# Patient Record
Sex: FEMALE | Race: WHITE | Employment: OTHER | URBAN - METROPOLITAN AREA
[De-identification: names, ages, dates, MRNs, and addresses within clinical notes are randomized per-mention and may not be internally consistent; named-entity substitution may affect disease eponyms.]

---

## 2017-01-21 ENCOUNTER — APPOINTMENT (OUTPATIENT)
Dept: GENERAL RADIOLOGY | Facility: HOSPITAL | Age: 72
End: 2017-01-21
Attending: HOSPITALIST
Payer: MEDICARE

## 2017-01-21 ENCOUNTER — APPOINTMENT (OUTPATIENT)
Dept: CT IMAGING | Facility: HOSPITAL | Age: 72
End: 2017-01-21
Attending: HOSPITALIST
Payer: MEDICARE

## 2017-01-21 ENCOUNTER — HOSPITAL ENCOUNTER (OUTPATIENT)
Facility: HOSPITAL | Age: 72
Setting detail: OBSERVATION
Discharge: HOME | End: 2017-01-22
Attending: EMERGENCY MEDICINE | Admitting: HOSPITALIST
Payer: MEDICARE

## 2017-01-21 DIAGNOSIS — I48.92 ATRIAL FLUTTER, PAROXYSMAL (HCC): ICD-10-CM

## 2017-01-21 DIAGNOSIS — R07.9 CHEST PAIN RADIATING TO JAW: Primary | ICD-10-CM

## 2017-01-21 PROBLEM — R73.9 HYPERGLYCEMIA: Status: ACTIVE | Noted: 2017-01-21

## 2017-01-21 PROBLEM — R79.89 AZOTEMIA: Status: ACTIVE | Noted: 2017-01-21

## 2017-01-21 LAB
ANION GAP SERPL CALC-SCNC: 8 MMOL/L (ref 0–18)
BASOPHILS # BLD: 0 K/UL (ref 0–0.2)
BASOPHILS NFR BLD: 1 %
BUN SERPL-MCNC: 17 MG/DL (ref 8–20)
BUN/CREAT SERPL: 22.1 (ref 10–20)
CALCIUM SERPL-MCNC: 9.5 MG/DL (ref 8.5–10.5)
CHLORIDE SERPL-SCNC: 104 MMOL/L (ref 95–110)
CO2 SERPL-SCNC: 28 MMOL/L (ref 22–32)
CREAT SERPL-MCNC: 0.77 MG/DL (ref 0.5–1.5)
D DIMER PPP FEU-MCNC: 2.53 MCG/ML
EOSINOPHIL # BLD: 0.1 K/UL (ref 0–0.7)
EOSINOPHIL NFR BLD: 2 %
ERYTHROCYTE [DISTWIDTH] IN BLOOD BY AUTOMATED COUNT: 12.5 % (ref 11–15)
GLUCOSE SERPL-MCNC: 100 MG/DL (ref 70–99)
HCT VFR BLD AUTO: 41.8 % (ref 35–48)
HGB BLD-MCNC: 14 G/DL (ref 12–16)
LYMPHOCYTES # BLD: 1.7 K/UL (ref 1–4)
LYMPHOCYTES NFR BLD: 29 %
MCH RBC QN AUTO: 31.5 PG (ref 27–32)
MCHC RBC AUTO-ENTMCNC: 33.5 G/DL (ref 32–37)
MCV RBC AUTO: 94 FL (ref 80–100)
MONOCYTES # BLD: 0.5 K/UL (ref 0–1)
MONOCYTES NFR BLD: 8 %
NEUTROPHILS # BLD AUTO: 3.6 K/UL (ref 1.8–7.7)
NEUTROPHILS NFR BLD: 61 %
OSMOLALITY UR CALC.SUM OF ELEC: 292 MOSM/KG (ref 275–295)
PLATELET # BLD AUTO: 281 K/UL (ref 140–400)
PMV BLD AUTO: 8 FL (ref 7.4–10.3)
POTASSIUM SERPL-SCNC: 3.9 MMOL/L (ref 3.3–5.1)
RBC # BLD AUTO: 4.44 M/UL (ref 3.7–5.4)
SODIUM SERPL-SCNC: 140 MMOL/L (ref 136–144)
TROPONIN I SERPL-MCNC: 0 NG/ML (ref ?–0.03)
TSH SERPL-ACNC: 1.48 UIU/ML (ref 0.34–5.6)
WBC # BLD AUTO: 6 K/UL (ref 4–11)

## 2017-01-21 PROCEDURE — 71010 XR CHEST AP PORTABLE  (CPT=71010): CPT

## 2017-01-21 PROCEDURE — 99220 INITIAL OBSERVATION CARE,LEVL III: CPT | Performed by: HOSPITALIST

## 2017-01-21 PROCEDURE — 71260 CT THORAX DX C+: CPT

## 2017-01-21 RX ORDER — ONDANSETRON 4 MG/1
4 TABLET, FILM COATED ORAL EVERY 8 HOURS PRN
Status: DISCONTINUED | OUTPATIENT
Start: 2017-01-21 | End: 2017-01-22

## 2017-01-21 RX ORDER — OMEPRAZOLE 20 MG/1
20 CAPSULE, DELAYED RELEASE ORAL
COMMUNITY
End: 2017-01-31 | Stop reason: DRUGHIGH

## 2017-01-21 RX ORDER — PANTOPRAZOLE SODIUM 20 MG/1
20 TABLET, DELAYED RELEASE ORAL NIGHTLY
Status: DISCONTINUED | OUTPATIENT
Start: 2017-01-21 | End: 2017-01-22

## 2017-01-21 RX ORDER — ACETAMINOPHEN 325 MG/1
650 TABLET ORAL EVERY 6 HOURS PRN
Status: DISCONTINUED | OUTPATIENT
Start: 2017-01-21 | End: 2017-01-22

## 2017-01-21 RX ORDER — CETIRIZINE HYDROCHLORIDE 10 MG/1
10 TABLET ORAL DAILY
Status: DISCONTINUED | OUTPATIENT
Start: 2017-01-21 | End: 2017-01-22

## 2017-01-21 RX ORDER — PRAVASTATIN SODIUM 10 MG
10 TABLET ORAL NIGHTLY
Status: DISCONTINUED | OUTPATIENT
Start: 2017-01-21 | End: 2017-01-22

## 2017-01-21 RX ORDER — PANTOPRAZOLE SODIUM 20 MG/1
20 TABLET, DELAYED RELEASE ORAL
Status: DISCONTINUED | OUTPATIENT
Start: 2017-01-22 | End: 2017-01-21

## 2017-01-21 RX ORDER — ATENOLOL 25 MG/1
25 TABLET ORAL
Status: DISCONTINUED | OUTPATIENT
Start: 2017-01-22 | End: 2017-01-22

## 2017-01-21 RX ORDER — ASPIRIN 81 MG/1
81 TABLET ORAL DAILY
Status: DISCONTINUED | OUTPATIENT
Start: 2017-01-22 | End: 2017-01-22

## 2017-01-21 NOTE — CONSULTS
[facility] AND [facility]  Cardiology Consultation    Sera Mccabe Patient Status:  Observation    1945 MRN [id number]   Location [address] [facility] Attending Lina Meraz, [address] Day # 0 PCP Balaji Guerrero MD     Reason for [facility] • Breast Cancer Mother 61     approx   • Other[other] [OTHER] Mother    • Other[other] [OTHER] Other      per ng lupus erythematosus   • Heart Disease Other      per ng CAD, vein, artery, liver disease, arthritis   • MS[other] Marquez Santamaria Sister       report Without clubbing, cyanosis or edema. Peripheral pulses are 2+. Neurologic: Alert and oriented, normal affect. Skin: Warm and dry.      Laboratory Data:    Lab Results  Component Value Date   WBC 6.0 01/21/2017   HGB 14.0 01/21/2017   HCT 41.8 01/21/2017

## 2017-01-21 NOTE — ED PROVIDER NOTES
Patient Seen in: [facility] AND CLINICS 1sw    History   Patient presents with:  Chest Pain Angina (cardiovascular)    Stated Complaint: chest pain    HPI    The patient is a 26-year-old female who woke up this morning with sharp left-sided face pain.   She to HCl (ANTIVERT) 12.5 MG Oral Tab,     Ondansetron HCl (ZOFRAN) 4 mg tablet,     Probiotic Product (PROBIOTIC DAILY OR),  Take by mouth daily. Calcium Carbonate-Vitamin D (CALCIUM + D OR),  Take by mouth daily.  Take 500mg & 1000 IU Vit D daily   GLUCOSAMIN 96%        Physical Exam   Constitutional: She is oriented to person, place, and time. She appears well-developed and well-nourished. HENT:   Head: Normocephalic and atraumatic.    Mouth/Throat: Oropharynx is clear and moist.   Eyes: Conjunctivae and EOM DRAW LAVENDER   RAINBOW DRAW LIGHT GREEN   RAINBOW DRAW LAVENDER TALL (BNP)   RAINBOW DRAW DARK GREEN   CBC W/ DIFFERENTIAL      EKG    Rate, intervals and axes as noted on EKG Report.   Rate: 109  Rhythm: Atrial Fibrillation with rapid ventricular rate  Re

## 2017-01-21 NOTE — H&P
[facility]    History & Physical    Nataliafarhana Bermudez Patient Status:  Observation    1945 MRN [id number]   Location [facility] Attending Gaviota Ravi, [address] [facility] Day # 0 PCP Chiqui Guerra MD     Date:  2017 LEO  2006    Comment per jessenia  uvjamal removed      Family History   Problem Relation Age of Onset   • Cancer Father      per jessenia lung   • Breast Cancer Mother 61     approx   • Other[other] [OTHER] Mother    • Other[other] [OTHER] Other      per  lupus vicky vision  Ears, nose, mouth, throat, and face: negative  Respiratory: negative for cough and dyspnea on exertion  Cardiovascular: pos for cp and facial pain   Gastrointestinal: negative for constipation, diarrhea, melena, nausea and vomiting  Genitourinary:n 01/21/2017   * 01/21/2017   CA 9.5 01/21/2017   ALB 3.6 10/25/2016   ALKPHO 87 10/25/2016   BILT 0.8 10/25/2016   TP 7.6 10/25/2016   AST 21 10/25/2016   ALT 20 10/25/2016   TSH 1.48 01/21/2017   CRP <0.5 12/12/2015   MG 2.1 12/12/2015   TROP 0.00 0

## 2017-01-22 ENCOUNTER — APPOINTMENT (OUTPATIENT)
Dept: NUCLEAR MEDICINE | Facility: HOSPITAL | Age: 72
End: 2017-01-22
Attending: HOSPITALIST
Payer: MEDICARE

## 2017-01-22 ENCOUNTER — APPOINTMENT (OUTPATIENT)
Dept: CV DIAGNOSTICS | Facility: HOSPITAL | Age: 72
End: 2017-01-22
Attending: HOSPITALIST
Payer: MEDICARE

## 2017-01-22 VITALS
TEMPERATURE: 98 F | SYSTOLIC BLOOD PRESSURE: 141 MMHG | WEIGHT: 177.38 LBS | HEART RATE: 70 BPM | RESPIRATION RATE: 18 BRPM | OXYGEN SATURATION: 97 % | HEIGHT: 62 IN | BODY MASS INDEX: 32.64 KG/M2 | DIASTOLIC BLOOD PRESSURE: 65 MMHG

## 2017-01-22 LAB
ANION GAP SERPL CALC-SCNC: 6 MMOL/L (ref 0–18)
BASOPHILS # BLD: 0 K/UL (ref 0–0.2)
BASOPHILS NFR BLD: 1 %
BUN SERPL-MCNC: 17 MG/DL (ref 8–20)
BUN/CREAT SERPL: 23.6 (ref 10–20)
CALCIUM SERPL-MCNC: 9.1 MG/DL (ref 8.5–10.5)
CHLORIDE SERPL-SCNC: 106 MMOL/L (ref 95–110)
CO2 SERPL-SCNC: 28 MMOL/L (ref 22–32)
CREAT SERPL-MCNC: 0.72 MG/DL (ref 0.5–1.5)
EOSINOPHIL # BLD: 0.1 K/UL (ref 0–0.7)
EOSINOPHIL NFR BLD: 2 %
ERYTHROCYTE [DISTWIDTH] IN BLOOD BY AUTOMATED COUNT: 12.5 % (ref 11–15)
GLUCOSE SERPL-MCNC: 89 MG/DL (ref 70–99)
HCT VFR BLD AUTO: 36.2 % (ref 35–48)
HGB BLD-MCNC: 12.4 G/DL (ref 12–16)
LYMPHOCYTES # BLD: 2.1 K/UL (ref 1–4)
LYMPHOCYTES NFR BLD: 36 %
MCH RBC QN AUTO: 32.2 PG (ref 27–32)
MCHC RBC AUTO-ENTMCNC: 34.4 G/DL (ref 32–37)
MCV RBC AUTO: 93.6 FL (ref 80–100)
MONOCYTES # BLD: 0.5 K/UL (ref 0–1)
MONOCYTES NFR BLD: 9 %
NEUTROPHILS # BLD AUTO: 3 K/UL (ref 1.8–7.7)
NEUTROPHILS NFR BLD: 52 %
OSMOLALITY UR CALC.SUM OF ELEC: 291 MOSM/KG (ref 275–295)
PLATELET # BLD AUTO: 242 K/UL (ref 140–400)
PMV BLD AUTO: 7.7 FL (ref 7.4–10.3)
POTASSIUM SERPL-SCNC: 3.9 MMOL/L (ref 3.3–5.1)
RBC # BLD AUTO: 3.87 M/UL (ref 3.7–5.4)
SODIUM SERPL-SCNC: 140 MMOL/L (ref 136–144)
WBC # BLD AUTO: 5.7 K/UL (ref 4–11)

## 2017-01-22 PROCEDURE — 93016 CV STRESS TEST SUPVJ ONLY: CPT | Performed by: INTERNAL MEDICINE

## 2017-01-22 PROCEDURE — 93306 TTE W/DOPPLER COMPLETE: CPT | Performed by: INTERNAL MEDICINE

## 2017-01-22 PROCEDURE — 99217 OBSERVATION CARE DISCHARGE: CPT | Performed by: HOSPITALIST

## 2017-01-22 PROCEDURE — 78452 HT MUSCLE IMAGE SPECT MULT: CPT

## 2017-01-22 PROCEDURE — 93018 CV STRESS TEST I&R ONLY: CPT | Performed by: INTERNAL MEDICINE

## 2017-01-22 PROCEDURE — 93306 TTE W/DOPPLER COMPLETE: CPT

## 2017-01-22 PROCEDURE — 93017 CV STRESS TEST TRACING ONLY: CPT

## 2017-01-22 RX ORDER — SODIUM CHLORIDE 0.9 % (FLUSH) 0.9 %
SYRINGE (ML) INJECTION
Status: COMPLETED
Start: 2017-01-22 | End: 2017-01-22

## 2017-01-22 RX ORDER — SODIUM CHLORIDE 9 MG/ML
INJECTION, SOLUTION INTRAVENOUS
Status: COMPLETED
Start: 2017-01-22 | End: 2017-01-22

## 2017-01-22 RX ORDER — ASPIRIN 325 MG
325 TABLET ORAL DAILY
Qty: 30 TABLET | Refills: 0 | Status: SHIPPED | OUTPATIENT
Start: 2017-01-22 | End: 2017-01-30 | Stop reason: ALTCHOICE

## 2017-01-22 NOTE — PLAN OF CARE
CARDIOVASCULAR - ADULT    • Maintains optimal cardiac output and hemodynamic stability Adequate for Discharge    • Absence of cardiac arrhythmias or at baseline Adequate for Discharge        Patient/Family Goals    • Patient/Family Long Term Goal: Remain o

## 2017-01-22 NOTE — DISCHARGE SUMMARY
[place] HOSPITALIST  DISCHARGE SUMMARY     Inessameryl Taveras Pascual Patient Status:  Observation    1945 MRN [id number]   Location [facility] Attending Kelsey Herrera MD   [address] Day # 1 PCP Chiqui Guerra MD     Date of Admission:  Prescription details    aspirin 325 MG Tabs        Take 1 tablet (325 mg total) by mouth daily.     Stop taking on:  2/21/2017   Quantity:  30 tablet   Refills:  0         CONTINUE taking these medications       Instructions Prescription details    atenolol [address] [id number]     Phone:  [phone]    - aspirin 325 MG Tabs          Discharge Condition: Medically Stable for discharge. Discharge Diet: .  AHA    Discharge Activity: As tolerated     Follow-up appointment:   Isai Bull

## 2017-01-22 NOTE — PROGRESS NOTES
[facility] AND [facility]  Cardiology Progress Note    Adrianna Silvana Patient Status:  Observation    1945 MRN [id number]   Location [facility] Attending Herlinda Singleton MD   Hosp Day # 1 PCP Lee Rutherford MD       Subjective: Feeling ALPHOS, TBIL, DBIL, TPROT    Recent Labs   Lab  01/21/17   0840  01/21/17   1048  01/21/17   1447   TROP  0.00  0.00  0.00       Allergies:     Pollen                  Other (See Comments)    Medications:    Current Facility-Administered Medications:  aten

## 2017-01-22 NOTE — IMAGING NOTE
Pt here for a regadenoson stress test. Baseline EKG SB. While walking during test, pt had an intermittent RBBB. Tolerated test without incident until in recovery.  At 40 seconds into recovery pt became unresponsive and threw her head back while sitting in t

## 2017-01-22 NOTE — PLAN OF CARE
CARDIOVASCULAR - ADULT    • Maintains optimal cardiac output and hemodynamic stability Progressing    • Absence of cardiac arrhythmias or at baseline Progressing        Patient/Family Goals    • Patient/Family Long Term Goal: Remain out of hospital [place]

## 2017-01-30 ENCOUNTER — OFFICE VISIT (OUTPATIENT)
Dept: CARDIOLOGY CLINIC | Facility: HOSPITAL | Age: 72
End: 2017-01-30
Attending: INTERNAL MEDICINE
Payer: MEDICARE

## 2017-01-30 VITALS
HEART RATE: 51 BPM | DIASTOLIC BLOOD PRESSURE: 67 MMHG | SYSTOLIC BLOOD PRESSURE: 125 MMHG | WEIGHT: 178 LBS | OXYGEN SATURATION: 99 % | BODY MASS INDEX: 33 KG/M2 | RESPIRATION RATE: 17 BRPM

## 2017-01-30 DIAGNOSIS — K22.719 BARRETT'S ESOPHAGUS WITH DYSPLASIA: ICD-10-CM

## 2017-01-30 DIAGNOSIS — I48.0 PAROXYSMAL ATRIAL FIBRILLATION (HCC): ICD-10-CM

## 2017-01-30 DIAGNOSIS — R12 HEART BURN: ICD-10-CM

## 2017-01-30 DIAGNOSIS — I10 ESSENTIAL HYPERTENSION: ICD-10-CM

## 2017-01-30 DIAGNOSIS — I48.91 ATRIAL FIBRILLATION (HCC): Primary | ICD-10-CM

## 2017-01-30 DIAGNOSIS — I48.92 ATRIAL FLUTTER, PAROXYSMAL (HCC): ICD-10-CM

## 2017-01-30 PROCEDURE — 99214 OFFICE O/P EST MOD 30 MIN: CPT | Performed by: NURSE PRACTITIONER

## 2017-01-30 PROCEDURE — 93005 ELECTROCARDIOGRAM TRACING: CPT

## 2017-01-30 PROCEDURE — 93010 ELECTROCARDIOGRAM REPORT: CPT | Performed by: NURSE PRACTITIONER

## 2017-01-30 PROCEDURE — 99211 OFF/OP EST MAY X REQ PHY/QHP: CPT | Performed by: NURSE PRACTITIONER

## 2017-01-30 RX ORDER — RANITIDINE 150 MG/1
150 TABLET ORAL
COMMUNITY
End: 2019-08-21 | Stop reason: ALTCHOICE

## 2017-01-30 NOTE — PROGRESS NOTES
[address]  Patient Status:  Outpatient    1945 MRN [id number]   Location MD Riley Carvajal MD Kris Curlin is a 70year old female who p AM   BUN 17 01/22/2017 05:37 AM    01/22/2017 05:37 AM   K 3.9 01/22/2017 05:37 AM    01/22/2017 05:37 AM   CO2 28 01/22/2017 05:37 AM   GLU 89 01/22/2017 05:37 AM   CA 9.1 01/22/2017 05:37 AM   ALB 3.6 10/25/2016 08:20 AM   ALKPHO 87 10/25/201 esophagitis following with Dr. Janie Aldana  -recurrent mild heart burn after dinner with increased asa 325 mg  - no sign of bleeding, cbc nml  - on omeprazole 20 mg daily , taking with dinner  - prn zantac        Decision Making:   Here for follow up for hos walking at a slow to moderate pace for 5-10 minutes 2-3 times a day. Pace your activity to prevent shortness of breath or fatigue.  Stop exercise if you develop chest pain, lightheadedness, or significant shortness of breath          I spent greater than 40

## 2017-01-30 NOTE — PATIENT INSTRUCTIONS
Continue all your same medications and may take a coated aspirin 325 mg daily    Begin taking your omeprazole 20 mg tablet daily, take about 30 minutes before breakfast on an empty stomach    Call if having any dizziness, lightheadedness, heart racing, pal

## 2017-01-31 ENCOUNTER — OFFICE VISIT (OUTPATIENT)
Dept: INTERNAL MEDICINE CLINIC | Facility: CLINIC | Age: 72
End: 2017-01-31

## 2017-01-31 VITALS
HEIGHT: 62 IN | BODY MASS INDEX: 32.76 KG/M2 | TEMPERATURE: 98 F | RESPIRATION RATE: 18 BRPM | SYSTOLIC BLOOD PRESSURE: 115 MMHG | HEART RATE: 47 BPM | WEIGHT: 178 LBS | DIASTOLIC BLOOD PRESSURE: 70 MMHG

## 2017-01-31 DIAGNOSIS — K21.00 REFLUX ESOPHAGITIS: ICD-10-CM

## 2017-01-31 DIAGNOSIS — I10 ESSENTIAL HYPERTENSION: Primary | ICD-10-CM

## 2017-01-31 DIAGNOSIS — E78.2 HYPERLIPIDEMIA, MIXED: ICD-10-CM

## 2017-01-31 DIAGNOSIS — I48.0 PAROXYSMAL ATRIAL FIBRILLATION (HCC): ICD-10-CM

## 2017-01-31 PROCEDURE — 99212 OFFICE O/P EST SF 10 MIN: CPT | Performed by: INTERNAL MEDICINE

## 2017-01-31 PROCEDURE — 99214 OFFICE O/P EST MOD 30 MIN: CPT | Performed by: INTERNAL MEDICINE

## 2017-01-31 RX ORDER — OMEPRAZOLE 40 MG/1
40 CAPSULE, DELAYED RELEASE ORAL DAILY
Qty: 90 CAPSULE | Refills: 3 | Status: SHIPPED
Start: 2017-01-31 | End: 2017-09-07

## 2017-02-15 ENCOUNTER — OFFICE VISIT (OUTPATIENT)
Dept: CARDIOLOGY CLINIC | Facility: CLINIC | Age: 72
End: 2017-02-15

## 2017-02-15 VITALS
HEART RATE: 60 BPM | HEIGHT: 62 IN | BODY MASS INDEX: 33.31 KG/M2 | RESPIRATION RATE: 18 BRPM | DIASTOLIC BLOOD PRESSURE: 58 MMHG | SYSTOLIC BLOOD PRESSURE: 118 MMHG | WEIGHT: 181 LBS

## 2017-02-15 DIAGNOSIS — I48.0 PAROXYSMAL ATRIAL FIBRILLATION (HCC): Primary | ICD-10-CM

## 2017-02-15 PROCEDURE — 99214 OFFICE O/P EST MOD 30 MIN: CPT | Performed by: INTERNAL MEDICINE

## 2017-02-15 PROCEDURE — 99212 OFFICE O/P EST SF 10 MIN: CPT | Performed by: INTERNAL MEDICINE

## 2017-02-15 NOTE — PROGRESS NOTES
Rosie Stratton is a 70year old female. Patient presents with:  Hospital F/U    HPI:   Patient is here for follow-up after hospitalization.   She has history of hypertension and acid reflux, Ramírez's esophagitis who presents to the hospital with com Rfl:    omega-3 fatty acids (FISH OIL) 1000 MG Oral Cap Take 500 mg by mouth 2 (two) times daily. Disp:  Rfl:    Multiple Vitamins-Minerals (MULTI FOR HER OR) Take 1 tablet by mouth daily.    Disp:  Rfl:         Past Medical History   Diagnosis Date   • D anticoagulation with Coumadin, Javier and aspirin discussed with the patient. Risks including but not limited to bleeding complications and risk of strokes were discussed. Patient would like to think about it and let us know.   She has an appointment with

## 2017-02-15 NOTE — PATIENT INSTRUCTIONS
Continue current medications. Consider switching from aspirin to Pradaxa. Follow up with APN in 4 weeks after appointment with gastroenterology.

## 2017-02-20 ENCOUNTER — PATIENT MESSAGE (OUTPATIENT)
Dept: INTERNAL MEDICINE CLINIC | Facility: CLINIC | Age: 72
End: 2017-02-20

## 2017-02-22 NOTE — TELEPHONE ENCOUNTER
From: Minna Pastor  To: Lorne Almonte MD  Sent: 2/20/2017 5:22 PM CST  Subject: Visit Follow-up Question    Dr. Ziyad Montero,  Following my visit with you, I also had a consultation with Dr. Chelly Hammonds regarding my A-Fib incident.  He feels that I shoul

## 2017-03-07 ENCOUNTER — OFFICE VISIT (OUTPATIENT)
Dept: GASTROENTEROLOGY | Facility: CLINIC | Age: 72
End: 2017-03-07

## 2017-03-07 VITALS
HEART RATE: 50 BPM | BODY MASS INDEX: 33.44 KG/M2 | SYSTOLIC BLOOD PRESSURE: 131 MMHG | HEIGHT: 61.3 IN | DIASTOLIC BLOOD PRESSURE: 68 MMHG | WEIGHT: 179.38 LBS

## 2017-03-07 DIAGNOSIS — K21.00 REFLUX ESOPHAGITIS: ICD-10-CM

## 2017-03-07 DIAGNOSIS — I48.0 PAROXYSMAL ATRIAL FIBRILLATION (HCC): ICD-10-CM

## 2017-03-07 DIAGNOSIS — Z87.19 HISTORY OF BARRETT'S ESOPHAGUS: Primary | ICD-10-CM

## 2017-03-07 PROCEDURE — 99212 OFFICE O/P EST SF 10 MIN: CPT | Performed by: INTERNAL MEDICINE

## 2017-03-07 PROCEDURE — 99214 OFFICE O/P EST MOD 30 MIN: CPT | Performed by: INTERNAL MEDICINE

## 2017-03-07 NOTE — H&P
History of present illness: This is a 77-year-old female patient of Dr. Chey Plasencia here in routine follow-up for history of Ramírez's. The patient was diagnosed with Ramírez's esophagus and her next surveillance endoscopy is not due until January 2018.   R

## 2017-03-07 NOTE — PROGRESS NOTES
HPI:    Patient ID: Sera Mccabe is a 70year old female. HPI    Review of Systems   Constitutional: Negative for fever, chills, diaphoresis, activity change, appetite change, fatigue and unexpected weight change.    HENT: Negative for sore thr Carbonate-Vitamin D (CALCIUM + D OR) Take by mouth daily. Take 500mg & 1000 IU Vit D daily Disp:  Rfl:    GLUCOSAMINE-CHONDROITIN OR Take 1 tablet by mouth 2 (two) times daily. Take Glucosamine 1500mg and Chondroitin 1100mg two times a day.   Disp:  Rfl: tenderness. There is no CVA tenderness. No hernia. Abdomen soft, nondistended, nontender. No scars, masses or hepatosplenomegaly. Musculoskeletal: Normal range of motion. She exhibits no edema or tenderness.    Lymphadenopathy:     She has no cervical

## 2017-03-07 NOTE — PATIENT INSTRUCTIONS
1. Take omeprazole daily with ranitidine for breakthrough symptoms as already prescribed. 2.  Next EGD in January 2018 as per recall    3. Start warfarin as per your cardiologist instructions with INR checked as needed.

## 2017-03-15 ENCOUNTER — OFFICE VISIT (OUTPATIENT)
Dept: CARDIOLOGY CLINIC | Facility: CLINIC | Age: 72
End: 2017-03-15

## 2017-03-15 VITALS
HEART RATE: 60 BPM | SYSTOLIC BLOOD PRESSURE: 90 MMHG | BODY MASS INDEX: 32.94 KG/M2 | HEIGHT: 62 IN | DIASTOLIC BLOOD PRESSURE: 60 MMHG | RESPIRATION RATE: 18 BRPM | WEIGHT: 179 LBS

## 2017-03-15 DIAGNOSIS — I48.0 PAROXYSMAL ATRIAL FIBRILLATION (HCC): ICD-10-CM

## 2017-03-15 DIAGNOSIS — I48.91 ATRIAL FIBRILLATION, UNSPECIFIED TYPE (HCC): Primary | ICD-10-CM

## 2017-03-15 PROCEDURE — 99212 OFFICE O/P EST SF 10 MIN: CPT | Performed by: NURSE PRACTITIONER

## 2017-03-15 PROCEDURE — 99213 OFFICE O/P EST LOW 20 MIN: CPT | Performed by: NURSE PRACTITIONER

## 2017-03-15 RX ORDER — WARFARIN SODIUM 5 MG/1
5 TABLET ORAL NIGHTLY
Qty: 30 TABLET | Refills: 0 | Status: SHIPPED | OUTPATIENT
Start: 2017-03-15 | End: 2017-04-14

## 2017-03-15 NOTE — PROGRESS NOTES
Deloris Grey is a 70year old female. Patient presents with:  Atrial Fibrillation    HPI:   Patient comes in today for a checkup to discuss anticoagulation.   She has a history of hypertension and acid reflux Ramírez's esophagus who is having some Disp:  Rfl:    DiphenhydrAMINE HCl (BENADRYL ALLERGY) 25 MG Oral Cap Take 25 mg by mouth as needed. Disp:  Rfl:    loratadine (CLARITIN) 10 MG Oral Tab Take  by mouth.  Disp:  Rfl:    omega-3 fatty acids (FISH OIL) 1000 MG Oral Cap Take 500 mg by mouth 2 type [facility])    -  Primary           Patient is doing well from a cardiac standpoint she is maintaining sinus rhythm however she does have risk factors that increase likelihood of stroke so we will start her on Coumadin 5 mg she will have an INR check on Mon

## 2017-03-20 ENCOUNTER — ANTI-COAG VISIT (OUTPATIENT)
Dept: INTERNAL MEDICINE CLINIC | Facility: CLINIC | Age: 72
End: 2017-03-20

## 2017-03-20 DIAGNOSIS — I48.91 ATRIAL FIBRILLATION, UNSPECIFIED TYPE (HCC): Primary | ICD-10-CM

## 2017-03-20 LAB — INR: 1.5 (ref 2–3)

## 2017-03-20 PROCEDURE — 85610 PROTHROMBIN TIME: CPT

## 2017-03-20 PROCEDURE — 99211 OFF/OP EST MAY X REQ PHY/QHP: CPT

## 2017-03-20 PROCEDURE — 36416 COLLJ CAPILLARY BLOOD SPEC: CPT

## 2017-03-24 ENCOUNTER — ANTI-COAG VISIT (OUTPATIENT)
Dept: INTERNAL MEDICINE CLINIC | Facility: CLINIC | Age: 72
End: 2017-03-24

## 2017-03-24 DIAGNOSIS — I48.91 ATRIAL FIBRILLATION, UNSPECIFIED TYPE (HCC): Primary | ICD-10-CM

## 2017-03-24 LAB — INR: 3 (ref 2–3)

## 2017-03-24 PROCEDURE — 99211 OFF/OP EST MAY X REQ PHY/QHP: CPT

## 2017-03-24 PROCEDURE — 85610 PROTHROMBIN TIME: CPT

## 2017-03-24 PROCEDURE — 36416 COLLJ CAPILLARY BLOOD SPEC: CPT

## 2017-03-31 ENCOUNTER — ANTI-COAG VISIT (OUTPATIENT)
Dept: INTERNAL MEDICINE CLINIC | Facility: CLINIC | Age: 72
End: 2017-03-31

## 2017-03-31 DIAGNOSIS — I48.91 ATRIAL FIBRILLATION, UNSPECIFIED TYPE (HCC): Primary | ICD-10-CM

## 2017-03-31 LAB — INR: 3.9 (ref 2–3)

## 2017-03-31 PROCEDURE — 99211 OFF/OP EST MAY X REQ PHY/QHP: CPT

## 2017-03-31 PROCEDURE — 85610 PROTHROMBIN TIME: CPT

## 2017-03-31 PROCEDURE — 36416 COLLJ CAPILLARY BLOOD SPEC: CPT

## 2017-04-14 ENCOUNTER — ANTI-COAG VISIT (OUTPATIENT)
Dept: INTERNAL MEDICINE CLINIC | Facility: CLINIC | Age: 72
End: 2017-04-14

## 2017-04-14 ENCOUNTER — OFFICE VISIT (OUTPATIENT)
Dept: INTERNAL MEDICINE CLINIC | Facility: CLINIC | Age: 72
End: 2017-04-14

## 2017-04-14 VITALS
SYSTOLIC BLOOD PRESSURE: 136 MMHG | BODY MASS INDEX: 33.13 KG/M2 | HEIGHT: 62 IN | DIASTOLIC BLOOD PRESSURE: 69 MMHG | TEMPERATURE: 98 F | OXYGEN SATURATION: 97 % | HEART RATE: 47 BPM | WEIGHT: 180 LBS

## 2017-04-14 DIAGNOSIS — I10 ESSENTIAL HYPERTENSION WITH GOAL BLOOD PRESSURE LESS THAN 140/90: Primary | ICD-10-CM

## 2017-04-14 DIAGNOSIS — R06.83 SNORING: ICD-10-CM

## 2017-04-14 DIAGNOSIS — Z78.0 POSTMENOPAUSAL: ICD-10-CM

## 2017-04-14 DIAGNOSIS — I48.0 PAROXYSMAL ATRIAL FIBRILLATION (HCC): ICD-10-CM

## 2017-04-14 DIAGNOSIS — R29.818 SUSPECTED SLEEP APNEA: ICD-10-CM

## 2017-04-14 DIAGNOSIS — I48.91 ATRIAL FIBRILLATION, UNSPECIFIED TYPE (HCC): Primary | ICD-10-CM

## 2017-04-14 DIAGNOSIS — E78.5 HYPERLIPIDEMIA, UNSPECIFIED HYPERLIPIDEMIA TYPE: ICD-10-CM

## 2017-04-14 PROCEDURE — G0463 HOSPITAL OUTPT CLINIC VISIT: HCPCS | Performed by: INTERNAL MEDICINE

## 2017-04-14 PROCEDURE — 85610 PROTHROMBIN TIME: CPT

## 2017-04-14 PROCEDURE — 90670 PCV13 VACCINE IM: CPT | Performed by: INTERNAL MEDICINE

## 2017-04-14 PROCEDURE — 99214 OFFICE O/P EST MOD 30 MIN: CPT | Performed by: INTERNAL MEDICINE

## 2017-04-14 PROCEDURE — G0009 ADMIN PNEUMOCOCCAL VACCINE: HCPCS | Performed by: INTERNAL MEDICINE

## 2017-04-14 PROCEDURE — 36416 COLLJ CAPILLARY BLOOD SPEC: CPT

## 2017-04-14 RX ORDER — WARFARIN SODIUM 5 MG/1
5 TABLET ORAL NIGHTLY
Qty: 90 TABLET | Refills: 1 | Status: SHIPPED | OUTPATIENT
Start: 2017-04-14 | End: 2017-07-07 | Stop reason: SDUPTHER

## 2017-04-14 NOTE — PROGRESS NOTES
HPI:    Patient ID: Adiel Moe is a 70year old female.     HPI  Establish care   Pt of Dr Graciela Boeck    HTN  Long standing history of hypertension  More than a year haylee  Status::::: controlled:::::::::::::::::::::::::::::y   sympotms  :        Head length about diet and monitoring she understands what this will entail.  We will have her come back and see Dr. Austen Parrish in 6 months sooner if any questions or problems  The patient indicates understanding of these issues and agrees to the plan.   The patient surgery    INJECTION; TENDON ORIGIN/INSERTION      Comment per ng injection tendon sheath, ligament    CORTISONE INJECTION Right 2010    Comment per ng post op right forefoot    EXCISE HAND/FOOT NEUROMA Right 2007    Comment per ng neuroma 2 , grupo Probiotic Product (PROBIOTIC DAILY OR) Take by mouth daily. Disp:  Rfl:    Calcium Carbonate-Vitamin D (CALCIUM + D OR) Take by mouth daily.  Take 500mg & 1000 IU Vit D daily Disp:  Rfl:    GLUCOSAMINE-CHONDROITIN OR Take 1 tablet by mouth 2 (two) times d PERFORMED  2015      Family History   Problem Relation Age of Onset   • Cancer Father      per ng lung   • Breast Cancer Mother 61     approx   • Other[other] [OTHER] Mother    • Other[other] [OTHER] Other      per ng lupus erythematosus   • Heart Disease  --------------------------  Marked sinus Bradycardia  LAD  BORDERLINE RHYTHM  When compared with ECG of 01/21/2017 11:11:50  No significant changes  Electronically signed on 01/30/2017 at 15:09 by Thieron Weston MD                ASSESSMENT/PLAN:   (I1

## 2017-04-19 ENCOUNTER — HOSPITAL ENCOUNTER (OUTPATIENT)
Dept: BONE DENSITY | Facility: HOSPITAL | Age: 72
Discharge: HOME | End: 2017-04-19
Attending: INTERNAL MEDICINE
Payer: MEDICARE

## 2017-04-19 DIAGNOSIS — Z78.0 POSTMENOPAUSAL: ICD-10-CM

## 2017-04-19 PROCEDURE — 77080 DXA BONE DENSITY AXIAL: CPT

## 2017-04-20 PROBLEM — I48.91 ATRIAL FIBRILLATION, UNSPECIFIED TYPE (HCC): Status: RESOLVED | Noted: 2017-03-15 | Resolved: 2017-04-20

## 2017-04-20 PROBLEM — R12 HEART BURN: Status: RESOLVED | Noted: 2017-01-30 | Resolved: 2017-04-20

## 2017-04-20 NOTE — PROGRESS NOTES
HPI:    Patient ID: Carmen Naik is a 70year old female. HPI patient was hospitalized with left-sided chest pain. Cleared quickly and she had a negative stress echo.   He had a short burst of atrial fibrillation was seen by cardiology and was 25 MG Oral Cap Take 25 mg by mouth as needed. Disp:  Rfl:    loratadine (CLARITIN) 10 MG Oral Tab Take  by mouth. Disp:  Rfl:    omega-3 fatty acids (FISH OIL) 1000 MG Oral Cap Take 500 mg by mouth 2 (two) times daily.    Disp:  Rfl:    Multiple Vitamins- exercise at least 3 times weekly to build strength, burn calories and help to achieve ideal body weight. 3. Paroxysmal atrial fibrillation (HCC)  Continue aspirin 325 mg daily and see Dr. Keith Rodriguez on 2-15    4.  Reflux esophagitis  Omeprazole 40 mg d

## 2017-04-22 ENCOUNTER — APPOINTMENT (OUTPATIENT)
Dept: LAB | Facility: HOSPITAL | Age: 72
End: 2017-04-22
Attending: INTERNAL MEDICINE
Payer: MEDICARE

## 2017-04-22 DIAGNOSIS — E78.5 HYPERLIPIDEMIA, UNSPECIFIED HYPERLIPIDEMIA TYPE: ICD-10-CM

## 2017-04-22 PROCEDURE — 36415 COLL VENOUS BLD VENIPUNCTURE: CPT

## 2017-04-22 PROCEDURE — 80061 LIPID PANEL: CPT

## 2017-04-28 ENCOUNTER — ANTI-COAG VISIT (OUTPATIENT)
Dept: INTERNAL MEDICINE CLINIC | Facility: CLINIC | Age: 72
End: 2017-04-28

## 2017-04-28 DIAGNOSIS — I48.0 PAROXYSMAL ATRIAL FIBRILLATION (HCC): Primary | ICD-10-CM

## 2017-04-28 PROCEDURE — 85610 PROTHROMBIN TIME: CPT

## 2017-04-28 PROCEDURE — 36416 COLLJ CAPILLARY BLOOD SPEC: CPT

## 2017-05-04 ENCOUNTER — OFFICE VISIT (OUTPATIENT)
Dept: RHEUMATOLOGY | Facility: CLINIC | Age: 72
End: 2017-05-04

## 2017-05-04 VITALS
WEIGHT: 181 LBS | TEMPERATURE: 98 F | BODY MASS INDEX: 33.31 KG/M2 | DIASTOLIC BLOOD PRESSURE: 80 MMHG | HEIGHT: 62 IN | SYSTOLIC BLOOD PRESSURE: 129 MMHG | HEART RATE: 52 BPM

## 2017-05-04 DIAGNOSIS — M25.531 RIGHT WRIST PAIN: ICD-10-CM

## 2017-05-04 DIAGNOSIS — M11.20 PSEUDOGOUT: ICD-10-CM

## 2017-05-04 DIAGNOSIS — G56.01 CARPAL TUNNEL SYNDROME OF RIGHT WRIST: Primary | ICD-10-CM

## 2017-05-04 PROCEDURE — 99214 OFFICE O/P EST MOD 30 MIN: CPT | Performed by: INTERNAL MEDICINE

## 2017-05-04 PROCEDURE — G0463 HOSPITAL OUTPT CLINIC VISIT: HCPCS | Performed by: INTERNAL MEDICINE

## 2017-05-04 NOTE — PATIENT INSTRUCTIONS
1. Try carpal tunnel brace - quinn jaun lopez   2. Information on carpal tunnel brace   3. Check right hand xray   4. Return to clinic in 2-3 months. Carpal Tunnel Syndrome    Carpal tunnel syndrome is a painful condition of the wrist and arm.  It is caused acetaminophen, which treats pain, but not inflammation. If you have chronic liver or kidney disease or ever had a stomach ulcer or GI bleeding, talk with your doctor before using these medicines.   · Opioid pain medicine will only give temporary relief and your wrist. When you can, use your whole hand and all its fingers to grasp an object. Minimize repetition  Don’t move your arms or hands or hold an object in the same way for long periods of time. Even simple, light tasks can cause injury this way.  Gabriel Hall

## 2017-05-04 NOTE — PROGRESS NOTES
Sera Mccabe is a 70year old female who presents for Patient presents with:  Joint Pain  . HPI:     He is a 79year old with hx of pseudogout and is here for evaluation on 12/11/2015. She has a hx of pseugo gout.  SHe had it in her knee sev kg)    Body mass index is 33.1 kg/(m^2). Current Outpatient Prescriptions:  Warfarin Sodium 5 MG Oral Tab Take 1 tablet (5 mg total) by mouth nightly. Or as directed by anticoagulation clinic.  Disp: 90 tablet Rfl: 1   Omeprazole 40 MG Oral Capsule D egd w/ biopsy   • Ramírez's esophagus    • Other and unspecified hyperlipidemia    • High cholesterol    • Paroxysmal atrial fibrillation ([facility] 2/15/2017   • Atrial fibrillation [facility]) 1/ 2017          Past Surgical History    COLONOSCOPY  8/26/11    OTHER nasal ulcers, no parotid swelling, no neck pain, no jaw pain, rare temple pain  Eyes: No visual changes,   Hx of iritis once a few years ago- before 1996.    CVS: No chest pain, no heart disease  RS: No SOB, no Cough, No Pleurtic pain,   GI: No nausea, no v 20.0 17.9 23.6 (H)   ANION GAP      0 - 18 8 7   CALCIUM      8.5 - 10.5 mg/dL 9.3 9.6   CALCULATED OSMOLALITY      275 - 295 mOsm/kg 291 295   AST      15 - 41 U/L 18 20   ALT (SGPT)      14 - 54 U/L 14 20   ALK PHOSPHATASE (P)      32 - 100 U/L 85 79   T 20    URINE SQUAMOUS EPITHELIAL CELL       Few    URINE WBC      0 - 5 /HPF 3    URINE RBC      0 - 3 /HPF 1    URINE BACTERIA      None seen Negative    MICROSCOPIC URINALYSIS COMMENT       Completed    HDL Cholesterol       36 41   CHOLESTEROL (P)      1 %  61    Lymphocytes %      25 - 46 %  23 (L)    Monocytes %      3 - 16 %  12    Eosinophils %      0 - 7 %  4    Basophils %      0 - 2 %  1    NEUTROPHILS #      1.8 - 7.7 K/UL  3.3    LYMPHOCYTES #      1.0 - 4.0 K/UL  1.3    MONOCYTES #      0.0 - 1.0 Component      Latest Ref Rng 1/22/2017 1/21/2017   WBC      4.0-11.0 K/UL 5.7    RBC      3.70-5.40 M/UL 3.87    Hemoglobin      12.0-16.0 g/dL 12.4    Hematocrit      35.0-48.0 % 36.2    MCV      80.0-100.0 fL 93.6    MCH      27.0-32.0 pg 32.2 (H) 18 wc, 68218 rbc, positive cppd   ASSESSMENT AND PLAN:   Jeremias Hauser is a 70year old female who presents for Patient presents with:  Joint Pain      1.  Intermittent pseudogout - d/w her that work up in past show no RA - had chornic synoviti

## 2017-05-11 ENCOUNTER — HOSPITAL ENCOUNTER (OUTPATIENT)
Dept: GENERAL RADIOLOGY | Facility: HOSPITAL | Age: 72
Discharge: HOME | End: 2017-05-11
Attending: INTERNAL MEDICINE
Payer: MEDICARE

## 2017-05-11 DIAGNOSIS — M11.20 PSEUDOGOUT: ICD-10-CM

## 2017-05-11 DIAGNOSIS — M25.531 RIGHT WRIST PAIN: ICD-10-CM

## 2017-05-11 PROCEDURE — 73130 X-RAY EXAM OF HAND: CPT | Performed by: INTERNAL MEDICINE

## 2017-05-26 ENCOUNTER — ANTI-COAG VISIT (OUTPATIENT)
Dept: INTERNAL MEDICINE CLINIC | Facility: CLINIC | Age: 72
End: 2017-05-26

## 2017-05-26 DIAGNOSIS — I48.20 CHRONIC ATRIAL FIBRILLATION (HCC): Primary | ICD-10-CM

## 2017-05-26 PROBLEM — I48.91 ATRIAL FIBRILLATION (HCC): Status: ACTIVE | Noted: 2017-05-26

## 2017-05-26 PROCEDURE — G0463 HOSPITAL OUTPT CLINIC VISIT: HCPCS

## 2017-05-26 PROCEDURE — 85610 PROTHROMBIN TIME: CPT

## 2017-05-26 PROCEDURE — 36416 COLLJ CAPILLARY BLOOD SPEC: CPT

## 2017-06-06 ENCOUNTER — OFFICE VISIT (OUTPATIENT)
Dept: SLEEP CENTER | Age: 72
End: 2017-06-06
Attending: INTERNAL MEDICINE
Payer: MEDICARE

## 2017-06-06 DIAGNOSIS — Z76.89 SLEEP CONCERN: Primary | ICD-10-CM

## 2017-06-06 PROCEDURE — 95810 POLYSOM 6/> YRS 4/> PARAM: CPT

## 2017-06-08 NOTE — PROCEDURES
[address] [facility]  Accredited by the [facility] Sleep Medicine ([facility])    PATIENT'S NAME: [facility]   ATTENDING PHYSICIAN: [address] Harjit Rodríguez MD   REFERRING PHYSICIAN: Balbina Travis.  Ambreen Rodríguez MD   PATIENT ACCOUNT #: [id number] LOC periodic limb movements for a periodic limb movement index of 175 events per hour of which 11.3 per hour were associated with arousal.  Lowest desaturation was to 90% and the average heart rate was 50 beats per minute with no significant bradycardia, asyst

## 2017-06-09 ENCOUNTER — ANTI-COAG VISIT (OUTPATIENT)
Dept: INTERNAL MEDICINE CLINIC | Facility: CLINIC | Age: 72
End: 2017-06-09

## 2017-06-09 DIAGNOSIS — I48.20 CHRONIC ATRIAL FIBRILLATION (HCC): Primary | ICD-10-CM

## 2017-06-09 PROCEDURE — 85610 PROTHROMBIN TIME: CPT

## 2017-06-09 PROCEDURE — 36416 COLLJ CAPILLARY BLOOD SPEC: CPT

## 2017-06-22 DIAGNOSIS — G47.33 OBSTRUCTIVE SLEEP APNEA SYNDROME: Primary | ICD-10-CM

## 2017-07-07 ENCOUNTER — ANTI-COAG VISIT (OUTPATIENT)
Dept: INTERNAL MEDICINE CLINIC | Facility: CLINIC | Age: 72
End: 2017-07-07

## 2017-07-07 DIAGNOSIS — I48.20 CHRONIC ATRIAL FIBRILLATION (HCC): ICD-10-CM

## 2017-07-07 LAB — INR: 2.1 (ref 2–3)

## 2017-07-07 PROCEDURE — 85610 PROTHROMBIN TIME: CPT

## 2017-07-07 PROCEDURE — 36416 COLLJ CAPILLARY BLOOD SPEC: CPT

## 2017-07-07 RX ORDER — WARFARIN SODIUM 5 MG/1
TABLET ORAL
Qty: 90 TABLET | Refills: 1 | Status: SHIPPED | OUTPATIENT
Start: 2017-07-07 | End: 2018-01-04 | Stop reason: SDUPTHER

## 2017-08-09 ENCOUNTER — OFFICE VISIT (OUTPATIENT)
Dept: CARDIOLOGY CLINIC | Facility: CLINIC | Age: 72
End: 2017-08-09

## 2017-08-09 ENCOUNTER — ANTI-COAG VISIT (OUTPATIENT)
Dept: INTERNAL MEDICINE CLINIC | Facility: CLINIC | Age: 72
End: 2017-08-09

## 2017-08-09 VITALS
HEART RATE: 58 BPM | RESPIRATION RATE: 16 BRPM | WEIGHT: 182 LBS | SYSTOLIC BLOOD PRESSURE: 118 MMHG | DIASTOLIC BLOOD PRESSURE: 64 MMHG | BODY MASS INDEX: 33 KG/M2

## 2017-08-09 DIAGNOSIS — I48.20 CHRONIC ATRIAL FIBRILLATION (HCC): ICD-10-CM

## 2017-08-09 DIAGNOSIS — I10 ESSENTIAL HYPERTENSION: ICD-10-CM

## 2017-08-09 DIAGNOSIS — I48.0 PAROXYSMAL ATRIAL FIBRILLATION (HCC): Primary | ICD-10-CM

## 2017-08-09 LAB — INR: 2.6 (ref 2–3)

## 2017-08-09 PROCEDURE — G0463 HOSPITAL OUTPT CLINIC VISIT: HCPCS | Performed by: INTERNAL MEDICINE

## 2017-08-09 PROCEDURE — 85610 PROTHROMBIN TIME: CPT

## 2017-08-09 PROCEDURE — 99214 OFFICE O/P EST MOD 30 MIN: CPT | Performed by: INTERNAL MEDICINE

## 2017-08-09 PROCEDURE — 36416 COLLJ CAPILLARY BLOOD SPEC: CPT

## 2017-08-09 NOTE — PROGRESS NOTES
Jeremias Hauser is a 70year old female. Patient presents with: Follow - Up: A-Fib incident in June    HPI:   Patient is here for follow-up appointment. She has history of paroxysmal atrial fibrillation and she is on anticoagulation for that.   She for Heartburn. Disp:  Rfl:    LORazepam (ATIVAN) 0.5 MG Oral Tab Take 0.5 mg by mouth as needed. Disp:  Rfl: 1   Meclizine HCl (ANTIVERT) 12.5 MG Oral Tab Take 12.5 mg by mouth as needed.    Disp:  Rfl: 0   Ondansetron HCl (ZOFRAN) 4 mg tablet Take 4 mg b edema    Assessment   ASSESSMENT AND PLAN:     Problem List Items Addressed This Visit     Essential hypertension    Paroxysmal atrial fibrillation ([facility] - Primary      Other Visit Diagnoses    None.          Paroxysmal atrial fibrillation with history of C

## 2017-08-09 NOTE — PATIENT INSTRUCTIONS
Continue current medications. Follow-up with Dr. Annita Mullen for sleep apnea. Follow-up with me in 6 months or sooner if needed.

## 2017-08-22 ENCOUNTER — OFFICE VISIT (OUTPATIENT)
Dept: PULMONOLOGY | Facility: CLINIC | Age: 72
End: 2017-08-22

## 2017-08-22 VITALS
HEIGHT: 62 IN | HEART RATE: 54 BPM | DIASTOLIC BLOOD PRESSURE: 69 MMHG | RESPIRATION RATE: 18 BRPM | BODY MASS INDEX: 33.93 KG/M2 | SYSTOLIC BLOOD PRESSURE: 108 MMHG | WEIGHT: 184.38 LBS | OXYGEN SATURATION: 95 %

## 2017-08-22 DIAGNOSIS — G47.33 OSA (OBSTRUCTIVE SLEEP APNEA): Primary | ICD-10-CM

## 2017-08-22 PROCEDURE — G0463 HOSPITAL OUTPT CLINIC VISIT: HCPCS | Performed by: INTERNAL MEDICINE

## 2017-08-22 PROCEDURE — 99204 OFFICE O/P NEW MOD 45 MIN: CPT | Performed by: INTERNAL MEDICINE

## 2017-08-22 NOTE — PROGRESS NOTES
Dear  Olga Lidia Londono  :           As you know, Gómez Vazquez is a 25-year-old female who I am now evaluating for sleep disordered breathing.        HISTORY OF PRESENT ILLNESS: I first met the patient about 17 years ago at which time she had polysomnography which did not de examination is unremarkable with pupils equal round and reactive to light and accommodation. Neck without adenopathy, thyromegaly, JVD nor bruit. Lungs clear to auscultation and percussion. Cardiac regular rate and rhythm no murmur.  Abdomen nontender, with

## 2017-09-07 ENCOUNTER — OFFICE VISIT (OUTPATIENT)
Dept: INTERNAL MEDICINE CLINIC | Facility: CLINIC | Age: 72
End: 2017-09-07

## 2017-09-07 ENCOUNTER — TELEPHONE (OUTPATIENT)
Dept: INTERNAL MEDICINE CLINIC | Facility: CLINIC | Age: 72
End: 2017-09-07

## 2017-09-07 ENCOUNTER — ANTI-COAG VISIT (OUTPATIENT)
Dept: INTERNAL MEDICINE CLINIC | Facility: CLINIC | Age: 72
End: 2017-09-07

## 2017-09-07 VITALS
HEART RATE: 48 BPM | DIASTOLIC BLOOD PRESSURE: 67 MMHG | TEMPERATURE: 98 F | BODY MASS INDEX: 32.94 KG/M2 | SYSTOLIC BLOOD PRESSURE: 103 MMHG | HEIGHT: 62 IN | WEIGHT: 179 LBS

## 2017-09-07 DIAGNOSIS — Z12.31 VISIT FOR SCREENING MAMMOGRAM: ICD-10-CM

## 2017-09-07 DIAGNOSIS — I48.0 PAROXYSMAL ATRIAL FIBRILLATION (HCC): ICD-10-CM

## 2017-09-07 DIAGNOSIS — M85.80 OSTEOPENIA WITH HIGH RISK OF FRACTURE: ICD-10-CM

## 2017-09-07 DIAGNOSIS — Z87.19 HISTORY OF BARRETT'S ESOPHAGUS: ICD-10-CM

## 2017-09-07 DIAGNOSIS — G47.33 OSA (OBSTRUCTIVE SLEEP APNEA): ICD-10-CM

## 2017-09-07 DIAGNOSIS — Z11.59 SCREENING FOR VIRAL DISEASE: ICD-10-CM

## 2017-09-07 DIAGNOSIS — I10 ESSENTIAL HYPERTENSION: Primary | ICD-10-CM

## 2017-09-07 DIAGNOSIS — E78.5 HYPERLIPIDEMIA, UNSPECIFIED HYPERLIPIDEMIA TYPE: ICD-10-CM

## 2017-09-07 LAB — INR: 1.8 (ref 2–3)

## 2017-09-07 PROCEDURE — 99214 OFFICE O/P EST MOD 30 MIN: CPT | Performed by: INTERNAL MEDICINE

## 2017-09-07 PROCEDURE — 36416 COLLJ CAPILLARY BLOOD SPEC: CPT

## 2017-09-07 PROCEDURE — G0463 HOSPITAL OUTPT CLINIC VISIT: HCPCS | Performed by: INTERNAL MEDICINE

## 2017-09-07 PROCEDURE — 85610 PROTHROMBIN TIME: CPT

## 2017-09-07 RX ORDER — OMEPRAZOLE 20 MG/1
20 CAPSULE, DELAYED RELEASE ORAL
Qty: 90 CAPSULE | Refills: 3 | Status: SHIPPED | OUTPATIENT
Start: 2017-09-07 | End: 2018-08-21

## 2017-09-07 RX ORDER — NICOTINE POLACRILEX 4 MG/1
20 GUM, CHEWING ORAL DAILY
Qty: 90 TABLET | Refills: 3 | Status: SHIPPED | OUTPATIENT
Start: 2017-09-07 | End: 2017-09-07

## 2017-09-07 RX ORDER — NICOTINE POLACRILEX 4 MG/1
GUM, CHEWING ORAL
COMMUNITY
End: 2017-09-07

## 2017-09-07 RX ORDER — ATENOLOL 25 MG/1
25 TABLET ORAL
Qty: 90 TABLET | Refills: 3 | Status: SHIPPED | OUTPATIENT
Start: 2017-09-07 | End: 2018-09-17

## 2017-09-07 RX ORDER — LOVASTATIN 10 MG/1
10 TABLET ORAL DAILY
Qty: 90 TABLET | Refills: 3 | Status: SHIPPED | OUTPATIENT
Start: 2017-09-07 | End: 2018-09-17

## 2017-09-07 NOTE — PROGRESS NOTES
HPI:    Patient ID: Rosie Stratton is a 70year old female.     HPI    Follow up    HTN  Long standing history of hypertension  More than a year haylee  Status::::: controlled:::::::::::::::::::::::::::::y   sympotms  :        Headache no  dizziness for agitation and behavioral problems. Current Outpatient Prescriptions:  Omeprazole 20 MG Oral Tab EC Take by mouth. Disp:  Rfl:    Warfarin Sodium 5 MG Oral Tab TAKE AS DIRECTED BY ANTICOAGULATION CLINIC: 1 tab daily, PO in Evening.  Disp: 90 ta 2012    pr ng egd w/ biopsy   • Gastritis    • Heartburn 2011    per ng reflux, dysphagia   • High cholesterol    • Other and unspecified hyperlipidemia    • [facility] 2/15/2017   • Snoring 2006    per ng uvulectomy      Past Lesia equal, round, and reactive to light. Right eye exhibits no discharge. Left eye exhibits no discharge. No scleral icterus. Neck: Neck supple. No thyromegaly present.    Cardiovascular: Normal rate, regular rhythm, normal heart sounds and intact distal puls CALCIUM      8.5 - 10.5 mg/dL 9.1      BUN/CREA RATIO      10.0 - 20.0 23.6 (H)      ANION GAP      0 - 18 6      CALCULATED OSMOLALITY      275 - 295 mOsm/kg 291      GFR, Non-      >=60 >60      GFR, -American      >=60 >60 sleep apnea (ICD-10 code G47.33). Severe periodic limb movements were identified of which 11 per hour were associated with arousal.     RECOMMENDATIONS:    1. CPAP titration. 2.                 Weight loss.   3.                 Avoid alcoh 32.0 pg 32.9 (H)   MCHC      32.0 - 37.0 g/dl 34.7   RDW      11.0 - 15.0 % 12.6   Platelet Count      347 - 400 K/   MEAN PLATELET VOLUME      7.4 - 10.3 fL 7.8   Neutrophils %      % 61   Lymphocytes %      % 28   Monocytes %      % 8   Eosinophils patients with mean peak bone mass and are given in standard deviation (s.d.). Each 1 s.d. corresponds to approximately 10% below peak normal bone density.        [employer]IZATION CRITERIA  NORMAL T SCORE:                Above -1 s.d.    OSTEOPENIA pt with chronic  somis badycardia  asymptmatic  CPM    (E78.5) Hyperlipidemia, unspecified hyperlipidemia type  Plan: ASSAY, THYROID STIM HORMONE, LIPID PANEL        Low chol diet  Monitor    (G47.33) YASMIN (obstructive sleep apnea)  Plan: ongoing evaluation

## 2017-09-07 NOTE — TELEPHONE ENCOUNTER
Pharmacy calling, omeprazole tabs not covered by insurance, request change to capsule, see pending rx

## 2017-09-16 ENCOUNTER — APPOINTMENT (OUTPATIENT)
Dept: LAB | Facility: HOSPITAL | Age: 72
End: 2017-09-16
Attending: INTERNAL MEDICINE
Payer: MEDICARE

## 2017-09-16 DIAGNOSIS — I10 ESSENTIAL HYPERTENSION: ICD-10-CM

## 2017-09-16 DIAGNOSIS — Z11.59 SCREENING FOR VIRAL DISEASE: ICD-10-CM

## 2017-09-16 DIAGNOSIS — E78.5 HYPERLIPIDEMIA, UNSPECIFIED HYPERLIPIDEMIA TYPE: ICD-10-CM

## 2017-09-16 LAB
ALBUMIN SERPL BCP-MCNC: 3.8 G/DL (ref 3.5–4.8)
ALBUMIN/GLOB SERPL: 1 {RATIO} (ref 1–2)
ALP SERPL-CCNC: 69 U/L (ref 32–100)
ALT SERPL-CCNC: 17 U/L (ref 14–54)
ANION GAP SERPL CALC-SCNC: 6 MMOL/L (ref 0–18)
AST SERPL-CCNC: 21 U/L (ref 15–41)
BILIRUB SERPL-MCNC: 0.6 MG/DL (ref 0.3–1.2)
BUN SERPL-MCNC: 17 MG/DL (ref 8–20)
BUN/CREAT SERPL: 19.8 (ref 10–20)
CALCIUM SERPL-MCNC: 9.6 MG/DL (ref 8.5–10.5)
CHLORIDE SERPL-SCNC: 105 MMOL/L (ref 95–110)
CHOLEST SERPL-MCNC: 159 MG/DL (ref 110–200)
CO2 SERPL-SCNC: 30 MMOL/L (ref 22–32)
CREAT SERPL-MCNC: 0.86 MG/DL (ref 0.5–1.5)
ERYTHROCYTE [DISTWIDTH] IN BLOOD BY AUTOMATED COUNT: 12.7 % (ref 11–15)
GLOBULIN PLAS-MCNC: 3.8 G/DL (ref 2.5–3.7)
GLUCOSE SERPL-MCNC: 92 MG/DL (ref 70–99)
HCT VFR BLD AUTO: 39.5 % (ref 35–48)
HDLC SERPL-MCNC: 44 MG/DL
HGB BLD-MCNC: 13.6 G/DL (ref 12–16)
LDLC SERPL CALC-MCNC: 100 MG/DL (ref 0–99)
MCH RBC QN AUTO: 32.7 PG (ref 27–32)
MCHC RBC AUTO-ENTMCNC: 34.3 G/DL (ref 32–37)
MCV RBC AUTO: 95.1 FL (ref 80–100)
NONHDLC SERPL-MCNC: 115 MG/DL
OSMOLALITY UR CALC.SUM OF ELEC: 293 MOSM/KG (ref 275–295)
PLATELET # BLD AUTO: 258 K/UL (ref 140–400)
PMV BLD AUTO: 8.3 FL (ref 7.4–10.3)
POTASSIUM SERPL-SCNC: 5.1 MMOL/L (ref 3.3–5.1)
PROT SERPL-MCNC: 7.6 G/DL (ref 5.9–8.4)
RBC # BLD AUTO: 4.15 M/UL (ref 3.7–5.4)
SODIUM SERPL-SCNC: 141 MMOL/L (ref 136–144)
TRIGL SERPL-MCNC: 75 MG/DL (ref 1–149)
TSH SERPL-ACNC: 1.21 UIU/ML (ref 0.45–5.33)
WBC # BLD AUTO: 4.8 K/UL (ref 4–11)

## 2017-09-16 PROCEDURE — 36415 COLL VENOUS BLD VENIPUNCTURE: CPT

## 2017-09-16 PROCEDURE — 84443 ASSAY THYROID STIM HORMONE: CPT

## 2017-09-16 PROCEDURE — 80053 COMPREHEN METABOLIC PANEL: CPT

## 2017-09-16 PROCEDURE — 86803 HEPATITIS C AB TEST: CPT

## 2017-09-16 PROCEDURE — 85027 COMPLETE CBC AUTOMATED: CPT

## 2017-09-16 PROCEDURE — 80061 LIPID PANEL: CPT

## 2017-09-18 LAB — HCV AB SERPL QL IA: NONREACTIVE

## 2017-09-22 ENCOUNTER — ANTI-COAG VISIT (OUTPATIENT)
Dept: INTERNAL MEDICINE CLINIC | Facility: CLINIC | Age: 72
End: 2017-09-22

## 2017-09-22 ENCOUNTER — TELEPHONE (OUTPATIENT)
Dept: INTERNAL MEDICINE CLINIC | Facility: CLINIC | Age: 72
End: 2017-09-22

## 2017-09-22 DIAGNOSIS — I48.20 CHRONIC ATRIAL FIBRILLATION (HCC): ICD-10-CM

## 2017-09-22 LAB — INR: 1.7 (ref 2–3)

## 2017-09-22 PROCEDURE — G0463 HOSPITAL OUTPT CLINIC VISIT: HCPCS

## 2017-09-22 PROCEDURE — 36416 COLLJ CAPILLARY BLOOD SPEC: CPT

## 2017-09-22 PROCEDURE — 85610 PROTHROMBIN TIME: CPT

## 2017-09-26 ENCOUNTER — OFFICE VISIT (OUTPATIENT)
Dept: SLEEP CENTER | Age: 72
End: 2017-09-26
Attending: INTERNAL MEDICINE
Payer: MEDICARE

## 2017-09-26 DIAGNOSIS — Z76.89 SLEEP CONCERN: Primary | ICD-10-CM

## 2017-09-26 PROCEDURE — 95810 POLYSOM 6/> YRS 4/> PARAM: CPT

## 2017-09-29 NOTE — PROCEDURES
[address] [facility]  Accredited by the [facility] ([facility])    PATIENT'S NAME: Severiano Cassius   ATTENDING PHYSICIAN: Shannon Wyatt MD   REFERRING PHYSICIAN: Shannon Wyatt MD   PATIENT ACCOUNT #: [id number] [facility] events per hour. The patient spent 319 minutes in the supine position. The respiratory event related arousal index was 2.8 events per hour and the spontaneous arousal index was 9.8 events per hour for a combined arousal index of 23 events per hour.   Monica Rea

## 2017-10-09 ENCOUNTER — PATIENT MESSAGE (OUTPATIENT)
Dept: INTERNAL MEDICINE CLINIC | Facility: CLINIC | Age: 72
End: 2017-10-09

## 2017-10-09 ENCOUNTER — ANTI-COAG VISIT (OUTPATIENT)
Dept: INTERNAL MEDICINE CLINIC | Facility: CLINIC | Age: 72
End: 2017-10-09

## 2017-10-09 DIAGNOSIS — I48.20 CHRONIC ATRIAL FIBRILLATION (HCC): ICD-10-CM

## 2017-10-09 PROCEDURE — 36416 COLLJ CAPILLARY BLOOD SPEC: CPT

## 2017-10-09 PROCEDURE — 85610 PROTHROMBIN TIME: CPT

## 2017-11-06 ENCOUNTER — ANTI-COAG VISIT (OUTPATIENT)
Dept: INTERNAL MEDICINE CLINIC | Facility: CLINIC | Age: 72
End: 2017-11-06

## 2017-11-06 DIAGNOSIS — I48.20 CHRONIC ATRIAL FIBRILLATION (HCC): ICD-10-CM

## 2017-11-06 PROCEDURE — 36416 COLLJ CAPILLARY BLOOD SPEC: CPT

## 2017-11-06 PROCEDURE — 85610 PROTHROMBIN TIME: CPT

## 2017-11-14 ENCOUNTER — HOSPITAL ENCOUNTER (OUTPATIENT)
Dept: MAMMOGRAPHY | Age: 72
Discharge: HOME | End: 2017-11-14
Attending: INTERNAL MEDICINE
Payer: MEDICARE

## 2017-11-14 DIAGNOSIS — Z12.31 VISIT FOR SCREENING MAMMOGRAM: ICD-10-CM

## 2017-11-14 PROCEDURE — 77067 SCR MAMMO BI INCL CAD: CPT | Performed by: INTERNAL MEDICINE

## 2017-12-01 ENCOUNTER — TELEPHONE (OUTPATIENT)
Dept: GASTROENTEROLOGY | Facility: CLINIC | Age: 72
End: 2017-12-01

## 2017-12-01 NOTE — TELEPHONE ENCOUNTER
----- Message from Santi Vega RN sent at 7/2/2015  4:28 PM CDT -----  Regarding: Recall EGD  Recall EGD in 3 years per ES. EGD done 1/2015.

## 2017-12-04 ENCOUNTER — ANTI-COAG VISIT (OUTPATIENT)
Dept: INTERNAL MEDICINE CLINIC | Facility: CLINIC | Age: 72
End: 2017-12-04

## 2017-12-04 DIAGNOSIS — I48.20 CHRONIC ATRIAL FIBRILLATION (HCC): ICD-10-CM

## 2017-12-04 PROCEDURE — G0463 HOSPITAL OUTPT CLINIC VISIT: HCPCS

## 2017-12-04 PROCEDURE — 36416 COLLJ CAPILLARY BLOOD SPEC: CPT

## 2017-12-04 PROCEDURE — 85610 PROTHROMBIN TIME: CPT

## 2017-12-11 ENCOUNTER — TELEPHONE (OUTPATIENT)
Dept: GASTROENTEROLOGY | Facility: CLINIC | Age: 72
End: 2017-12-11

## 2017-12-11 DIAGNOSIS — K22.70 BARRETT'S ESOPHAGUS WITHOUT DYSPLASIA: Primary | ICD-10-CM

## 2017-12-11 NOTE — TELEPHONE ENCOUNTER
Last Procedure:  1/15/2015  Last Diagnosis: History of Ramírez's esophagus    Recalled for (years): 3 years  Sedation used previously:  IV sedation  Last Prep Used (if known):  NPO after midnight  Anticoagulants/Diabetic Meds: Warfarin  Height & Weight/BMI

## 2017-12-11 NOTE — TELEPHONE ENCOUNTER
Pt called to schedule repeat EGD per letter received in mail. Pt would like to know if she needs to be seen before scheduling procedure and she also has concerns because she is on warfarin now. Please call.

## 2017-12-12 NOTE — TELEPHONE ENCOUNTER
Scheduled for:  EGD - [address]130  Provider Name:  Dr. Judi Byers  Date:  1/18/18  Location:  [facility]  Sedation:  IV  Time:  9:30 am (pt is aware to arrive at 8:30 am)  Prep:  NPO after midnight, Prep instructions were given to pt over the phone, pt verbalized understandi

## 2017-12-12 NOTE — TELEPHONE ENCOUNTER
Okay to schedule EGD, diagnosis Ramírez's esophagus, IV or MAC sedation, hold warfarin for 5 days prior to the procedure if okay with prescribing MD

## 2017-12-12 NOTE — TELEPHONE ENCOUNTER
Surgery Schedulers,    Cee Schmid MD is pt's cardiologist with [facility] [phone].  Please send this back to me after the pt is scheduled so I can get Coumadin orders    Thanks

## 2017-12-18 ENCOUNTER — ANTI-COAG VISIT (OUTPATIENT)
Dept: INTERNAL MEDICINE CLINIC | Facility: CLINIC | Age: 72
End: 2017-12-18

## 2017-12-18 DIAGNOSIS — I48.0 PAROXYSMAL ATRIAL FIBRILLATION (HCC): ICD-10-CM

## 2017-12-18 PROCEDURE — 85610 PROTHROMBIN TIME: CPT

## 2017-12-18 PROCEDURE — 36416 COLLJ CAPILLARY BLOOD SPEC: CPT

## 2017-12-22 NOTE — TELEPHONE ENCOUNTER
Ángel Casillas. Could you please call Chiqui Caballero in [place]. All they want to know is if pt can hold warfarin X 5 days before her procedure? So far they have gotten the run around between offices. Ex is [id number].

## 2017-12-22 NOTE — TELEPHONE ENCOUNTER
I spoke to the pt over the phone. She will call Luke SALEH at DR Mahmood's office to get the Lovenex orders    Please let us know after you speak to this pt regarding her Lovenox.      [facility], [facility]

## 2017-12-22 NOTE — TELEPHONE ENCOUNTER
Mi called back and reviewed EGD is scheduled for 1/18. She states pt will need to bridge with lovenox. Pt should call their office in records to this.

## 2017-12-22 NOTE — TELEPHONE ENCOUNTER
Future Appointments  Date Time Provider Vincent Shelley   1/15/2018 9:45 AM [facility]   1/18/2018 9:30 AM ARJUN, PROCEDURE [id number] None   2/28/2018 10:00 AM Sol Rendon MD [facility]     Is it ok for pt to h

## 2017-12-22 NOTE — TELEPHONE ENCOUNTER
Dr. Andreina Aleman in [address] had pt bridge with lovenox for procedure so will also need to bridge her for EGD.  This was relayed to GI

## 2018-01-08 ENCOUNTER — OFFICE VISIT (OUTPATIENT)
Dept: CARDIOLOGY CLINIC | Facility: CLINIC | Age: 73
End: 2018-01-08

## 2018-01-08 VITALS
HEIGHT: 62 IN | RESPIRATION RATE: 18 BRPM | HEART RATE: 56 BPM | WEIGHT: 163 LBS | DIASTOLIC BLOOD PRESSURE: 60 MMHG | SYSTOLIC BLOOD PRESSURE: 100 MMHG | BODY MASS INDEX: 30 KG/M2

## 2018-01-08 DIAGNOSIS — I48.0 PAROXYSMAL ATRIAL FIBRILLATION (HCC): Primary | ICD-10-CM

## 2018-01-08 PROCEDURE — 99214 OFFICE O/P EST MOD 30 MIN: CPT | Performed by: NURSE PRACTITIONER

## 2018-01-08 PROCEDURE — G0463 HOSPITAL OUTPT CLINIC VISIT: HCPCS | Performed by: NURSE PRACTITIONER

## 2018-01-08 NOTE — PROGRESS NOTES
Brian Leroy is a 67year old female. Patient presents with:  Atrial Fibrillation: Lovenox teaching for endoscopy w/ Dr Kevin Mckeon 1/18/18    HPI:   Patient comes in today for a checkup for [facility] teaching.   She has a history of hypertension Ramírez's daily Disp:  Rfl:    GLUCOSAMINE-CHONDROITIN OR Take 1 tablet by mouth 2 (two) times daily. Take Glucosamine 1500mg and Chondroitin 1100mg two times a day. Disp:  Rfl:    loratadine (CLARITIN) 10 MG Oral Tab Take  by mouth.  Disp:  Rfl:    omega-3 fatty ac suspicious lesions  HEENT: atraumatic, normocephalic,ears and throat are clear  NECK: supple,no adenopathy,no bruits  LUNGS: clear to auscultation  CARDIO: Regular rate and rhythm  GI: good BS's,no masses, HSM or tenderness  EXTREMITIES: no cyanosis, clubb

## 2018-01-08 NOTE — PATIENT INSTRUCTIONS
1. Last dose of coumadin on 1/13/18  2. Check INR 1/15/18 if less than 2 start lovenox 80mg SC twice a day last dose 1/17/18 evening  3. Restart coumadin evening of procedure  4. Ask Dr. Marquis Moy when ok to resume lovenox  5.  Recheck INR 1/22/18

## 2018-01-15 ENCOUNTER — ANTI-COAG VISIT (OUTPATIENT)
Dept: INTERNAL MEDICINE CLINIC | Facility: CLINIC | Age: 73
End: 2018-01-15

## 2018-01-15 DIAGNOSIS — I48.20 CHRONIC ATRIAL FIBRILLATION (HCC): ICD-10-CM

## 2018-01-15 LAB — INR: 1.5 (ref 2–3)

## 2018-01-15 PROCEDURE — 85610 PROTHROMBIN TIME: CPT

## 2018-01-15 PROCEDURE — 36416 COLLJ CAPILLARY BLOOD SPEC: CPT

## 2018-01-15 NOTE — TELEPHONE ENCOUNTER
I spoke to the pt over the phone    She states she started the Lovenox and will be taking it until her procedure is over. She just started it this morning.     Future Appointments  Date Time Provider Vincent Rosa   1/18/2018 9:30 AM ARJUN, PROCEDURE ECW

## 2018-01-18 ENCOUNTER — SURGERY (OUTPATIENT)
Age: 73
End: 2018-01-18

## 2018-01-18 ENCOUNTER — HOSPITAL ENCOUNTER (OUTPATIENT)
Facility: HOSPITAL | Age: 73
Setting detail: HOSPITAL OUTPATIENT SURGERY
Discharge: HOME | End: 2018-01-18
Attending: INTERNAL MEDICINE | Admitting: INTERNAL MEDICINE
Payer: MEDICARE

## 2018-01-18 ENCOUNTER — TELEPHONE (OUTPATIENT)
Dept: GASTROENTEROLOGY | Facility: CLINIC | Age: 73
End: 2018-01-18

## 2018-01-18 DIAGNOSIS — K22.70 BARRETT'S ESOPHAGUS WITHOUT DYSPLASIA: ICD-10-CM

## 2018-01-18 PROCEDURE — G0500 MOD SEDAT ENDO SERVICE >5YRS: HCPCS | Performed by: INTERNAL MEDICINE

## 2018-01-18 PROCEDURE — 43239 EGD BIOPSY SINGLE/MULTIPLE: CPT | Performed by: INTERNAL MEDICINE

## 2018-01-18 PROCEDURE — 0DB48ZX EXCISION OF ESOPHAGOGASTRIC JUNCTION, VIA NATURAL OR ARTIFICIAL OPENING ENDOSCOPIC, DIAGNOSTIC: ICD-10-PCS | Performed by: INTERNAL MEDICINE

## 2018-01-18 RX ORDER — MIDAZOLAM HYDROCHLORIDE 1 MG/ML
INJECTION INTRAMUSCULAR; INTRAVENOUS
Status: DISCONTINUED | OUTPATIENT
Start: 2018-01-18 | End: 2018-01-18

## 2018-01-18 RX ORDER — SODIUM CHLORIDE, SODIUM LACTATE, POTASSIUM CHLORIDE, CALCIUM CHLORIDE 600; 310; 30; 20 MG/100ML; MG/100ML; MG/100ML; MG/100ML
INJECTION, SOLUTION INTRAVENOUS CONTINUOUS
Status: DISCONTINUED | OUTPATIENT
Start: 2018-01-18 | End: 2018-01-18

## 2018-01-18 RX ORDER — SODIUM CHLORIDE 0.9 % (FLUSH) 0.9 %
10 SYRINGE (ML) INJECTION AS NEEDED
Status: DISCONTINUED | OUTPATIENT
Start: 2018-01-18 | End: 2018-01-18

## 2018-01-18 RX ORDER — MIDAZOLAM HYDROCHLORIDE 1 MG/ML
1 INJECTION INTRAMUSCULAR; INTRAVENOUS EVERY 5 MIN PRN
Status: DISCONTINUED | OUTPATIENT
Start: 2018-01-18 | End: 2018-01-18

## 2018-01-18 NOTE — OPERATIVE REPORT
[facility]    PATIENT'S NAME: Kendlal Race A   ATTENDING PHYSICIAN: Zaire Garcia MD   OPERATING PHYSICIAN: Zaire Garcia MD   PATIENT ACCOUNT#:   [id number]    LOCATION:  [facility] ENDO POOL ROOM [address] [place] [address] less than 5 mm, short-segment Ramírez esophagus possible. Then, biopsies x6 were taken of the distal esophagus at that location. The diaphragmatic impression was also at 36 cm. There were no strictures, ulcerations, or mass lesions.   3.   The stomach wa

## 2018-01-18 NOTE — BRIEF OP NOTE
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

## 2018-01-18 NOTE — H&P
History & Physical Examination    Patient Name: Fredy Sampson  MRN: [id number]  [facility]: [id number]  YOB: 1945    Diagnosis: History of Ramírez's esophagus      Prescriptions Prior to Admission:  Enoxaparin Sodium (LOVENOX) 80 MG/0.8ML S 1 1/12/2018   Misc. Devices (WRIST BRACE) Does not apply Misc 1 Application by Does not apply route nightly.  Disp: 1 each Rfl: 1 Taking       Current Facility-Administered Medications:  Normal Saline Flush 0.9 % injection 10 mL 10 mL Intravenous PRN   lact Onset   • Cancer Father      per ng lung   • Breast Cancer Mother 61     approx   • Other [OTHER] Mother    • MS [OTHER] Sister    • Heart Disease Other      per ng CAD, vein, artery, liver disease, arthritis   • Other [OTHER] Other      per ng lupus eryth

## 2018-01-19 VITALS
HEART RATE: 52 BPM | RESPIRATION RATE: 17 BRPM | BODY MASS INDEX: 29.63 KG/M2 | OXYGEN SATURATION: 94 % | HEIGHT: 62 IN | SYSTOLIC BLOOD PRESSURE: 111 MMHG | DIASTOLIC BLOOD PRESSURE: 67 MMHG | WEIGHT: 161 LBS

## 2018-01-22 ENCOUNTER — TELEPHONE (OUTPATIENT)
Dept: CARDIOLOGY CLINIC | Facility: CLINIC | Age: 73
End: 2018-01-22

## 2018-01-22 ENCOUNTER — ANTI-COAG VISIT (OUTPATIENT)
Dept: INTERNAL MEDICINE CLINIC | Facility: CLINIC | Age: 73
End: 2018-01-22

## 2018-01-22 DIAGNOSIS — I48.20 CHRONIC ATRIAL FIBRILLATION (HCC): ICD-10-CM

## 2018-01-22 LAB — INR: 1.5 (ref 2–3)

## 2018-01-22 PROCEDURE — 85610 PROTHROMBIN TIME: CPT

## 2018-01-22 PROCEDURE — 36416 COLLJ CAPILLARY BLOOD SPEC: CPT

## 2018-01-22 NOTE — TELEPHONE ENCOUNTER
Hi Dr. Anny Santiago,    Pt had INR 1.5 today, was holding x 3 days for EGD and resume warfarin 7.5 mg x4 days, She is on lovenox, I have her take 12.5 mg tonight and 10 mg tmr night, Recheck in 2 days.

## 2018-01-24 ENCOUNTER — ANTI-COAG VISIT (OUTPATIENT)
Dept: INTERNAL MEDICINE CLINIC | Facility: CLINIC | Age: 73
End: 2018-01-24

## 2018-01-24 DIAGNOSIS — I48.20 CHRONIC ATRIAL FIBRILLATION (HCC): ICD-10-CM

## 2018-01-24 LAB — INR: 2.2 (ref 2–3)

## 2018-01-24 PROCEDURE — 85610 PROTHROMBIN TIME: CPT

## 2018-01-24 PROCEDURE — 36416 COLLJ CAPILLARY BLOOD SPEC: CPT

## 2018-01-24 PROCEDURE — G0463 HOSPITAL OUTPT CLINIC VISIT: HCPCS

## 2018-02-05 ENCOUNTER — TELEPHONE (OUTPATIENT)
Dept: INTERNAL MEDICINE CLINIC | Facility: CLINIC | Age: 73
End: 2018-02-05

## 2018-02-05 ENCOUNTER — ANTI-COAG VISIT (OUTPATIENT)
Dept: INTERNAL MEDICINE CLINIC | Facility: CLINIC | Age: 73
End: 2018-02-05

## 2018-02-05 DIAGNOSIS — I48.91 ATRIAL FIBRILLATION, UNSPECIFIED TYPE (HCC): ICD-10-CM

## 2018-02-05 LAB — INR: 1.8 (ref 2–3)

## 2018-02-05 PROCEDURE — 85610 PROTHROMBIN TIME: CPT

## 2018-02-05 PROCEDURE — 36416 COLLJ CAPILLARY BLOOD SPEC: CPT

## 2018-02-05 PROCEDURE — G0463 HOSPITAL OUTPT CLINIC VISIT: HCPCS

## 2018-02-28 ENCOUNTER — OFFICE VISIT (OUTPATIENT)
Dept: CARDIOLOGY CLINIC | Facility: CLINIC | Age: 73
End: 2018-02-28

## 2018-02-28 ENCOUNTER — ANTI-COAG VISIT (OUTPATIENT)
Dept: INTERNAL MEDICINE CLINIC | Facility: CLINIC | Age: 73
End: 2018-02-28

## 2018-02-28 VITALS
SYSTOLIC BLOOD PRESSURE: 100 MMHG | BODY MASS INDEX: 29 KG/M2 | WEIGHT: 157 LBS | HEART RATE: 56 BPM | RESPIRATION RATE: 12 BRPM | DIASTOLIC BLOOD PRESSURE: 60 MMHG

## 2018-02-28 DIAGNOSIS — R07.9 CHEST PAIN RADIATING TO JAW: Primary | ICD-10-CM

## 2018-02-28 DIAGNOSIS — E78.2 HYPERLIPIDEMIA, MIXED: ICD-10-CM

## 2018-02-28 DIAGNOSIS — I48.91 ATRIAL FIBRILLATION, UNSPECIFIED TYPE (HCC): Primary | ICD-10-CM

## 2018-02-28 DIAGNOSIS — I48.0 PAROXYSMAL ATRIAL FIBRILLATION (HCC): ICD-10-CM

## 2018-02-28 DIAGNOSIS — I10 ESSENTIAL HYPERTENSION: ICD-10-CM

## 2018-02-28 LAB — INR: 2 (ref 2–3)

## 2018-02-28 PROCEDURE — 85610 PROTHROMBIN TIME: CPT

## 2018-02-28 PROCEDURE — 99214 OFFICE O/P EST MOD 30 MIN: CPT | Performed by: INTERNAL MEDICINE

## 2018-02-28 PROCEDURE — G0463 HOSPITAL OUTPT CLINIC VISIT: HCPCS | Performed by: INTERNAL MEDICINE

## 2018-02-28 PROCEDURE — 36416 COLLJ CAPILLARY BLOOD SPEC: CPT

## 2018-02-28 NOTE — PATIENT INSTRUCTIONS
Continue current medications. Schedule a CT coronary angiogram within the next few days. Follow with me in 6 months or sooner depending on the results.

## 2018-02-28 NOTE — PROGRESS NOTES
Jackie Crook is a 67year old female. Patient presents with: Follow - Up    HPI:   Patient is here for a follow-up appointment. She has history of paroxysmal atrial fibrillation.   She has had 2 episodes since last 6 months with atrial fibrillati Misc. Devices (WRIST BRACE) Does not apply Misc 1 Application by Does not apply route nightly. Disp: 1 each Rfl: 1   LORazepam (ATIVAN) 0.5 MG Oral Tab Take 0.5 mg by mouth as needed.    Disp:  Rfl: 1      Past Medical History:   Diagnosis Date   • Arrhyt ARTERIES W CALCIUM SCORING (CPT=75574)    Hyperlipidemia, mixed    Relevant Orders    CTA GATED CORONARY ARTERIES W CALCIUM SCORING (CPT=75574)    Chest pain radiating to jaw - Primary    Relevant Orders    CTA GATED CORONARY ARTERIES W CALCIUM SCORING (CP

## 2018-03-01 ENCOUNTER — TELEPHONE (OUTPATIENT)
Dept: CARDIOLOGY CLINIC | Facility: CLINIC | Age: 73
End: 2018-03-01

## 2018-03-01 NOTE — TELEPHONE ENCOUNTER
No PA required for supplemental policy to Medicare. Verified with pt Medicare is active as primary insurance. She will keep appt as scheduled.

## 2018-03-07 ENCOUNTER — HOSPITAL ENCOUNTER (OUTPATIENT)
Dept: CT IMAGING | Facility: HOSPITAL | Age: 73
Discharge: HOME | End: 2018-03-07
Attending: INTERNAL MEDICINE
Payer: MEDICARE

## 2018-03-07 VITALS — WEIGHT: 157 LBS | HEIGHT: 61 IN | BODY MASS INDEX: 29.64 KG/M2

## 2018-03-07 DIAGNOSIS — I48.0 PAROXYSMAL ATRIAL FIBRILLATION (HCC): ICD-10-CM

## 2018-03-07 DIAGNOSIS — E78.2 HYPERLIPIDEMIA, MIXED: ICD-10-CM

## 2018-03-07 DIAGNOSIS — I10 ESSENTIAL HYPERTENSION: ICD-10-CM

## 2018-03-07 DIAGNOSIS — R07.9 CHEST PAIN RADIATING TO JAW: ICD-10-CM

## 2018-03-07 LAB — CREAT BLD-MCNC: 0.8 MG/DL (ref 0.5–1.5)

## 2018-03-07 PROCEDURE — 75574 CT ANGIO HRT W/3D IMAGE: CPT | Performed by: INTERNAL MEDICINE

## 2018-03-07 PROCEDURE — 82565 ASSAY OF CREATININE: CPT

## 2018-03-07 RX ORDER — NITROGLYCERIN 0.4 MG/1
0.4 TABLET SUBLINGUAL ONCE
Status: COMPLETED | OUTPATIENT
Start: 2018-03-07 | End: 2018-03-07

## 2018-03-07 RX ORDER — DILTIAZEM HYDROCHLORIDE 5 MG/ML
10 INJECTION INTRAVENOUS SEE ADMIN INSTRUCTIONS
Status: DISCONTINUED | OUTPATIENT
Start: 2018-03-07 | End: 2018-03-08

## 2018-03-07 RX ORDER — METOPROLOL TARTRATE 5 MG/5ML
5 INJECTION INTRAVENOUS SEE ADMIN INSTRUCTIONS
Status: DISCONTINUED | OUTPATIENT
Start: 2018-03-07 | End: 2018-03-08

## 2018-03-07 RX ADMIN — NITROGLYCERIN 0.4 MG: 0.4 TABLET SUBLINGUAL at 08:45:00

## 2018-03-07 NOTE — IMAGING NOTE
TO RAD HOLDING AT 0810  HX TAKEN PT CONSENTED AT 0820 VS HR 42 /52    18 GAUGE IV STARTED AT 0835 POC TESTING COMPLETED    TO CT TABLE @ 5113G1 PLACED AT 2 L NASAL CANNULA HOOKED TO MONITOR  VS HR 40 /60      NITROGLYCERIN 0.4 MILLIGRAMS SUBLIN

## 2018-03-14 ENCOUNTER — OFFICE VISIT (OUTPATIENT)
Dept: INTERNAL MEDICINE CLINIC | Facility: CLINIC | Age: 73
End: 2018-03-14

## 2018-03-14 VITALS
HEART RATE: 58 BPM | DIASTOLIC BLOOD PRESSURE: 67 MMHG | BODY MASS INDEX: 28.89 KG/M2 | TEMPERATURE: 98 F | HEIGHT: 62 IN | WEIGHT: 157 LBS | SYSTOLIC BLOOD PRESSURE: 108 MMHG

## 2018-03-14 DIAGNOSIS — H81.02 MENIERE DISEASE, LEFT: Primary | ICD-10-CM

## 2018-03-14 DIAGNOSIS — H81.12 BENIGN PAROXYSMAL POSITIONAL VERTIGO OF LEFT EAR: ICD-10-CM

## 2018-03-14 PROCEDURE — G0463 HOSPITAL OUTPT CLINIC VISIT: HCPCS | Performed by: INTERNAL MEDICINE

## 2018-03-14 PROCEDURE — 99214 OFFICE O/P EST MOD 30 MIN: CPT | Performed by: INTERNAL MEDICINE

## 2018-03-14 RX ORDER — WARFARIN SODIUM 5 MG/1
TABLET ORAL
Qty: 150 TABLET | Refills: 1 | Status: SHIPPED | OUTPATIENT
Start: 2018-03-14 | End: 2019-03-03

## 2018-03-14 NOTE — PROGRESS NOTES
HPI:    Patient ID: Juan Antonio Walsh is a 67year old female.     HPI    menier's left  3 episodes last week  The first 2 days   Took meclizine and Zofran  Sat did not atke anythign    menier's attack  nausae and have to have a BM    Feeling  Imbalanc 150 mg by mouth daily as needed for Heartburn. Disp:  Rfl:    LORazepam (ATIVAN) 0.5 MG Oral Tab Take 0.5 mg by mouth as needed. Disp:  Rfl: 1   Meclizine HCl (ANTIVERT) 12.5 MG Oral Tab Take 12.5 mg by mouth as needed.    Disp:  Rfl: 0   Ondansetron HCl HAND/FINGER SURGERY UNLISTED Right      Comment: per ng hand synovitis surgery  No date: HYSTERECTOMY  No date: INJECTION; TENDON ORIGIN/INSERTION      Comment: per ng injection tendon sheath, ligament  1981: OTHER SURGICAL HISTORY Left      Comment: per n She exhibits no edema or tenderness. Lymphadenopathy:     She has no cervical adenopathy. Neurological: She is alert. Skin: No rash noted. She is not diaphoretic. No erythema. Nursing note and vitals reviewed.            ASSESSMENT/PLAN:   (H81.02)

## 2018-03-22 ENCOUNTER — TELEPHONE (OUTPATIENT)
Dept: CARDIOLOGY CLINIC | Facility: CLINIC | Age: 73
End: 2018-03-22

## 2018-03-22 DIAGNOSIS — I48.91 ATRIAL FIBRILLATION, UNSPECIFIED TYPE (HCC): Primary | ICD-10-CM

## 2018-03-22 NOTE — TELEPHONE ENCOUNTER
Patient's current anticoagulation monitoring order is  3/15/18. Patient has an upcoming appointment with [facility] on 3/28/18         Please sign pending order for renewal.     Thank you.

## 2018-03-23 ENCOUNTER — ANTI-COAG VISIT (OUTPATIENT)
Dept: INTERNAL MEDICINE CLINIC | Facility: CLINIC | Age: 73
End: 2018-03-23

## 2018-03-23 DIAGNOSIS — I48.20 CHRONIC ATRIAL FIBRILLATION (HCC): ICD-10-CM

## 2018-03-23 DIAGNOSIS — I48.91 ATRIAL FIBRILLATION, UNSPECIFIED TYPE (HCC): ICD-10-CM

## 2018-03-28 ENCOUNTER — ANTI-COAG VISIT (OUTPATIENT)
Dept: INTERNAL MEDICINE CLINIC | Facility: CLINIC | Age: 73
End: 2018-03-28

## 2018-03-28 DIAGNOSIS — I48.91 ATRIAL FIBRILLATION, UNSPECIFIED TYPE (HCC): ICD-10-CM

## 2018-03-28 LAB — INR: 2.1 (ref 2–3)

## 2018-03-28 PROCEDURE — 85610 PROTHROMBIN TIME: CPT

## 2018-03-28 PROCEDURE — 36416 COLLJ CAPILLARY BLOOD SPEC: CPT

## 2018-04-25 ENCOUNTER — ANTI-COAG VISIT (OUTPATIENT)
Dept: INTERNAL MEDICINE CLINIC | Facility: CLINIC | Age: 73
End: 2018-04-25

## 2018-04-25 DIAGNOSIS — I48.91 ATRIAL FIBRILLATION, UNSPECIFIED TYPE (HCC): ICD-10-CM

## 2018-04-25 DIAGNOSIS — I48.20 CHRONIC ATRIAL FIBRILLATION (HCC): ICD-10-CM

## 2018-04-25 PROCEDURE — 85610 PROTHROMBIN TIME: CPT

## 2018-04-25 PROCEDURE — 36416 COLLJ CAPILLARY BLOOD SPEC: CPT

## 2018-05-19 ENCOUNTER — MED REC SCAN ONLY (OUTPATIENT)
Dept: INTERNAL MEDICINE CLINIC | Facility: CLINIC | Age: 73
End: 2018-05-19

## 2018-05-24 ENCOUNTER — ANTI-COAG VISIT (OUTPATIENT)
Dept: INTERNAL MEDICINE CLINIC | Facility: CLINIC | Age: 73
End: 2018-05-24

## 2018-05-24 DIAGNOSIS — I48.20 CHRONIC ATRIAL FIBRILLATION (HCC): ICD-10-CM

## 2018-05-24 DIAGNOSIS — I48.91 ATRIAL FIBRILLATION, UNSPECIFIED TYPE (HCC): ICD-10-CM

## 2018-05-24 PROCEDURE — 36416 COLLJ CAPILLARY BLOOD SPEC: CPT

## 2018-05-24 PROCEDURE — 85610 PROTHROMBIN TIME: CPT

## 2018-06-20 ENCOUNTER — ANTI-COAG VISIT (OUTPATIENT)
Dept: INTERNAL MEDICINE CLINIC | Facility: CLINIC | Age: 73
End: 2018-06-20

## 2018-06-20 DIAGNOSIS — I48.20 CHRONIC ATRIAL FIBRILLATION (HCC): ICD-10-CM

## 2018-06-20 DIAGNOSIS — I48.91 ATRIAL FIBRILLATION, UNSPECIFIED TYPE (HCC): ICD-10-CM

## 2018-06-20 PROCEDURE — 36416 COLLJ CAPILLARY BLOOD SPEC: CPT

## 2018-06-20 PROCEDURE — 85610 PROTHROMBIN TIME: CPT

## 2018-07-17 ENCOUNTER — ANTI-COAG VISIT (OUTPATIENT)
Dept: INTERNAL MEDICINE CLINIC | Facility: CLINIC | Age: 73
End: 2018-07-17

## 2018-07-17 DIAGNOSIS — I48.91 ATRIAL FIBRILLATION, UNSPECIFIED TYPE (HCC): ICD-10-CM

## 2018-07-17 LAB — INR: 2.2 (ref 2–3)

## 2018-07-17 PROCEDURE — 93793 ANTICOAG MGMT PT WARFARIN: CPT | Performed by: INTERNAL MEDICINE

## 2018-08-15 ENCOUNTER — ANTI-COAG VISIT (OUTPATIENT)
Dept: INTERNAL MEDICINE CLINIC | Facility: CLINIC | Age: 73
End: 2018-08-15
Payer: MEDICARE

## 2018-08-15 DIAGNOSIS — I48.91 ATRIAL FIBRILLATION, UNSPECIFIED TYPE (HCC): ICD-10-CM

## 2018-08-15 DIAGNOSIS — I48.20 CHRONIC ATRIAL FIBRILLATION (HCC): ICD-10-CM

## 2018-08-15 LAB — INR: 2.4 (ref 2–3)

## 2018-08-15 PROCEDURE — 93793 ANTICOAG MGMT PT WARFARIN: CPT | Performed by: INTERNAL MEDICINE

## 2018-08-21 RX ORDER — OMEPRAZOLE 20 MG/1
20 CAPSULE, DELAYED RELEASE ORAL
Qty: 90 CAPSULE | Refills: 0 | Status: SHIPPED | OUTPATIENT
Start: 2018-08-21 | End: 2018-09-17

## 2018-08-21 NOTE — TELEPHONE ENCOUNTER
Refill Protocol Appointment Criteria  · Appointment scheduled in the past 12 months or in the next 3 months  Recent Outpatient Visits            5 months ago Meniere disease, left    Peterson Andrade MD    Office Visit

## 2018-08-22 ENCOUNTER — OFFICE VISIT (OUTPATIENT)
Dept: CARDIOLOGY CLINIC | Facility: CLINIC | Age: 73
End: 2018-08-22
Payer: MEDICARE

## 2018-08-22 VITALS
SYSTOLIC BLOOD PRESSURE: 118 MMHG | HEIGHT: 62 IN | RESPIRATION RATE: 12 BRPM | WEIGHT: 150 LBS | DIASTOLIC BLOOD PRESSURE: 60 MMHG | HEART RATE: 64 BPM | BODY MASS INDEX: 27.6 KG/M2

## 2018-08-22 DIAGNOSIS — I48.0 PAROXYSMAL ATRIAL FIBRILLATION (HCC): Primary | ICD-10-CM

## 2018-08-22 PROCEDURE — G0463 HOSPITAL OUTPT CLINIC VISIT: HCPCS | Performed by: INTERNAL MEDICINE

## 2018-08-22 PROCEDURE — 99213 OFFICE O/P EST LOW 20 MIN: CPT | Performed by: INTERNAL MEDICINE

## 2018-08-22 RX ORDER — HYDROCHLOROTHIAZIDE 12.5 MG/1
12.5 CAPSULE, GELATIN COATED ORAL EVERY MORNING
COMMUNITY
End: 2019-08-21 | Stop reason: ALTCHOICE

## 2018-08-22 NOTE — PROGRESS NOTES
Anisa Skinner is a 67year old female. Patient presents with: Follow - Up: Palpatations - only 1 incident from last visit    HPI:   Patient is here for follow-up appointment.   She had one episode of paroxysmal A. fib since last 6 months which last FOR HER OR) Take 1 tablet by mouth daily.    Disp:  Rfl:       Past Medical History:   Diagnosis Date   • Arrhythmia     A-fib   • Atrial fibrillation [facility]) 1/ 2017   • Ramírez's esophagus    • DUB (dysfunctional uterine bleeding) 1980    per jessenia MORRISH   • Eso Continue warfarin. The patient indicates understanding of these issues and agrees to the plan. The patient is asked to return in 12 months or sooner if needed.              Joesph Arreola MD  8/22/2018  9:50 AM

## 2018-09-12 ENCOUNTER — ANTI-COAG VISIT (OUTPATIENT)
Dept: INTERNAL MEDICINE CLINIC | Facility: CLINIC | Age: 73
End: 2018-09-12
Payer: MEDICARE

## 2018-09-12 DIAGNOSIS — I48.91 ATRIAL FIBRILLATION, UNSPECIFIED TYPE (HCC): ICD-10-CM

## 2018-09-12 DIAGNOSIS — I48.20 CHRONIC ATRIAL FIBRILLATION (HCC): ICD-10-CM

## 2018-09-12 LAB — INR: 2.1 (ref 2–3)

## 2018-09-12 PROCEDURE — G0463 HOSPITAL OUTPT CLINIC VISIT: HCPCS

## 2018-09-12 PROCEDURE — 36416 COLLJ CAPILLARY BLOOD SPEC: CPT

## 2018-09-12 PROCEDURE — 85610 PROTHROMBIN TIME: CPT

## 2018-09-17 ENCOUNTER — OFFICE VISIT (OUTPATIENT)
Dept: INTERNAL MEDICINE CLINIC | Facility: CLINIC | Age: 73
End: 2018-09-17
Payer: MEDICARE

## 2018-09-17 VITALS
HEIGHT: 62 IN | DIASTOLIC BLOOD PRESSURE: 56 MMHG | WEIGHT: 150 LBS | SYSTOLIC BLOOD PRESSURE: 90 MMHG | BODY MASS INDEX: 27.6 KG/M2 | HEART RATE: 52 BPM

## 2018-09-17 DIAGNOSIS — E78.5 HYPERLIPIDEMIA, UNSPECIFIED HYPERLIPIDEMIA TYPE: ICD-10-CM

## 2018-09-17 DIAGNOSIS — I48.0 PAROXYSMAL ATRIAL FIBRILLATION (HCC): ICD-10-CM

## 2018-09-17 DIAGNOSIS — Z12.31 VISIT FOR SCREENING MAMMOGRAM: ICD-10-CM

## 2018-09-17 DIAGNOSIS — I10 ESSENTIAL HYPERTENSION: Primary | ICD-10-CM

## 2018-09-17 DIAGNOSIS — H81.02 MENIERE DISEASE, LEFT: ICD-10-CM

## 2018-09-17 PROCEDURE — G0463 HOSPITAL OUTPT CLINIC VISIT: HCPCS | Performed by: INTERNAL MEDICINE

## 2018-09-17 PROCEDURE — 99214 OFFICE O/P EST MOD 30 MIN: CPT | Performed by: INTERNAL MEDICINE

## 2018-09-17 RX ORDER — ATENOLOL 25 MG/1
25 TABLET ORAL
Qty: 90 TABLET | Refills: 3 | Status: SHIPPED | OUTPATIENT
Start: 2018-09-17 | End: 2018-11-26 | Stop reason: ALTCHOICE

## 2018-09-17 RX ORDER — LOVASTATIN 10 MG/1
10 TABLET ORAL DAILY
Qty: 90 TABLET | Refills: 3 | Status: SHIPPED | OUTPATIENT
Start: 2018-09-17 | End: 2018-09-24

## 2018-09-17 RX ORDER — FEXOFENADINE HCL 180 MG/1
180 TABLET ORAL DAILY
COMMUNITY
End: 2018-11-26

## 2018-09-17 RX ORDER — OMEPRAZOLE 20 MG/1
20 CAPSULE, DELAYED RELEASE ORAL
Qty: 90 CAPSULE | Refills: 1 | Status: SHIPPED | OUTPATIENT
Start: 2018-09-17 | End: 2019-05-14

## 2018-09-22 ENCOUNTER — APPOINTMENT (OUTPATIENT)
Dept: LAB | Facility: HOSPITAL | Age: 73
End: 2018-09-22
Attending: INTERNAL MEDICINE
Payer: MEDICARE

## 2018-09-22 DIAGNOSIS — E78.5 HYPERLIPIDEMIA, UNSPECIFIED HYPERLIPIDEMIA TYPE: ICD-10-CM

## 2018-09-22 DIAGNOSIS — I10 ESSENTIAL HYPERTENSION: ICD-10-CM

## 2018-09-22 LAB
ALBUMIN SERPL BCP-MCNC: 3.7 G/DL (ref 3.5–4.8)
ALBUMIN/GLOB SERPL: 1.1 {RATIO} (ref 1–2)
ALP SERPL-CCNC: 68 U/L (ref 32–100)
ALT SERPL-CCNC: 15 U/L (ref 14–54)
ANION GAP SERPL CALC-SCNC: 6 MMOL/L (ref 0–18)
AST SERPL-CCNC: 19 U/L (ref 15–41)
BACTERIA UR QL AUTO: NEGATIVE /HPF
BILIRUB SERPL-MCNC: 0.4 MG/DL (ref 0.3–1.2)
BUN SERPL-MCNC: 12 MG/DL (ref 8–20)
BUN/CREAT SERPL: 14.6 (ref 10–20)
CALCIUM SERPL-MCNC: 9.3 MG/DL (ref 8.5–10.5)
CHLORIDE SERPL-SCNC: 101 MMOL/L (ref 95–110)
CHOLEST SERPL-MCNC: 168 MG/DL (ref 110–200)
CO2 SERPL-SCNC: 30 MMOL/L (ref 22–32)
CREAT SERPL-MCNC: 0.82 MG/DL (ref 0.5–1.5)
ERYTHROCYTE [DISTWIDTH] IN BLOOD BY AUTOMATED COUNT: 12.8 % (ref 11–15)
GLOBULIN PLAS-MCNC: 3.5 G/DL (ref 2.5–3.7)
GLUCOSE SERPL-MCNC: 85 MG/DL (ref 70–99)
HCT VFR BLD AUTO: 38.9 % (ref 35–48)
HDLC SERPL-MCNC: 50 MG/DL
HGB BLD-MCNC: 13.3 G/DL (ref 12–16)
LDLC SERPL CALC-MCNC: 105 MG/DL (ref 0–99)
MCH RBC QN AUTO: 32.5 PG (ref 27–32)
MCHC RBC AUTO-ENTMCNC: 34.1 G/DL (ref 32–37)
MCV RBC AUTO: 95.4 FL (ref 80–100)
NONHDLC SERPL-MCNC: 118 MG/DL
OSMOLALITY UR CALC.SUM OF ELEC: 283 MOSM/KG (ref 275–295)
PATIENT FASTING: YES
PLATELET # BLD AUTO: 279 K/UL (ref 140–400)
PMV BLD AUTO: 7.7 FL (ref 7.4–10.3)
POTASSIUM SERPL-SCNC: 4.2 MMOL/L (ref 3.3–5.1)
PROT SERPL-MCNC: 7.2 G/DL (ref 5.9–8.4)
RBC # BLD AUTO: 4.07 M/UL (ref 3.7–5.4)
RBC #/AREA URNS AUTO: <1 /HPF
SODIUM SERPL-SCNC: 137 MMOL/L (ref 136–144)
T4 FREE SERPL-MCNC: 0.83 NG/DL (ref 0.58–1.64)
TRIGL SERPL-MCNC: 65 MG/DL (ref 1–149)
TSH SERPL-ACNC: 1.08 UIU/ML (ref 0.45–5.33)
WBC # BLD AUTO: 4.5 K/UL (ref 4–11)
WBC #/AREA URNS AUTO: 1 /HPF

## 2018-09-22 PROCEDURE — 85027 COMPLETE CBC AUTOMATED: CPT

## 2018-09-22 PROCEDURE — 84439 ASSAY OF FREE THYROXINE: CPT

## 2018-09-22 PROCEDURE — 80053 COMPREHEN METABOLIC PANEL: CPT

## 2018-09-22 PROCEDURE — 81015 MICROSCOPIC EXAM OF URINE: CPT

## 2018-09-22 PROCEDURE — 84443 ASSAY THYROID STIM HORMONE: CPT

## 2018-09-22 PROCEDURE — 36415 COLL VENOUS BLD VENIPUNCTURE: CPT

## 2018-09-22 PROCEDURE — 80061 LIPID PANEL: CPT

## 2018-09-24 RX ORDER — LOVASTATIN 10 MG/1
TABLET ORAL
Qty: 90 TABLET | Refills: 3 | Status: SHIPPED | OUTPATIENT
Start: 2018-09-24 | End: 2019-10-21

## 2018-09-24 RX ORDER — ATENOLOL 25 MG/1
TABLET ORAL
Qty: 90 TABLET | Refills: 3 | Status: SHIPPED | OUTPATIENT
Start: 2018-09-24 | End: 2019-08-21

## 2018-09-28 NOTE — PROGRESS NOTES
HPI:    Patient ID: Renata Hammonds is a 67year old female.     HPI  HTN  Long standing history of hypertension     sympotms  :        Headache no  dizziness        no                             Blurred vision no  palpitaionsSyncope no  Chest pain Medications:  Fexofenadine HCl 180 MG Oral Tab Take 180 mg by mouth daily. Disp:  Rfl:    atenolol 25 MG Oral Tab Take 1 tablet (25 mg total) by mouth once daily.  Disp: 90 tablet Rfl: 3   omeprazole 20 MG Oral Capsule Delayed Release Take 1 capsule (20 mg 2012    pr ng egd w/ biopsy   • Gastritis    • Heartburn 2011    per ng reflux, dysphagia   • High blood pressure    • High cholesterol    • Meniere disease    • Other and unspecified hyperlipidemia    • Paroxysmal atrial fibrillation ([facility] 2/15/2017   • S distress. HENT:   Head: Normocephalic and atraumatic. Right Ear: External ear normal.   Left Ear: External ear normal.   Nose: Nose normal.   Mouth/Throat: Oropharynx is clear and moist. No oropharyngeal exudate.    Eyes: Conjunctivae and EOM are normal advsed    (Z12.31) Visit for screening mammogram  Plan: [facility] ALICE 2D+3D SCREENING BILAT         (CPT=77067/14187), CANCELED: [facility] SCREENING         BILAT (HWW=33861)        Self breast exam advsed    Medication reviewed. Refill will be given as needed .   Med

## 2018-10-10 ENCOUNTER — ANTI-COAG VISIT (OUTPATIENT)
Dept: INTERNAL MEDICINE CLINIC | Facility: CLINIC | Age: 73
End: 2018-10-10
Payer: MEDICARE

## 2018-10-10 DIAGNOSIS — I48.91 ATRIAL FIBRILLATION, UNSPECIFIED TYPE (HCC): ICD-10-CM

## 2018-10-10 DIAGNOSIS — I48.20 CHRONIC ATRIAL FIBRILLATION (HCC): ICD-10-CM

## 2018-10-10 PROCEDURE — 36416 COLLJ CAPILLARY BLOOD SPEC: CPT

## 2018-10-10 PROCEDURE — 85610 PROTHROMBIN TIME: CPT

## 2018-11-06 ENCOUNTER — LAB ENCOUNTER (OUTPATIENT)
Dept: LAB | Facility: HOSPITAL | Age: 73
End: 2018-11-06
Attending: INTERNAL MEDICINE
Payer: MEDICARE

## 2018-11-06 DIAGNOSIS — I48.20 CHRONIC ATRIAL FIBRILLATION (HCC): ICD-10-CM

## 2018-11-06 PROCEDURE — 36415 COLL VENOUS BLD VENIPUNCTURE: CPT

## 2018-11-06 PROCEDURE — 85610 PROTHROMBIN TIME: CPT

## 2018-11-07 ENCOUNTER — TELEPHONE (OUTPATIENT)
Dept: OTHER | Age: 73
End: 2018-11-07

## 2018-11-07 ENCOUNTER — ANTI-COAG VISIT (OUTPATIENT)
Dept: INTERNAL MEDICINE CLINIC | Facility: CLINIC | Age: 73
End: 2018-11-07

## 2018-11-07 ENCOUNTER — PATIENT MESSAGE (OUTPATIENT)
Dept: INTERNAL MEDICINE CLINIC | Facility: CLINIC | Age: 73
End: 2018-11-07

## 2018-11-07 DIAGNOSIS — I48.20 CHRONIC ATRIAL FIBRILLATION (HCC): ICD-10-CM

## 2018-11-07 DIAGNOSIS — I48.91 ATRIAL FIBRILLATION, UNSPECIFIED TYPE (HCC): ICD-10-CM

## 2018-11-08 NOTE — TELEPHONE ENCOUNTER
From: Minna Pastor  To: Kasey Watson MD  Sent: 11/7/2018 5:10 PM CST  Subject: Other    I am having many vertigo attacks the last 10 days. I have made an appt. with you on Monday afternoon.  I am wonder if there could be an issue with medicati

## 2018-11-08 NOTE — TELEPHONE ENCOUNTER
From: Julia Martines  To: Joni Travis MD  Sent: 11/7/2018 4:49 PM CST  Subject: Other    Dr. Holden Pineda,  I am having many vertigo attacks the last 10 days. I have made an appt. with you on Monday afternoon.  I am wondering if there could be an is

## 2018-11-08 NOTE — TELEPHONE ENCOUNTER
Patient informed with understanding. She has an appointment to see Dr. Christian Sy on Monday 11/19/18.

## 2018-11-08 NOTE — TELEPHONE ENCOUNTER
From  Maeve Garner To  Em Rn Triage Sent  11/7/2018  4:49 PM   ----- Message from Generic, Vivienhart sent at 11/7/2018  4:49 PM CST -----     Dr. Ruben Drake,   I am having many vertigo attacks the last 10 days.  I have made an appt.  with you on Monday

## 2018-11-08 NOTE — TELEPHONE ENCOUNTER
See Pt's mychart message, decline to see other Dr -stated this was chronic and rather see you/discuss her medications

## 2018-11-12 ENCOUNTER — OFFICE VISIT (OUTPATIENT)
Dept: INTERNAL MEDICINE CLINIC | Facility: CLINIC | Age: 73
End: 2018-11-12
Payer: MEDICARE

## 2018-11-12 VITALS
DIASTOLIC BLOOD PRESSURE: 66 MMHG | SYSTOLIC BLOOD PRESSURE: 118 MMHG | HEART RATE: 47 BPM | HEIGHT: 62 IN | BODY MASS INDEX: 27.79 KG/M2 | WEIGHT: 151 LBS

## 2018-11-12 DIAGNOSIS — H81.02 MENIERE DISEASE, LEFT: ICD-10-CM

## 2018-11-12 DIAGNOSIS — I10 ESSENTIAL HYPERTENSION: ICD-10-CM

## 2018-11-12 DIAGNOSIS — I48.91 ATRIAL FIBRILLATION, UNSPECIFIED TYPE (HCC): Primary | ICD-10-CM

## 2018-11-12 PROCEDURE — G0463 HOSPITAL OUTPT CLINIC VISIT: HCPCS | Performed by: INTERNAL MEDICINE

## 2018-11-12 PROCEDURE — 99214 OFFICE O/P EST MOD 30 MIN: CPT | Performed by: INTERNAL MEDICINE

## 2018-11-13 NOTE — PROGRESS NOTES
HPI:    Patient ID: Moses Almendarez is a 68year old female.     HPI    History of Ménière's disease was seen by her ear nose and throat doctor  Nothing was working so she was started on half dose of hydrochlorothiazide 12.5 mg and it was increased t and joint swelling. Skin: Negative for rash. Neurological: Positive for dizziness. Negative for syncope, weakness, light-headedness and headaches. Hematological: Negative for adenopathy. Does not bruise/bleed easily.    Psychiatric/Behavioral: Fatoumata Fine Allergies:No Known Allergies    HISTORY:  Past Medical History:   Diagnosis Date   • Arrhythmia     A-fib   • Atrial fibrillation [facility]) 1/ 2017   • Ramírez's esophagus    • DUB (dysfunctional uterine bleeding) 1980    per [facility]   • Esophageal reflux quittin.1      Smokeless tobacco: Former User    Alcohol use: No      Alcohol/week: 0.0 oz      Comment: formerly since 2016    Drug use: No       PHYSICAL EXAM:    Physical Exam   Constitutional: She appears well-nourished. No distress.    HENT:   Umu [place] 3. 7   GLOBULIN, TOTAL      2.5 - 3.7 g/dL 3.5   A/G RATIO      1.0 - 2.0 1.1   ANION GAP      0 - 18 mmol/L 6   BUN/CREA RATIO      10.0 - 20.0 14.6   CALCULATED OSMOLALITY      275 - 295 mOsm/kg 283   GFR, Non-      >=60 >60   GFR,

## 2018-11-26 ENCOUNTER — NURSE ONLY (OUTPATIENT)
Dept: ALLERGY | Facility: CLINIC | Age: 73
End: 2018-11-26
Payer: MEDICARE

## 2018-11-26 ENCOUNTER — OFFICE VISIT (OUTPATIENT)
Dept: ALLERGY | Facility: CLINIC | Age: 73
End: 2018-11-26
Payer: MEDICARE

## 2018-11-26 VITALS
HEIGHT: 62 IN | TEMPERATURE: 97 F | WEIGHT: 149 LBS | DIASTOLIC BLOOD PRESSURE: 68 MMHG | BODY MASS INDEX: 27.42 KG/M2 | SYSTOLIC BLOOD PRESSURE: 107 MMHG | HEART RATE: 51 BPM

## 2018-11-26 DIAGNOSIS — J30.1 SEASONAL ALLERGIC RHINITIS DUE TO POLLEN: Primary | ICD-10-CM

## 2018-11-26 DIAGNOSIS — H81.09 MENIERE'S DISEASE, UNSPECIFIED LATERALITY: ICD-10-CM

## 2018-11-26 DIAGNOSIS — J30.9 ALLERGIC RHINITIS, UNSPECIFIED SEASONALITY, UNSPECIFIED TRIGGER: ICD-10-CM

## 2018-11-26 PROBLEM — J30.2 SEASONAL ALLERGIC RHINITIS: Status: ACTIVE | Noted: 2018-11-26

## 2018-11-26 PROCEDURE — G0463 HOSPITAL OUTPT CLINIC VISIT: HCPCS | Performed by: ALLERGY & IMMUNOLOGY

## 2018-11-26 PROCEDURE — 99204 OFFICE O/P NEW MOD 45 MIN: CPT | Performed by: ALLERGY & IMMUNOLOGY

## 2018-11-26 PROCEDURE — 95004 PERQ TESTS W/ALRGNC XTRCS: CPT | Performed by: ALLERGY & IMMUNOLOGY

## 2018-11-26 RX ORDER — LEVOCETIRIZINE DIHYDROCHLORIDE 5 MG/1
5 TABLET, FILM COATED ORAL NIGHTLY
Qty: 30 TABLET | Refills: 0 | Status: SHIPPED | OUTPATIENT
Start: 2018-11-26 | End: 2018-12-24

## 2018-11-26 NOTE — PROGRESS NOTES
Kamillaabdelrahman Ear is a 68year old female. HPI:   Patient presents with: Allergies    Patient is a 77-year-old female who presents for allergy evaluation upon referral of her PCP, Dr. Lj Robles with a chief complaint of allergies.     Prior note from naprosyn 500   • EXCISE HAND/FOOT NEUROMA Left 2009    per ng 2 left hammer toe 2 rt, hallux valqus naprosyn 500   • EXCISION OF UVULA  2006    per ng  uvula removed    • HAND/FINGER SURGERY UNLISTED Right 2011    per ng hand synovitis surgery   • HYSTEREC Rfl:    Meclizine HCl (ANTIVERT) 12.5 MG Oral Tab Take 12.5 mg by mouth as needed. Disp:  Rfl: 0   Ondansetron HCl (ZOFRAN) 4 mg tablet Take 4 mg by mouth as needed. Disp:  Rfl: 1   Calcium Carbonate-Vitamin D (CALCIUM + D OR) Take by mouth daily.  Take noted  Head/Face: NC/Atraumatic  Eyes/Vision: conjunctiva and lids are normal extraocular motion is intact   Ears/Audiometry: tympanic membranes are normal bilaterally hearing is grossly intact  Nose/Mouth/Throat: nose and throat are clear mucous membranes training related to self administration of these medications. Teaching, instruction and sample was provided to the patient by myself. Teaching included  a review of potential adverse side effects as well as potential efficacy.   Patient's questions were a

## 2018-11-26 NOTE — PATIENT INSTRUCTIONS
Recs: Handouts on allergies and avoidance measures provided and reviewed including the potential treatment option of immunotherapy for underlying environmental allergens  Trial of Xyzal, levo cetirizine 5 mg once a night at bedtime  Consider adding Flonase

## 2018-11-29 ENCOUNTER — HOSPITAL ENCOUNTER (OUTPATIENT)
Dept: MAMMOGRAPHY | Age: 73
Discharge: HOME | End: 2018-11-29
Attending: INTERNAL MEDICINE
Payer: MEDICARE

## 2018-11-29 DIAGNOSIS — Z12.31 VISIT FOR SCREENING MAMMOGRAM: ICD-10-CM

## 2018-11-29 PROCEDURE — 77063 BREAST TOMOSYNTHESIS BI: CPT | Performed by: INTERNAL MEDICINE

## 2018-11-29 PROCEDURE — 77067 SCR MAMMO BI INCL CAD: CPT | Performed by: INTERNAL MEDICINE

## 2018-12-05 ENCOUNTER — ANTI-COAG VISIT (OUTPATIENT)
Dept: INTERNAL MEDICINE CLINIC | Facility: CLINIC | Age: 73
End: 2018-12-05
Payer: MEDICARE

## 2018-12-05 DIAGNOSIS — Z79.01 LONG TERM (CURRENT) USE OF ANTICOAGULANTS: Primary | ICD-10-CM

## 2018-12-05 DIAGNOSIS — Z51.81 ENCOUNTER FOR THERAPEUTIC DRUG MONITORING: ICD-10-CM

## 2018-12-05 DIAGNOSIS — I48.20 CHRONIC ATRIAL FIBRILLATION (HCC): ICD-10-CM

## 2018-12-05 PROCEDURE — 85610 PROTHROMBIN TIME: CPT

## 2018-12-05 PROCEDURE — 36416 COLLJ CAPILLARY BLOOD SPEC: CPT

## 2018-12-07 ENCOUNTER — OFFICE VISIT (OUTPATIENT)
Dept: FAMILY MEDICINE CLINIC | Facility: CLINIC | Age: 73
End: 2018-12-07
Payer: MEDICARE

## 2018-12-07 VITALS
TEMPERATURE: 98 F | SYSTOLIC BLOOD PRESSURE: 100 MMHG | BODY MASS INDEX: 28.13 KG/M2 | WEIGHT: 149 LBS | DIASTOLIC BLOOD PRESSURE: 60 MMHG | HEART RATE: 57 BPM | RESPIRATION RATE: 14 BRPM | HEIGHT: 61 IN

## 2018-12-07 DIAGNOSIS — R30.0 DYSURIA: Primary | ICD-10-CM

## 2018-12-07 PROCEDURE — 87086 URINE CULTURE/COLONY COUNT: CPT | Performed by: NURSE PRACTITIONER

## 2018-12-07 PROCEDURE — 99213 OFFICE O/P EST LOW 20 MIN: CPT | Performed by: NURSE PRACTITIONER

## 2018-12-07 RX ORDER — CEPHALEXIN 500 MG/1
500 TABLET ORAL 2 TIMES DAILY
Qty: 14 TABLET | Refills: 0 | Status: SHIPPED | OUTPATIENT
Start: 2018-12-07 | End: 2018-12-14

## 2018-12-07 NOTE — PROGRESS NOTES
CHIEF COMPLAINT:   Patient presents with:  UTI      HPI:   James Oh is a 68year old female who presents with symptoms of UTI. Complaining of urinary frequency, urgency, dysuria for last 7 days worse in last 24 hours.  Symptoms have been pro Ondansetron HCl (ZOFRAN) 4 mg tablet Take 4 mg by mouth as needed. Disp:  Rfl: 1   Calcium Carbonate-Vitamin D (CALCIUM + D OR) Take by mouth daily.  Take 500mg & 1000 IU Vit D daily Disp:  Rfl:    GLUCOSAMINE-CHONDROITIN OR Take 1 tablet by mouth 2 (two) CARDIO: RRR, no murmurs  LUNGS: clear to ausculation bilaterally, no wheezing or rhonchi  GI: BS present x 4. No hepatosplenomegaly. : no suprapubic tenderness.  No bladder distention, or CVAT     No results found for this or any previous visit (from th · Radiation therapy to the pelvic area  How is dysuria diagnosed? Your healthcare provider will examine you. He or she will ask about your symptoms and health.  After talking with you and doing a physical exam, your healthcare provider may know what is cau

## 2018-12-07 NOTE — PATIENT INSTRUCTIONS
Dysuria     Painful urination (dysuria) is often caused by a problem in the urinary tract. Dysuria is pain felt during urination. It is often described as a burning. Learn more about this problem and how it can be treated. What causes dysuria?   Possib · Rash or joint pain  · Increased back or abdominal pain  · Enlarged painful lymph nodes (lumps) in the groin   Date Last Reviewed: 1/1/2017  © 2703-4112 [employer]. [address] [facility], [address] [place] [address]. All rights reserved.  This inform

## 2018-12-18 ENCOUNTER — OFFICE VISIT (OUTPATIENT)
Dept: INTERNAL MEDICINE CLINIC | Facility: CLINIC | Age: 73
End: 2018-12-18
Payer: MEDICARE

## 2018-12-18 VITALS
HEIGHT: 61 IN | SYSTOLIC BLOOD PRESSURE: 102 MMHG | BODY MASS INDEX: 28.13 KG/M2 | WEIGHT: 149 LBS | DIASTOLIC BLOOD PRESSURE: 53 MMHG | HEART RATE: 53 BPM

## 2018-12-18 DIAGNOSIS — R73.9 HYPERGLYCEMIA: ICD-10-CM

## 2018-12-18 DIAGNOSIS — E78.5 HYPERLIPIDEMIA, UNSPECIFIED HYPERLIPIDEMIA TYPE: ICD-10-CM

## 2018-12-18 DIAGNOSIS — I10 ESSENTIAL HYPERTENSION: Primary | ICD-10-CM

## 2018-12-18 DIAGNOSIS — M85.80 OSTEOPENIA WITH HIGH RISK OF FRACTURE: ICD-10-CM

## 2018-12-18 DIAGNOSIS — G47.33 OSA (OBSTRUCTIVE SLEEP APNEA): ICD-10-CM

## 2018-12-18 DIAGNOSIS — I48.0 PAROXYSMAL ATRIAL FIBRILLATION (HCC): ICD-10-CM

## 2018-12-18 PROCEDURE — G0463 HOSPITAL OUTPT CLINIC VISIT: HCPCS | Performed by: INTERNAL MEDICINE

## 2018-12-18 PROCEDURE — 99214 OFFICE O/P EST MOD 30 MIN: CPT | Performed by: INTERNAL MEDICINE

## 2018-12-18 NOTE — PROGRESS NOTES
HPI:    Patient ID: Priti Pinto is a 68year old female.     HPI     HTN  HTN  Long standing history of hypertension     sympotms  :        Headache no  dizziness        no                             Blurred vision no  palpitaionsSyncope no  Smiley 1,000 mg by mouth.  Disp:  Rfl:    ATENOLOL 25 MG Oral Tab TAKE 1 TABLET BY MOUTH EVERY DAY Disp: 90 tablet Rfl: 3   LOVASTATIN 10 MG Oral Tab TAKE 1 TABLET BY MOUTH EVERY DAY Disp: 90 tablet Rfl: 3   omeprazole 20 MG Oral Capsule Delayed Release Take 1 cap Comment:Headache, watery eyes    HISTORY:  Past Medical History:   Diagnosis Date   • Arrhythmia     A-fib   • Atrial fibrillation [facility]) 1/ 2017   • Ramírez's esophagus    • DUB (dysfunctional uterine bleeding) 1980    per jessenia SOUZA   • Esophageal reflux    • 50.2      Smokeless tobacco: Former User    Alcohol use: No      Alcohol/week: 0.0 oz      Comment: formerly since 2016    Drug use: No       PHYSICAL EXAM:    Physical Exam   Constitutional: She appears well-nourished. No distress.    HENT:   Head: Batool 15 - 41 U/L    19   ALKALINE PHOSPHATASE      32 - 100 U/L    68   Total Bilirubin      0.3 - 1.2 mg/dL    0.4   TOTAL PROTEIN      5.9 - 8.4 g/dL    7.2   Albumin      3.5 - 4.8 g/dL    3.7   GLOBULIN, TOTAL      2.5 - 3.7 g/dL    3.5   A/G RATIO      1.0 5.9 - 8.4 g/dL    Albumin      3.5 - 4.8 g/dL    GLOBULIN, TOTAL      2.5 - 3.7 g/dL    A/G RATIO      1.0 - 2.0    ANION GAP      0 - 18 mmol/L    BUN/CREA RATIO      10.0 - 20.0    CALCULATED OSMOLALITY      275 - 295 mOsm/kg    GFR, Non-African American with CPaP    (I48.0) Paroxysmal atrial fibrillation (HCC)  Plan: controolled    (R73.9) Hyperglycemia  Plan: limit carb intake  Body mass index is 28.15 kg/m².   WT loss adivsed    Medications and most recent test results reviewed  Refill medicaitons  as ne

## 2018-12-24 RX ORDER — LEVOCETIRIZINE DIHYDROCHLORIDE 5 MG/1
TABLET, FILM COATED ORAL
Qty: 30 TABLET | Refills: 0 | Status: SHIPPED | OUTPATIENT
Start: 2018-12-24 | End: 2019-08-21 | Stop reason: ALTCHOICE

## 2018-12-24 NOTE — TELEPHONE ENCOUNTER
Refill requested for   Name from pharmacy: LEVOCETIRIZINE 5 MG TABLET          Will file in chart as: LEVOCETIRIZINE DIHYDROCHLORIDE 5 MG Oral Tab    Sig: TAKE 1 TABLET BY MOUTH EVERY DAY AT NIGHT    Disp:  30 tablet    Refills:  0    Start: 12/24/2018

## 2019-01-02 ENCOUNTER — ANTI-COAG VISIT (OUTPATIENT)
Dept: INTERNAL MEDICINE CLINIC | Facility: CLINIC | Age: 74
End: 2019-01-02
Payer: MEDICARE

## 2019-01-02 DIAGNOSIS — I48.20 CHRONIC ATRIAL FIBRILLATION (HCC): ICD-10-CM

## 2019-01-02 DIAGNOSIS — I48.91 ATRIAL FIBRILLATION, UNSPECIFIED TYPE (HCC): ICD-10-CM

## 2019-01-02 DIAGNOSIS — Z51.81 ENCOUNTER FOR THERAPEUTIC DRUG MONITORING: ICD-10-CM

## 2019-01-02 DIAGNOSIS — Z79.01 LONG TERM (CURRENT) USE OF ANTICOAGULANTS: ICD-10-CM

## 2019-01-02 LAB — INR: 1.7 (ref 2–3)

## 2019-01-02 PROCEDURE — 85610 PROTHROMBIN TIME: CPT

## 2019-01-02 PROCEDURE — 36416 COLLJ CAPILLARY BLOOD SPEC: CPT

## 2019-01-31 ENCOUNTER — APPOINTMENT (OUTPATIENT)
Dept: LAB | Age: 74
End: 2019-01-31
Attending: INTERNAL MEDICINE
Payer: MEDICARE

## 2019-01-31 ENCOUNTER — OFFICE VISIT (OUTPATIENT)
Dept: RHEUMATOLOGY | Facility: CLINIC | Age: 74
End: 2019-01-31
Payer: MEDICARE

## 2019-01-31 VITALS
HEART RATE: 52 BPM | DIASTOLIC BLOOD PRESSURE: 77 MMHG | BODY MASS INDEX: 28.13 KG/M2 | HEIGHT: 61 IN | WEIGHT: 149 LBS | SYSTOLIC BLOOD PRESSURE: 156 MMHG

## 2019-01-31 DIAGNOSIS — M11.20 PSEUDOGOUT: Primary | ICD-10-CM

## 2019-01-31 DIAGNOSIS — M11.20 PSEUDOGOUT: ICD-10-CM

## 2019-01-31 DIAGNOSIS — M54.2 NECK PAIN: ICD-10-CM

## 2019-01-31 LAB
CRP SERPL-MCNC: <0.5 MG/DL (ref 0–0.9)
ERYTHROCYTE [SEDIMENTATION RATE] IN BLOOD: 29 MM/HR (ref 0–30)

## 2019-01-31 PROCEDURE — 85652 RBC SED RATE AUTOMATED: CPT

## 2019-01-31 PROCEDURE — 99213 OFFICE O/P EST LOW 20 MIN: CPT | Performed by: INTERNAL MEDICINE

## 2019-01-31 PROCEDURE — G0463 HOSPITAL OUTPT CLINIC VISIT: HCPCS | Performed by: INTERNAL MEDICINE

## 2019-01-31 PROCEDURE — 86140 C-REACTIVE PROTEIN: CPT

## 2019-01-31 PROCEDURE — 36415 COLL VENOUS BLD VENIPUNCTURE: CPT

## 2019-01-31 RX ORDER — PREDNISONE 10 MG/1
TABLET ORAL
Qty: 21 TABLET | Refills: 0 | Status: SHIPPED | OUTPATIENT
Start: 2019-01-31 | End: 2019-08-21 | Stop reason: ALTCHOICE

## 2019-01-31 RX ORDER — TIZANIDINE 2 MG/1
TABLET ORAL
Qty: 60 TABLET | Refills: 1 | Status: SHIPPED | OUTPATIENT
Start: 2019-01-31 | End: 2019-08-21 | Stop reason: ALTCHOICE

## 2019-01-31 NOTE — PATIENT INSTRUCTIONS
1. Prednisone 10mg - Take 6 tabsx 1 day, take 5 tabsx 1 day, take 4 tabsx 1 day,take 3 tabsx 1 day, take 2 tabsx 1 day, take 1 tabsx 1 day, then off  Call again if it doesn't work -   2. tizandine 2-4mg at night   3. Check labs  4.  Return to clinic as need

## 2019-01-31 NOTE — PROGRESS NOTES
Lali Howe is a 68year old female who presents for No chief complaint on file. Jose Villarreal HPI:     He is a 79year old with hx of pseudogout and is here for evaluation on 12/11/2015. She has a hx of pseugo gout.  SHe had it in her knee several ye but now it's getting worse. She didn't feel she pulled a muscle. This feels like her pseudogout in her left shoulder. No stress and no fevers. She can't move her neck much. She had 2 menierr's attack e5 days ago - and that is stress ful for her.  She (FISH OIL) 1000 MG Oral Cap Take 500 mg by mouth 2 (two) times daily. Disp:  Rfl:    Multiple Vitamins-Minerals (MULTI FOR HER OR) Take 1 tablet by mouth daily.    Disp:  Rfl:       Past Medical History:   Diagnosis Date   • Arrhythmia     A-fib   • Atria no hx of kidney stones, no psorais in family, no hx of sle,   No hx of magnesium, no hx of back pain, no hx of calicum in the family.     Social History:  Social History    Tobacco Use      Smoking status: Former Smoker        Quit date: 9/29/1968 RRR, no murmurs  RS: CTAB, no crackles, no rhonchi  ABD: Soft Non tender, no HSM felt, BS positive  Joint exam:   Left shoulder not tender now.,  good range of motion   Neck - tender in paracervical area - toward left side -   Tender with lateral rotaiton 2    Basophils %       1    NEUTROPHILS #      1.8 - 7.7 K/UL 3.5    LYMPHOCYTES #      1.0 - 4.0 K/UL 1.7    MONOCYTES #      0.0 - 1.0 K/UL 0.4    EOSINOPHILS #      0.0 - 0.7 K/UL 0.1    BASOPHILS #      0.0 - 0.2 K/UL 0.0    URINE-COLOR      Yellow Yel - 1.2 mg/dL  0.3    TOTAL PROTEIN      6.5 - 9.1 g/dL 7.3 7.2    Albumin      3.8 - 5.8 g/dL 4.52 3.6    GLOBULIN, TOTAL      2.5 - 3.7 g/dL  3.6    A/G RATIO      1.0 - 2.0  1.0    GFR/NON-      >60  > 60    GFR/      >60 mg/dL  116 (H)    c-ANCA      Negative Negative     p-ANCA      Negative Negative     URIC ACID      2.1 - 7.4 mg/dL 5.1     SED RATE (ESR) (L)      0 - 30 MM/HR 19  19   C-CITRULLINATED PEPTIDE IGG AB      0.0 - 6.9 U/ML 4.2     HEPATITIS C VIRUS ANTIBODY 291    GFR, Non-      >=60 >60    GFR, -American      >=60 >60    D-DIMER        2.53     Reviewed notes from 6/2012 - when she came to see me    11/12/2014 - dexa - right fem neck - t score is -1.4, right total hip is -0.9, lumbar s months. Le Reyna MD  1/31/2019   11:05 AM  - [place]ed [place]  through Ar Sam is a 68year old female who presents for Patient presents with:  Neck Pain: left side  Shoulder Pain  .    HPI:     He is a 79 doesn't seen any swelling. Wt Readings from Last 2 Encounters:  01/31/19 : 149 lb (67.6 kg)  12/18/18 : 149 lb (67.6 kg)    Body mass index is 28.15 kg/m².         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   EXTREMITIES: no cyanosis, clubbing or edema  NEURO: intact touch, 5/5 ue and le strength    Component      Latest Ref Rng 9/18/2015 3/10/2015   Glucose      65 - 99 mg/dL 98 92   Sodium      136 - 144 mmol/L 140 141   Potassium      3.3 - 5 GRAVITY, URINE      1.002 - 1.035 1.024    URINE PH      5.0 - 8.0 6.0    PROTEIN, URINE, QUAL. Negative mg/dL Negative    GLUCOSE, URINE, QUAL. Negative mg/dL Negative    KETONES, URINE      Negative mg/dL Negative    BILIRUBIN, URINE, QUAL. 5. 5    RED BLOOD CELL COUNT      3.70 - 5.40 M/UL  3.93    Hemoglobin      12.0 - 16.0 G/DL  12.6    Hematocrit      35.0 - 48.0 %  37.8    MEAN CORPUSCULAR VOLUME      80.0 - 100.0 FL  96.0    MEAN CORPUSCULAR HEMOGLOBIN      27.0 - 32.0 PG  32.0    MEAN CONVERTING ENZ(S)      8 - 53 U/L <5 (L)     C-REACTIVE PROTEIN (CRP)(S)      0.0 - 0.9 mg/dL < 0.5     TSH      0.34 - 5.60 uIU/mL  1.39    RHEUMATOID FACTOR      <14 IU/mL   11     Component      Latest Ref Rng 12/12/2015 6/8/2012 10/26/2011   SED RATE 0 - 5 /HPF 1   RBC Urine      0 - 3 /HPF <1   BACTERIA      None Seen /HPF Negative   TSH      0.45 - 5.33 uIU/mL 1.08   T4,Free (Direct)      0.58 - 1.64 ng/dL 0.83       Reviewed notes from 6/2012 - when she came to see me    11/12/2014 - dexa - right fe treatment like colchicine needed at this time. 2. afbi 1/2107 - on coumadin  3. abdiel's esophagiits -   4.  carpal tunnel symtpoms  Better with braces   5. menierre's since 2012 -     Summary:  1.  Prednisone 10mg - Take 6 tabsx 1 day, take 5 tabsx 1 d

## 2019-02-05 ENCOUNTER — MED REC SCAN ONLY (OUTPATIENT)
Dept: INTERNAL MEDICINE CLINIC | Facility: CLINIC | Age: 74
End: 2019-02-05

## 2019-02-05 ENCOUNTER — ANTI-COAG VISIT (OUTPATIENT)
Dept: INTERNAL MEDICINE CLINIC | Facility: CLINIC | Age: 74
End: 2019-02-05
Payer: MEDICARE

## 2019-02-05 DIAGNOSIS — Z51.81 ENCOUNTER FOR THERAPEUTIC DRUG MONITORING: ICD-10-CM

## 2019-02-05 DIAGNOSIS — Z79.01 LONG TERM (CURRENT) USE OF ANTICOAGULANTS: ICD-10-CM

## 2019-02-05 DIAGNOSIS — I48.91 ATRIAL FIBRILLATION, UNSPECIFIED TYPE (HCC): ICD-10-CM

## 2019-02-05 DIAGNOSIS — I48.20 CHRONIC ATRIAL FIBRILLATION (HCC): ICD-10-CM

## 2019-02-05 LAB — INR: 2.7 (ref 2–3)

## 2019-02-05 PROCEDURE — 85610 PROTHROMBIN TIME: CPT

## 2019-02-05 PROCEDURE — 36416 COLLJ CAPILLARY BLOOD SPEC: CPT

## 2019-03-04 RX ORDER — WARFARIN SODIUM 5 MG/1
TABLET ORAL
Qty: 150 TABLET | Refills: 1 | Status: SHIPPED | OUTPATIENT
Start: 2019-03-04 | End: 2019-11-02

## 2019-03-05 ENCOUNTER — TELEPHONE (OUTPATIENT)
Dept: INTERNAL MEDICINE CLINIC | Facility: CLINIC | Age: 74
End: 2019-03-05

## 2019-03-05 DIAGNOSIS — Z79.01 LONG TERM (CURRENT) USE OF ANTICOAGULANTS: ICD-10-CM

## 2019-03-05 DIAGNOSIS — Z51.81 ENCOUNTER FOR THERAPEUTIC DRUG MONITORING: ICD-10-CM

## 2019-03-05 DIAGNOSIS — I48.91 ATRIAL FIBRILLATION, UNSPECIFIED TYPE (HCC): Primary | ICD-10-CM

## 2019-03-05 NOTE — TELEPHONE ENCOUNTER
Anticoag referral expires soon. Order pended. Please sign, thank you.     Dx: Atrial fibrillation, unspecified type ([facility] (I48.91)

## 2019-03-06 ENCOUNTER — ANTI-COAG VISIT (OUTPATIENT)
Dept: INTERNAL MEDICINE CLINIC | Facility: CLINIC | Age: 74
End: 2019-03-06
Payer: MEDICARE

## 2019-03-06 DIAGNOSIS — Z79.01 LONG TERM (CURRENT) USE OF ANTICOAGULANTS: ICD-10-CM

## 2019-03-06 DIAGNOSIS — Z51.81 ENCOUNTER FOR THERAPEUTIC DRUG MONITORING: ICD-10-CM

## 2019-03-06 DIAGNOSIS — I48.91 ATRIAL FIBRILLATION, UNSPECIFIED TYPE (HCC): ICD-10-CM

## 2019-03-06 DIAGNOSIS — I48.20 CHRONIC ATRIAL FIBRILLATION (HCC): ICD-10-CM

## 2019-03-06 LAB — INR: 2.2 (ref 2–3)

## 2019-03-06 PROCEDURE — 85610 PROTHROMBIN TIME: CPT

## 2019-03-06 PROCEDURE — 36416 COLLJ CAPILLARY BLOOD SPEC: CPT

## 2019-04-03 ENCOUNTER — ANTI-COAG VISIT (OUTPATIENT)
Dept: INTERNAL MEDICINE CLINIC | Facility: CLINIC | Age: 74
End: 2019-04-03
Payer: MEDICARE

## 2019-04-03 DIAGNOSIS — I48.91 ATRIAL FIBRILLATION, UNSPECIFIED TYPE (HCC): ICD-10-CM

## 2019-04-03 DIAGNOSIS — I48.20 CHRONIC ATRIAL FIBRILLATION (HCC): ICD-10-CM

## 2019-04-03 DIAGNOSIS — Z51.81 ENCOUNTER FOR THERAPEUTIC DRUG MONITORING: ICD-10-CM

## 2019-04-03 DIAGNOSIS — Z79.01 LONG TERM (CURRENT) USE OF ANTICOAGULANTS: ICD-10-CM

## 2019-04-03 PROCEDURE — 36416 COLLJ CAPILLARY BLOOD SPEC: CPT

## 2019-04-03 PROCEDURE — 85610 PROTHROMBIN TIME: CPT

## 2019-05-01 ENCOUNTER — ANTI-COAG VISIT (OUTPATIENT)
Dept: INTERNAL MEDICINE CLINIC | Facility: CLINIC | Age: 74
End: 2019-05-01
Payer: MEDICARE

## 2019-05-01 DIAGNOSIS — I48.91 ATRIAL FIBRILLATION, UNSPECIFIED TYPE (HCC): ICD-10-CM

## 2019-05-01 DIAGNOSIS — Z79.01 LONG TERM (CURRENT) USE OF ANTICOAGULANTS: ICD-10-CM

## 2019-05-01 DIAGNOSIS — I48.20 CHRONIC ATRIAL FIBRILLATION (HCC): ICD-10-CM

## 2019-05-01 DIAGNOSIS — Z51.81 ENCOUNTER FOR THERAPEUTIC DRUG MONITORING: ICD-10-CM

## 2019-05-01 PROCEDURE — 36416 COLLJ CAPILLARY BLOOD SPEC: CPT

## 2019-05-01 PROCEDURE — 85610 PROTHROMBIN TIME: CPT

## 2019-05-14 RX ORDER — OMEPRAZOLE 20 MG/1
CAPSULE, DELAYED RELEASE ORAL
Qty: 90 CAPSULE | Refills: 1 | Status: SHIPPED | OUTPATIENT
Start: 2019-05-14 | End: 2019-10-21

## 2019-05-22 RX ORDER — WARFARIN SODIUM 5 MG/1
TABLET ORAL
Qty: 90 TABLET | Refills: 1 | OUTPATIENT
Start: 2019-05-22

## 2019-05-29 ENCOUNTER — ANTI-COAG VISIT (OUTPATIENT)
Dept: INTERNAL MEDICINE CLINIC | Facility: CLINIC | Age: 74
End: 2019-05-29
Payer: MEDICARE

## 2019-05-29 DIAGNOSIS — Z79.01 LONG TERM (CURRENT) USE OF ANTICOAGULANTS: ICD-10-CM

## 2019-05-29 DIAGNOSIS — Z51.81 ENCOUNTER FOR THERAPEUTIC DRUG MONITORING: ICD-10-CM

## 2019-05-29 DIAGNOSIS — I48.20 CHRONIC ATRIAL FIBRILLATION (HCC): ICD-10-CM

## 2019-05-29 DIAGNOSIS — I48.91 ATRIAL FIBRILLATION, UNSPECIFIED TYPE (HCC): ICD-10-CM

## 2019-05-29 PROCEDURE — 36416 COLLJ CAPILLARY BLOOD SPEC: CPT

## 2019-05-29 PROCEDURE — 85610 PROTHROMBIN TIME: CPT

## 2019-05-29 RX ORDER — OMEPRAZOLE 20 MG/1
CAPSULE, DELAYED RELEASE ORAL
Qty: 90 CAPSULE | Refills: 1 | OUTPATIENT
Start: 2019-05-29

## 2019-06-26 ENCOUNTER — TELEPHONE (OUTPATIENT)
Dept: INTERNAL MEDICINE CLINIC | Facility: CLINIC | Age: 74
End: 2019-06-26

## 2019-06-26 ENCOUNTER — ANTI-COAG VISIT (OUTPATIENT)
Dept: INTERNAL MEDICINE CLINIC | Facility: CLINIC | Age: 74
End: 2019-06-26
Payer: MEDICARE

## 2019-06-26 DIAGNOSIS — Z79.01 LONG TERM (CURRENT) USE OF ANTICOAGULANTS: ICD-10-CM

## 2019-06-26 DIAGNOSIS — I48.91 ATRIAL FIBRILLATION, UNSPECIFIED TYPE (HCC): ICD-10-CM

## 2019-06-26 DIAGNOSIS — Z51.81 ENCOUNTER FOR THERAPEUTIC DRUG MONITORING: ICD-10-CM

## 2019-06-26 DIAGNOSIS — I48.20 CHRONIC ATRIAL FIBRILLATION (HCC): ICD-10-CM

## 2019-06-26 PROCEDURE — 36416 COLLJ CAPILLARY BLOOD SPEC: CPT

## 2019-06-26 PROCEDURE — 85610 PROTHROMBIN TIME: CPT

## 2019-07-10 ENCOUNTER — ANTI-COAG VISIT (OUTPATIENT)
Dept: INTERNAL MEDICINE CLINIC | Facility: CLINIC | Age: 74
End: 2019-07-10
Payer: MEDICARE

## 2019-07-10 DIAGNOSIS — Z51.81 ENCOUNTER FOR THERAPEUTIC DRUG MONITORING: ICD-10-CM

## 2019-07-10 DIAGNOSIS — Z79.01 LONG TERM (CURRENT) USE OF ANTICOAGULANTS: ICD-10-CM

## 2019-07-10 DIAGNOSIS — I48.20 CHRONIC ATRIAL FIBRILLATION (HCC): ICD-10-CM

## 2019-07-10 DIAGNOSIS — I48.91 ATRIAL FIBRILLATION, UNSPECIFIED TYPE (HCC): ICD-10-CM

## 2019-07-10 LAB — INR: 2.7 (ref 2–3)

## 2019-07-10 PROCEDURE — 85610 PROTHROMBIN TIME: CPT

## 2019-07-10 PROCEDURE — 36416 COLLJ CAPILLARY BLOOD SPEC: CPT

## 2019-08-04 ENCOUNTER — HOSPITAL ENCOUNTER (OUTPATIENT)
Age: 74
Discharge: HOME | End: 2019-08-04
Attending: EMERGENCY MEDICINE
Payer: MEDICARE

## 2019-08-04 VITALS
WEIGHT: 149 LBS | SYSTOLIC BLOOD PRESSURE: 103 MMHG | BODY MASS INDEX: 27.42 KG/M2 | OXYGEN SATURATION: 95 % | HEART RATE: 58 BPM | HEIGHT: 62 IN | DIASTOLIC BLOOD PRESSURE: 63 MMHG | RESPIRATION RATE: 18 BRPM | TEMPERATURE: 98 F

## 2019-08-04 DIAGNOSIS — R30.0 DYSURIA: Primary | ICD-10-CM

## 2019-08-04 LAB
BILIRUB UR QL STRIP: NEGATIVE
COLOR UR: YELLOW
GLUCOSE UR STRIP-MCNC: NEGATIVE MG/DL
KETONES UR STRIP-MCNC: NEGATIVE MG/DL
NITRITE UR QL STRIP: NEGATIVE
PH UR STRIP: 8.5 [PH]
PROT UR STRIP-MCNC: >=300 MG/DL
SP GR UR STRIP: 1.02
UROBILINOGEN UR STRIP-ACNC: <2 MG/DL

## 2019-08-04 PROCEDURE — 99214 OFFICE O/P EST MOD 30 MIN: CPT

## 2019-08-04 PROCEDURE — 87086 URINE CULTURE/COLONY COUNT: CPT | Performed by: EMERGENCY MEDICINE

## 2019-08-04 PROCEDURE — 81002 URINALYSIS NONAUTO W/O SCOPE: CPT

## 2019-08-04 RX ORDER — CEPHALEXIN 500 MG/1
500 CAPSULE ORAL 2 TIMES DAILY
Qty: 14 CAPSULE | Refills: 0 | Status: SHIPPED | OUTPATIENT
Start: 2019-08-04 | End: 2019-08-11

## 2019-08-04 NOTE — ED INITIAL ASSESSMENT (HPI)
patient reports urinary urgency accompanied by dysuria starting yesterday evening. Denies fevers. Denies vomiting. Denies back pain.

## 2019-08-04 NOTE — ED PROVIDER NOTES
[facility]    History   Patient presents with:  Urinary Symptoms (urologic)    Stated Complaint: uti     HPI    Patient complains of burning and urgency with urination for the past day.   Patient denies fe history reviewed and is not pertinent to presenting problem.     Social History    Tobacco Use      Smoking status: Former Smoker        Quit date: 1968        Years since quittin.8      Smokeless tobacco: Former User    Alcohol use: No      Alcoh was needed.   Patient stated that she has taken cephalosporin antibiotic in the past without adverse reaction              Disposition and Plan     Clinical Impression:  Dysuria  (primary encounter diagnosis)    Disposition:  Discharge  8/4/2019  9:11 am

## 2019-08-08 ENCOUNTER — ANTI-COAG VISIT (OUTPATIENT)
Dept: INTERNAL MEDICINE CLINIC | Facility: CLINIC | Age: 74
End: 2019-08-08
Payer: MEDICARE

## 2019-08-08 DIAGNOSIS — Z79.01 LONG TERM (CURRENT) USE OF ANTICOAGULANTS: ICD-10-CM

## 2019-08-08 DIAGNOSIS — Z51.81 ENCOUNTER FOR THERAPEUTIC DRUG MONITORING: ICD-10-CM

## 2019-08-08 DIAGNOSIS — I48.91 ATRIAL FIBRILLATION, UNSPECIFIED TYPE (HCC): ICD-10-CM

## 2019-08-08 DIAGNOSIS — I48.20 CHRONIC ATRIAL FIBRILLATION (HCC): ICD-10-CM

## 2019-08-08 LAB — INR: 2.5 (ref 2–3)

## 2019-08-08 PROCEDURE — 85610 PROTHROMBIN TIME: CPT

## 2019-08-08 PROCEDURE — 36416 COLLJ CAPILLARY BLOOD SPEC: CPT

## 2019-08-21 ENCOUNTER — OFFICE VISIT (OUTPATIENT)
Dept: CARDIOLOGY CLINIC | Facility: CLINIC | Age: 74
End: 2019-08-21
Payer: MEDICARE

## 2019-08-21 VITALS
HEART RATE: 59 BPM | DIASTOLIC BLOOD PRESSURE: 67 MMHG | RESPIRATION RATE: 20 BRPM | WEIGHT: 150 LBS | OXYGEN SATURATION: 97 % | SYSTOLIC BLOOD PRESSURE: 106 MMHG | BODY MASS INDEX: 27 KG/M2

## 2019-08-21 DIAGNOSIS — I48.0 PAROXYSMAL ATRIAL FIBRILLATION (HCC): Primary | ICD-10-CM

## 2019-08-21 PROCEDURE — G0463 HOSPITAL OUTPT CLINIC VISIT: HCPCS | Performed by: INTERNAL MEDICINE

## 2019-08-21 PROCEDURE — 99214 OFFICE O/P EST MOD 30 MIN: CPT | Performed by: INTERNAL MEDICINE

## 2019-08-21 NOTE — PROGRESS NOTES
Tamara Bernal is a 68year old female. Patient presents with: Follow - Up: Annual   Atrial Fibrillation    HPI:   Patient is here for follow-up appointment. She did not have any more episodes of atrial fibrillation over last 12 months.   He denies biopsy   • Esophagitis 2012    pr ng egd w/ biopsy   • Gastritis    • Heartburn 2011    per ng reflux, dysphagia   • High blood pressure    • High cholesterol    • Meniere disease    • Other and unspecified hyperlipidemia    • Paroxysmal atrial fibrillatio

## 2019-08-21 NOTE — PATIENT INSTRUCTIONS
Discontinue atenolol. Continue rest of the medications. Follow-up with me in 12 months or sooner if needed.

## 2019-09-04 ENCOUNTER — ANTI-COAG VISIT (OUTPATIENT)
Dept: INTERNAL MEDICINE CLINIC | Facility: CLINIC | Age: 74
End: 2019-09-04
Payer: MEDICARE

## 2019-09-04 DIAGNOSIS — Z79.01 LONG TERM (CURRENT) USE OF ANTICOAGULANTS: ICD-10-CM

## 2019-09-04 DIAGNOSIS — I48.20 CHRONIC ATRIAL FIBRILLATION (HCC): ICD-10-CM

## 2019-09-04 DIAGNOSIS — Z51.81 ENCOUNTER FOR THERAPEUTIC DRUG MONITORING: ICD-10-CM

## 2019-09-04 DIAGNOSIS — I48.91 ATRIAL FIBRILLATION, UNSPECIFIED TYPE (HCC): ICD-10-CM

## 2019-09-04 LAB — INR: 2.4 (ref 0.8–1.2)

## 2019-09-04 PROCEDURE — 36416 COLLJ CAPILLARY BLOOD SPEC: CPT

## 2019-09-04 PROCEDURE — 85610 PROTHROMBIN TIME: CPT

## 2019-09-13 ENCOUNTER — HOSPITAL ENCOUNTER (OUTPATIENT)
Dept: GENERAL RADIOLOGY | Facility: HOSPITAL | Age: 74
Discharge: HOME | End: 2019-09-13
Attending: PODIATRIST
Payer: MEDICARE

## 2019-09-13 ENCOUNTER — OFFICE VISIT (OUTPATIENT)
Dept: PODIATRY CLINIC | Facility: CLINIC | Age: 74
End: 2019-09-13
Payer: MEDICARE

## 2019-09-13 ENCOUNTER — HOSPITAL ENCOUNTER (OUTPATIENT)
Dept: GENERAL RADIOLOGY | Facility: HOSPITAL | Age: 74
Discharge: HOME | End: 2019-09-13
Attending: PODIATRIST | Admitting: PODIATRIST
Payer: MEDICARE

## 2019-09-13 DIAGNOSIS — M79.671 RIGHT FOOT PAIN: ICD-10-CM

## 2019-09-13 DIAGNOSIS — M25.571 RIGHT ANKLE PAIN, UNSPECIFIED CHRONICITY: ICD-10-CM

## 2019-09-13 DIAGNOSIS — M79.671 RIGHT FOOT PAIN: Primary | ICD-10-CM

## 2019-09-13 DIAGNOSIS — M19.90 ARTHRITIS: ICD-10-CM

## 2019-09-13 PROCEDURE — 73620 X-RAY EXAM OF FOOT: CPT | Performed by: PODIATRIST

## 2019-09-13 PROCEDURE — 99203 OFFICE O/P NEW LOW 30 MIN: CPT | Performed by: PODIATRIST

## 2019-09-13 PROCEDURE — 73610 X-RAY EXAM OF ANKLE: CPT | Performed by: PODIATRIST

## 2019-09-13 PROCEDURE — G0463 HOSPITAL OUTPT CLINIC VISIT: HCPCS | Performed by: PODIATRIST

## 2019-09-13 NOTE — PROGRESS NOTES
HPI:    Patient ID: Jessica Tierney is a 68year old female. This pleasant 70-year-old female presents to the office today having not been seen in about 4 years. Patient's chief complaint is pain associated with the right foot and ankle.   She give Comment:Headache, watery eyes  Ragweed                 OTHER (SEE COMMENTS)    Comment:Watery eyes , Sneezing, HA's  Trees, [place]        Runny nose    Comment:Headache, watery eyes   PHYSICAL EXAM:     On physical exam pedal pulses are noted.   There is requested or ordered in this encounter       Imaging & Referrals:  None       [id number]

## 2019-10-02 ENCOUNTER — ANTI-COAG VISIT (OUTPATIENT)
Dept: INTERNAL MEDICINE CLINIC | Facility: CLINIC | Age: 74
End: 2019-10-02
Payer: MEDICARE

## 2019-10-02 DIAGNOSIS — I48.20 CHRONIC ATRIAL FIBRILLATION (HCC): ICD-10-CM

## 2019-10-02 DIAGNOSIS — Z79.01 LONG TERM (CURRENT) USE OF ANTICOAGULANTS: ICD-10-CM

## 2019-10-02 DIAGNOSIS — I48.91 ATRIAL FIBRILLATION, UNSPECIFIED TYPE (HCC): ICD-10-CM

## 2019-10-02 DIAGNOSIS — Z51.81 ENCOUNTER FOR THERAPEUTIC DRUG MONITORING: ICD-10-CM

## 2019-10-02 PROCEDURE — 85610 PROTHROMBIN TIME: CPT

## 2019-10-02 PROCEDURE — 36416 COLLJ CAPILLARY BLOOD SPEC: CPT

## 2019-10-21 ENCOUNTER — OFFICE VISIT (OUTPATIENT)
Dept: INTERNAL MEDICINE CLINIC | Facility: CLINIC | Age: 74
End: 2019-10-21
Payer: MEDICARE

## 2019-10-21 VITALS
HEIGHT: 62 IN | DIASTOLIC BLOOD PRESSURE: 72 MMHG | TEMPERATURE: 98 F | BODY MASS INDEX: 27.79 KG/M2 | HEART RATE: 62 BPM | WEIGHT: 151 LBS | SYSTOLIC BLOOD PRESSURE: 116 MMHG

## 2019-10-21 DIAGNOSIS — H81.02 MENIERE DISEASE, LEFT: ICD-10-CM

## 2019-10-21 DIAGNOSIS — Z00.00 ENCOUNTER FOR MEDICARE ANNUAL WELLNESS EXAM: Primary | ICD-10-CM

## 2019-10-21 DIAGNOSIS — M85.80 OSTEOPENIA WITH HIGH RISK OF FRACTURE: ICD-10-CM

## 2019-10-21 DIAGNOSIS — I10 ESSENTIAL HYPERTENSION: ICD-10-CM

## 2019-10-21 DIAGNOSIS — Z00.00 ENCOUNTER FOR ANNUAL HEALTH EXAMINATION: ICD-10-CM

## 2019-10-21 DIAGNOSIS — I48.91 ATRIAL FIBRILLATION, UNSPECIFIED TYPE (HCC): ICD-10-CM

## 2019-10-21 DIAGNOSIS — Z12.31 VISIT FOR SCREENING MAMMOGRAM: ICD-10-CM

## 2019-10-21 DIAGNOSIS — G47.33 OSA (OBSTRUCTIVE SLEEP APNEA): ICD-10-CM

## 2019-10-21 DIAGNOSIS — E78.5 HYPERLIPIDEMIA, UNSPECIFIED HYPERLIPIDEMIA TYPE: ICD-10-CM

## 2019-10-21 DIAGNOSIS — Z78.0 POSTMENOPAUSAL: ICD-10-CM

## 2019-10-21 PROCEDURE — G0439 PPPS, SUBSEQ VISIT: HCPCS | Performed by: INTERNAL MEDICINE

## 2019-10-21 RX ORDER — LOVASTATIN 10 MG/1
10 TABLET ORAL
Qty: 90 TABLET | Refills: 3 | Status: SHIPPED | OUTPATIENT
Start: 2019-10-21 | End: 2020-11-17

## 2019-10-21 RX ORDER — OMEPRAZOLE 20 MG/1
CAPSULE, DELAYED RELEASE ORAL
Qty: 90 CAPSULE | Refills: 1 | Status: SHIPPED | OUTPATIENT
Start: 2019-10-21 | End: 2020-05-19

## 2019-10-21 NOTE — PATIENT INSTRUCTIONS
Maeve Garner's SCREENING SCHEDULE   Tests on this list are recommended by your physician but may not be covered, or covered at this frequency, by your insurer. Please check with your insurance carrier before scheduling to verify coverage.    PRE lifetime   • Anyone with a family history    Colorectal Cancer Screening  Covered up to Age 76     Colonoscopy Screen   Covered every 10 years- more often if abnormal Colonoscopy due on 08/26/2021 Update Health Maintenance if applicable    Flex Sigmoidosco Once after 65 Orders placed or performed in visit on 04/14/17   • PNEUMOCOCCAL VACC, 13 KISHOR IM    Please get once after your 65th birthday    Pneumococcal 23 (Pneumovax)  Covered Once after 65 No orders found for this or any previous visit.  Please get once

## 2019-10-21 NOTE — PROGRESS NOTES
HPI:   Tamara Bernal is a 76year old female who presents for a Medicare Subsequent Annual Wellness visit (Pt already had Initial Annual Wellness).     Colon due Addi DR Maeve Zuluaga  Hearing aid bird [employer]   Thinking about surery  For n ow  Mamm Psychiatric/Behavioral: The patient is not nervous/anxious. Lovastatin 10 MG Oral Tab, Take 1 tablet (10 mg total) by mouth once daily. , Disp: 90 tablet, Rfl: 3  omeprazole 20 MG Oral Capsule Delayed Release, TAKE 1 CAPSULE BY MOUTH EVERY DAY IN (dysfunctional uterine bleeding) 1980    per ng LIBBY   • Esophageal reflux    • Esophagitis 2011    per ng eosinophilic egd w/ biopsy   • Esophagitis 2012    pr ng egd w/ biopsy   • Gastritis    • Heartburn 2011    per ng reflux, dysphagia   • High blood pr Physical Exam   Constitutional: She appears well-developed and well-nourished. No distress. HENT:   Head: Normocephalic and atraumatic.    Right Ear: External ear and ear canal normal.   Left Ear: External ear and ear canal normal.   Nose: Nose normal. over 1 year: Annual Physical due on 1948         Fall/Risk Assessment   She has been screened for Falls and is low risk: Fall/Risk Scorin    Cognitive Assessment             Functional Ability/Status   Maeve Negrete has some abnormal fun pain radiating to jaw     Hyperglycemia     Azotemia     Atrial flutter, paroxysmal (HCC)     Paroxysmal atrial fibrillation ([facility]     Atrial fibrillation (HCC) [I48.91]     YASMIN (obstructive sleep apnea)     Atrial fibrillation, unspecified type ([facility] daily  GLUCOSAMINE-CHONDROITIN OR, Take 1 tablet by mouth 2 (two) times daily. Take Glucosamine 1500mg and Chondroitin 1100mg two times a day. omega-3 fatty acids (FISH OIL) 1000 MG Oral Cap, Take 500 mg by mouth 2 (two) times daily.     Multiple Vitamins Negative for chest pain, palpitations and leg swelling. Gastrointestinal: Negative for nausea, vomiting, abdominal pain, constipation and abdominal distention. Genitourinary: Negative for dysuria, frequency, hematuria and difficulty urinating.    Muscul I misunderstand what they say:  No   I misunderstand what others are saying and make inappropriate responses:  No I avoid social activities because I cannot hear well and fear I will reply improperly:  No   Family members and friends have told me they thin Vaccine Medicare () 10/10/2017   • Influenza 10/24/2007, 09/19/2009, 09/24/2011, 09/30/2013   • Pneumococcal (Prevnar 13) 04/14/2017   • Pneumococcal (Prevnar 7) 11/05/2011   • Pneumovax 23 11/05/2011   • TDAP 06/04/2007        ASSESSMENT AND OTHER RE ask questions  After Visit Summary handout    Discussed  And given to patient. Diet assessment: good     PLAN:  The patient indicates understanding of these issues and agrees to the plan. Reinforced healthy diet, lifestyle, and exercise.     No reminders to display for this patient. Update Health Maintenance if applicable    Chlamydia  Annually if high risk No results found for: CHLAMYDIA No flowsheet data found.     Screening Mammogram      Mammogram  Annually to 76, then as discussed Mammogram d

## 2019-10-24 ENCOUNTER — APPOINTMENT (OUTPATIENT)
Dept: LAB | Facility: HOSPITAL | Age: 74
End: 2019-10-24
Attending: INTERNAL MEDICINE
Payer: MEDICARE

## 2019-10-24 DIAGNOSIS — E78.5 HYPERLIPIDEMIA, UNSPECIFIED HYPERLIPIDEMIA TYPE: ICD-10-CM

## 2019-10-24 DIAGNOSIS — I10 ESSENTIAL HYPERTENSION: ICD-10-CM

## 2019-10-24 PROCEDURE — 81015 MICROSCOPIC EXAM OF URINE: CPT

## 2019-10-24 PROCEDURE — 80061 LIPID PANEL: CPT

## 2019-10-24 PROCEDURE — 36415 COLL VENOUS BLD VENIPUNCTURE: CPT

## 2019-10-24 PROCEDURE — 84443 ASSAY THYROID STIM HORMONE: CPT

## 2019-10-24 PROCEDURE — 85027 COMPLETE CBC AUTOMATED: CPT

## 2019-10-24 PROCEDURE — 80053 COMPREHEN METABOLIC PANEL: CPT

## 2019-10-24 PROCEDURE — 84439 ASSAY OF FREE THYROXINE: CPT

## 2019-10-30 ENCOUNTER — ANTI-COAG VISIT (OUTPATIENT)
Dept: INTERNAL MEDICINE CLINIC | Facility: CLINIC | Age: 74
End: 2019-10-30
Payer: MEDICARE

## 2019-10-30 DIAGNOSIS — Z51.81 ENCOUNTER FOR THERAPEUTIC DRUG MONITORING: ICD-10-CM

## 2019-10-30 DIAGNOSIS — I48.91 ATRIAL FIBRILLATION, UNSPECIFIED TYPE (HCC): ICD-10-CM

## 2019-10-30 DIAGNOSIS — Z79.01 LONG TERM (CURRENT) USE OF ANTICOAGULANTS: ICD-10-CM

## 2019-10-30 DIAGNOSIS — I48.20 CHRONIC ATRIAL FIBRILLATION (HCC): ICD-10-CM

## 2019-10-30 PROCEDURE — 85610 PROTHROMBIN TIME: CPT

## 2019-10-30 PROCEDURE — 36416 COLLJ CAPILLARY BLOOD SPEC: CPT

## 2019-11-03 NOTE — TELEPHONE ENCOUNTER
Review pended refill request as it does not fall under a protocol.   Requested Prescriptions     Pending Prescriptions Disp Refills   • WARFARIN SODIUM 5 MG Oral Tab [Pharmacy Med Name: WARFARIN SODIUM 5 MG TABLET] 150 tablet 1     Sig: TAKE 1 TAB DAILY IN

## 2019-11-04 RX ORDER — WARFARIN SODIUM 5 MG/1
TABLET ORAL
Qty: 150 TABLET | Refills: 1 | Status: SHIPPED | OUTPATIENT
Start: 2019-11-04 | End: 2020-07-23

## 2019-11-06 ENCOUNTER — TELEPHONE (OUTPATIENT)
Dept: GASTROENTEROLOGY | Facility: CLINIC | Age: 74
End: 2019-11-06

## 2019-11-06 ENCOUNTER — OFFICE VISIT (OUTPATIENT)
Dept: GASTROENTEROLOGY | Facility: CLINIC | Age: 74
End: 2019-11-06
Payer: MEDICARE

## 2019-11-06 VITALS
BODY MASS INDEX: 27.23 KG/M2 | DIASTOLIC BLOOD PRESSURE: 71 MMHG | WEIGHT: 148 LBS | SYSTOLIC BLOOD PRESSURE: 116 MMHG | HEART RATE: 64 BPM | HEIGHT: 62 IN

## 2019-11-06 DIAGNOSIS — Z12.11 COLON CANCER SCREENING: Primary | ICD-10-CM

## 2019-11-06 DIAGNOSIS — Z87.19 HISTORY OF BARRETT'S ESOPHAGUS: ICD-10-CM

## 2019-11-06 DIAGNOSIS — K22.70 BARRETT'S ESOPHAGUS WITHOUT DYSPLASIA: ICD-10-CM

## 2019-11-06 PROCEDURE — G0463 HOSPITAL OUTPT CLINIC VISIT: HCPCS | Performed by: INTERNAL MEDICINE

## 2019-11-06 PROCEDURE — 99214 OFFICE O/P EST MOD 30 MIN: CPT | Performed by: INTERNAL MEDICINE

## 2019-11-06 NOTE — TELEPHONE ENCOUNTER
Scheduled for:  Colonoscopy - 58719 & EGD - 16518  Provider Name:  Dr. Luis Borja  Date:  2/3/20  Location:  [facility]  Sedation:  MAC  Time:  7:30 am (pt is aware to arrive at 6:30 am)  Prep:  Colyte, Prep instructions were given to pt in the office, pt verbalized u

## 2019-11-06 NOTE — PROGRESS NOTES
Melita Moy is a 76year old female. HPI:   Patient presents with:  Colonoscopy Screening    The patient is a 51-year-old female who has a history of atrial fibrillation, Ramírez's esophagus, who presents for colon cancer screening.   She mazin TENDON ORIGIN/INSERTION      per ng injection tendon sheath, ligament   • OTHER SURGICAL HISTORY Left 1981    per ng torn ligament left ankle   • UPPER GI ENDOSCOPY PERFORMED  2015   • UPPER GI ENDOSCOPY,EXAM        Family History   Problem Relation Age of (ATIVAN) 0.5 MG Oral Tab Take 0.5 mg by mouth as needed. 1   • Meclizine HCl (ANTIVERT) 12.5 MG Oral Tab Take 12.5 mg by mouth as needed. 0   • Ondansetron HCl (ZOFRAN) 4 mg tablet Take 4 mg by mouth as needed.     1   • Calcium Carbonate-Vitamin D (C years out from her last colonoscopy and this was negative. She was told to return 10 years time. She has no rectal bleeding or change in bowel habits but she does have increased gas. We discussed dietary modification and a trial of probiotic.   Abdon

## 2019-11-06 NOTE — TELEPHONE ENCOUNTER
Dr. Andrea Yang,     It's right below that EGD op note dated 09/26/16 also w/ procedure date 08/26/11.

## 2019-11-06 NOTE — TELEPHONE ENCOUNTER
Dr. Fidelina Cruz an operative report under the Notes tab dated 09/29/2016 with an encounter date of 08/26/2011 done by Dr. Ghazala Talley.

## 2019-11-06 NOTE — PATIENT INSTRUCTIONS
Ramírez's esophagus  - continue on omeprazole 20mg daily  - EGD for sampling Ramírez's segment    Colon cancer screening  - colonoscopy + MAC and colyte prep  - hold coumadin 2 days before and check PT/INR the AM of procedure     Gas  - avoid dairy   - susana

## 2019-11-12 ENCOUNTER — PATIENT MESSAGE (OUTPATIENT)
Dept: INTERNAL MEDICINE CLINIC | Facility: CLINIC | Age: 74
End: 2019-11-12

## 2019-11-13 ENCOUNTER — TELEPHONE (OUTPATIENT)
Dept: OTHER | Age: 74
End: 2019-11-13

## 2019-11-13 NOTE — TELEPHONE ENCOUNTER
Patient returned call. She states that she is having intermittent moderate pain in her right metacarpal region. She has a history of 3 bouts of synovitis. She states the pain feels different than normal RA pain.  Is still able to function normally with hand

## 2019-11-13 NOTE — TELEPHONE ENCOUNTER
From: Moses Almendarez  To: Olegario Edgar MD  Sent: 11/12/2019 5:30 PM CST  Subject: Other    Dr. Amrita Medina,  In 2011 I had synovitis in my right hand which destroyed 2 tendons.  Dr. Aletha Garcia sent me to Dr. Mackenzie Barragan at that time and she operated to

## 2019-11-13 NOTE — TELEPHONE ENCOUNTER
----- [facility] Alcantara sent at 11/12/2019  5:30 PM CST -----  Regarding: Other  Contact: [phone]  Dr. Mildred Whitaker,  In 2011 I had synovitis in my right hand which destroyed 2 tendons.    Dr. Martha Alejandro sent me to Dr. Chasity Kruger at that t

## 2019-11-14 NOTE — TELEPHONE ENCOUNTER
Spoke with patient ( verified) and relayed Dr. Carol Avelar message below--patient verbalizes understanding and agreement. Call transferred to Rheum per patient's request. No further questions/concerns at this time.

## 2019-11-27 ENCOUNTER — ANTI-COAG VISIT (OUTPATIENT)
Dept: INTERNAL MEDICINE CLINIC | Facility: CLINIC | Age: 74
End: 2019-11-27
Payer: MEDICARE

## 2019-11-27 DIAGNOSIS — Z79.01 LONG TERM (CURRENT) USE OF ANTICOAGULANTS: ICD-10-CM

## 2019-11-27 DIAGNOSIS — Z51.81 ENCOUNTER FOR THERAPEUTIC DRUG MONITORING: ICD-10-CM

## 2019-11-27 DIAGNOSIS — I48.20 CHRONIC ATRIAL FIBRILLATION (HCC): ICD-10-CM

## 2019-11-27 DIAGNOSIS — I48.91 ATRIAL FIBRILLATION, UNSPECIFIED TYPE (HCC): ICD-10-CM

## 2019-11-27 PROCEDURE — 85610 PROTHROMBIN TIME: CPT

## 2019-11-27 PROCEDURE — 36416 COLLJ CAPILLARY BLOOD SPEC: CPT

## 2019-12-09 ENCOUNTER — HOSPITAL ENCOUNTER (OUTPATIENT)
Dept: BONE DENSITY | Facility: HOSPITAL | Age: 74
Discharge: HOME | End: 2019-12-09
Attending: INTERNAL MEDICINE
Payer: MEDICARE

## 2019-12-09 DIAGNOSIS — Z78.0 POSTMENOPAUSAL: ICD-10-CM

## 2019-12-09 PROCEDURE — 77080 DXA BONE DENSITY AXIAL: CPT | Performed by: INTERNAL MEDICINE

## 2019-12-14 ENCOUNTER — HOSPITAL ENCOUNTER (OUTPATIENT)
Dept: MAMMOGRAPHY | Facility: HOSPITAL | Age: 74
Discharge: HOME | End: 2019-12-14
Attending: INTERNAL MEDICINE
Payer: MEDICARE

## 2019-12-14 DIAGNOSIS — Z12.31 VISIT FOR SCREENING MAMMOGRAM: ICD-10-CM

## 2019-12-14 PROCEDURE — 77067 SCR MAMMO BI INCL CAD: CPT | Performed by: INTERNAL MEDICINE

## 2019-12-14 PROCEDURE — 77063 BREAST TOMOSYNTHESIS BI: CPT | Performed by: INTERNAL MEDICINE

## 2019-12-30 ENCOUNTER — ANTI-COAG VISIT (OUTPATIENT)
Dept: INTERNAL MEDICINE CLINIC | Facility: CLINIC | Age: 74
End: 2019-12-30
Payer: MEDICARE

## 2019-12-30 DIAGNOSIS — Z51.81 ENCOUNTER FOR THERAPEUTIC DRUG MONITORING: ICD-10-CM

## 2019-12-30 DIAGNOSIS — I48.21 PERMANENT ATRIAL FIBRILLATION (HCC): ICD-10-CM

## 2019-12-30 DIAGNOSIS — I48.20 CHRONIC ATRIAL FIBRILLATION (HCC): ICD-10-CM

## 2019-12-30 DIAGNOSIS — Z79.01 LONG TERM (CURRENT) USE OF ANTICOAGULANTS: ICD-10-CM

## 2019-12-30 DIAGNOSIS — I48.91 ATRIAL FIBRILLATION, UNSPECIFIED TYPE (HCC): ICD-10-CM

## 2019-12-30 PROCEDURE — 85610 PROTHROMBIN TIME: CPT

## 2019-12-30 PROCEDURE — 36416 COLLJ CAPILLARY BLOOD SPEC: CPT

## 2020-01-06 ENCOUNTER — OFFICE VISIT (OUTPATIENT)
Dept: INTERNAL MEDICINE CLINIC | Facility: CLINIC | Age: 75
End: 2020-01-06
Payer: MEDICARE

## 2020-01-06 ENCOUNTER — NURSE TRIAGE (OUTPATIENT)
Dept: INTERNAL MEDICINE CLINIC | Facility: CLINIC | Age: 75
End: 2020-01-06

## 2020-01-06 VITALS
HEART RATE: 64 BPM | SYSTOLIC BLOOD PRESSURE: 152 MMHG | OXYGEN SATURATION: 97 % | RESPIRATION RATE: 18 BRPM | HEIGHT: 62 IN | DIASTOLIC BLOOD PRESSURE: 81 MMHG | BODY MASS INDEX: 28.19 KG/M2 | TEMPERATURE: 98 F | WEIGHT: 153.19 LBS

## 2020-01-06 DIAGNOSIS — R03.0 ELEVATED BP WITHOUT DIAGNOSIS OF HYPERTENSION: ICD-10-CM

## 2020-01-06 DIAGNOSIS — M54.2 NECK PAIN ON LEFT SIDE: Primary | ICD-10-CM

## 2020-01-06 PROCEDURE — G0463 HOSPITAL OUTPT CLINIC VISIT: HCPCS | Performed by: INTERNAL MEDICINE

## 2020-01-06 PROCEDURE — 99214 OFFICE O/P EST MOD 30 MIN: CPT | Performed by: INTERNAL MEDICINE

## 2020-01-06 RX ORDER — CYCLOBENZAPRINE HCL 10 MG
5 TABLET ORAL 2 TIMES DAILY PRN
Qty: 20 TABLET | Refills: 0 | Status: SHIPPED | OUTPATIENT
Start: 2020-01-06 | End: 2020-01-26

## 2020-01-06 RX ORDER — POLYETHYLENE GLYCOL-3350 AND ELECTROLYTES WITH FLAVOR PACK 240; 5.84; 2.98; 6.72; 22.72 G/278.26G; G/278.26G; G/278.26G; G/278.26G; G/278.26G
POWDER, FOR SOLUTION ORAL
Refills: 0 | COMMUNITY
Start: 2019-11-06 | End: 2020-03-06 | Stop reason: ALTCHOICE

## 2020-01-06 NOTE — TELEPHONE ENCOUNTER
Action Requested: Summary for Provider     []  Critical Lab, Recommendations Needed  [] Need Additional Advice  []   FYI    []   Need Orders  [] Need Medications Sent to Pharmacy  []  Other     SUMMARY:appt made, neck pain from lifting weights, icing/heat,

## 2020-01-06 NOTE — PROGRESS NOTES
HPI:    Patient ID: Elisha Weathers is a 76year old female.   Patient presents with:  Neck Pain: Pt state that she has left side neck pain that started on Friday that she think started on Thursday with exercising    Patient states she developed some Medication Sig Dispense Refill   • cyclobenzaprine 10 MG Oral Tab Take 0.5 tablets (5 mg total) by mouth 2 (two) times daily as needed for Muscle spasms. 20 tablet 0   • WARFARIN SODIUM 5 MG Oral Tab TAKE 1 TAB DAILY IN THE EVENING.  IN ADDITION PT TAKE 7.5 Constitutional: She is oriented to person, place, and time. She appears well-developed and well-nourished. No distress. HENT:   Head: Normocephalic and atraumatic.    Right Ear: Tympanic membrane, external ear and ear canal normal.   Left Ear: Tympanic me Range of motion neck exercises-slowly patient education  Flexeril muscle relaxant 5 mg twice daily as needed for muscle spasm  Recommend patient if pain is worsening might need Medrol Dosepak    Advised patient to avoid Tylenol especially ibuprofen and NSA

## 2020-01-06 NOTE — TELEPHONE ENCOUNTER
Patient called in stating that she is experiencing neck pain on the left side that goes up into her head and down her shoulder that started Friday. Requesting to see Dr. Rambo Reed today (first available 1/7) or advisement. CSS transferred to triage.

## 2020-01-29 ENCOUNTER — ANTI-COAG VISIT (OUTPATIENT)
Dept: INTERNAL MEDICINE CLINIC | Facility: CLINIC | Age: 75
End: 2020-01-29
Payer: MEDICARE

## 2020-01-29 DIAGNOSIS — I48.91 ATRIAL FIBRILLATION, UNSPECIFIED TYPE (HCC): ICD-10-CM

## 2020-01-29 DIAGNOSIS — Z79.01 LONG TERM (CURRENT) USE OF ANTICOAGULANTS: ICD-10-CM

## 2020-01-29 DIAGNOSIS — Z51.81 ENCOUNTER FOR THERAPEUTIC DRUG MONITORING: ICD-10-CM

## 2020-01-29 DIAGNOSIS — I48.20 CHRONIC ATRIAL FIBRILLATION (HCC): ICD-10-CM

## 2020-01-29 LAB
INR: 3.4 (ref 0.8–1.2)
TEST STRIP EXPIRATION DATE: ABNORMAL DATE

## 2020-01-29 PROCEDURE — 85610 PROTHROMBIN TIME: CPT

## 2020-01-29 PROCEDURE — 36416 COLLJ CAPILLARY BLOOD SPEC: CPT

## 2020-01-30 ENCOUNTER — TELEPHONE (OUTPATIENT)
Dept: INTERNAL MEDICINE CLINIC | Facility: CLINIC | Age: 75
End: 2020-01-30

## 2020-02-03 ENCOUNTER — ANESTHESIA (OUTPATIENT)
Dept: ENDOSCOPY | Facility: HOSPITAL | Age: 75
End: 2020-02-03
Payer: MEDICARE

## 2020-02-03 ENCOUNTER — ANESTHESIA EVENT (OUTPATIENT)
Dept: ENDOSCOPY | Facility: HOSPITAL | Age: 75
End: 2020-02-03
Payer: MEDICARE

## 2020-02-03 ENCOUNTER — HOSPITAL ENCOUNTER (OUTPATIENT)
Facility: HOSPITAL | Age: 75
Setting detail: HOSPITAL OUTPATIENT SURGERY
Discharge: HOME | End: 2020-02-03
Attending: INTERNAL MEDICINE | Admitting: INTERNAL MEDICINE
Payer: MEDICARE

## 2020-02-03 ENCOUNTER — ANTI-COAG VISIT (OUTPATIENT)
Dept: INTERNAL MEDICINE CLINIC | Facility: CLINIC | Age: 75
End: 2020-02-03

## 2020-02-03 VITALS
OXYGEN SATURATION: 100 % | HEIGHT: 62 IN | RESPIRATION RATE: 17 BRPM | BODY MASS INDEX: 27.6 KG/M2 | HEART RATE: 61 BPM | DIASTOLIC BLOOD PRESSURE: 59 MMHG | WEIGHT: 150 LBS | SYSTOLIC BLOOD PRESSURE: 113 MMHG

## 2020-02-03 DIAGNOSIS — K63.5 POLYP OF ASCENDING COLON: ICD-10-CM

## 2020-02-03 DIAGNOSIS — I48.91 ATRIAL FIBRILLATION, UNSPECIFIED TYPE (HCC): ICD-10-CM

## 2020-02-03 DIAGNOSIS — Z12.11 COLON CANCER SCREENING: ICD-10-CM

## 2020-02-03 DIAGNOSIS — Z79.01 LONG TERM (CURRENT) USE OF ANTICOAGULANTS: ICD-10-CM

## 2020-02-03 DIAGNOSIS — K57.30 DIVERTICULOSIS LARGE INTESTINE W/O PERFORATION OR ABSCESS W/O BLEEDING: ICD-10-CM

## 2020-02-03 DIAGNOSIS — Z51.81 ENCOUNTER FOR THERAPEUTIC DRUG MONITORING: ICD-10-CM

## 2020-02-03 DIAGNOSIS — K44.9 HIATAL HERNIA: Primary | ICD-10-CM

## 2020-02-03 DIAGNOSIS — K63.5 POLYP OF TRANSVERSE COLON: ICD-10-CM

## 2020-02-03 DIAGNOSIS — K31.7 GASTRIC POLYP: ICD-10-CM

## 2020-02-03 DIAGNOSIS — K22.70 BARRETT'S ESOPHAGUS WITHOUT DYSPLASIA: ICD-10-CM

## 2020-02-03 LAB
INR BLD: 1.8 (ref 0.9–1.2)
PROTHROMBIN TIME: 21 SECONDS (ref 11.8–14.5)

## 2020-02-03 PROCEDURE — 0DB68ZX EXCISION OF STOMACH, VIA NATURAL OR ARTIFICIAL OPENING ENDOSCOPIC, DIAGNOSTIC: ICD-10-PCS | Performed by: INTERNAL MEDICINE

## 2020-02-03 PROCEDURE — 0DBL8ZX EXCISION OF TRANSVERSE COLON, VIA NATURAL OR ARTIFICIAL OPENING ENDOSCOPIC, DIAGNOSTIC: ICD-10-PCS | Performed by: INTERNAL MEDICINE

## 2020-02-03 PROCEDURE — 45385 COLONOSCOPY W/LESION REMOVAL: CPT | Performed by: INTERNAL MEDICINE

## 2020-02-03 PROCEDURE — 43239 EGD BIOPSY SINGLE/MULTIPLE: CPT | Performed by: INTERNAL MEDICINE

## 2020-02-03 PROCEDURE — 0DBK8ZX EXCISION OF ASCENDING COLON, VIA NATURAL OR ARTIFICIAL OPENING ENDOSCOPIC, DIAGNOSTIC: ICD-10-PCS | Performed by: INTERNAL MEDICINE

## 2020-02-03 PROCEDURE — 0DB48ZX EXCISION OF ESOPHAGOGASTRIC JUNCTION, VIA NATURAL OR ARTIFICIAL OPENING ENDOSCOPIC, DIAGNOSTIC: ICD-10-PCS | Performed by: INTERNAL MEDICINE

## 2020-02-03 RX ORDER — SODIUM CHLORIDE, SODIUM LACTATE, POTASSIUM CHLORIDE, CALCIUM CHLORIDE 600; 310; 30; 20 MG/100ML; MG/100ML; MG/100ML; MG/100ML
INJECTION, SOLUTION INTRAVENOUS CONTINUOUS
Status: DISCONTINUED | OUTPATIENT
Start: 2020-02-03 | End: 2020-02-03

## 2020-02-03 RX ORDER — LIDOCAINE HYDROCHLORIDE 10 MG/ML
INJECTION, SOLUTION EPIDURAL; INFILTRATION; INTRACAUDAL; PERINEURAL AS NEEDED
Status: DISCONTINUED | OUTPATIENT
Start: 2020-02-03 | End: 2020-02-03 | Stop reason: SURG

## 2020-02-03 RX ORDER — NALOXONE HYDROCHLORIDE 0.4 MG/ML
80 INJECTION, SOLUTION INTRAMUSCULAR; INTRAVENOUS; SUBCUTANEOUS AS NEEDED
Status: DISCONTINUED | OUTPATIENT
Start: 2020-02-03 | End: 2020-02-03

## 2020-02-03 RX ADMIN — SODIUM CHLORIDE, SODIUM LACTATE, POTASSIUM CHLORIDE, CALCIUM CHLORIDE: 600; 310; 30; 20 INJECTION, SOLUTION INTRAVENOUS at 07:46:00

## 2020-02-03 RX ADMIN — SODIUM CHLORIDE, SODIUM LACTATE, POTASSIUM CHLORIDE, CALCIUM CHLORIDE: 600; 310; 30; 20 INJECTION, SOLUTION INTRAVENOUS at 08:16:00

## 2020-02-03 RX ADMIN — LIDOCAINE HYDROCHLORIDE 25 MG: 10 INJECTION, SOLUTION EPIDURAL; INFILTRATION; INTRACAUDAL; PERINEURAL at 07:45:00

## 2020-02-03 NOTE — H&P
History & Physical Examination    Patient Name: Liberty Alfaro  MRN: [id number]  [facility]: [id number]  YOB: 1945    Diagnosis: colon cancer screening  Ramírez's esophagus      WARFARIN SODIUM 5 MG Oral Tab, TAKE 1 TAB DAILY IN THE EVENING. eyes  Mold                    Runny nose    Comment:Headache, watery eyes  Pollen                  Runny nose    Comment:Headache, watery eyes  Ragweed                 OTHER (SEE COMMENTS)    Comment:Watery eyes , Sneezing, HA's  Trees, [place]        Ru breast cancer   • Other (Other) Mother    • Other (MS) Sister    • Heart Disease Other         per ng CAD, vein, artery, liver disease, arthritis   • Other (Other) Other         per ng lupus erythematosus   • Cancer Paternal Uncle         liver cancer   •

## 2020-02-03 NOTE — ANESTHESIA POSTPROCEDURE EVALUATION
Patient: Adrianna Pina    Procedure Summary     Date:  02/03/20 Room / Location:  [facility] ENDOSCOPY 05 / [facility] ENDOSCOPY    Anesthesia Start:  0745 Anesthesia Stop:      Procedures:       COLONOSCOPY (N/A )      ESOPHAGOGASTRODUODENOSCOPY (EGD) (N/A ) D

## 2020-02-03 NOTE — ANESTHESIA PREPROCEDURE EVALUATION
Anesthesia PreOp Note    HPI:     Kameron Hernandez is a 76year old female who presents for preoperative consultation requested by: Phani Moeller MD    Date of Surgery: 2/3/2020    Procedure(s):  COLONOSCOPY  ESOPHAGOGASTRODUODENOSCOPY (EGD)  Ind • Atrial fibrillation [facility]) 1/ 2017   • Ramírez's esophagus    • DUB (dysfunctional uterine bleeding) 1980    per ng LIBBY   • Esophageal reflux    • Esophagitis 2011    per ng eosinophilic egd w/ biopsy   • Esophagitis 2012    pr ng egd w/ biopsy   • Leonidas 1/30/2020  LORazepam (ATIVAN) 0.5 MG Oral Tab, Take 0.5 mg by mouth as needed. , Disp: , Rfl: 1, Taking  Meclizine HCl (ANTIVERT) 12.5 MG Oral Tab, Take 12.5 mg by mouth as needed.   , Disp: , Rfl: 0, Taking  Ondansetron HCl (ZOFRAN) 4 mg tablet, Take 4 mg lupus erythematosus   • Cancer Paternal Uncle         liver cancer   • Cancer Paternal Uncle         stomach cancer   • Cancer Maternal Uncle         stomach cancer   • Cancer Maternal Uncle    • Colon Polyps Maternal Uncle         bone cancer     Social H Hazards: Not Asked        Sleep Concern: Not Asked        Stress Concern: Not Asked        Weight Concern: Not Asked        Special Diet: Not Asked        Back Care: Not Asked        Exercise: Not Asked        Bike Helmet: Not Asked        Seat Belt: Not A 2  Plan:   MAC  Informed Consent Plan and Risks Discussed With:  Patient and spouse  Consent Comment: INR 3.4 a few days ago, recent lab pending this am upon pt arrival. Dr. Eyal López aware.   Discussed plan with:  Surgeon      I have informed Markel Ordaz

## 2020-02-03 NOTE — OPERATIVE REPORT
[facility] HOSP - [facility] Endoscopy Report      Preoperative Diagnosis:  - colon cancer screening  - history of Ramírez's esophagus      Postoperative Diagnosis:  - colon polyps x 2  - diverticulosis  - internal hemorrhoids  - small hiatal hernia  - gastr diverticulitis    Small internal hemorrhoids noted on retroflexed view. The esophagus was normal.  No Ramírez's changes noted, biopsies taken from GE junction given history.    The GE junction and diaphragmatic impression were at 36 cm and 38 cm for a 2

## 2020-02-03 NOTE — TELEPHONE ENCOUNTER
Patient states she got INR at hospital today. States it was 1.8. Requesting a call back if she should resume regular medications tonight. Please advise.

## 2020-02-12 ENCOUNTER — ANTI-COAG VISIT (OUTPATIENT)
Dept: INTERNAL MEDICINE CLINIC | Facility: CLINIC | Age: 75
End: 2020-02-12
Payer: MEDICARE

## 2020-02-12 DIAGNOSIS — I48.91 ATRIAL FIBRILLATION, UNSPECIFIED TYPE (HCC): ICD-10-CM

## 2020-02-12 DIAGNOSIS — Z51.81 ENCOUNTER FOR THERAPEUTIC DRUG MONITORING: ICD-10-CM

## 2020-02-12 DIAGNOSIS — Z79.01 LONG TERM (CURRENT) USE OF ANTICOAGULANTS: ICD-10-CM

## 2020-02-12 DIAGNOSIS — I48.20 CHRONIC ATRIAL FIBRILLATION (HCC): ICD-10-CM

## 2020-02-12 LAB
INR: 2.2 (ref 0.8–1.2)
TEST STRIP EXPIRATION DATE: ABNORMAL DATE

## 2020-02-12 PROCEDURE — 85610 PROTHROMBIN TIME: CPT

## 2020-02-12 PROCEDURE — 36416 COLLJ CAPILLARY BLOOD SPEC: CPT

## 2020-03-06 ENCOUNTER — OFFICE VISIT (OUTPATIENT)
Dept: ORTHOPEDICS CLINIC | Facility: CLINIC | Age: 75
End: 2020-03-06
Payer: MEDICARE

## 2020-03-06 ENCOUNTER — HOSPITAL ENCOUNTER (OUTPATIENT)
Dept: GENERAL RADIOLOGY | Facility: HOSPITAL | Age: 75
Discharge: HOME | End: 2020-03-06
Attending: ORTHOPAEDIC SURGERY | Admitting: ORTHOPAEDIC SURGERY
Payer: MEDICARE

## 2020-03-06 VITALS
DIASTOLIC BLOOD PRESSURE: 73 MMHG | BODY MASS INDEX: 28.13 KG/M2 | SYSTOLIC BLOOD PRESSURE: 136 MMHG | RESPIRATION RATE: 14 BRPM | HEART RATE: 60 BPM | WEIGHT: 149 LBS | HEIGHT: 61 IN

## 2020-03-06 DIAGNOSIS — M23.92 INTERNAL DERANGEMENT OF LEFT KNEE: ICD-10-CM

## 2020-03-06 DIAGNOSIS — M25.562 LEFT KNEE PAIN, UNSPECIFIED CHRONICITY: ICD-10-CM

## 2020-03-06 DIAGNOSIS — M25.562 LEFT KNEE PAIN, UNSPECIFIED CHRONICITY: Primary | ICD-10-CM

## 2020-03-06 PROCEDURE — 20610 DRAIN/INJ JOINT/BURSA W/O US: CPT | Performed by: ORTHOPAEDIC SURGERY

## 2020-03-06 PROCEDURE — 73564 X-RAY EXAM KNEE 4 OR MORE: CPT | Performed by: ORTHOPAEDIC SURGERY

## 2020-03-06 PROCEDURE — 99213 OFFICE O/P EST LOW 20 MIN: CPT | Performed by: ORTHOPAEDIC SURGERY

## 2020-03-06 RX ORDER — FEXOFENADINE HCL 180 MG/1
180 TABLET ORAL DAILY
COMMUNITY

## 2020-03-06 RX ORDER — TRIAMCINOLONE ACETONIDE 40 MG/ML
40 INJECTION, SUSPENSION INTRA-ARTICULAR; INTRAMUSCULAR ONCE
Status: COMPLETED | OUTPATIENT
Start: 2020-03-06 | End: 2020-03-06

## 2020-03-06 RX ADMIN — TRIAMCINOLONE ACETONIDE 40 MG: 40 INJECTION, SUSPENSION INTRA-ARTICULAR; INTRAMUSCULAR at 10:20:00

## 2020-03-06 NOTE — PROGRESS NOTES
NURSING INTAKE COMMENTS: Patient presents with:  Knee Pain: left- pt states pain started yrs ago. pt states she saw Dr. Tomas Morales in 2017.        HPI: This 76year old female presents today with complaints of mid and symptoms of pain and locking in the left k per ng 2 left hammer toe 2 rt, hallux valqus naprosyn 500   • EXCISION OF UVULA  2006    per ng  uvula removed    • HAND/FINGER SURGERY UNLISTED Right 2011    per ng hand synovitis surgery   • HYSTERECTOMY      partial at 34   • INJECTION; TENDON ORIGIN/IN Runny nose    Comment:Headache, watery eyes  Ragweed                 OTHER (SEE COMMENTS)    Comment:Watery eyes , Sneezing, HA's  Trees, [place]        Runny nose    Comment:Headache, watery eyes  Family History   Problem Relation Age of Onset asthma    Physical Examination:    Ht 5' 1\" (1.549 m)   Wt 149 lb (67.6 kg)   BMI 28.15 kg/m²   Constitutional: appears well hydrated, alert and responsive, no acute distress noted  Extremities: No effusion in the left knee.   Full extension 110 degrees of

## 2020-03-06 NOTE — PROGRESS NOTES
Per verbal order from Dr. Shyam Montoya, draw up and 4ml of 0.5% Marcaine and 1ml of Kenalog 40 for injection into left knee. Hanane Martinez RN  Patient provided education handout for cortisone injection. Patient left office prior to obtaining post injection vitals.

## 2020-03-10 ENCOUNTER — ANTI-COAG VISIT (OUTPATIENT)
Dept: INTERNAL MEDICINE CLINIC | Facility: CLINIC | Age: 75
End: 2020-03-10
Payer: MEDICARE

## 2020-03-10 DIAGNOSIS — Z51.81 ENCOUNTER FOR THERAPEUTIC DRUG MONITORING: ICD-10-CM

## 2020-03-10 DIAGNOSIS — Z79.01 LONG TERM (CURRENT) USE OF ANTICOAGULANTS: ICD-10-CM

## 2020-03-10 DIAGNOSIS — I48.91 ATRIAL FIBRILLATION, UNSPECIFIED TYPE (HCC): ICD-10-CM

## 2020-03-10 LAB
INR: 2.3 (ref 0.8–1.2)
TEST STRIP EXPIRATION DATE: ABNORMAL DATE

## 2020-03-10 PROCEDURE — 85610 PROTHROMBIN TIME: CPT

## 2020-03-10 PROCEDURE — 36416 COLLJ CAPILLARY BLOOD SPEC: CPT

## 2020-04-08 ENCOUNTER — ANTI-COAG VISIT (OUTPATIENT)
Dept: INTERNAL MEDICINE CLINIC | Facility: CLINIC | Age: 75
End: 2020-04-08
Payer: MEDICARE

## 2020-04-08 DIAGNOSIS — Z79.01 LONG TERM (CURRENT) USE OF ANTICOAGULANTS: ICD-10-CM

## 2020-04-08 DIAGNOSIS — Z51.81 ENCOUNTER FOR THERAPEUTIC DRUG MONITORING: ICD-10-CM

## 2020-04-08 DIAGNOSIS — I48.91 ATRIAL FIBRILLATION, UNSPECIFIED TYPE (HCC): ICD-10-CM

## 2020-04-08 PROCEDURE — 85610 PROTHROMBIN TIME: CPT

## 2020-04-08 PROCEDURE — 36416 COLLJ CAPILLARY BLOOD SPEC: CPT

## 2020-05-13 ENCOUNTER — ANTI-COAG VISIT (OUTPATIENT)
Dept: INTERNAL MEDICINE CLINIC | Facility: CLINIC | Age: 75
End: 2020-05-13
Payer: MEDICARE

## 2020-05-13 DIAGNOSIS — Z51.81 ENCOUNTER FOR THERAPEUTIC DRUG MONITORING: ICD-10-CM

## 2020-05-13 DIAGNOSIS — Z79.01 LONG TERM (CURRENT) USE OF ANTICOAGULANTS: ICD-10-CM

## 2020-05-13 DIAGNOSIS — I48.91 ATRIAL FIBRILLATION, UNSPECIFIED TYPE (HCC): ICD-10-CM

## 2020-05-13 PROCEDURE — 85610 PROTHROMBIN TIME: CPT

## 2020-05-13 PROCEDURE — 36416 COLLJ CAPILLARY BLOOD SPEC: CPT

## 2020-05-19 RX ORDER — OMEPRAZOLE 20 MG/1
CAPSULE, DELAYED RELEASE ORAL
Qty: 90 CAPSULE | Refills: 1 | Status: SHIPPED | OUTPATIENT
Start: 2020-05-19 | End: 2020-11-09

## 2020-06-26 ENCOUNTER — ANTI-COAG VISIT (OUTPATIENT)
Dept: INTERNAL MEDICINE CLINIC | Facility: CLINIC | Age: 75
End: 2020-06-26
Payer: MEDICARE

## 2020-06-26 DIAGNOSIS — I48.91 ATRIAL FIBRILLATION, UNSPECIFIED TYPE (HCC): ICD-10-CM

## 2020-06-26 DIAGNOSIS — Z79.01 LONG TERM (CURRENT) USE OF ANTICOAGULANTS: ICD-10-CM

## 2020-06-26 DIAGNOSIS — Z51.81 ENCOUNTER FOR THERAPEUTIC DRUG MONITORING: ICD-10-CM

## 2020-06-26 PROCEDURE — 85610 PROTHROMBIN TIME: CPT

## 2020-06-26 PROCEDURE — 36416 COLLJ CAPILLARY BLOOD SPEC: CPT

## 2020-07-22 ENCOUNTER — TELEPHONE (OUTPATIENT)
Dept: INTERNAL MEDICINE CLINIC | Facility: CLINIC | Age: 75
End: 2020-07-22

## 2020-07-29 ENCOUNTER — ANTI-COAG VISIT (OUTPATIENT)
Dept: INTERNAL MEDICINE CLINIC | Facility: CLINIC | Age: 75
End: 2020-07-29
Payer: MEDICARE

## 2020-07-29 DIAGNOSIS — Z79.01 LONG TERM (CURRENT) USE OF ANTICOAGULANTS: ICD-10-CM

## 2020-07-29 DIAGNOSIS — I48.91 ATRIAL FIBRILLATION, UNSPECIFIED TYPE (HCC): ICD-10-CM

## 2020-07-29 DIAGNOSIS — Z51.81 ENCOUNTER FOR THERAPEUTIC DRUG MONITORING: ICD-10-CM

## 2020-07-29 LAB
INR: 2.7 (ref 0.8–1.2)
TEST STRIP EXPIRATION DATE: ABNORMAL DATE

## 2020-07-29 PROCEDURE — 85610 PROTHROMBIN TIME: CPT

## 2020-07-29 PROCEDURE — 36416 COLLJ CAPILLARY BLOOD SPEC: CPT

## 2020-08-21 ENCOUNTER — OFFICE VISIT (OUTPATIENT)
Dept: CARDIOLOGY CLINIC | Facility: CLINIC | Age: 75
End: 2020-08-21
Payer: MEDICARE

## 2020-08-21 VITALS
HEART RATE: 57 BPM | SYSTOLIC BLOOD PRESSURE: 100 MMHG | WEIGHT: 151 LBS | TEMPERATURE: 98 F | BODY MASS INDEX: 27.79 KG/M2 | DIASTOLIC BLOOD PRESSURE: 63 MMHG | HEIGHT: 62 IN

## 2020-08-21 DIAGNOSIS — I48.0 PAROXYSMAL ATRIAL FIBRILLATION (HCC): Primary | ICD-10-CM

## 2020-08-21 DIAGNOSIS — E78.2 HYPERLIPIDEMIA, MIXED: ICD-10-CM

## 2020-08-21 DIAGNOSIS — R06.02 SOB (SHORTNESS OF BREATH) ON EXERTION: ICD-10-CM

## 2020-08-21 PROCEDURE — G0463 HOSPITAL OUTPT CLINIC VISIT: HCPCS | Performed by: INTERNAL MEDICINE

## 2020-08-21 PROCEDURE — 99214 OFFICE O/P EST MOD 30 MIN: CPT | Performed by: INTERNAL MEDICINE

## 2020-08-21 NOTE — PATIENT INSTRUCTIONS
Continue current medications. 2D echocardiogram within the next few days. Follow-up with me in 12 months or sooner if needed.

## 2020-08-21 NOTE — PROGRESS NOTES
Sera Mccabe is a 76year old female. Patient presents with: Follow - Up: annual check up ,A-fib,denies chest pain ,has mild SOB with climbing stairs    HPI:   Patient is here for follow-up appointment.   She denies any symptoms other than mild sh • Heartburn 2011    per ng reflux, dysphagia   • High blood pressure    • High cholesterol    • History of blood transfusion    • Meniere disease    • Other and unspecified hyperlipidemia    • [facility] 2/15/2017   • Sleep apn echocardiogram.             Paradise Deluna MD  8/21/2020  1:27 PM

## 2020-08-26 ENCOUNTER — HOSPITAL ENCOUNTER (OUTPATIENT)
Dept: CV DIAGNOSTICS | Age: 75
Discharge: HOME | End: 2020-08-26
Attending: INTERNAL MEDICINE | Admitting: INTERNAL MEDICINE
Payer: MEDICARE

## 2020-08-26 ENCOUNTER — ANTI-COAG VISIT (OUTPATIENT)
Dept: INTERNAL MEDICINE CLINIC | Facility: CLINIC | Age: 75
End: 2020-08-26
Payer: MEDICARE

## 2020-08-26 DIAGNOSIS — R06.02 SOB (SHORTNESS OF BREATH) ON EXERTION: ICD-10-CM

## 2020-08-26 DIAGNOSIS — I48.91 ATRIAL FIBRILLATION, UNSPECIFIED TYPE (HCC): ICD-10-CM

## 2020-08-26 DIAGNOSIS — Z79.01 LONG TERM (CURRENT) USE OF ANTICOAGULANTS: ICD-10-CM

## 2020-08-26 DIAGNOSIS — Z51.81 ENCOUNTER FOR THERAPEUTIC DRUG MONITORING: ICD-10-CM

## 2020-08-26 DIAGNOSIS — I48.0 PAROXYSMAL ATRIAL FIBRILLATION (HCC): ICD-10-CM

## 2020-08-26 LAB — INR: 2.6 (ref 0.8–1.2)

## 2020-08-26 PROCEDURE — 93306 TTE W/DOPPLER COMPLETE: CPT | Performed by: INTERNAL MEDICINE

## 2020-08-26 PROCEDURE — 36416 COLLJ CAPILLARY BLOOD SPEC: CPT

## 2020-08-26 PROCEDURE — 93307 TTE W/O DOPPLER COMPLETE: CPT | Performed by: INTERNAL MEDICINE

## 2020-08-26 PROCEDURE — 85610 PROTHROMBIN TIME: CPT

## 2020-09-23 ENCOUNTER — ANTI-COAG VISIT (OUTPATIENT)
Dept: INTERNAL MEDICINE CLINIC | Facility: CLINIC | Age: 75
End: 2020-09-23
Payer: MEDICARE

## 2020-09-23 DIAGNOSIS — I48.91 ATRIAL FIBRILLATION, UNSPECIFIED TYPE (HCC): ICD-10-CM

## 2020-09-23 DIAGNOSIS — Z51.81 ENCOUNTER FOR THERAPEUTIC DRUG MONITORING: ICD-10-CM

## 2020-09-23 DIAGNOSIS — Z79.01 LONG TERM (CURRENT) USE OF ANTICOAGULANTS: ICD-10-CM

## 2020-09-23 LAB
INR: 2.6 (ref 0.8–1.2)
TEST STRIP EXPIRATION DATE: ABNORMAL DATE

## 2020-09-23 PROCEDURE — 36416 COLLJ CAPILLARY BLOOD SPEC: CPT

## 2020-09-23 PROCEDURE — 85610 PROTHROMBIN TIME: CPT

## 2020-10-21 ENCOUNTER — ANTI-COAG VISIT (OUTPATIENT)
Dept: INTERNAL MEDICINE CLINIC | Facility: CLINIC | Age: 75
End: 2020-10-21
Payer: MEDICARE

## 2020-10-21 DIAGNOSIS — Z79.01 LONG TERM (CURRENT) USE OF ANTICOAGULANTS: ICD-10-CM

## 2020-10-21 DIAGNOSIS — Z51.81 ENCOUNTER FOR THERAPEUTIC DRUG MONITORING: ICD-10-CM

## 2020-10-21 DIAGNOSIS — I48.91 ATRIAL FIBRILLATION, UNSPECIFIED TYPE (HCC): ICD-10-CM

## 2020-10-21 PROCEDURE — 36416 COLLJ CAPILLARY BLOOD SPEC: CPT

## 2020-10-21 PROCEDURE — 85610 PROTHROMBIN TIME: CPT

## 2020-11-09 RX ORDER — OMEPRAZOLE 20 MG/1
CAPSULE, DELAYED RELEASE ORAL
Qty: 90 CAPSULE | Refills: 0 | Status: SHIPPED | OUTPATIENT
Start: 2020-11-09 | End: 2020-11-17

## 2020-11-17 ENCOUNTER — ANTI-COAG VISIT (OUTPATIENT)
Dept: INTERNAL MEDICINE CLINIC | Facility: CLINIC | Age: 75
End: 2020-11-17
Payer: MEDICARE

## 2020-11-17 ENCOUNTER — OFFICE VISIT (OUTPATIENT)
Dept: INTERNAL MEDICINE CLINIC | Facility: CLINIC | Age: 75
End: 2020-11-17
Payer: MEDICARE

## 2020-11-17 VITALS
DIASTOLIC BLOOD PRESSURE: 76 MMHG | BODY MASS INDEX: 28.12 KG/M2 | HEIGHT: 62 IN | TEMPERATURE: 98 F | HEART RATE: 61 BPM | SYSTOLIC BLOOD PRESSURE: 120 MMHG | WEIGHT: 152.81 LBS

## 2020-11-17 DIAGNOSIS — Z79.01 LONG TERM (CURRENT) USE OF ANTICOAGULANTS: ICD-10-CM

## 2020-11-17 DIAGNOSIS — I48.91 ATRIAL FIBRILLATION, UNSPECIFIED TYPE (HCC): ICD-10-CM

## 2020-11-17 DIAGNOSIS — M85.80 OSTEOPENIA WITH HIGH RISK OF FRACTURE: ICD-10-CM

## 2020-11-17 DIAGNOSIS — I10 ESSENTIAL HYPERTENSION: ICD-10-CM

## 2020-11-17 DIAGNOSIS — G47.33 OSA (OBSTRUCTIVE SLEEP APNEA): ICD-10-CM

## 2020-11-17 DIAGNOSIS — R73.9 HYPERGLYCEMIA: ICD-10-CM

## 2020-11-17 DIAGNOSIS — Z00.00 ENCOUNTER FOR ANNUAL HEALTH EXAMINATION: ICD-10-CM

## 2020-11-17 DIAGNOSIS — Z12.31 VISIT FOR SCREENING MAMMOGRAM: ICD-10-CM

## 2020-11-17 DIAGNOSIS — Z51.81 ENCOUNTER FOR THERAPEUTIC DRUG MONITORING: ICD-10-CM

## 2020-11-17 DIAGNOSIS — Z00.00 MEDICARE ANNUAL WELLNESS VISIT, SUBSEQUENT: Primary | ICD-10-CM

## 2020-11-17 DIAGNOSIS — E78.2 HYPERLIPIDEMIA, MIXED: ICD-10-CM

## 2020-11-17 DIAGNOSIS — J30.2 SEASONAL ALLERGIC RHINITIS, UNSPECIFIED TRIGGER: ICD-10-CM

## 2020-11-17 DIAGNOSIS — I48.0 PAROXYSMAL ATRIAL FIBRILLATION (HCC): ICD-10-CM

## 2020-11-17 PROBLEM — R07.9 CHEST PAIN RADIATING TO JAW: Status: RESOLVED | Noted: 2017-01-21 | Resolved: 2020-11-17

## 2020-11-17 PROBLEM — R06.02 SOB (SHORTNESS OF BREATH) ON EXERTION: Status: RESOLVED | Noted: 2020-08-21 | Resolved: 2020-11-17

## 2020-11-17 PROBLEM — I48.92 ATRIAL FLUTTER, PAROXYSMAL (HCC): Status: RESOLVED | Noted: 2017-01-21 | Resolved: 2020-11-17

## 2020-11-17 PROBLEM — R79.89 AZOTEMIA: Status: RESOLVED | Noted: 2017-01-21 | Resolved: 2020-11-17

## 2020-11-17 PROCEDURE — 36416 COLLJ CAPILLARY BLOOD SPEC: CPT

## 2020-11-17 PROCEDURE — 85610 PROTHROMBIN TIME: CPT

## 2020-11-17 PROCEDURE — G0439 PPPS, SUBSEQ VISIT: HCPCS | Performed by: INTERNAL MEDICINE

## 2020-11-17 RX ORDER — LOVASTATIN 10 MG/1
10 TABLET ORAL
Qty: 90 TABLET | Refills: 3 | Status: SHIPPED | OUTPATIENT
Start: 2020-11-17 | End: 2021-08-27

## 2020-11-17 RX ORDER — WARFARIN SODIUM 5 MG/1
TABLET ORAL
Qty: 120 TABLET | Refills: 3 | Status: SHIPPED | OUTPATIENT
Start: 2020-11-17 | End: 2021-11-16

## 2020-11-17 RX ORDER — OMEPRAZOLE 20 MG/1
CAPSULE, DELAYED RELEASE ORAL
Qty: 90 CAPSULE | Refills: 3 | Status: SHIPPED | OUTPATIENT
Start: 2020-11-17 | End: 2021-11-23

## 2020-11-17 NOTE — PROGRESS NOTES
HPI:   Romeo Albarran is a 76year old female who presents for a Medicare Subsequent Annual Wellness visit (Pt already had Initial Annual Wellness).       Her last annual assessment has been over 1 year: Annual Physical due on 10/21/2020         [facility] (obstructive sleep apnea)     Seasonal allergic rhinitis    Wt Readings from Last 3 Encounters:  11/17/20 : 152 lb 12.8 oz (69.3 kg)  08/21/20 : 151 lb (68.5 kg)  03/06/20 : 149 lb (67.6 kg)     Last Cholesterol Labs:   Lab Results   Component Value Date INFORMATION:   She  has a past medical history of Arrhythmia, Atrial fibrillation ([facility] (1/ 2017), Ramírez's esophagus, DUB (dysfunctional uterine bleeding) (1980), Esophageal reflux, Esophagitis (2011), Esophagitis (2012), Gastritis, Heartburn (2011), Hig and wheezing. Cardiovascular: Negative for chest pain, palpitations and leg swelling. Gastrointestinal: Negative for nausea, vomiting, abdominal pain, constipation and abdominal distention.    Genitourinary: Negative for dysuria, frequency, hematuria a what they say: No   I misunderstand what others are saying and make inappropriate responses: No I avoid social activities because I cannot hear well and fear I will reply improperly: No   Family members and friends have told me they think I may have hearin TDAP 06/04/2007   • Zoster Vaccine Recombinant Adjuvanted (Shingrix) 08/17/2020        ASSESSMENT AND OTHER RELEVANT CHRONIC CONDITIONS:   Deloris Grey is a 76year old female who presents for a Medicare Assessment.      PLAN SUMMARY:      (Z00.00 these issues and agrees to the plan. No orders of the defined types were placed in this encounter. Reinforced healthy diet, lifestyle, and exercise. No follow-ups on file.      Lisa Wheeler MD, 11/17/2020     General Health     In the past six mon preventive care reminders to display for this patient. Update Health Maintenance if applicable    Chlamydia  Annually if high risk No results found for: CHLAMYDIA No flowsheet data found.     Screening Mammogram      Mammogram  Annually to 76, then as discu

## 2020-11-17 NOTE — PATIENT INSTRUCTIONS
Maeve Garner's SCREENING SCHEDULE   Tests on this list are recommended by your physician but may not be covered, or covered at this frequency, by your insurer. Please check with your insurance carrier before scheduling to verify coverage.    PRE Covered every 10 years- more often if abnormal Colonoscopy due on 02/03/2030 Update Health Maintenance if applicable    Flex Sigmoidoscopy Screen  Covered every 5 years No results found for this or any previous visit. No flowsheet data found.      Fecal O VACC, 13 KISHOR IM    Please get once after your 65th birthday    Pneumococcal 23 (Pneumovax)  Covered Once after 65 No orders found for this or any previous visit.  Please get once after your 65th birthday    Hepatitis B for Moderate/High Risk       No orders

## 2020-11-30 ENCOUNTER — LAB ENCOUNTER (OUTPATIENT)
Dept: LAB | Facility: HOSPITAL | Age: 75
End: 2020-11-30
Attending: INTERNAL MEDICINE
Payer: MEDICARE

## 2020-11-30 DIAGNOSIS — R73.9 HYPERGLYCEMIA: ICD-10-CM

## 2020-11-30 DIAGNOSIS — E78.2 HYPERLIPIDEMIA, MIXED: ICD-10-CM

## 2020-11-30 DIAGNOSIS — M85.80 OSTEOPENIA WITH HIGH RISK OF FRACTURE: ICD-10-CM

## 2020-11-30 DIAGNOSIS — I10 ESSENTIAL HYPERTENSION: ICD-10-CM

## 2020-11-30 PROCEDURE — 83036 HEMOGLOBIN GLYCOSYLATED A1C: CPT

## 2020-11-30 PROCEDURE — 84439 ASSAY OF FREE THYROXINE: CPT

## 2020-11-30 PROCEDURE — 87086 URINE CULTURE/COLONY COUNT: CPT

## 2020-11-30 PROCEDURE — 84080 ASSAY ALKALINE PHOSPHATASES: CPT

## 2020-11-30 PROCEDURE — 83970 ASSAY OF PARATHORMONE: CPT

## 2020-11-30 PROCEDURE — 82306 VITAMIN D 25 HYDROXY: CPT

## 2020-11-30 PROCEDURE — 81015 MICROSCOPIC EXAM OF URINE: CPT

## 2020-11-30 PROCEDURE — 80053 COMPREHEN METABOLIC PANEL: CPT

## 2020-11-30 PROCEDURE — 85027 COMPLETE CBC AUTOMATED: CPT

## 2020-11-30 PROCEDURE — 84443 ASSAY THYROID STIM HORMONE: CPT

## 2020-11-30 PROCEDURE — 36415 COLL VENOUS BLD VENIPUNCTURE: CPT

## 2020-11-30 PROCEDURE — 80061 LIPID PANEL: CPT

## 2020-12-15 ENCOUNTER — ANTI-COAG VISIT (OUTPATIENT)
Dept: INTERNAL MEDICINE CLINIC | Facility: CLINIC | Age: 75
End: 2020-12-15
Payer: MEDICARE

## 2020-12-15 DIAGNOSIS — Z79.01 LONG TERM (CURRENT) USE OF ANTICOAGULANTS: ICD-10-CM

## 2020-12-15 DIAGNOSIS — Z51.81 ENCOUNTER FOR THERAPEUTIC DRUG MONITORING: ICD-10-CM

## 2020-12-15 DIAGNOSIS — I48.91 ATRIAL FIBRILLATION, UNSPECIFIED TYPE (HCC): ICD-10-CM

## 2020-12-15 PROCEDURE — 85610 PROTHROMBIN TIME: CPT

## 2020-12-15 PROCEDURE — 36416 COLLJ CAPILLARY BLOOD SPEC: CPT

## 2020-12-18 ENCOUNTER — HOSPITAL ENCOUNTER (OUTPATIENT)
Dept: MAMMOGRAPHY | Facility: HOSPITAL | Age: 75
Discharge: HOME | End: 2020-12-18
Attending: INTERNAL MEDICINE
Payer: MEDICARE

## 2020-12-18 DIAGNOSIS — Z12.31 VISIT FOR SCREENING MAMMOGRAM: ICD-10-CM

## 2020-12-18 PROCEDURE — 77067 SCR MAMMO BI INCL CAD: CPT | Performed by: INTERNAL MEDICINE

## 2020-12-18 PROCEDURE — 77063 BREAST TOMOSYNTHESIS BI: CPT | Performed by: INTERNAL MEDICINE

## 2021-01-12 ENCOUNTER — ANTI-COAG VISIT (OUTPATIENT)
Dept: INTERNAL MEDICINE CLINIC | Facility: CLINIC | Age: 76
End: 2021-01-12
Payer: MEDICARE

## 2021-01-12 DIAGNOSIS — Z51.81 ENCOUNTER FOR THERAPEUTIC DRUG MONITORING: ICD-10-CM

## 2021-01-12 DIAGNOSIS — Z79.01 LONG TERM (CURRENT) USE OF ANTICOAGULANTS: ICD-10-CM

## 2021-01-12 DIAGNOSIS — I48.91 ATRIAL FIBRILLATION, UNSPECIFIED TYPE (HCC): ICD-10-CM

## 2021-01-12 LAB
INR: 2.9 (ref 0.8–1.2)
TEST STRIP EXPIRATION DATE: ABNORMAL DATE

## 2021-01-12 PROCEDURE — 36416 COLLJ CAPILLARY BLOOD SPEC: CPT

## 2021-01-12 PROCEDURE — 85610 PROTHROMBIN TIME: CPT

## 2021-01-12 PROCEDURE — 93793 ANTICOAG MGMT PT WARFARIN: CPT

## 2021-02-09 ENCOUNTER — OFFICE VISIT (OUTPATIENT)
Dept: OPHTHALMOLOGY | Facility: CLINIC | Age: 76
End: 2021-02-09
Payer: MEDICARE

## 2021-02-09 DIAGNOSIS — H25.13 AGE-RELATED NUCLEAR CATARACT OF BOTH EYES: Primary | ICD-10-CM

## 2021-02-09 DIAGNOSIS — H26.8 PSEUDOEXFOLIATION (PXF) OF LENS CAPSULE: ICD-10-CM

## 2021-02-09 DIAGNOSIS — H43.392 FLOATER, VITREOUS, LEFT: ICD-10-CM

## 2021-02-09 DIAGNOSIS — H40.003 GLAUCOMA SUSPECT OF BOTH EYES: ICD-10-CM

## 2021-02-09 PROCEDURE — 92250 FUNDUS PHOTOGRAPHY W/I&R: CPT | Performed by: OPHTHALMOLOGY

## 2021-02-09 PROCEDURE — 92004 COMPRE OPH EXAM NEW PT 1/>: CPT | Performed by: OPHTHALMOLOGY

## 2021-02-09 PROCEDURE — 92015 DETERMINE REFRACTIVE STATE: CPT | Performed by: OPHTHALMOLOGY

## 2021-02-09 NOTE — PROGRESS NOTES
Pascual Butler is a 76year old female. HPI:     HPI     NP here for a complete exam. Pt's last eye exam with Dr Kevyn Lugo, optometrist, in [place] in fall of 2019. Pt states vision is stable, she has not noticed any changes in vision.  Pt excise hand/foot neuroma (Left, 2009) (per ng 2 left hammer toe 2 rt, hallux valqus naprosyn 500); excision of uvula (2006) (per ng  uvula removed ); upper gi endoscopy performed (2015); upper gi endoscopy,exam; colonoscopy; hysterectomy (partial at 34); a (ANTIVERT) 12.5 MG Oral Tab Take 12.5 mg by mouth as needed. 0   • Ondansetron HCl (ZOFRAN) 4 mg tablet Take 4 mg by mouth as needed. 1   • Calcium Carbonate-Vitamin D (CALCIUM + D OR) Take by mouth daily.  Take 500mg & 1000 IU Vit D daily     • GLUCO Vitreous floaters          Fundus Exam       Right Left    Disc Good rim, Temporal crescent Sloping margin, Temporal crescent    C/D Ratio 0.4 0.8    Macula Normal Normal    Vessels Normal Normal    Periphery Normal Normal            Refraction     Wearing prescriptions requested or ordered in this encounter        Follow up instructions:  Return for Next avail, VF,OCT and pachy w/ no MD, If glaucoma diagnostics are wnl, 1 yr dil EE.    2/9/2021  Scribed by: Yuli Garcia MD

## 2021-02-09 NOTE — PATIENT INSTRUCTIONS
Age-related nuclear cataract of both eyes  Discussed mild cataracts in both eyes that are not affecting vision and are not surgical at this time. RX for separate distance and reading glasses per patient's choice.        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 But it often has no symptoms, so you need regular eye exams. Glaucoma often begins when pressure builds up in the eye. This pressure can damage the optic nerve. The optic nerve sends messages to the brain so you can see.  There are 2 main kin eye pressure  Eye drops and pills may be used to lower eye pressure. Some medicines reduce the amount of fluid your eyes make. Others increase drainage of fluid from the eyes. Use your medicines every day as directed.  Don't stop taking them—even if you hav

## 2021-02-09 NOTE — ASSESSMENT & PLAN NOTE
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 Retinal photos taken today to document optic nerves. Glaucoma diagnostic testing ordered.   [place]

## 2021-02-09 NOTE — ASSESSMENT & PLAN NOTE
Discussed mild cataracts in both eyes that are not affecting vision and are not surgical at this time. RX for separate distance and reading glasses per patient's choice.

## 2021-02-16 ENCOUNTER — TELEPHONE (OUTPATIENT)
Dept: OPHTHALMOLOGY | Facility: CLINIC | Age: 76
End: 2021-02-16

## 2021-02-16 ENCOUNTER — TELEPHONE (OUTPATIENT)
Dept: INTERNAL MEDICINE CLINIC | Facility: CLINIC | Age: 76
End: 2021-02-16

## 2021-02-16 NOTE — TELEPHONE ENCOUNTER
Per pt has appt today with tech for EP/ VF, OCT and pachy with no MD and needs to rs due to the weather.  Please advise

## 2021-02-16 NOTE — TELEPHONE ENCOUNTER
Patient, had an appointment for today, but cancelled due to the weather. Pt would like to speak with the coumadin nurse. No, further information was given.

## 2021-02-17 ENCOUNTER — ANTI-COAG VISIT (OUTPATIENT)
Dept: INTERNAL MEDICINE CLINIC | Facility: CLINIC | Age: 76
End: 2021-02-17
Payer: MEDICARE

## 2021-02-17 DIAGNOSIS — I48.91 ATRIAL FIBRILLATION, UNSPECIFIED TYPE (HCC): ICD-10-CM

## 2021-02-17 DIAGNOSIS — Z51.81 ENCOUNTER FOR THERAPEUTIC DRUG MONITORING: ICD-10-CM

## 2021-02-17 DIAGNOSIS — Z79.01 LONG TERM (CURRENT) USE OF ANTICOAGULANTS: ICD-10-CM

## 2021-02-17 LAB
INR: 2.2 (ref 0.8–1.2)
TEST STRIP EXPIRATION DATE: ABNORMAL DATE

## 2021-02-17 PROCEDURE — 85610 PROTHROMBIN TIME: CPT

## 2021-02-17 PROCEDURE — 93793 ANTICOAG MGMT PT WARFARIN: CPT

## 2021-02-17 PROCEDURE — 36416 COLLJ CAPILLARY BLOOD SPEC: CPT

## 2021-02-27 ENCOUNTER — NURSE ONLY (OUTPATIENT)
Dept: OPHTHALMOLOGY | Facility: CLINIC | Age: 76
End: 2021-02-27
Payer: MEDICARE

## 2021-02-27 DIAGNOSIS — H40.003 GLAUCOMA SUSPECT OF BOTH EYES: ICD-10-CM

## 2021-02-27 PROCEDURE — 92133 CPTRZD OPH DX IMG PST SGM ON: CPT | Performed by: OPHTHALMOLOGY

## 2021-02-27 PROCEDURE — 92083 EXTENDED VISUAL FIELD XM: CPT | Performed by: OPHTHALMOLOGY

## 2021-02-27 PROCEDURE — 76514 ECHO EXAM OF EYE THICKNESS: CPT | Performed by: OPHTHALMOLOGY

## 2021-02-28 NOTE — PROGRESS NOTES
Joshua Chapin is a 76year old female. HPI:     HPI     Patient is here for a glaucoma work up with HVF, OCT and Pachy, rhonda COREAS     Last edited by Althea William on 2/27/2021 10:12 AM. (History)        Patient History:  Past Medical History:   D 2/3/2020) (Procedure: COLONOSCOPY;  Surgeon: Jackalyn Duane, MD;  Location: [facility]).     Family History   Problem Relation Age of Onset   • Cancer Father         per ng lung   • Breast Cancer Mother 60        approx   • Other (Other) Mother    • Ot tablet by mouth 2 (two) times daily. Take Glucosamine 1500mg and Chondroitin 1100mg two times a day. • omega-3 fatty acids (FISH OIL) 1000 MG Oral Cap Take 500 mg by mouth 2 (two) times daily.        • Multiple Vitamins-Minerals (MULTI FOR HER OR) Take

## 2021-03-01 ENCOUNTER — OFFICE VISIT (OUTPATIENT)
Dept: ENDOCRINOLOGY CLINIC | Facility: CLINIC | Age: 76
End: 2021-03-01
Payer: MEDICARE

## 2021-03-01 VITALS
HEART RATE: 62 BPM | WEIGHT: 157 LBS | SYSTOLIC BLOOD PRESSURE: 116 MMHG | BODY MASS INDEX: 29 KG/M2 | DIASTOLIC BLOOD PRESSURE: 77 MMHG

## 2021-03-01 DIAGNOSIS — M81.0 AGE-RELATED OSTEOPOROSIS WITHOUT CURRENT PATHOLOGICAL FRACTURE: Primary | ICD-10-CM

## 2021-03-01 PROCEDURE — 99203 OFFICE O/P NEW LOW 30 MIN: CPT | Performed by: INTERNAL MEDICINE

## 2021-03-01 NOTE — PROGRESS NOTES
Name: Lali Howe  Date: 3/1/2021    Referring Physician: No ref. provider found    Patient presents with:  Consult: Patient would like to establish care with Endocrinologist to help manage Osteopenia. Patient reports no history of fractures. Wheat Dextrin (BENEFIBER OR), Take by mouth., Disp: , Rfl:   •  Cyanocobalamin (VITAMIN B 12 OR), Take 1,000 mg by mouth., Disp: , Rfl:   •  Meclizine HCl (ANTIVERT) 12.5 MG Oral Tab, Take 12.5 mg by mouth as needed.   , Disp: , Rfl: 0  •  Ondansetron HCl ( Arrhythmia     A-fib   • Atrial fibrillation [facility]) 1/ 2017   • Ramírez's esophagus    • DUB (dysfunctional uterine bleeding) 1980    per ng LIBBY   • Esophageal reflux    • Esophagitis 2011    per ng eosinophilic egd w/ biopsy   • Esophagitis 2012    pr ng e 116/77   Pulse 62   Wt 157 lb (71.2 kg)   BMI 28.72 kg/m²     General Appearance:  Alert, in no acute distress, well developed  Eyes: normal conjunctivae, sclera.   Ears/Nose/Mouth/Throat/Neck:  normal hearing, normal speech and diffusely enlarged thyroid g

## 2021-03-09 DIAGNOSIS — Z23 NEED FOR VACCINATION: ICD-10-CM

## 2021-03-12 ENCOUNTER — TELEPHONE (OUTPATIENT)
Dept: INTERNAL MEDICINE CLINIC | Facility: CLINIC | Age: 76
End: 2021-03-12

## 2021-03-12 DIAGNOSIS — Z79.01 LONG TERM (CURRENT) USE OF ANTICOAGULANTS: ICD-10-CM

## 2021-03-12 DIAGNOSIS — Z51.81 ENCOUNTER FOR THERAPEUTIC DRUG MONITORING: ICD-10-CM

## 2021-03-12 DIAGNOSIS — I48.91 ATRIAL FIBRILLATION, UNSPECIFIED TYPE (HCC): Primary | ICD-10-CM

## 2021-03-12 NOTE — TELEPHONE ENCOUNTER
Anticoag referral expires soon. Order pended. Please sign, thank you.       Dx: Long term (current) use of anticoagulants (Z79.01)  Encounter for therapeutic drug monitoring (Z51.81)  [facility] (I48.91)

## 2021-03-17 ENCOUNTER — ANTI-COAG VISIT (OUTPATIENT)
Dept: INTERNAL MEDICINE CLINIC | Facility: CLINIC | Age: 76
End: 2021-03-17
Payer: MEDICARE

## 2021-03-17 DIAGNOSIS — I48.91 ATRIAL FIBRILLATION, UNSPECIFIED TYPE (HCC): ICD-10-CM

## 2021-03-17 DIAGNOSIS — Z51.81 ENCOUNTER FOR THERAPEUTIC DRUG MONITORING: ICD-10-CM

## 2021-03-17 DIAGNOSIS — Z79.01 LONG TERM (CURRENT) USE OF ANTICOAGULANTS: ICD-10-CM

## 2021-03-17 LAB
INR: 2.4 (ref 0.8–1.2)
TEST STRIP EXPIRATION DATE: ABNORMAL DATE

## 2021-03-17 PROCEDURE — 85610 PROTHROMBIN TIME: CPT

## 2021-03-17 PROCEDURE — 93793 ANTICOAG MGMT PT WARFARIN: CPT

## 2021-03-17 PROCEDURE — 36416 COLLJ CAPILLARY BLOOD SPEC: CPT

## 2021-04-02 ENCOUNTER — TELEPHONE (OUTPATIENT)
Dept: PULMONOLOGY | Facility: CLINIC | Age: 76
End: 2021-04-02

## 2021-04-05 NOTE — TELEPHONE ENCOUNTER
Spoke with patient informed her that a new appointment is needed. Last office visit was 8/22/2017. Consult appointment scheduled for 4/23/21 at 9:30am with Tan Morocho PA-C. Verified date, time, location & parking. Patient verified understanding.  Faxed orde

## 2021-04-14 ENCOUNTER — ANTI-COAG VISIT (OUTPATIENT)
Dept: INTERNAL MEDICINE CLINIC | Facility: CLINIC | Age: 76
End: 2021-04-14
Payer: MEDICARE

## 2021-04-14 DIAGNOSIS — I48.91 ATRIAL FIBRILLATION, UNSPECIFIED TYPE (HCC): ICD-10-CM

## 2021-04-14 DIAGNOSIS — Z51.81 ENCOUNTER FOR THERAPEUTIC DRUG MONITORING: ICD-10-CM

## 2021-04-14 DIAGNOSIS — Z79.01 LONG TERM (CURRENT) USE OF ANTICOAGULANTS: ICD-10-CM

## 2021-04-14 PROCEDURE — 85610 PROTHROMBIN TIME: CPT

## 2021-04-14 PROCEDURE — 93793 ANTICOAG MGMT PT WARFARIN: CPT

## 2021-04-14 PROCEDURE — 36416 COLLJ CAPILLARY BLOOD SPEC: CPT

## 2021-04-23 ENCOUNTER — OFFICE VISIT (OUTPATIENT)
Dept: PULMONOLOGY | Facility: CLINIC | Age: 76
End: 2021-04-23
Payer: MEDICARE

## 2021-04-23 VITALS
TEMPERATURE: 98 F | WEIGHT: 157 LBS | BODY MASS INDEX: 28.89 KG/M2 | OXYGEN SATURATION: 100 % | HEIGHT: 62 IN | HEART RATE: 58 BPM | SYSTOLIC BLOOD PRESSURE: 121 MMHG | DIASTOLIC BLOOD PRESSURE: 70 MMHG

## 2021-04-23 DIAGNOSIS — G47.33 OBSTRUCTIVE SLEEP APNEA: Primary | ICD-10-CM

## 2021-04-23 PROCEDURE — 99203 OFFICE O/P NEW LOW 30 MIN: CPT | Performed by: PHYSICIAN ASSISTANT

## 2021-04-23 NOTE — PROGRESS NOTES
Pulmonary Consult Note    History of Present Illness:  Radha Avila is a 76year old female presenting to pulmonary clinic today for obstructive sleep apnea. She is a known patient to Dr. Ernie Payne, last seen in 2017.  She has history of moderate YASMIN wi the last year. Physical Exam:  /70   Pulse 58   Temp 97.9 °F (36.6 °C) (Oral)   Ht 5' 2\" (1.575 m)   Wt 157 lb (71.2 kg)   SpO2 100%   BMI 28.72 kg/m²    Constitutional: No acute distress. HEENT: Head NC/AT. PEERL.  No tonsillar or uvula enlargem

## 2021-05-11 ENCOUNTER — ANTI-COAG VISIT (OUTPATIENT)
Dept: INTERNAL MEDICINE CLINIC | Facility: CLINIC | Age: 76
End: 2021-05-11
Payer: MEDICARE

## 2021-05-11 DIAGNOSIS — Z51.81 ENCOUNTER FOR THERAPEUTIC DRUG MONITORING: ICD-10-CM

## 2021-05-11 DIAGNOSIS — I48.91 ATRIAL FIBRILLATION, UNSPECIFIED TYPE (HCC): ICD-10-CM

## 2021-05-11 DIAGNOSIS — Z79.01 LONG TERM (CURRENT) USE OF ANTICOAGULANTS: ICD-10-CM

## 2021-05-11 PROCEDURE — 85610 PROTHROMBIN TIME: CPT

## 2021-05-11 PROCEDURE — 93793 ANTICOAG MGMT PT WARFARIN: CPT

## 2021-06-08 ENCOUNTER — ANTI-COAG VISIT (OUTPATIENT)
Dept: INTERNAL MEDICINE CLINIC | Facility: CLINIC | Age: 76
End: 2021-06-08
Payer: MEDICARE

## 2021-06-08 ENCOUNTER — TELEPHONE (OUTPATIENT)
Dept: INTERNAL MEDICINE CLINIC | Facility: CLINIC | Age: 76
End: 2021-06-08

## 2021-06-08 DIAGNOSIS — Z51.81 ENCOUNTER FOR THERAPEUTIC DRUG MONITORING: ICD-10-CM

## 2021-06-08 DIAGNOSIS — Z79.01 LONG TERM (CURRENT) USE OF ANTICOAGULANTS: ICD-10-CM

## 2021-06-08 DIAGNOSIS — I48.91 ATRIAL FIBRILLATION, UNSPECIFIED TYPE (HCC): ICD-10-CM

## 2021-06-08 PROCEDURE — 93793 ANTICOAG MGMT PT WARFARIN: CPT

## 2021-06-08 PROCEDURE — 85610 PROTHROMBIN TIME: CPT

## 2021-06-08 NOTE — TELEPHONE ENCOUNTER
Today;s INR= 3.5  Goal= 2.0-3.0    Reports having bilateral knee pain for the past week- has been taking Tylenol Extra Strength 1000 mg twice a day for the past 4-5 days. She is hoping to get a cortisone injection in one/both knees tomorrow 6/9.     Per pro

## 2021-06-15 ENCOUNTER — ANTI-COAG VISIT (OUTPATIENT)
Dept: INTERNAL MEDICINE CLINIC | Facility: CLINIC | Age: 76
End: 2021-06-15
Payer: MEDICARE

## 2021-06-15 DIAGNOSIS — Z79.01 LONG TERM (CURRENT) USE OF ANTICOAGULANTS: ICD-10-CM

## 2021-06-15 DIAGNOSIS — I48.91 ATRIAL FIBRILLATION, UNSPECIFIED TYPE (HCC): ICD-10-CM

## 2021-06-15 DIAGNOSIS — Z51.81 ENCOUNTER FOR THERAPEUTIC DRUG MONITORING: ICD-10-CM

## 2021-06-15 PROCEDURE — 85610 PROTHROMBIN TIME: CPT

## 2021-06-15 PROCEDURE — 93793 ANTICOAG MGMT PT WARFARIN: CPT

## 2021-07-14 ENCOUNTER — ANTI-COAG VISIT (OUTPATIENT)
Dept: INTERNAL MEDICINE CLINIC | Facility: CLINIC | Age: 76
End: 2021-07-14
Payer: MEDICARE

## 2021-07-14 DIAGNOSIS — Z51.81 ENCOUNTER FOR THERAPEUTIC DRUG MONITORING: ICD-10-CM

## 2021-07-14 DIAGNOSIS — Z79.01 LONG TERM (CURRENT) USE OF ANTICOAGULANTS: ICD-10-CM

## 2021-07-14 DIAGNOSIS — I48.91 ATRIAL FIBRILLATION, UNSPECIFIED TYPE (HCC): ICD-10-CM

## 2021-07-14 LAB
INR: 2.8 (ref 0.8–1.2)
TEST STRIP EXPIRATION DATE: ABNORMAL DATE

## 2021-07-14 PROCEDURE — 93793 ANTICOAG MGMT PT WARFARIN: CPT

## 2021-07-14 PROCEDURE — 85610 PROTHROMBIN TIME: CPT

## 2021-08-10 ENCOUNTER — OFFICE VISIT (OUTPATIENT)
Dept: FAMILY MEDICINE CLINIC | Facility: CLINIC | Age: 76
End: 2021-08-10
Payer: MEDICARE

## 2021-08-10 VITALS
TEMPERATURE: 97 F | HEART RATE: 62 BPM | BODY MASS INDEX: 29.64 KG/M2 | SYSTOLIC BLOOD PRESSURE: 124 MMHG | WEIGHT: 157 LBS | OXYGEN SATURATION: 97 % | DIASTOLIC BLOOD PRESSURE: 84 MMHG | RESPIRATION RATE: 15 BRPM | HEIGHT: 61 IN

## 2021-08-10 DIAGNOSIS — R30.0 DYSURIA: Primary | ICD-10-CM

## 2021-08-10 DIAGNOSIS — N30.90 BLADDER INFECTION: ICD-10-CM

## 2021-08-10 LAB
BILIRUBIN: NEGATIVE
GLUCOSE (URINE DIPSTICK): NEGATIVE MG/DL
KETONES (URINE DIPSTICK): NEGATIVE MG/DL
MULTISTIX LOT#: 5077 NUMERIC
NITRITE, URINE: NEGATIVE
PH, URINE: 6.5 (ref 4.5–8)
PROTEIN (URINE DIPSTICK): >=300 MG/DL
SPECIFIC GRAVITY: 1.02 (ref 1–1.03)
URINE-COLOR: YELLOW
UROBILINOGEN,SEMI-QN: 0.2 MG/DL (ref 0–1.9)

## 2021-08-10 PROCEDURE — 81003 URINALYSIS AUTO W/O SCOPE: CPT | Performed by: NURSE PRACTITIONER

## 2021-08-10 PROCEDURE — 87088 URINE BACTERIA CULTURE: CPT | Performed by: NURSE PRACTITIONER

## 2021-08-10 PROCEDURE — 87186 SC STD MICRODIL/AGAR DIL: CPT | Performed by: NURSE PRACTITIONER

## 2021-08-10 PROCEDURE — 99213 OFFICE O/P EST LOW 20 MIN: CPT | Performed by: NURSE PRACTITIONER

## 2021-08-10 PROCEDURE — 87086 URINE CULTURE/COLONY COUNT: CPT | Performed by: NURSE PRACTITIONER

## 2021-08-10 RX ORDER — CEPHALEXIN 500 MG/1
CAPSULE ORAL
Qty: 14 CAPSULE | Refills: 0 | Status: SHIPPED | OUTPATIENT
Start: 2021-08-10 | End: 2021-08-27

## 2021-08-10 NOTE — PROGRESS NOTES
CHIEF COMPLAINT:   Patient presents with:  UTI      HPI:   Fredy Sampson is a 76year old female who presents with symptoms of UTI. Complaining of urinary frequency, urgency, suprapubic pressure/mild tenderness, and dysuria for last 1 week.   Sympt 2/9/2021   • Arrhythmia     A-fib   • Atrial fibrillation [facility]) 1/ 2017   • Ramírez's esophagus    • DUB (dysfunctional uterine bleeding) 1980    per  LIBBY   • Esophageal reflux    • Esophagitis 2011    per  eosinophilic egd w/ biopsy   • Esophagitis 20 BS present x 4 and normoactive. No hepatosplenomegaly. : +Mild suprapubic tenderness; abdomen otherwise non-tender throughout. No bladder distention or CVAT.     Recent Results (from the past 24 hour(s))   URINALYSIS, AUTO, W/O SCOPE    Collection [facility] Instructions     Bladder Infection, Female (Adult)     Urine normally doesn't have any germs (bacteria) in it. But bacteria can get into the urinary tract from the skin around the rectum. Or they can travel in the blood from other parts of the body.  Once t urinating. Always wipe from front to back. · Bowel incontinence  · Pregnancy  · Procedures such as having a catheter put in  · Older age  · Not emptying your bladder. This can give bacteria a chance to grow in your urine.   · Fluid loss (dehydration)  · Co less junk foods and fatty foods. · Don't have sex until your symptoms are gone. · Don't have caffeine, alcohol, and spicy foods. These can irritate your bladder. · Urinate right after you have sex to flush out your bladder.   · If you use birth control p

## 2021-08-11 ENCOUNTER — ANTI-COAG VISIT (OUTPATIENT)
Dept: INTERNAL MEDICINE CLINIC | Facility: CLINIC | Age: 76
End: 2021-08-11
Payer: MEDICARE

## 2021-08-11 DIAGNOSIS — Z79.01 LONG TERM (CURRENT) USE OF ANTICOAGULANTS: ICD-10-CM

## 2021-08-11 DIAGNOSIS — Z51.81 ENCOUNTER FOR THERAPEUTIC DRUG MONITORING: ICD-10-CM

## 2021-08-11 DIAGNOSIS — I48.91 ATRIAL FIBRILLATION, UNSPECIFIED TYPE (HCC): ICD-10-CM

## 2021-08-11 LAB — INR: 2.8 (ref 0.8–1.2)

## 2021-08-11 PROCEDURE — 85610 PROTHROMBIN TIME: CPT

## 2021-08-11 PROCEDURE — 93793 ANTICOAG MGMT PT WARFARIN: CPT

## 2021-08-27 ENCOUNTER — OFFICE VISIT (OUTPATIENT)
Dept: CARDIOLOGY CLINIC | Facility: CLINIC | Age: 76
End: 2021-08-27
Payer: MEDICARE

## 2021-08-27 VITALS
HEART RATE: 66 BPM | OXYGEN SATURATION: 97 % | WEIGHT: 156 LBS | RESPIRATION RATE: 18 BRPM | HEIGHT: 61 IN | DIASTOLIC BLOOD PRESSURE: 69 MMHG | BODY MASS INDEX: 29.45 KG/M2 | SYSTOLIC BLOOD PRESSURE: 105 MMHG

## 2021-08-27 DIAGNOSIS — I48.0 PAROXYSMAL ATRIAL FIBRILLATION (HCC): Primary | ICD-10-CM

## 2021-08-27 DIAGNOSIS — E78.2 HYPERLIPIDEMIA, MIXED: ICD-10-CM

## 2021-08-27 PROCEDURE — 99214 OFFICE O/P EST MOD 30 MIN: CPT | Performed by: INTERNAL MEDICINE

## 2021-08-27 RX ORDER — ATORVASTATIN CALCIUM 10 MG/1
10 TABLET, FILM COATED ORAL NIGHTLY
Qty: 30 TABLET | Refills: 5 | Status: SHIPPED | OUTPATIENT
Start: 2021-08-27

## 2021-08-27 NOTE — PATIENT INSTRUCTIONS
Discontinue lovastatin and start atorvastatin 10 mg once a day. Blood test for cholesterol in 2 months. Follow-up with me in 6 months or sooner if needed.

## 2021-08-27 NOTE — PROGRESS NOTES
Jessica Tierney is a 76year old female. Patient presents with: Follow - Up: annual     HPI:   Patient is here for follow-up appointment. She denies any new symptoms of chest pain or shortness of breath.   She does have a history of paroxysmal atria blood transfusion    • Long term (current) use of anticoagulants 3/5/2019   • Meniere disease    • Osteopenia 10/20/2014   • Osteopenia determined by x-ray 10/18/2016   • Other and unspecified hyperlipidemia    • Paroxysmal [facility] ([facility] 2/15/2 atorvastatin. Fasting lipid profile in 2 months. The patient indicates understanding of these issues and agrees to the plan. The patient is asked to return in 6 months or sooner if needed.              Bonita Lincoln MD  8/27/2021  2:21 PM

## 2021-09-02 ENCOUNTER — OFFICE VISIT (OUTPATIENT)
Dept: ENDOCRINOLOGY CLINIC | Facility: CLINIC | Age: 76
End: 2021-09-02
Payer: MEDICARE

## 2021-09-02 VITALS
SYSTOLIC BLOOD PRESSURE: 113 MMHG | WEIGHT: 156 LBS | HEART RATE: 54 BPM | BODY MASS INDEX: 29 KG/M2 | DIASTOLIC BLOOD PRESSURE: 68 MMHG

## 2021-09-02 DIAGNOSIS — E55.9 VITAMIN D DEFICIENCY: ICD-10-CM

## 2021-09-02 DIAGNOSIS — M81.8 OTHER OSTEOPOROSIS WITHOUT CURRENT PATHOLOGICAL FRACTURE: Primary | ICD-10-CM

## 2021-09-02 PROCEDURE — 99213 OFFICE O/P EST LOW 20 MIN: CPT | Performed by: INTERNAL MEDICINE

## 2021-09-02 PROCEDURE — 96372 THER/PROPH/DIAG INJ SC/IM: CPT | Performed by: INTERNAL MEDICINE

## 2021-09-02 NOTE — PROGRESS NOTES
Name: Thong Camera  Date: 9/2/2021    Referring Physician: No ref. provider found    Patient presents with:  Osteoporosis: Prolia injection. Patient reports no falls/fractures since last visit.       HISTORY OF PRESENT ILLNESS   Maeve Orourke Disp: , Rfl:   •  Wheat Dextrin (BENEFIBER OR), Take by mouth., Disp: , Rfl:   •  Cyanocobalamin (VITAMIN B 12 OR), Take 1,000 mg by mouth., Disp: , Rfl:   •  Calcium Carbonate-Vitamin D (CALCIUM + D OR), Take by mouth daily.  Take 500mg & 1000 IU Vit D gwendolyn (dysfunctional uterine bleeding) 1980    per ng LIBBY   • Esophageal reflux    • Esophagitis 2011    per ng eosinophilic egd w/ biopsy   • Esophagitis 2012    pr ng egd w/ biopsy   • Floater, vitreous, left 2/9/2021   • Gastritis    • Glaucoma suspect of bot midline  Neurologic: sensory grossly intact and motor grossly intact  Musculoskeletal:  normal muscle strength and tone  PV: normal pulses of carotids, pedals  Skin:  normal moisture and skin texture  Hair & Nails:  normal scalp hair     Neuro:  sensory gr

## 2021-09-13 ENCOUNTER — ANTI-COAG VISIT (OUTPATIENT)
Dept: INTERNAL MEDICINE CLINIC | Facility: CLINIC | Age: 76
End: 2021-09-13
Payer: MEDICARE

## 2021-09-13 DIAGNOSIS — Z51.81 ENCOUNTER FOR THERAPEUTIC DRUG MONITORING: ICD-10-CM

## 2021-09-13 DIAGNOSIS — I48.91 ATRIAL FIBRILLATION, UNSPECIFIED TYPE (HCC): ICD-10-CM

## 2021-09-13 DIAGNOSIS — Z79.01 LONG TERM (CURRENT) USE OF ANTICOAGULANTS: ICD-10-CM

## 2021-09-13 LAB
INR: 2.7 (ref 0.8–1.2)
TEST STRIP EXPIRATION DATE: ABNORMAL DATE

## 2021-09-13 PROCEDURE — 93793 ANTICOAG MGMT PT WARFARIN: CPT

## 2021-09-13 PROCEDURE — 85610 PROTHROMBIN TIME: CPT

## 2021-10-26 ENCOUNTER — ANTI-COAG VISIT (OUTPATIENT)
Dept: INTERNAL MEDICINE CLINIC | Facility: CLINIC | Age: 76
End: 2021-10-26
Payer: MEDICARE

## 2021-10-26 ENCOUNTER — LAB ENCOUNTER (OUTPATIENT)
Dept: LAB | Age: 76
End: 2021-10-26
Attending: INTERNAL MEDICINE
Payer: MEDICARE

## 2021-10-26 DIAGNOSIS — Z51.81 ENCOUNTER FOR THERAPEUTIC DRUG MONITORING: ICD-10-CM

## 2021-10-26 DIAGNOSIS — I48.91 ATRIAL FIBRILLATION, UNSPECIFIED TYPE (HCC): ICD-10-CM

## 2021-10-26 DIAGNOSIS — E78.2 HYPERLIPIDEMIA, MIXED: ICD-10-CM

## 2021-10-26 DIAGNOSIS — I48.0 PAROXYSMAL ATRIAL FIBRILLATION (HCC): ICD-10-CM

## 2021-10-26 DIAGNOSIS — Z79.01 LONG TERM (CURRENT) USE OF ANTICOAGULANTS: ICD-10-CM

## 2021-10-26 DIAGNOSIS — E55.9 VITAMIN D DEFICIENCY: ICD-10-CM

## 2021-10-26 DIAGNOSIS — M81.8 OTHER OSTEOPOROSIS WITHOUT CURRENT PATHOLOGICAL FRACTURE: ICD-10-CM

## 2021-10-26 PROCEDURE — 84080 ASSAY ALKALINE PHOSPHATASES: CPT

## 2021-10-26 PROCEDURE — 93793 ANTICOAG MGMT PT WARFARIN: CPT

## 2021-10-26 PROCEDURE — 83970 ASSAY OF PARATHORMONE: CPT

## 2021-10-26 PROCEDURE — 85610 PROTHROMBIN TIME: CPT

## 2021-10-26 PROCEDURE — 36415 COLL VENOUS BLD VENIPUNCTURE: CPT

## 2021-10-26 PROCEDURE — 82306 VITAMIN D 25 HYDROXY: CPT

## 2021-10-26 PROCEDURE — 80061 LIPID PANEL: CPT

## 2021-11-16 RX ORDER — WARFARIN SODIUM 5 MG/1
TABLET ORAL
Qty: 120 TABLET | Refills: 3 | Status: ON HOLD | OUTPATIENT
Start: 2021-11-16 | End: 2022-11-09

## 2021-11-16 NOTE — TELEPHONE ENCOUNTER
Please review. Protocol failed / No protocol.    Requested Prescriptions   Pending Prescriptions Disp Refills    WARFARIN 5 MG Oral Tab [Pharmacy Med Name: WARFARIN SODIUM 5 MG TABLET] 120 tablet 3     Sig: Taken as directed by coumadin clinic        There is no refill protocol information for this order          Future Appointments         Provider Department Appt Notes    In 1 week Negro Meyers MD [place], [facility], [address]    In 3 weeks [address] [facility]     In 3 months Ashley Haines MD [facility]iology 6M F/U    In 3 months [address] [facility] Endocrinology prolia inj           Recent Outpatient Visits              2 months ago Other osteoporosis without current pathological fracture    [place], [facility] Endocrinology Teresita Kent MD    Office Visit    2 months ago Paroxysmal atrial fibrillation [facility])    [facility] Ashley Haines MD    Office Visit    3 months ago [address] [facility] [place]    Office Visit    6 months ago Obstructive sleep apnea    [place], [facility]    Office Visit    8 months ago Age-related osteoporosis without current pathological fracture    [facility], [facility] Endocrinology Teresita Kent MD    Office Visit

## 2021-11-23 ENCOUNTER — OFFICE VISIT (OUTPATIENT)
Dept: INTERNAL MEDICINE CLINIC | Facility: CLINIC | Age: 76
End: 2021-11-23
Payer: MEDICARE

## 2021-11-23 ENCOUNTER — LAB ENCOUNTER (OUTPATIENT)
Dept: LAB | Age: 76
End: 2021-11-23
Attending: INTERNAL MEDICINE
Payer: MEDICARE

## 2021-11-23 VITALS
BODY MASS INDEX: 29.64 KG/M2 | SYSTOLIC BLOOD PRESSURE: 121 MMHG | HEART RATE: 54 BPM | WEIGHT: 157 LBS | DIASTOLIC BLOOD PRESSURE: 67 MMHG | HEIGHT: 61 IN

## 2021-11-23 DIAGNOSIS — R73.9 HYPERGLYCEMIA: ICD-10-CM

## 2021-11-23 DIAGNOSIS — Z00.00 MEDICARE ANNUAL WELLNESS VISIT, SUBSEQUENT: ICD-10-CM

## 2021-11-23 DIAGNOSIS — G47.33 OSA (OBSTRUCTIVE SLEEP APNEA): ICD-10-CM

## 2021-11-23 DIAGNOSIS — Z12.31 VISIT FOR SCREENING MAMMOGRAM: Primary | ICD-10-CM

## 2021-11-23 DIAGNOSIS — I48.0 PAROXYSMAL ATRIAL FIBRILLATION (HCC): ICD-10-CM

## 2021-11-23 DIAGNOSIS — I10 ESSENTIAL HYPERTENSION: ICD-10-CM

## 2021-11-23 DIAGNOSIS — M85.80 OSTEOPENIA WITH HIGH RISK OF FRACTURE: ICD-10-CM

## 2021-11-23 DIAGNOSIS — Z00.00 ENCOUNTER FOR ANNUAL HEALTH EXAMINATION: ICD-10-CM

## 2021-11-23 DIAGNOSIS — E78.2 HYPERLIPIDEMIA, MIXED: ICD-10-CM

## 2021-11-23 PROCEDURE — 83036 HEMOGLOBIN GLYCOSYLATED A1C: CPT

## 2021-11-23 PROCEDURE — 84439 ASSAY OF FREE THYROXINE: CPT

## 2021-11-23 PROCEDURE — 90732 PPSV23 VACC 2 YRS+ SUBQ/IM: CPT | Performed by: INTERNAL MEDICINE

## 2021-11-23 PROCEDURE — G0439 PPPS, SUBSEQ VISIT: HCPCS | Performed by: INTERNAL MEDICINE

## 2021-11-23 PROCEDURE — 85025 COMPLETE CBC W/AUTO DIFF WBC: CPT

## 2021-11-23 PROCEDURE — 81015 MICROSCOPIC EXAM OF URINE: CPT

## 2021-11-23 PROCEDURE — 82746 ASSAY OF FOLIC ACID SERUM: CPT | Performed by: NURSE PRACTITIONER

## 2021-11-23 PROCEDURE — 80053 COMPREHEN METABOLIC PANEL: CPT

## 2021-11-23 PROCEDURE — G0009 ADMIN PNEUMOCOCCAL VACCINE: HCPCS | Performed by: INTERNAL MEDICINE

## 2021-11-23 PROCEDURE — 36415 COLL VENOUS BLD VENIPUNCTURE: CPT

## 2021-11-23 PROCEDURE — 87086 URINE CULTURE/COLONY COUNT: CPT

## 2021-11-23 PROCEDURE — 84443 ASSAY THYROID STIM HORMONE: CPT

## 2021-11-23 RX ORDER — OMEPRAZOLE 20 MG/1
CAPSULE, DELAYED RELEASE ORAL
Qty: 90 CAPSULE | Refills: 3 | Status: SHIPPED | OUTPATIENT
Start: 2021-11-23

## 2021-11-23 RX ORDER — CHOLECALCIFEROL (VITAMIN D3) 25 MCG
CAPSULE ORAL
COMMUNITY
Start: 2021-11-01

## 2021-12-05 PROBLEM — H26.8 PSEUDOEXFOLIATION (PXF) OF LENS CAPSULE: Status: RESOLVED | Noted: 2021-02-09 | Resolved: 2021-12-05

## 2021-12-05 PROBLEM — H43.392 FLOATER, VITREOUS, LEFT: Status: RESOLVED | Noted: 2021-02-09 | Resolved: 2021-12-05

## 2021-12-05 PROBLEM — Z79.01 LONG TERM (CURRENT) USE OF ANTICOAGULANTS: Status: RESOLVED | Noted: 2021-03-12 | Resolved: 2021-12-05

## 2021-12-05 PROBLEM — H40.003 GLAUCOMA SUSPECT OF BOTH EYES: Status: RESOLVED | Noted: 2021-02-09 | Resolved: 2021-12-05

## 2021-12-05 PROBLEM — J30.2 SEASONAL ALLERGIC RHINITIS: Status: RESOLVED | Noted: 2018-11-26 | Resolved: 2021-12-05

## 2021-12-05 PROBLEM — Z51.81 ENCOUNTER FOR THERAPEUTIC DRUG MONITORING: Status: RESOLVED | Noted: 2021-03-12 | Resolved: 2021-12-05

## 2021-12-05 PROBLEM — H25.13 AGE-RELATED NUCLEAR CATARACT OF BOTH EYES: Status: RESOLVED | Noted: 2021-02-09 | Resolved: 2021-12-05

## 2021-12-06 NOTE — PATIENT INSTRUCTIONS
Maeve Garner's SCREENING SCHEDULE   Tests on this list are recommended by your physician but may not be covered, or covered at this frequency, by your insurer. Please check with your insurance carrier before scheduling to verify coverage.    P (CPT=77080) 12/09/2019      No recommendations at this time   Pap and Pelvic    Pap   Covered every 2 years for women at normal risk;  Annually if at high risk -  No recommendations at this time    Chlamydia Annually if high risk -  No recommendations at th regarding Advance Directives.

## 2021-12-06 NOTE — PROGRESS NOTES
HPI:   Minna Pastor is a 68year old female who presents for a Medicare Subsequent Annual Wellness visit (Pt already had Initial Annual Wellness).       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 REVIEW OF SYSTEMS:   Review of Systems   Constitutional: Negative for activity change, chills, fatigue and fever.    HENT: Negative for ear discharge, nosebleeds, postnasal drip, rhinorrhea, sinus pressure and sore t sounds come from: Yes I misunderstand some words in a sentence and need to ask people to repeat themselves: Yes   I especially have trouble understanding the speech of women and children: No I have trouble understanding the speaker in a large room such as year old female who presents for a Medicare Assessment.      PLAN SUMMARY:       (Z00.00) Medicare annual wellness visit, subsequent  Plan: Patient is independent with ADL.s    Health screening   Colonoscopy, mammography, dexa scan  And routine eye exam adv Pascual's SCREENING SCHEDULE   Tests on this list are recommended by your physician but may not be covered, or covered at this frequency, by your insurer. Please check with your insurance carrier before scheduling to verify coverage.    PREVENTATIV 12/09/2019      No recommendations at this time   Pap and Pelvic    Pap   Covered every 2 years for women at normal risk;  Annually if at high risk -  No recommendations at this time    Chlamydia Annually if high risk -  No recommendations at this time   Sc

## 2021-12-07 ENCOUNTER — ANTI-COAG VISIT (OUTPATIENT)
Dept: INTERNAL MEDICINE CLINIC | Facility: CLINIC | Age: 76
End: 2021-12-07
Payer: MEDICARE

## 2021-12-07 DIAGNOSIS — Z79.01 LONG TERM (CURRENT) USE OF ANTICOAGULANTS: ICD-10-CM

## 2021-12-07 DIAGNOSIS — I48.91 ATRIAL FIBRILLATION, UNSPECIFIED TYPE (HCC): ICD-10-CM

## 2021-12-07 DIAGNOSIS — Z51.81 ENCOUNTER FOR THERAPEUTIC DRUG MONITORING: ICD-10-CM

## 2021-12-07 PROCEDURE — 93793 ANTICOAG MGMT PT WARFARIN: CPT

## 2021-12-07 PROCEDURE — 85610 PROTHROMBIN TIME: CPT

## 2022-01-11 ENCOUNTER — ANTI-COAG VISIT (OUTPATIENT)
Dept: INTERNAL MEDICINE CLINIC | Facility: CLINIC | Age: 77
End: 2022-01-11
Payer: MEDICARE

## 2022-01-11 DIAGNOSIS — I48.91 ATRIAL FIBRILLATION, UNSPECIFIED TYPE (HCC): ICD-10-CM

## 2022-01-11 DIAGNOSIS — Z79.01 LONG TERM (CURRENT) USE OF ANTICOAGULANTS: ICD-10-CM

## 2022-01-11 DIAGNOSIS — Z51.81 ENCOUNTER FOR THERAPEUTIC DRUG MONITORING: ICD-10-CM

## 2022-01-11 LAB — INR: 2.9 (ref 0.8–1.2)

## 2022-01-11 PROCEDURE — 93793 ANTICOAG MGMT PT WARFARIN: CPT

## 2022-01-11 PROCEDURE — 85610 PROTHROMBIN TIME: CPT

## 2022-01-20 ENCOUNTER — HOSPITAL ENCOUNTER (OUTPATIENT)
Dept: MAMMOGRAPHY | Age: 77
Discharge: HOME | End: 2022-01-20
Attending: INTERNAL MEDICINE
Payer: MEDICARE

## 2022-01-20 ENCOUNTER — HOSPITAL ENCOUNTER (OUTPATIENT)
Dept: BONE DENSITY | Age: 77
Discharge: HOME | End: 2022-01-20
Attending: INTERNAL MEDICINE
Payer: MEDICARE

## 2022-01-20 DIAGNOSIS — Z12.31 VISIT FOR SCREENING MAMMOGRAM: ICD-10-CM

## 2022-01-20 DIAGNOSIS — M81.8 OTHER OSTEOPOROSIS WITHOUT CURRENT PATHOLOGICAL FRACTURE: ICD-10-CM

## 2022-01-20 PROCEDURE — 77067 SCR MAMMO BI INCL CAD: CPT | Performed by: INTERNAL MEDICINE

## 2022-01-20 PROCEDURE — 77063 BREAST TOMOSYNTHESIS BI: CPT | Performed by: INTERNAL MEDICINE

## 2022-01-20 PROCEDURE — 77080 DXA BONE DENSITY AXIAL: CPT | Performed by: INTERNAL MEDICINE

## 2022-02-16 ENCOUNTER — ANTI-COAG VISIT (OUTPATIENT)
Dept: INTERNAL MEDICINE CLINIC | Facility: CLINIC | Age: 77
End: 2022-02-16
Payer: MEDICARE

## 2022-02-16 ENCOUNTER — TELEPHONE (OUTPATIENT)
Dept: INTERNAL MEDICINE CLINIC | Facility: CLINIC | Age: 77
End: 2022-02-16

## 2022-02-16 DIAGNOSIS — I48.91 ATRIAL FIBRILLATION, UNSPECIFIED TYPE (HCC): ICD-10-CM

## 2022-02-16 DIAGNOSIS — Z79.01 LONG TERM (CURRENT) USE OF ANTICOAGULANTS: ICD-10-CM

## 2022-02-16 DIAGNOSIS — Z51.81 ENCOUNTER FOR THERAPEUTIC DRUG MONITORING: ICD-10-CM

## 2022-02-16 LAB — INR: 4 (ref 0.8–1.2)

## 2022-02-16 PROCEDURE — 93793 ANTICOAG MGMT PT WARFARIN: CPT

## 2022-02-16 PROCEDURE — 85610 PROTHROMBIN TIME: CPT

## 2022-02-16 NOTE — TELEPHONE ENCOUNTER
Today's INR= 4.0  Goal= 2.0-3.0    Reports her arthritis has been very painful and she has been taking Tylenol daily/consistently (up to 2000 mg per day) for the past 3-4 weeks. Advised that Tylenol can increase INR when taken consistently. Denies any blood in the urine/stool, no unusual bruising/bleeding. Bleeding precautions reviewed. She will be making an apptmt with the Rheumatologist and Ortho physician soon to discuss options for pain relief. Advised to notify coumadin clinic for any changes made to medication to check interaction and we will monitor INR closely. Per protocol, weekly dose decrease 5-10%. Outside of protocol- instructed to hold one dose of warfarin tonight, weekly dose decrease 11.8% and recheck INR in 1 week.

## 2022-02-23 ENCOUNTER — TELEPHONE (OUTPATIENT)
Dept: INTERNAL MEDICINE CLINIC | Facility: CLINIC | Age: 77
End: 2022-02-23

## 2022-02-23 NOTE — TELEPHONE ENCOUNTER
Anticoag referral expires soon. Order pended. Please sign, thank you.         Dx: Atrial fibrillation, unspecified type ([facility] (I48.91)  Encounter for therapeutic drug monitoring (Z51.81)  Long term (current) use of anticoagulants (Z79.01)

## 2022-02-24 PROBLEM — Z79.01 LONG TERM (CURRENT) USE OF ANTICOAGULANTS: Status: ACTIVE | Noted: 2022-02-24

## 2022-02-24 PROBLEM — Z51.81 ENCOUNTER FOR THERAPEUTIC DRUG MONITORING: Status: ACTIVE | Noted: 2022-02-24

## 2022-02-25 ENCOUNTER — ANTI-COAG VISIT (OUTPATIENT)
Dept: INTERNAL MEDICINE CLINIC | Facility: CLINIC | Age: 77
End: 2022-02-25
Payer: MEDICARE

## 2022-02-25 DIAGNOSIS — Z51.81 ENCOUNTER FOR THERAPEUTIC DRUG MONITORING: ICD-10-CM

## 2022-02-25 DIAGNOSIS — I48.91 ATRIAL FIBRILLATION, UNSPECIFIED TYPE (HCC): ICD-10-CM

## 2022-02-25 DIAGNOSIS — Z79.01 LONG TERM (CURRENT) USE OF ANTICOAGULANTS: ICD-10-CM

## 2022-02-25 LAB
INR: 2.5 (ref 0.8–1.2)
TEST STRIP EXPIRATION DATE: ABNORMAL DATE

## 2022-02-25 PROCEDURE — 93793 ANTICOAG MGMT PT WARFARIN: CPT

## 2022-02-25 PROCEDURE — 85610 PROTHROMBIN TIME: CPT

## 2022-03-07 ENCOUNTER — NURSE ONLY (OUTPATIENT)
Dept: ENDOCRINOLOGY CLINIC | Facility: CLINIC | Age: 77
End: 2022-03-07
Payer: MEDICARE

## 2022-03-07 DIAGNOSIS — M81.0 AGE-RELATED OSTEOPOROSIS WITHOUT CURRENT PATHOLOGICAL FRACTURE: Primary | ICD-10-CM

## 2022-03-07 PROCEDURE — 96372 THER/PROPH/DIAG INJ SC/IM: CPT | Performed by: INTERNAL MEDICINE

## 2022-03-07 NOTE — PROGRESS NOTES
Patient presents to office for prolia injection. Prolia given to Right upper arm and tolerated well. Patient recommended to make fu appointment with provider in 6 months and have blood work.

## 2022-03-09 ENCOUNTER — OFFICE VISIT (OUTPATIENT)
Dept: RHEUMATOLOGY | Facility: CLINIC | Age: 77
End: 2022-03-09
Payer: MEDICARE

## 2022-03-09 ENCOUNTER — TELEPHONE (OUTPATIENT)
Dept: RHEUMATOLOGY | Facility: CLINIC | Age: 77
End: 2022-03-09

## 2022-03-09 VITALS
BODY MASS INDEX: 29.83 KG/M2 | HEART RATE: 65 BPM | SYSTOLIC BLOOD PRESSURE: 91 MMHG | DIASTOLIC BLOOD PRESSURE: 58 MMHG | RESPIRATION RATE: 16 BRPM | HEIGHT: 61 IN | WEIGHT: 158 LBS

## 2022-03-09 DIAGNOSIS — M19.041 LOCALIZED OSTEOARTHRITIS OF HANDS, BILATERAL: ICD-10-CM

## 2022-03-09 DIAGNOSIS — M19.042 LOCALIZED OSTEOARTHRITIS OF HANDS, BILATERAL: ICD-10-CM

## 2022-03-09 DIAGNOSIS — M11.20 PSEUDOGOUT: Primary | ICD-10-CM

## 2022-03-09 PROCEDURE — 99204 OFFICE O/P NEW MOD 45 MIN: CPT | Performed by: INTERNAL MEDICINE

## 2022-03-09 RX ORDER — COLCHICINE 0.6 MG/1
0.6 TABLET ORAL DAILY PRN
Qty: 30 TABLET | Refills: 0 | Status: SHIPPED | OUTPATIENT
Start: 2022-03-09

## 2022-03-09 NOTE — PATIENT INSTRUCTIONS
1. Check labs   2. Left shoulder PT [phone]  3. Stress ball for hands   4. If pain returns - try using colchicine 0.6mg daily x 2-3 days and see if you are better - watch for diarrhea  5. Return to clinic if any pain returns.

## 2022-03-09 NOTE — TELEPHONE ENCOUNTER
Per pharmacy on file, drug inter-action between colchicine and Atorvastatin, patient saw doctor today.

## 2022-03-11 ENCOUNTER — ANTI-COAG VISIT (OUTPATIENT)
Dept: INTERNAL MEDICINE CLINIC | Facility: CLINIC | Age: 77
End: 2022-03-11
Payer: MEDICARE

## 2022-03-11 ENCOUNTER — LAB ENCOUNTER (OUTPATIENT)
Dept: LAB | Age: 77
End: 2022-03-11
Attending: INTERNAL MEDICINE
Payer: MEDICARE

## 2022-03-11 ENCOUNTER — ORDER TRANSCRIPTION (OUTPATIENT)
Dept: PHYSICAL THERAPY | Facility: HOSPITAL | Age: 77
End: 2022-03-11

## 2022-03-11 DIAGNOSIS — Z79.01 LONG TERM (CURRENT) USE OF ANTICOAGULANTS: ICD-10-CM

## 2022-03-11 DIAGNOSIS — M11.20 PSEUDOGOUT: ICD-10-CM

## 2022-03-11 DIAGNOSIS — I48.91 ATRIAL FIBRILLATION, UNSPECIFIED TYPE (HCC): ICD-10-CM

## 2022-03-11 DIAGNOSIS — Z51.81 ENCOUNTER FOR THERAPEUTIC DRUG MONITORING: ICD-10-CM

## 2022-03-11 LAB
ANION GAP SERPL CALC-SCNC: 4 MMOL/L (ref 0–18)
BUN BLD-MCNC: 27 MG/DL (ref 7–18)
BUN/CREAT SERPL: 29 (ref 10–20)
CALCIUM BLD-MCNC: 9.2 MG/DL (ref 8.5–10.1)
CHLORIDE SERPL-SCNC: 105 MMOL/L (ref 98–112)
CO2 SERPL-SCNC: 29 MMOL/L (ref 21–32)
CREAT BLD-MCNC: 0.93 MG/DL
CRP SERPL-MCNC: <0.29 MG/DL (ref ?–0.3)
ERYTHROCYTE [SEDIMENTATION RATE] IN BLOOD: 26 MM/HR
FASTING STATUS PATIENT QL REPORTED: NO
GLUCOSE BLD-MCNC: 94 MG/DL (ref 70–99)
INR: 2.2 (ref 0.8–1.2)
MAGNESIUM SERPL-MCNC: 2.3 MG/DL (ref 1.6–2.6)
OSMOLALITY SERPL CALC.SUM OF ELEC: 291 MOSM/KG (ref 275–295)
POTASSIUM SERPL-SCNC: 4.6 MMOL/L (ref 3.5–5.1)
PTH-INTACT SERPL-MCNC: 52.7 PG/ML (ref 18.5–88)
RHEUMATOID FACT SERPL-ACNC: <10 IU/ML (ref ?–15)
SODIUM SERPL-SCNC: 138 MMOL/L (ref 136–145)
TEST STRIP EXPIRATION DATE: ABNORMAL DATE

## 2022-03-11 PROCEDURE — 93793 ANTICOAG MGMT PT WARFARIN: CPT

## 2022-03-11 PROCEDURE — 86431 RHEUMATOID FACTOR QUANT: CPT

## 2022-03-11 PROCEDURE — 80048 BASIC METABOLIC PNL TOTAL CA: CPT

## 2022-03-11 PROCEDURE — 85652 RBC SED RATE AUTOMATED: CPT

## 2022-03-11 PROCEDURE — 86200 CCP ANTIBODY: CPT

## 2022-03-11 PROCEDURE — 85610 PROTHROMBIN TIME: CPT

## 2022-03-11 PROCEDURE — 36415 COLL VENOUS BLD VENIPUNCTURE: CPT

## 2022-03-11 PROCEDURE — 83970 ASSAY OF PARATHORMONE: CPT

## 2022-03-11 PROCEDURE — 86140 C-REACTIVE PROTEIN: CPT

## 2022-03-11 PROCEDURE — 83735 ASSAY OF MAGNESIUM: CPT

## 2022-03-15 LAB — CCP IGG SERPL-ACNC: 3.4 U/ML (ref 0–6.9)

## 2022-03-25 ENCOUNTER — OFFICE VISIT (OUTPATIENT)
Dept: PHYSICAL THERAPY | Age: 77
End: 2022-03-25
Attending: ORTHOPAEDIC SURGERY
Payer: MEDICARE

## 2022-03-25 DIAGNOSIS — M77.8 LEFT SHOULDER TENDONITIS: ICD-10-CM

## 2022-03-25 PROCEDURE — 97161 PT EVAL LOW COMPLEX 20 MIN: CPT | Performed by: PHYSICAL THERAPIST

## 2022-03-25 PROCEDURE — 97110 THERAPEUTIC EXERCISES: CPT | Performed by: PHYSICAL THERAPIST

## 2022-04-04 ENCOUNTER — ANTI-COAG VISIT (OUTPATIENT)
Dept: INTERNAL MEDICINE CLINIC | Facility: CLINIC | Age: 77
End: 2022-04-04
Payer: MEDICARE

## 2022-04-04 DIAGNOSIS — I48.91 ATRIAL FIBRILLATION, UNSPECIFIED TYPE (HCC): ICD-10-CM

## 2022-04-04 DIAGNOSIS — Z51.81 ENCOUNTER FOR THERAPEUTIC DRUG MONITORING: ICD-10-CM

## 2022-04-04 DIAGNOSIS — Z79.01 LONG TERM (CURRENT) USE OF ANTICOAGULANTS: ICD-10-CM

## 2022-04-04 LAB
INR: 2 (ref 0.8–1.2)
TEST STRIP EXPIRATION DATE: ABNORMAL DATE

## 2022-04-04 PROCEDURE — 93793 ANTICOAG MGMT PT WARFARIN: CPT

## 2022-04-04 PROCEDURE — 85610 PROTHROMBIN TIME: CPT

## 2022-04-07 ENCOUNTER — OFFICE VISIT (OUTPATIENT)
Dept: PHYSICAL THERAPY | Age: 77
End: 2022-04-07
Attending: ORTHOPAEDIC SURGERY
Payer: MEDICARE

## 2022-04-07 DIAGNOSIS — M77.8 LEFT SHOULDER TENDONITIS: ICD-10-CM

## 2022-04-07 PROCEDURE — 97110 THERAPEUTIC EXERCISES: CPT | Performed by: PHYSICAL THERAPIST

## 2022-04-07 NOTE — PROGRESS NOTES
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 Nadir Blanton MD visit: none scheduled  Fall Risk: standard         Precautions: n/a             Subjective: Deny any change in UE c/o. Reports some relief with heat and Aspercream.  Completing HEP; c/o forearm discomfort with YTB exercise, not row. Pain: 5-6/10      Objective:   + pt c/o discomfort L wrist extensor muscle bellies    Assessment: Pt reports L forearm discomfort with B shoulder ER using YTB. Modified HEP for improved tolerance of rotator cuff strengthening; pt tolerated well without c/o. Advised pt may complete mary wrist extensor stretches, STM for c/o but that c/o should resolve with stopping exercise. Pt with continued painful arc, c/o suggestive of rotator cuff involvement. Pt tolerated session well. Goals:   (to be met in 8 visits)   1. Patient to report independence with PT HEP to facilitate self management and to maintain progress made with PT.   2. Patient to have 5-/5 gross L shoulder MMT for min c/o with overhead ADL (hair care, reaching into cabinet). 3. Patient to have have active L shoulder IR AROM to T9 to facilitate don/doff clothing, bathing, self care. 4. Patient to have 165 deg active L shoulder abd, flexion with min - no c/o in order to complete overhead ADL. 5. Patient to report 50% improvement in B shoulders for increased ease with ADL - bathing, don/doff clothing. Plan: Continue PT 2 x weekly for 10 visits. F/u on HEP modifications. Progress postural and rotator cuff strengthening. Date: 4/7/2022  TX#: 2/10 Date:                 TX#: 3/ Date:                 TX#: 4/ Date:                 TX#: 5/ Date: Tx#: 6/   There Ex:   HEP review/modification; see below.    S/L L shoulder external rotation 0# 2 x 10 reps; R 0# x 10 reps, 1# x 10 reps  S/L L/R shoulder abduction 0# x 10 reps each   Supine L/R shoulder flexion 0# x 10 reps  Supine B shoulder flexion using wand x 10 reps  L/R shoulder IR using GTB 2 x 10 reps  B low row using GTB 3 x 10 reps  Pulleys: flex, scap, abd x 10 reps each                            HEP: Seated thoracic spine ext/pec stretch, B shoulder ER - YTB - modified to s/l shoulder ER, W wall slides, B row using GTB.     Charges: 3 TE       Total Timed Treatment: 40 min  Total Treatment Time: 45 min

## 2022-04-11 ENCOUNTER — OFFICE VISIT (OUTPATIENT)
Dept: PHYSICAL THERAPY | Age: 77
End: 2022-04-11
Attending: ORTHOPAEDIC SURGERY
Payer: MEDICARE

## 2022-04-11 PROCEDURE — 97110 THERAPEUTIC EXERCISES: CPT | Performed by: PHYSICAL THERAPIST

## 2022-04-11 PROCEDURE — 97140 MANUAL THERAPY 1/> REGIONS: CPT | Performed by: PHYSICAL THERAPIST

## 2022-04-11 NOTE — PROGRESS NOTES
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 Karlos Blanton MD visit: none scheduled  Fall Risk: standard         Precautions: n/a             Subjective: Completing HEP without c/o. L UE pain less intense, but it is still there. Less forearm c/o, but I do have arthritis, CTS. C/o L shoulder pain with holding hair dryer. Pain: 3/10      Objective:   Painful arc L shoulder abduction ~ 90 deg  L/R shoulder AROM IR each WFL with min c/o    Assessment:   Pt tolerated session well. Continue to progress rotator cuff/periscapular strengthening, shoulder AAROM. Pt with continued painful arc, c/o suggestive of rotator cuff involvement. Goals:   (to be met in 8 visits)   1. Patient to report independence with PT HEP to facilitate self management and to maintain progress made with PT.   2. Patient to have 5-/5 gross L shoulder MMT for min c/o with overhead ADL (hair care, reaching into cabinet). 3. Patient to have have active L shoulder IR AROM to T9 to facilitate don/doff clothing, bathing, self care. 4. Patient to have 165 deg active L shoulder abd, flexion with min - no c/o in order to complete overhead ADL. 5. Patient to report 50% improvement in B shoulders for increased ease with ADL - bathing, don/doff clothing. Plan: Continue PT 2 x weekly for 10 visits. F/u on HEP modifications. Posterior capsule stretch, strengthening at 90 deg abd. Date: 4/7/2022  TX#: 2/10 Date:   4/11/2022              TX#: 3/10 Date:                 TX#: 4/ Date:                 TX#: 5/ Date: Tx#: 6/   There Ex:   HEP review/modification; see below.    S/L L shoulder external rotation 0# 2 x 10 reps; R 0# x 10 reps, 1# x 10 reps  S/L L/R shoulder abduction 0# x 10 reps each   Supine L/R shoulder flexion 0# x 10 reps  Supine B shoulder flexion using wand x 10 reps  L/R shoulder IR using GTB 2 x 10 reps  B low row using GTB 3 x 10 reps  Pulleys: flex, scap, abd x 10 reps each There Ex:   Brief HEP review; see below. UBE: 1 min forward/1 min backward, level 1  Supine B shoulder flexion 0# x 5 reps, 1# each L/R x 10 reps  S/L L shoulder abduction 0# 2 x ~ 10 reps - progress next session  Standing L/R shoulder IR GTB 2 x 10 reps  Standing L/R shoulder ER using YTB x 10 reps each  B low row using GTB 3 x 10 reps  Pulleys: flex, scap, abd,  x 10 reps each  Doorway pec stretch 3 x 20 sec each       Manual:   B pec stretch 4 x 15 sec each  L GH posterior and inferior glides grade II-III  L shoulder PROM IR, ER, flex, abd                    HEP: Seated thoracic spine ext/pec stretch, s/l shoulder ER, W wall slides, B row using GTB.     Charges: 2 TE, 1 manual       Total Timed Treatment: 40 min  Total Treatment Time: 45 min

## 2022-04-13 ENCOUNTER — OFFICE VISIT (OUTPATIENT)
Dept: PHYSICAL THERAPY | Age: 77
End: 2022-04-13
Attending: ORTHOPAEDIC SURGERY
Payer: MEDICARE

## 2022-04-13 PROCEDURE — 97110 THERAPEUTIC EXERCISES: CPT | Performed by: PHYSICAL THERAPIST

## 2022-04-13 PROCEDURE — 97140 MANUAL THERAPY 1/> REGIONS: CPT | Performed by: PHYSICAL THERAPIST

## 2022-04-13 NOTE — PROGRESS NOTES
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 Karlos Blanton MD visit: none scheduled  Fall Risk: standard         Precautions: n/a             Subjective: Yesterday, I was able to complete overhead gardening with less shoulder pain. Do I need to complete my HEP on days that I am in PT? Pain: 1/10      Objective:   L shoulder AROM flex/abd each [facility] - painful arc 90 deg abd using  L UE  L/R shoulder AROM IR each WFL with min c/o    Assessment: Pt progressing - improved ADL tolerance. Advised pt does need to complete HEP on days when attends PT, except for seated thoracic spin ext/pec stretch. Pt continues to tolerate PT sessions well. Continue to progress rotator cuff/periscapular strengthening, shoulder AAROM. Pt reported that she takes glucosamine chondroitin supplements; advised to discuss any medication/supplement questions with physician/PCP. Goals:   (to be met in 8 visits)   1. Patient to report independence with PT HEP to facilitate self management and to maintain progress made with PT.   2. Patient to have 5-/5 gross L shoulder MMT for min c/o with overhead ADL (hair care, reaching into cabinet). 3. Patient to have have active L shoulder IR AROM to T9 to facilitate don/doff clothing, bathing, self care. 4. Patient to have 165 deg active L shoulder abd, flexion with min - no c/o in order to complete overhead ADL. 5. Patient to report 50% improvement in B shoulders for increased ease with ADL - bathing, don/doff clothing. Plan: Continue PT 2 x weekly for 10 visits. Strengthening at 90 deg abd. Date: 4/7/2022  TX#: 2/10 Date:   4/11/2022              TX#: 3/10 Date:   4/13/2022              TX#: 4/10 Date:                 TX#: 5/ Date: Tx#: 6/ There Ex:   HEP review/modification; see below.    S/L L shoulder external rotation 0# 2 x 10 reps; R 0# x 10 reps, 1# x 10 reps  S/L L/R shoulder abduction 0# x 10 reps each   Supine L/R shoulder flexion 0# x 10 reps  Supine B shoulder flexion using wand x 10 reps  L/R shoulder IR using GTB 2 x 10 reps  B low row using GTB 3 x 10 reps  Pulleys: flex, scap, abd x 10 reps each There Ex:   Brief HEP review; see below. UBE: 1 min forward/1 min backward, level 1  Supine B shoulder flexion 0# x 5 reps, 1# each L/R x 10 reps  S/L L shoulder abduction 0# 2 x ~ 10 reps - progress next session  Standing L/R shoulder IR GTB 2 x 10 reps  Standing L/R shoulder ER using YTB x 10 reps each  B low row using GTB 3 x 10 reps  Pulleys: flex, scap, abd,  x 10 reps each  Doorway pec stretch 3 x 20 sec each There Ex:   UBE: 1 min forward/1 min backward, level 1 ( 2 min total)  B low row using GTB x 30 reps  Supine B shoulder flexion 0# x 5 reps, 1# each L/R x 15 reps  S/L L/R shoulder abduction 0#  X 5 reps; 1# x 15 reps each   Supine B shoulder horiz abd using YTB 2 x 10 reps  Standing L/R shoulder IR GTB 2 x 10 reps  Standing L/R shoulder ER using YTB x 10 reps each  Pulleys: flex, scap, abd,  x 10 reps each  L/R shoulder posterior capsule stretch 2 x 20 sec each  Wall circles x 5 reps each clockwise/counterclockwise  Seated thoracic spine ext/pec stretch 5 x 5 sec each      Manual:   B pec stretch 4 x 15 sec each  L GH posterior and inferior glides grade II-III  L shoulder PROM IR, ER, flex, abd Manual:   B pec stretch 4 x 15 sec each  L GH posterior and inferior glides grade II-III  L shoulder PROM IR, ER, flex, abd                   HEP: Seated thoracic spine ext/pec stretch, s/l shoulder ER, W wall slides, B row using GTB.     Charges: 2 TE, 1 manual       Total Timed Treatment: 40 min  Total Treatment Time: 45 min

## 2022-04-20 ENCOUNTER — OFFICE VISIT (OUTPATIENT)
Dept: PHYSICAL THERAPY | Age: 77
End: 2022-04-20
Attending: ORTHOPAEDIC SURGERY
Payer: MEDICARE

## 2022-04-20 PROCEDURE — 97110 THERAPEUTIC EXERCISES: CPT | Performed by: PHYSICAL THERAPIST

## 2022-04-20 PROCEDURE — 97140 MANUAL THERAPY 1/> REGIONS: CPT | Performed by: PHYSICAL THERAPIST

## 2022-04-20 NOTE — PROGRESS NOTES
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 Benja Blanton MD visit: none scheduled  Fall Risk: standard         Precautions: n/a             Subjective: I am getting better - less pain, increased tolerance for reaching - but c/o have not resolved. Completing HEP. Pain: 1/10      Objective:   L shoulder AROM flex/abd each WFL - painful arc 90 deg abd using  L UE    Assessment:   Pt with subjective report of progress. Continued painful arc L shoulder 90 deg abd. Continue to address with strengthening, AAROM. Pt tolerated session well. C/o no worse at close of session. Goals:   (to be met in 8 visits)   1. Patient to report independence with PT HEP to facilitate self management and to maintain progress made with PT.   2. Patient to have 5-/5 gross L shoulder MMT for min c/o with overhead ADL (hair care, reaching into cabinet). 3. Patient to have have active L shoulder IR AROM to T9 to facilitate don/doff clothing, bathing, self care. 4. Patient to have 165 deg active L shoulder abd, flexion with min - no c/o in order to complete overhead ADL. 5. Patient to report 50% improvement in B shoulders for increased ease with ADL - bathing, don/doff clothing. Plan: Continue PT 2 x weekly for 10 visits. Strengthening at 90 deg abd. Date: 4/7/2022  TX#: 2/10 Date:   4/11/2022              TX#: 3/10 Date:   4/13/2022              TX#: 4/10 Date: 4/20/2022                TX#: 5/10 Date: Tx#: 6/   There Ex:   HEP review/modification; see below. S/L L shoulder external rotation 0# 2 x 10 reps; R 0# x 10 reps, 1# x 10 reps  S/L L/R shoulder abduction 0# x 10 reps each   Supine L/R shoulder flexion 0# x 10 reps  Supine B shoulder flexion using wand x 10 reps  L/R shoulder IR using GTB 2 x 10 reps  B low row using GTB 3 x 10 reps  Pulleys: flex, scap, abd x 10 reps each There Ex:   Brief HEP review; see below.   UBE: 1 min forward/1 min backward, level 1  Supine B shoulder flexion 0# x 5 reps, 1# each L/R x 10 reps  S/L L shoulder abduction 0# 2 x ~ 10 reps - progress next session  Standing L/R shoulder IR GTB 2 x 10 reps  Standing L/R shoulder ER using YTB x 10 reps each  B low row using GTB 3 x 10 reps  Pulleys: flex, scap, abd,  x 10 reps each  Doorway pec stretch 3 x 20 sec each There Ex:   UBE: 1 min forward/1 min backward, level 1 ( 2 min total)  B low row using GTB x 30 reps  Supine B shoulder flexion 0# x 5 reps, 1# each L/R x 15 reps  S/L L/R shoulder abduction 0#  X 5 reps; 1# x 15 reps each   Supine B shoulder horiz abd using YTB 2 x 10 reps  Standing L/R shoulder IR GTB 2 x 10 reps  Standing L/R shoulder ER using YTB x 10 reps each  Pulleys: flex, scap, abd,  x 10 reps each  L/R shoulder posterior capsule stretch 2 x 20 sec each  Wall circles x 5 reps each clockwise/counterclockwise  Seated thoracic spine ext/pec stretch 5 x 5 sec each There Ex:   UBE: 2 min forward/2 min backward, level 1 ( 4 min total)  B low row using GTB x 30 reps  Supine B shoulder flexion: 1# each L/R x 15 reps  S/L L/R shoulder abduction 0#  X 5 reps; 1# x 10 reps each, 2# x 10 reps  S/L L shoulder circles x 10 reps each clockwise/counterclockwise  Supine B shoulder horiz abd using YTB  x 10 reps, RTB x 10 reps  Standing B shoulder ER using YTB 2 x 10 reps  L shoulder circles using ball arnoldo 90 deg abd at wall x 10 reps each clockwise/counterclockwise  L shoulder pendulums x ~ 30 sec  L/R shoulder posterior capsule stretch 2 x 20 sec each  Wall slides flex and abd 2 x 5 reps each (20 reps total)  Seated thoracic spine ext/pec stretch - not completed; pt completing at home     Manual:   B pec stretch 4 x 15 sec each  L GH posterior and inferior glides grade II-III  L shoulder PROM IR, ER, flex, abd Manual:   B pec stretch 4 x 15 sec each  L GH posterior and inferior glides grade II-III  L shoulder PROM IR, ER, flex, abd Manual:   B pec stretch 4 x 15 sec each  L GH posterior and inferior glides grade II-III  L shoulder PROM IR, ER, flex, abd                  HEP: Seated thoracic spine ext/pec stretch, s/l shoulder ER, W wall slides, B row using GTB.     Charges: 2 TE, 1 manual       Total Timed Treatment: 40 min  Total Treatment Time: 45 min

## 2022-04-21 ENCOUNTER — APPOINTMENT (OUTPATIENT)
Dept: URBAN - METROPOLITAN AREA CLINIC 244 | Age: 77
Setting detail: DERMATOLOGY
End: 2022-04-21

## 2022-04-21 DIAGNOSIS — D18.0 HEMANGIOMA: ICD-10-CM

## 2022-04-21 DIAGNOSIS — L82.1 OTHER SEBORRHEIC KERATOSIS: ICD-10-CM

## 2022-04-21 DIAGNOSIS — L71.8 OTHER ROSACEA: ICD-10-CM

## 2022-04-21 DIAGNOSIS — D22 MELANOCYTIC NEVI: ICD-10-CM

## 2022-04-21 DIAGNOSIS — L72.0 EPIDERMAL CYST: ICD-10-CM

## 2022-04-21 DIAGNOSIS — L81.4 OTHER MELANIN HYPERPIGMENTATION: ICD-10-CM

## 2022-04-21 PROBLEM — D22.5 MELANOCYTIC NEVI OF TRUNK: Status: ACTIVE | Noted: 2022-04-21

## 2022-04-21 PROBLEM — D18.01 HEMANGIOMA OF SKIN AND SUBCUTANEOUS TISSUE: Status: ACTIVE | Noted: 2022-04-21

## 2022-04-21 PROCEDURE — OTHER ADDITIONAL NOTES: OTHER

## 2022-04-21 PROCEDURE — OTHER COUNSELING: OTHER

## 2022-04-21 PROCEDURE — 99203 OFFICE O/P NEW LOW 30 MIN: CPT

## 2022-04-21 ASSESSMENT — LOCATION SIMPLE DESCRIPTION DERM
LOCATION SIMPLE: CHEST
LOCATION SIMPLE: RIGHT BREAST
LOCATION SIMPLE: NOSE
LOCATION SIMPLE: ABDOMEN
LOCATION SIMPLE: RIGHT UPPER BACK
LOCATION SIMPLE: RIGHT CHEEK
LOCATION SIMPLE: LEFT CHEEK
LOCATION SIMPLE: LEFT UPPER BACK

## 2022-04-21 ASSESSMENT — LOCATION DETAILED DESCRIPTION DERM
LOCATION DETAILED: NASAL DORSUM
LOCATION DETAILED: UPPER STERNUM
LOCATION DETAILED: RIGHT INFERIOR CENTRAL MALAR CHEEK
LOCATION DETAILED: RIGHT RIB CAGE
LOCATION DETAILED: LEFT MEDIAL UPPER BACK
LOCATION DETAILED: LEFT INFERIOR CENTRAL MALAR CHEEK
LOCATION DETAILED: RIGHT INFRAMAMMARY CREASE (OUTER QUADRANT)
LOCATION DETAILED: RIGHT SUPERIOR MEDIAL UPPER BACK

## 2022-04-21 ASSESSMENT — LOCATION ZONE DERM
LOCATION ZONE: TRUNK
LOCATION ZONE: FACE
LOCATION ZONE: NOSE

## 2022-04-21 NOTE — PROCEDURE: ADDITIONAL NOTES
Detail Level: Simple
Render Risk Assessment In Note?: no
Additional Notes: If cyst returns will come back to clinic but today looks like a scar.

## 2022-04-22 ENCOUNTER — OFFICE VISIT (OUTPATIENT)
Dept: PHYSICAL THERAPY | Age: 77
End: 2022-04-22
Attending: ORTHOPAEDIC SURGERY
Payer: MEDICARE

## 2022-04-22 PROCEDURE — 97140 MANUAL THERAPY 1/> REGIONS: CPT | Performed by: PHYSICAL THERAPIST

## 2022-04-22 PROCEDURE — 97110 THERAPEUTIC EXERCISES: CPT | Performed by: PHYSICAL THERAPIST

## 2022-04-22 NOTE — PROGRESS NOTES
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 Eden Jewell  Next MD visit: none scheduled  Fall Risk: standard         Precautions: n/a             Subjective: I gardened yesterday. My shoulder is ~ 75% better. Still have some pain reaching out to side. Pain: -/10      Objective:   Painful arc 90 deg L shoulder abd  Stiffness end range L shoulder PROM abduction    Assessment:   Pt with subjective report of progress. Continued painful arc L shoulder 90 deg abd. Continue to address with strengthening, MIKEOM. Pt tolerated session well. C/o no worse at close of session. Pt with some c/o L upper trap tension after wall slides. Completed STM for c/o and reviewed effort to avoid shoulder shrug with reaching, working on rotator cuff/scapular stabilizer strengthening to address as well. Goals:   (to be met in 8 visits)   1. Patient to report independence with PT HEP to facilitate self management and to maintain progress made with PT.   2. Patient to have 5-/5 gross L shoulder MMT for min c/o with overhead ADL (hair care, reaching into cabinet). 3. Patient to have have active L shoulder IR AROM to T9 to facilitate don/doff clothing, bathing, self care. 4. Patient to have 165 deg active L shoulder abd, flexion with min - no c/o in order to complete overhead ADL. 5. Patient to report 50% improvement in B shoulders for increased ease with ADL - bathing, don/doff clothing. Plan: Continue PT 2 x weekly for 10 visits. Strengthening at 90 deg abd, rotator cuff/scapular stabilizer strengtheningANUJ. Date:   4/11/2022              TX#: 3/10 Date:   4/13/2022              TX#: 4/10 Date: 4/20/2022                TX#: 5/10 Date: 4/22/2022  Tx#: 6/10   There Ex:   Brief HEP review; see below.   UBE: 1 min forward/1 min backward, level 1  Supine B shoulder flexion 0# x 5 reps, 1# each L/R x 10 reps  S/L L shoulder abduction 0# 2 x ~ 10 reps - progress next session  Standing L/R shoulder IR GTB 2 x 10 reps  Standing L/R shoulder ER using YTB x 10 reps each  B low row using GTB 3 x 10 reps  Pulleys: flex, scap, abd,  x 10 reps each  Doorway pec stretch 3 x 20 sec each There Ex:   UBE: 1 min forward/1 min backward, level 1 ( 2 min total)  B low row using GTB x 30 reps  Supine B shoulder flexion 0# x 5 reps, 1# each L/R x 15 reps  S/L L/R shoulder abduction 0#  X 5 reps; 1# x 15 reps each   Supine B shoulder horiz abd using YTB 2 x 10 reps  Standing L/R shoulder IR GTB 2 x 10 reps  Standing L/R shoulder ER using YTB x 10 reps each  Pulleys: flex, scap, abd,  x 10 reps each  L/R shoulder posterior capsule stretch 2 x 20 sec each  Wall circles x 5 reps each clockwise/counterclockwise  Seated thoracic spine ext/pec stretch 5 x 5 sec each There Ex:   UBE: 2 min forward/2 min backward, level 1 ( 4 min total)  B low row using GTB x 30 reps  Supine B shoulder flexion: 1# each L/R x 15 reps  S/L L/R shoulder abduction 0#  X 5 reps; 1# x 10 reps each, 2# x 10 reps  S/L L shoulder circles x 10 reps each clockwise/counterclockwise  Supine B shoulder horiz abd using YTB  x 10 reps, RTB x 10 reps  Standing B shoulder ER using YTB 2 x 10 reps  L shoulder circles using ball arnoldo 90 deg abd at wall x 10 reps each clockwise/counterclockwise  L shoulder pendulums x ~ 30 sec  L/R shoulder posterior capsule stretch 2 x 20 sec each  Wall slides flex and abd 2 x 5 reps each (20 reps total)  Seated thoracic spine ext/pec stretch - not completed; pt completing at home There Ex:   UBE: 2 min forward/2 min backward, level 1 ( 4 min total)  B low row using GTB x ~30 reps  Supine B shoulder flexion: 1# L/2# R x 20 reps each   S/L L shoulder abduction 0#  X 5 reps; 1# x 10 reps each, 2# x 10 reps  S/L L shoulder circles x 5 reps 0#, 1# x 10 reps each clockwise/counterclockwise  S/L L/R open book x 10 reps each  Standing B shoulder horiz abd using YTB  x 10 reps  Standing B shoulder ER using YTB 2 x 10 reps  L shoulder pendulums x ~ 30 sec  L/R shoulder posterior capsule stretch 2 x 20 sec each  Wall slides flex and abd 2 x 5 reps each (20 reps total)  Seated thoracic spine ext/pec stretch 5 x 5 sec each    Manual:   B pec stretch 4 x 15 sec each  L GH posterior and inferior glides grade II-III  L shoulder PROM IR, ER, flex, abd Manual:   B pec stretch 4 x 15 sec each  L GH posterior and inferior glides grade II-III  L shoulder PROM IR, ER, flex, abd Manual:   B pec stretch 4 x 15 sec each  L GH posterior and inferior glides grade II-III  L shoulder PROM IR, ER, flex, abd Manual:   B pec stretch 4 x 15 sec each  L GH posterior and inferior glides grade II-III  L shoulder PROM IR, ER, flex, abd  STM B upper trap region               HEP: Seated thoracic spine ext/pec stretch, s/l shoulder ER, W wall slides, B row using GTB.     Charges: 2 TE, 1 manual       Total Timed Treatment: 45 min  Total Treatment Time: 55 min

## 2022-04-25 ENCOUNTER — OFFICE VISIT (OUTPATIENT)
Dept: PHYSICAL THERAPY | Age: 77
End: 2022-04-25
Attending: ORTHOPAEDIC SURGERY
Payer: MEDICARE

## 2022-04-25 PROCEDURE — 97140 MANUAL THERAPY 1/> REGIONS: CPT | Performed by: PHYSICAL THERAPIST

## 2022-04-25 PROCEDURE — 97110 THERAPEUTIC EXERCISES: CPT | Performed by: PHYSICAL THERAPIST

## 2022-04-25 NOTE — PROGRESS NOTES
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 Jesus Briceño  Next MD visit: none scheduled  Fall Risk: standard         Precautions: n/a             Subjective:  My shoulder was sore after last session; had to take some Tylenol, c/o resolved. Still have some awareness with reaching out to side, but my shoulder is much better. Pain: -/10      Objective:   Pt reports L shoulder awareness with 90 deg abd    Stiffness end range L shoulder PROM abduction    Assessment:   Pt reports less pain, more of an awareness with L shoulder abd 90 deg. Pt reporting improved ADL tolerance. Modified today's session based on Subjective. Pt tolerated well overall. Completed STM for upper trap c/o and reviewed effort to avoid shoulder shrug with reaching, working on rotator cuff/scapular stabilizer strengthening to address as well. Goals:   (to be met in 8 visits)   1. Patient to report independence with PT HEP to facilitate self management and to maintain progress made with PT.   2. Patient to have 5-/5 gross L shoulder MMT for min c/o with overhead ADL (hair care, reaching into cabinet). 3. Patient to have have active L shoulder IR AROM to T9 to facilitate don/doff clothing, bathing, self care. 4. Patient to have 165 deg active L shoulder abd, flexion with min - no c/o in order to complete overhead ADL. 5. Patient to report 50% improvement in B shoulders for increased ease with ADL - bathing, don/doff clothing. Plan: Continue PT 2 x weekly for 10 visits. Strengthening at 90 deg abd, rotator cuff/scapular stabilizer strengtheningANUJ.    Date:   4/13/2022              TX#: 4/10 Date: 4/20/2022                TX#: 5/10 Date: 4/22/2022  Tx#: 6/10 Date: 4/25/2022  Tx #: 7/10   There Ex:   UBE: 1 min forward/1 min backward, level 1 ( 2 min total)  B low row using GTB x 30 reps  Supine B shoulder flexion 0# x 5 reps, 1# each L/R x 15 reps  S/L L/R shoulder abduction 0#  X 5 reps; 1# x 15 reps each   Supine B shoulder horiz abd using YTB 2 x 10 reps  Standing L/R shoulder IR GTB 2 x 10 reps  Standing L/R shoulder ER using YTB x 10 reps each  Pulleys: flex, scap, abd,  x 10 reps each  L/R shoulder posterior capsule stretch 2 x 20 sec each  Wall circles x 5 reps each clockwise/counterclockwise  Seated thoracic spine ext/pec stretch 5 x 5 sec each There Ex:   UBE: 2 min forward/2 min backward, level 1 ( 4 min total)  B low row using GTB x 30 reps  Supine B shoulder flexion: 1# each L/R x 15 reps  S/L L/R shoulder abduction 0#  X 5 reps; 1# x 10 reps each, 2# x 10 reps  S/L L shoulder circles x 10 reps each clockwise/counterclockwise  Supine B shoulder horiz abd using YTB  x 10 reps, RTB x 10 reps  Standing B shoulder ER using YTB 2 x 10 reps  L shoulder circles using ball arnoldo 90 deg abd at wall x 10 reps each clockwise/counterclockwise  L shoulder pendulums x ~ 30 sec  L/R shoulder posterior capsule stretch 2 x 20 sec each  Wall slides flex and abd 2 x 5 reps each (20 reps total)  Seated thoracic spine ext/pec stretch - not completed; pt completing at home There Ex:   UBE: 2 min forward/2 min backward, level 1 ( 4 min total)  B low row using GTB x ~30 reps  Supine B shoulder flexion: 1# L/2# R x 20 reps each   S/L L shoulder abduction 0#  X 5 reps; 1# x 10 reps each, 2# x 10 reps  S/L L shoulder circles x 5 reps 0#, 1# x 10 reps each clockwise/counterclockwise  S/L L/R open book x 10 reps each  Standing B shoulder horiz abd using YTB  x 10 reps  Standing B shoulder ER using YTB 2 x 10 reps  L shoulder pendulums x ~ 30 sec  L/R shoulder posterior capsule stretch 2 x 20 sec each  Wall slides flex and abd 2 x 5 reps each (20 reps total)  Seated thoracic spine ext/pec stretch 5 x 5 sec each  There Ex:   UBE: 2 min forward/2 min backward, level 1 ( 4 min total)  B low row using GTB x ~30 reps  Standing B shoulder ER using YTB 2 x 10 reps  Wall slides flex abd x 5 reps each ( 10 reps total)  Low doorway pec stretch 4 x 15 sec each  Supine snow angels - pt c/o shoulder pain, stopped  S/L L shoulder abd: 0# x 5 reps, 2# x 20 reps  Supine B shoulder horiz abd using YTB 2 x 10 reps  Seated L/R posterior capsule stretch 2 x 20 sec each           Manual:   B pec stretch 4 x 15 sec each  L GH posterior and inferior glides grade II-III  L shoulder PROM IR, ER, flex, abd Manual:   B pec stretch 4 x 15 sec each  L GH posterior and inferior glides grade II-III  L shoulder PROM IR, ER, flex, abd Manual:   B pec stretch 4 x 15 sec each  L GH posterior and inferior glides grade II-III  L shoulder PROM IR, ER, flex, abd  STM B upper trap region Manual:   B pec stretch 4 x 15 sec each  L GH posterior and inferior glides grade II-III  L shoulder PROM IR, ER, flex, abd  STM B upper trap region               HEP: Seated thoracic spine ext/pec stretch, s/l shoulder ER, W wall slides, B row using GTB.     Charges: 2 TE, 1 manual       Total Timed Treatment: 45 min  Total Treatment Time: 50 min

## 2022-04-27 ENCOUNTER — APPOINTMENT (OUTPATIENT)
Dept: PHYSICAL THERAPY | Age: 77
End: 2022-04-27
Attending: ORTHOPAEDIC SURGERY
Payer: MEDICARE

## 2022-04-27 ENCOUNTER — OFFICE VISIT (OUTPATIENT)
Dept: PHYSICAL THERAPY | Age: 77
End: 2022-04-27
Attending: ORTHOPAEDIC SURGERY
Payer: MEDICARE

## 2022-04-27 PROCEDURE — 97140 MANUAL THERAPY 1/> REGIONS: CPT | Performed by: PHYSICAL THERAPIST

## 2022-04-27 PROCEDURE — 97110 THERAPEUTIC EXERCISES: CPT | Performed by: PHYSICAL THERAPIST

## 2022-04-27 NOTE — PROGRESS NOTES
ProgressSummary  Pt has attended 8 visits in Physical Therapy. 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 Thomas Mccormick  Next MD visit: none scheduled  Fall Risk: standard         Precautions: n/a             Subjective:   75% better since started PT. Still have pulling when reach L UE out to side but no longer painful, easier to complete ADL (dry hair, reach overhead, etc.). Deny c/o following last PT session. Pain: 0/10 at rest      Objective:     AROM: (* denotes performed with pain)  Shoulder    Flexion: R 165; L 165  Abduction: R 165; L 165* painful arc  ER: R T2,  T1  IR: R  T9; L T8   B shoulder mechanics WNL with overhead reach    L/R shoulder PROM grossly WFL; with stiffness/mild c/o end range flex, abd    Accessory motion: hypomobile posterior GH glide    Flexibility: B pec restriction    Strength/MMT: (* denotes performed with pain)  Shoulder   Flexion: R 5-/5; L 5-/5  Abduction: R 5/5; L 4+5*  ER: R 5-/5; L 4/5  IR: R 5/5; L 5/5     Special tests:   + Empty Can test L shoulder      Assessment:  Pt has progressed with PT reporting experiencing pulling at ~90 deg L shoulder abd rather than pain. She reports improved ADL tolerance. Pt prefers to continue with PT HEP until end of May and return at that time if needed. She has been educated on HEP; see below. Pt's L shoulder c/o continue to be suggestive of rotator cuff involvement. Pt tolerated session well. Goals:   (to be met in 8 visits)   1. Patient to report independence with PT HEP to facilitate self management and to maintain progress made with PT.   2. Patient to have 5-/5 gross L shoulder MMT for min c/o with overhead ADL (hair care, reaching into cabinet). 3. Patient to have have active L shoulder IR AROM to T9 to facilitate don/doff clothing, bathing, self care.    4. Patient to have 165 deg active L shoulder abd, flexion with min - no c/o in order to complete overhead ADL. 5. Patient to report 50% improvement in B shoulders for increased ease with ADL - bathing, don/doff clothing. Plan: Continue PT 2 x weekly for up to 12 visits. Pt prefers to continue with PT HEP until end of May, return to PT if needed at that time. She will f/u with physician for any unresolved c/o/concerns.   Date: 4/20/2022                TX#: 5/10 Date: 4/22/2022  Tx#: 6/10 Date: 4/25/2022  Tx #: 7/10 Date: 4/27/2022  Tx #: 8/10   There Ex:   UBE: 2 min forward/2 min backward, level 1 ( 4 min total)  B low row using GTB x 30 reps  Supine B shoulder flexion: 1# each L/R x 15 reps  S/L L/R shoulder abduction 0#  X 5 reps; 1# x 10 reps each, 2# x 10 reps  S/L L shoulder circles x 10 reps each clockwise/counterclockwise  Supine B shoulder horiz abd using YTB  x 10 reps, RTB x 10 reps  Standing B shoulder ER using YTB 2 x 10 reps  L shoulder circles using ball arnoldo 90 deg abd at wall x 10 reps each clockwise/counterclockwise  L shoulder pendulums x ~ 30 sec  L/R shoulder posterior capsule stretch 2 x 20 sec each  Wall slides flex and abd 2 x 5 reps each (20 reps total)  Seated thoracic spine ext/pec stretch - not completed; pt completing at home There Ex:   UBE: 2 min forward/2 min backward, level 1 ( 4 min total)  B low row using GTB x ~30 reps  Supine B shoulder flexion: 1# L/2# R x 20 reps each   S/L L shoulder abduction 0#  X 5 reps; 1# x 10 reps each, 2# x 10 reps  S/L L shoulder circles x 5 reps 0#, 1# x 10 reps each clockwise/counterclockwise  S/L L/R open book x 10 reps each  Standing B shoulder horiz abd using YTB  x 10 reps  Standing B shoulder ER using YTB 2 x 10 reps  L shoulder pendulums x ~ 30 sec  L/R shoulder posterior capsule stretch 2 x 20 sec each  Wall slides flex and abd 2 x 5 reps each (20 reps total)  Seated thoracic spine ext/pec stretch 5 x 5 sec each  There Ex:   UBE: 2 min forward/2 min backward, level 1 ( 4 min total)  B low row using GTB x ~30 reps  Standing B shoulder ER using YTB 2 x 10 reps  Wall slides flex abd x 5 reps each ( 10 reps total)  Low doorway pec stretch 4 x 15 sec each  Supine snow angels - pt c/o shoulder pain, stopped  S/L L shoulder abd: 0# x 5 reps, 2# x 20 reps  Supine B shoulder horiz abd using YTB 2 x 10 reps  Seated L/R posterior capsule stretch 2 x 20 sec each         There Ex:   UBE: 2 min forward/1 min backward, level 1 ( 3 min total) - pt c/o mild L shoulder pulling backward  Supine B shoulder horiz abd using YTB 2 x 10 reps, RTB x 10 reps  B low row using GTB x ~30 reps  Standing B shoulder ER using YTB 2 x 10 reps  Pulleys: flex, scap, abd x 15 reps each  Low doorway pec stretch 4 x 15 sec each  HEP review; see below. Manual:   B pec stretch 4 x 15 sec each  L GH posterior and inferior glides grade II-III  L shoulder PROM IR, ER, flex, abd Manual:   B pec stretch 4 x 15 sec each  L GH posterior and inferior glides grade II-III  L shoulder PROM IR, ER, flex, abd  STM B upper trap region Manual:   B pec stretch 4 x 15 sec each  L GH posterior and inferior glides grade II-III  L shoulder PROM IR, ER, flex, abd  STM B upper trap region Manual:   B pec stretch 4 x 15 sec each  L GH posterior and inferior glides grade II-III  L shoulder PROM IR, ER, flex, abd                 HEP: Seated thoracic spine ext/pec stretch, s/l shoulder ER, W wall slides, B row using GTB. Optional: s/l shoulder abd, posterior capsule stretch    Charges: 2 TE, 1 manual       Total Timed Treatment: 45 min  Total Treatment Time: 50 min  FOTO: pt not registered in [facility]; pt unable to complete. Plan: Continue skilled Physical Therapy 2 x/week or a total of up to 12 visits over a 90 day period. Pt prefers to continue with PT HEP until end of May, return to PT if needed at that time. She will f/u with physician for any unresolved c/o/concerns.  Treatment will include: there ex, there activities, manual therapy, NM Re-ed, and modalities as needed. Patient/Family/Caregiver was advised of these findings, precautions, and treatment options and has agreed to actively participate in planning and for this course of care. Thank you for your referral. If you have any questions, please contact me at Dept: [phone]. Sincerely,  Electronically signed by therapist: Sai Webster PT     Physician's certification required:  Yes  Please co-sign or sign and return this letter via fax as soon as possible to [phone]. I certify the need for these services furnished under this plan of treatment and while under my care.     X___________________________________________________ Date____________________    Certification From: 6/98/1484  To:7/26/2022

## 2022-05-04 ENCOUNTER — ANTI-COAG VISIT (OUTPATIENT)
Dept: INTERNAL MEDICINE CLINIC | Facility: CLINIC | Age: 77
End: 2022-05-04
Payer: MEDICARE

## 2022-05-04 DIAGNOSIS — Z51.81 ENCOUNTER FOR THERAPEUTIC DRUG MONITORING: ICD-10-CM

## 2022-05-04 DIAGNOSIS — Z79.01 LONG TERM (CURRENT) USE OF ANTICOAGULANTS: ICD-10-CM

## 2022-05-04 DIAGNOSIS — I48.91 ATRIAL FIBRILLATION, UNSPECIFIED TYPE (HCC): ICD-10-CM

## 2022-05-04 LAB
INR: 2.4 (ref 0.8–1.2)
TEST STRIP EXPIRATION DATE: ABNORMAL DATE

## 2022-05-04 PROCEDURE — 93793 ANTICOAG MGMT PT WARFARIN: CPT

## 2022-05-04 PROCEDURE — 85610 PROTHROMBIN TIME: CPT

## 2022-05-11 ENCOUNTER — OFFICE VISIT (OUTPATIENT)
Dept: OPHTHALMOLOGY | Facility: CLINIC | Age: 77
End: 2022-05-11
Payer: MEDICARE

## 2022-05-11 DIAGNOSIS — H26.8 PSEUDOEXFOLIATION (PXF) OF LENS CAPSULE: ICD-10-CM

## 2022-05-11 DIAGNOSIS — H40.003 GLAUCOMA SUSPECT OF BOTH EYES: ICD-10-CM

## 2022-05-11 DIAGNOSIS — H43.393 VITREOUS FLOATERS OF BOTH EYES: ICD-10-CM

## 2022-05-11 DIAGNOSIS — H25.13 AGE-RELATED NUCLEAR CATARACT OF BOTH EYES: Primary | ICD-10-CM

## 2022-05-11 PROCEDURE — 92014 COMPRE OPH EXAM EST PT 1/>: CPT | Performed by: OPHTHALMOLOGY

## 2022-05-11 NOTE — ASSESSMENT & PLAN NOTE
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 Will not start medication, but will continue to observe. Patient verbalized understanding.     Will see patient in 1 year for a complete exam and photos

## 2022-05-11 NOTE — PATIENT INSTRUCTIONS
Age-related nuclear cataract of both eyes  Discussed mild cataracts in both eyes that are not affecting vision and are not surgical at this time. Copy of RX for separate distance and reading glasses per patient's choice. Pseudoexfoliation (PXF) of lens capsule  No treatment    Glaucoma suspect of both eyes  Discussed with patient that she is a glaucoma suspect based on increased cupping of the optic nerves in both eyes and pseudoexfoliation changes in the left eye. Will not start medication, but will continue to observe. Patient verbalized understanding. Will see patient in 1 year for a complete exam and photos    Vitreous floaters of both eyes   There is no evidence of retinal pathology. All signs and symptoms of retinal detachment/tears explained in detail. Patient instructed to call the office if they experience increase in floaters, increase in flashes of light, loss of vision or curtain or veil effect.

## 2022-05-20 ENCOUNTER — APPOINTMENT (OUTPATIENT)
Dept: PHYSICAL THERAPY | Age: 77
End: 2022-05-20
Attending: ORTHOPAEDIC SURGERY
Payer: MEDICARE

## 2022-05-23 ENCOUNTER — OFFICE VISIT (OUTPATIENT)
Dept: PHYSICAL THERAPY | Age: 77
End: 2022-05-23
Attending: ORTHOPAEDIC SURGERY
Payer: MEDICARE

## 2022-05-23 PROCEDURE — 97140 MANUAL THERAPY 1/> REGIONS: CPT | Performed by: PHYSICAL THERAPIST

## 2022-05-23 PROCEDURE — 97110 THERAPEUTIC EXERCISES: CPT | Performed by: PHYSICAL THERAPIST

## 2022-05-23 NOTE — PROGRESS NOTES
[facility]  Pt has attended 9 visits in Physical Therapy. 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 Micah Tapia  Next MD visit: none scheduled  Fall Risk: standard         Precautions: n/a             Subjective: Min awareness with reaching L shoulder out to side. L shoulder is 95% better. Pt c/o shin spints and R hamstrings pain with driving. She reports history of R hamstrings strain x 2. Completing squats at home to help. Pain: 0/10 at rest      Objective:   Knee flexion MMT: L/R each 5/5; L/R hip ext 4/5  Negative SLUMP L/R LE  Hamstrings length: L/R each WNL -  90 deg SLR    Observation:   Standing: + scoliosis    AROM: (* denotes performed with pain)  Shoulder    Flexion: R 165; L 165  Abduction: R 165; L 165* min c/o  ER: R T1,  T1  IR: R  T8; L T8   B shoulder mechanics WNL with overhead reach    L/R shoulder PROM grossly WFL without c/o    Accessory motion: min hypomobile posterior GH glide    Flexibility: B pec restriction    Strength/MMT: (* denotes performed with pain)  Shoulder   Flexion: R 5/5; L 5/5  Abduction: R 5/5; L 4+5*  ER: R 5-/5; L 4+/5  IR: R 5/5; L 5/5       Assessment:  Pt returns to PT after last attending 4/27/2022. She reports compliance with her PT HEP, L shoulder 95% better. She does not feel that she needs any additional  PT due to her progress/self management. Pt with c/o R LE discomfort with driving. Pt thinks related to history of R hamstrings strain, R shin splint. Unable to r/o sciatica/lumbar radiculopathy. Provided pt with exercise considerations for her c/o; advised pt to consult physician. Goals:   (to be met in 8 visits)   1. Patient to report independence with PT HEP to facilitate self management and to maintain progress made with PT. - met  2. Patient to have 5-/5 gross L shoulder MMT for min c/o with overhead ADL (hair care, reaching into cabinet). -overall met  3. Patient to have have active L shoulder IR AROM to T9 to facilitate don/doff clothing, bathing, self care. - met  4. Patient to have 165 deg active L shoulder abd, flexion with min - no c/o in order to complete overhead ADL. - met  5. Patient to report 50% improvement in B shoulders for increased ease with ADL - bathing, don/doff clothing.- met    Plan: Discharge patient from PT to HEP as well tolerated/appropriate due to pt progress, goals met, independence with PT HEP with physician f/u for any unresolved c/o.    Date: 4/20/2022                TX#: 5/10 Date: 4/22/2022  Tx#: 6/10 Date: 4/25/2022  Tx #: 7/10 Date: 4/27/2022  Tx #: 8/10 Date: 5/23/2022  Tx #: 9/10   There Ex:   UBE: 2 min forward/2 min backward, level 1 ( 4 min total)  B low row using GTB x 30 reps  Supine B shoulder flexion: 1# each L/R x 15 reps  S/L L/R shoulder abduction 0#  X 5 reps; 1# x 10 reps each, 2# x 10 reps  S/L L shoulder circles x 10 reps each clockwise/counterclockwise  Supine B shoulder horiz abd using YTB  x 10 reps, RTB x 10 reps  Standing B shoulder ER using YTB 2 x 10 reps  L shoulder circles using ball arnoldo 90 deg abd at wall x 10 reps each clockwise/counterclockwise  L shoulder pendulums x ~ 30 sec  L/R shoulder posterior capsule stretch 2 x 20 sec each  Wall slides flex and abd 2 x 5 reps each (20 reps total)  Seated thoracic spine ext/pec stretch - not completed; pt completing at home There Ex:   UBE: 2 min forward/2 min backward, level 1 ( 4 min total)  B low row using GTB x ~30 reps  Supine B shoulder flexion: 1# L/2# R x 20 reps each   S/L L shoulder abduction 0#  X 5 reps; 1# x 10 reps each, 2# x 10 reps  S/L L shoulder circles x 5 reps 0#, 1# x 10 reps each clockwise/counterclockwise  S/L L/R open book x 10 reps each  Standing B shoulder horiz abd using YTB  x 10 reps  Standing B shoulder ER using YTB 2 x 10 reps  L shoulder pendulums x ~ 30 sec  L/R shoulder posterior capsule stretch 2 x 20 sec each  Wall slides flex and abd 2 x 5 reps each (20 reps total)  Seated thoracic spine ext/pec stretch 5 x 5 sec each  There Ex:   UBE: 2 min forward/2 min backward, level 1 ( 4 min total)  B low row using GTB x ~30 reps  Standing B shoulder ER using YTB 2 x 10 reps  Wall slides flex abd x 5 reps each ( 10 reps total)  Low doorway pec stretch 4 x 15 sec each  Supine snow angels - pt c/o shoulder pain, stopped  S/L L shoulder abd: 0# x 5 reps, 2# x 20 reps  Supine B shoulder horiz abd using YTB 2 x 10 reps  Seated L/R posterior capsule stretch 2 x 20 sec each         There Ex:   UBE: 2 min forward/1 min backward, level 1 ( 3 min total) - pt c/o mild L shoulder pulling backward  Supine B shoulder horiz abd using YTB 2 x 10 reps, RTB x 10 reps  B low row using GTB x ~30 reps  Standing B shoulder ER using YTB 2 x 10 reps  Pulleys: flex, scap, abd x 15 reps each  Low doorway pec stretch 4 x 15 sec each  HEP review; see below. There Ex:   Shin splint considerations due to to pt inquiry: seated/standing heel/toe raises, cold pack application 13-87 min  Supine gentle hamstrings stretches 5 x 5 sec each  HEP review/handout; see below.    Supine B shoulder horiz abd using RTB x 10 reps, standing using RTB x 10 reps  Seated B shoulder ER  RTB x 10 reps      Manual:   B pec stretch 4 x 15 sec each  L GH posterior and inferior glides grade II-III  L shoulder PROM IR, ER, flex, abd Manual:   B pec stretch 4 x 15 sec each  L GH posterior and inferior glides grade II-III  L shoulder PROM IR, ER, flex, abd  STM B upper trap region Manual:   B pec stretch 4 x 15 sec each  L GH posterior and inferior glides grade II-III  L shoulder PROM IR, ER, flex, abd  STM B upper trap region Manual:   B pec stretch 4 x 15 sec each  L GH posterior and inferior glides grade II-III  L shoulder PROM IR, ER, flex, abd   Manual:   B pec stretch 4 x 15 sec each  L GH posterior and inferior glides grade II-III  L shoulder PROM IR, ER, flex, abd                 HEP: Seated thoracic spine ext/pec stretch, s/l shoulder ER, W wall slides, B row using GTB. Optional: s/l shoulder abd, posterior capsule stretch    Charges: 2 TE, 1 manual       Total Timed Treatment: 45 min  Total Treatment Time: 50 min  FOTO: pt not registered in [facility]; pt unable to complete. Plan: Discharge patient from PT to HEP as well tolerated/appropriate due to pt progress, goals met, independence with PT HEP with physician f/u for any unresolved c/o. Thank you for your referral. If you have any questions, please contact me at Dept: [phone]. Sincerely,  Electronically signed by therapist: Jamison Perez PT     Physician's certification required:  No  Please co-sign or sign and return this letter via fax as soon as possible to [phone]. I certify the need for these services furnished under this plan of treatment and while under my care.     X___________________________________________________ Date____________________    Certification From: 7/64/3606  To:7/26/2022

## 2022-05-27 ENCOUNTER — APPOINTMENT (OUTPATIENT)
Dept: PHYSICAL THERAPY | Age: 77
End: 2022-05-27
Payer: MEDICARE

## 2022-06-01 ENCOUNTER — ANTI-COAG VISIT (OUTPATIENT)
Dept: ANTICOAGULATION | Facility: CLINIC | Age: 77
End: 2022-06-01
Payer: MEDICARE

## 2022-06-01 DIAGNOSIS — Z79.01 LONG TERM (CURRENT) USE OF ANTICOAGULANTS: ICD-10-CM

## 2022-06-01 DIAGNOSIS — I48.91 ATRIAL FIBRILLATION, UNSPECIFIED TYPE (HCC): ICD-10-CM

## 2022-06-01 DIAGNOSIS — Z51.81 ENCOUNTER FOR THERAPEUTIC DRUG MONITORING: ICD-10-CM

## 2022-06-01 LAB
INR: 3.1 (ref 0.8–1.2)
TEST STRIP EXPIRATION DATE: ABNORMAL DATE

## 2022-06-01 PROCEDURE — 85610 PROTHROMBIN TIME: CPT | Performed by: INTERNAL MEDICINE

## 2022-06-01 PROCEDURE — 93793 ANTICOAG MGMT PT WARFARIN: CPT | Performed by: INTERNAL MEDICINE

## 2022-06-12 ENCOUNTER — APPOINTMENT (OUTPATIENT)
Dept: GENERAL RADIOLOGY | Facility: HOSPITAL | Age: 77
End: 2022-06-12
Attending: EMERGENCY MEDICINE
Payer: MEDICARE

## 2022-06-12 ENCOUNTER — HOSPITAL ENCOUNTER (OUTPATIENT)
Facility: HOSPITAL | Age: 77
Setting detail: OBSERVATION
LOS: 1 days | Discharge: HOME | End: 2022-06-13
Attending: EMERGENCY MEDICINE
Payer: MEDICARE

## 2022-06-12 DIAGNOSIS — I48.0 PAROXYSMAL ATRIAL FIBRILLATION (HCC): Primary | ICD-10-CM

## 2022-06-12 DIAGNOSIS — R07.9 ACUTE CHEST PAIN: ICD-10-CM

## 2022-06-12 LAB
ANION GAP SERPL CALC-SCNC: 7 MMOL/L (ref 0–18)
BASOPHILS # BLD AUTO: 0.03 X10(3) UL (ref 0–0.2)
BASOPHILS NFR BLD AUTO: 0.3 %
BUN BLD-MCNC: 24 MG/DL (ref 7–18)
BUN/CREAT SERPL: 29.3 (ref 10–20)
CALCIUM BLD-MCNC: 9 MG/DL (ref 8.5–10.1)
CHLORIDE SERPL-SCNC: 110 MMOL/L (ref 98–112)
CO2 SERPL-SCNC: 25 MMOL/L (ref 21–32)
CREAT BLD-MCNC: 0.82 MG/DL
DEPRECATED RDW RBC AUTO: 45.5 FL (ref 35.1–46.3)
EOSINOPHIL # BLD AUTO: 0.02 X10(3) UL (ref 0–0.7)
EOSINOPHIL NFR BLD AUTO: 0.2 %
ERYTHROCYTE [DISTWIDTH] IN BLOOD BY AUTOMATED COUNT: 12.4 % (ref 11–15)
GLUCOSE BLD-MCNC: 86 MG/DL (ref 70–99)
HCT VFR BLD AUTO: 42.2 %
HGB BLD-MCNC: 13.9 G/DL
IMM GRANULOCYTES # BLD AUTO: 0.04 X10(3) UL (ref 0–1)
IMM GRANULOCYTES NFR BLD: 0.4 %
INR BLD: 2.64 (ref 0.8–1.2)
LYMPHOCYTES # BLD AUTO: 1.9 X10(3) UL (ref 1–4)
LYMPHOCYTES NFR BLD AUTO: 18.7 %
MCH RBC QN AUTO: 32.8 PG (ref 26–34)
MCHC RBC AUTO-ENTMCNC: 32.9 G/DL (ref 31–37)
MCV RBC AUTO: 99.5 FL
MONOCYTES # BLD AUTO: 0.95 X10(3) UL (ref 0.1–1)
MONOCYTES NFR BLD AUTO: 9.3 %
NEUTROPHILS # BLD AUTO: 7.23 X10 (3) UL (ref 1.5–7.7)
NEUTROPHILS # BLD AUTO: 7.23 X10(3) UL (ref 1.5–7.7)
NEUTROPHILS NFR BLD AUTO: 71.1 %
OSMOLALITY SERPL CALC.SUM OF ELEC: 297 MOSM/KG (ref 275–295)
PLATELET # BLD AUTO: 297 10(3)UL (ref 150–450)
POTASSIUM SERPL-SCNC: 3.8 MMOL/L (ref 3.5–5.1)
PROTHROMBIN TIME: 28.6 SECONDS (ref 11.6–14.8)
RBC # BLD AUTO: 4.24 X10(6)UL
SARS-COV-2 RNA RESP QL NAA+PROBE: NOT DETECTED
SODIUM SERPL-SCNC: 142 MMOL/L (ref 136–145)
TROPONIN I HIGH SENSITIVITY: 7 NG/L
WBC # BLD AUTO: 10.2 X10(3) UL (ref 4–11)

## 2022-06-12 PROCEDURE — 71045 X-RAY EXAM CHEST 1 VIEW: CPT | Performed by: EMERGENCY MEDICINE

## 2022-06-12 PROCEDURE — 99205 OFFICE O/P NEW HI 60 MIN: CPT | Performed by: HOSPITALIST

## 2022-06-12 RX ORDER — DOCUSATE SODIUM 100 MG/1
100 CAPSULE, LIQUID FILLED ORAL 2 TIMES DAILY PRN
COMMUNITY

## 2022-06-12 RX ORDER — ACETAMINOPHEN 500 MG
500 TABLET ORAL EVERY 4 HOURS PRN
Status: DISCONTINUED | OUTPATIENT
Start: 2022-06-12 | End: 2022-06-13

## 2022-06-12 RX ORDER — ONDANSETRON 2 MG/ML
4 INJECTION INTRAMUSCULAR; INTRAVENOUS EVERY 6 HOURS PRN
Status: DISCONTINUED | OUTPATIENT
Start: 2022-06-12 | End: 2022-06-13

## 2022-06-12 RX ORDER — WARFARIN SODIUM 5 MG/1
5 TABLET ORAL NIGHTLY
Status: DISCONTINUED | OUTPATIENT
Start: 2022-06-12 | End: 2022-06-13

## 2022-06-12 RX ORDER — COLCHICINE 0.6 MG/1
0.6 TABLET ORAL DAILY PRN
Status: DISCONTINUED | OUTPATIENT
Start: 2022-06-12 | End: 2022-06-12

## 2022-06-12 RX ORDER — ATORVASTATIN CALCIUM 10 MG/1
10 TABLET, FILM COATED ORAL NIGHTLY
Status: DISCONTINUED | OUTPATIENT
Start: 2022-06-12 | End: 2022-06-13

## 2022-06-12 RX ORDER — PANTOPRAZOLE SODIUM 20 MG/1
20 TABLET, DELAYED RELEASE ORAL EVERY MORNING
Status: DISCONTINUED | OUTPATIENT
Start: 2022-06-13 | End: 2022-06-13

## 2022-06-12 RX ORDER — METOPROLOL TARTRATE 5 MG/5ML
5 INJECTION INTRAVENOUS ONCE
Status: COMPLETED | OUTPATIENT
Start: 2022-06-12 | End: 2022-06-12

## 2022-06-12 RX ORDER — SIMETHICONE 80 MG
160 TABLET,CHEWABLE ORAL DAILY
COMMUNITY

## 2022-06-12 RX ORDER — POTASSIUM CHLORIDE 20 MEQ/1
40 TABLET, EXTENDED RELEASE ORAL ONCE
Status: COMPLETED | OUTPATIENT
Start: 2022-06-12 | End: 2022-06-12

## 2022-06-12 RX ORDER — CETIRIZINE HYDROCHLORIDE 10 MG/1
10 TABLET ORAL DAILY
Status: DISCONTINUED | OUTPATIENT
Start: 2022-06-13 | End: 2022-06-13

## 2022-06-12 NOTE — PLAN OF CARE
Patient admitted with atrial fibrillation. Patient reports she awoke with jaw pain which is the symptom she usually has when her heart goes into atrial fibrillation. Patient reports he baseline heart rate is 50-60s. Patient currently NSR with heart rate 50-60s on telemetry. Patient sitting up in room chair. Patient, , and daughters updated on POC. Problem: Patient Centered Care  Goal: Patient preferences are identified and integrated in the patient's plan of care  Description: Interventions:  - What would you like us to know as we care for you?  I live at home with my .  - Provide timely, complete, and accurate information to patient/family  - Incorporate patient and family knowledge, values, beliefs, and cultural backgrounds into the planning and delivery of care  - Encourage patient/family to participate in care and decision-making at the level they choose  - Honor patient and family perspectives and choices  Outcome: Progressing     Problem: Patient/Family Goals  Goal: Patient/Family Long Term Goal  Description: Patient's Long Term Goal: To go home    Interventions:  - Telemetry monitoring  - Medication compliance  - Follow MD recommendations  - See additional Care Plan goals for specific interventions  Outcome: Progressing  Goal: Patient/Family Short Term Goal  Description: Patient's Short Term Goal: Keep heart in normal rhythm    Interventions:   - Telemetry monitoring  - Medication compliance  - Follow MD recommendations  - See additional Care Plan goals for specific interventions  Outcome: Progressing

## 2022-06-12 NOTE — ED QUICK NOTES
Orders for admission, patient is aware of plan and ready to go upstairs. Any questions, please call ED RN Yordan Koehler at extension [phone]. Patient Covid vaccination status: Fully vaccinated     COVID Test Ordered in ED: Rapid SARS-CoV-2 by PCR    COVID Suspicion at Admission: N/A    Running Infusions:  None    Mental Status/LOC at time of transport: a/o x 4    Other pertinent information: a-fib, intermittently rapid, have not controlled yet. Patient does not take any rate control meds, was on metop 3 years ago but is not anymore.    CIWA score: N/A   NIH score:  N/A

## 2022-06-12 NOTE — H&P
[facility]    PATIENT'S NAME: Daryl Connors   ATTENDING PHYSICIAN: Yuval Claire MD   PATIENT ACCOUNT#:   [id number]    LOCATION:  [address] [place] [address] #:   [id number]       YOB: 1945  ADMISSION DATE:       2022    HISTORY AND PHYSICAL EXAMINATION    CHIEF COMPLAINT:  Palpitations and atrial fibrillation. HISTORY OF PRESENT ILLNESS:  The patient had come to the hospital endorsing chest pain and at home she was found to be in atrial fibrillation with her home pulse oximeter. In the emergency department, she was found to be in atrial fibrillation with RVR. She was given IV Lopressor once, which had controlled her rate. She is seen and examined on the medical floor. Currently denies any fevers, chills, headaches, blurred vision, palpitations, nausea, vomiting, diarrhea. PAST MEDICAL HISTORY:  Positive for spinal stenosis, sleep apnea, atrial fibrillation, Meniere disease, hypertension, hypercholesterolemia, GERD, history of esophagitis. PAST SURGICAL HISTORY:  Positive for hysterectomy, excision of uvula, excision of hand and foot neuroma, cortisone injection, history of colonoscopy. MEDICATIONS:  Home medications have been reviewed and reconciled. Please refer to patient's chart for detailed review regarding patient's home medications. ALLERGIES:  Dust, mold, pollen, ragweed, trees. FAMILY HISTORY:  Mother had history of breast cancer. She is . Father had history of lung cancer. Sister had history of MS.    SOCIAL HISTORY:  The patient is a former smoker, quit in . Denies any alcohol or illicit drug use. REVIEW OF SYSTEMS:  A 10-point review of systems has been obtained and otherwise negative. PHYSICAL EXAMINATION:    GENERAL:  Patient lying in bed, appears to be in no acute distress at this time. She is alert and oriented, answering questions.   VITAL SIGNS:  Temperature 97.4, pulse 72, respirations 21, blood pressure 100/82, saturating 94% on room air. HEENT:  Extraocular movements are intact. Pupils equally round and reactive to light and accommodation. Atraumatic, normocephalic. LUNGS:  Good air entry bilaterally. HEART:  S1, S2. Tachycardic. ABDOMEN:  Soft, nontender, nondistended. Positive bowel sounds. EXTREMITIES:  Peripheral pulses are positive. MUSCULOSKELETAL:  Full range of motion, intact. NEUROLOGIC:  No focal deficits noted at this time. PSYCHIATRIC:  Appropriate affect. SKIN:  No rashes noted. LABORATORY DATA:  The patient's glucose is 86, sodium is 142, potassium is 3.8, chloride is 110, carbon dioxide is 25, BUN is 24, creatinine is 0.82. PT 28.6, INR 2.64. WBC is 10.2, hemoglobin is 13.9, hematocrit is 42.2, and platelets 237. The patient underwent a chest x-ray in the emergency department which was negative for any acute intrathoracic processes. ASSESSMENT AND PLAN:  The patient is a 14-year-old female with past medical history of multiple comorbid conditions who is admitted to the hospital with atrial fibrillation with RVR. 1.   Atrial fibrillation with RVR. The patient was given 5 mg of IV Lopressor x1 in the emergency department which controlled her rate. Cardiology has been placed on consult. We will appreciate recommendations. The patient is therapeutically anticoagulated with Coumadin. At this time, we will continue. We will appreciate further recommendations. We will continue to monitor closely. 2.   History of hypertension. We will resume the patient's oral antihypertensives. We will continue to monitor closely. 3.   History of hyperlipidemia. We will resume the patient's atorvastatin. Will continue. 4. VTE prophylaxis. The patient will not require pharmacologic VTE prophylaxis as the patient is therapeutically anticoagulated with Coumadin. 5.   Disposition. At this time, we will continue to monitor closely. The patient is a full code.   Further recommendations to follow. Greater than 70 minutes spent, greater than 50% of time spent face-to-face.     Dictated By Christie Wiley MD  d: 06/12/2022 12:12:24  t: 06/12/2022 13:26:48  [facility] [id number]  [place]/

## 2022-06-12 NOTE — ED INITIAL ASSESSMENT (HPI)
Patient presents with complaints of chest pain, and atrial fibrillation per her home pulse oximeter. Denies shortness of breath.

## 2022-06-13 VITALS
DIASTOLIC BLOOD PRESSURE: 60 MMHG | TEMPERATURE: 98 F | BODY MASS INDEX: 29 KG/M2 | HEART RATE: 53 BPM | WEIGHT: 153.38 LBS | OXYGEN SATURATION: 97 % | RESPIRATION RATE: 16 BRPM | SYSTOLIC BLOOD PRESSURE: 127 MMHG

## 2022-06-13 LAB
ANION GAP SERPL CALC-SCNC: 6 MMOL/L (ref 0–18)
BASOPHILS # BLD AUTO: 0.04 X10(3) UL (ref 0–0.2)
BASOPHILS NFR BLD AUTO: 0.5 %
BUN BLD-MCNC: 21 MG/DL (ref 7–18)
BUN/CREAT SERPL: 26.3 (ref 10–20)
CALCIUM BLD-MCNC: 8.8 MG/DL (ref 8.5–10.1)
CHLORIDE SERPL-SCNC: 108 MMOL/L (ref 98–112)
CO2 SERPL-SCNC: 26 MMOL/L (ref 21–32)
CREAT BLD-MCNC: 0.8 MG/DL
DEPRECATED RDW RBC AUTO: 46.4 FL (ref 35.1–46.3)
EOSINOPHIL # BLD AUTO: 0.07 X10(3) UL (ref 0–0.7)
EOSINOPHIL NFR BLD AUTO: 0.9 %
ERYTHROCYTE [DISTWIDTH] IN BLOOD BY AUTOMATED COUNT: 12.7 % (ref 11–15)
GLUCOSE BLD-MCNC: 81 MG/DL (ref 70–99)
HCT VFR BLD AUTO: 37.9 %
HGB BLD-MCNC: 12.4 G/DL
IMM GRANULOCYTES # BLD AUTO: 0.03 X10(3) UL (ref 0–1)
IMM GRANULOCYTES NFR BLD: 0.4 %
LYMPHOCYTES # BLD AUTO: 2.06 X10(3) UL (ref 1–4)
LYMPHOCYTES NFR BLD AUTO: 26.4 %
MAGNESIUM SERPL-MCNC: 2.4 MG/DL (ref 1.6–2.6)
MCH RBC QN AUTO: 32.8 PG (ref 26–34)
MCHC RBC AUTO-ENTMCNC: 32.7 G/DL (ref 31–37)
MCV RBC AUTO: 100.3 FL
MONOCYTES # BLD AUTO: 0.66 X10(3) UL (ref 0.1–1)
MONOCYTES NFR BLD AUTO: 8.5 %
NEUTROPHILS # BLD AUTO: 4.94 X10 (3) UL (ref 1.5–7.7)
NEUTROPHILS # BLD AUTO: 4.94 X10(3) UL (ref 1.5–7.7)
NEUTROPHILS NFR BLD AUTO: 63.3 %
OSMOLALITY SERPL CALC.SUM OF ELEC: 292 MOSM/KG (ref 275–295)
PHOSPHATE SERPL-MCNC: 2.8 MG/DL (ref 2.5–4.9)
PLATELET # BLD AUTO: 262 10(3)UL (ref 150–450)
POTASSIUM SERPL-SCNC: 4.2 MMOL/L (ref 3.5–5.1)
POTASSIUM SERPL-SCNC: 4.2 MMOL/L (ref 3.5–5.1)
RBC # BLD AUTO: 3.78 X10(6)UL
SODIUM SERPL-SCNC: 140 MMOL/L (ref 136–145)
WBC # BLD AUTO: 7.8 X10(3) UL (ref 4–11)

## 2022-06-13 PROCEDURE — 99214 OFFICE O/P EST MOD 30 MIN: CPT | Performed by: HOSPITALIST

## 2022-06-13 RX ORDER — SOTALOL HYDROCHLORIDE 80 MG/1
80 TABLET ORAL 2 TIMES DAILY
Qty: 60 TABLET | Refills: 2 | Status: SHIPPED | OUTPATIENT
Start: 2022-06-13

## 2022-06-13 RX ORDER — SOTALOL HYDROCHLORIDE 80 MG/1
80 TABLET ORAL EVERY 12 HOURS SCHEDULED
Status: DISCONTINUED | OUTPATIENT
Start: 2022-06-13 | End: 2022-06-13

## 2022-06-13 NOTE — CM/SW NOTE
Patient failed inpatient criteria. Second level of review completed and supports observation. UR committee in agreement. Discussed with Dr. Jennett Paget who approves observation status. MOON  notice explained and  provided  to the patient . Copy placed in chart  Order for observation in place.     Kaitlyn MUIR, Utilization Review   Ext [phone]

## 2022-06-13 NOTE — PLAN OF CARE
Pt alert and oriented x4. Pt on room air. Pt to go home today via personal car. Sotalol ordered upon discharge per cardiology. Pt to follow up outpatient with cardiology. Pt educated on a-fib, medication instructions, and follow ups. No questions at this time. Problem: Patient Centered Care  Goal: Patient preferences are identified and integrated in the patient's plan of care  Description: Interventions:  - What would you like us to know as we care for you?  I live at home with my .  - Provide timely, complete, and accurate information to patient/family  - Incorporate patient and family knowledge, values, beliefs, and cultural backgrounds into the planning and delivery of care  - Encourage patient/family to participate in care and decision-making at the level they choose  - Honor patient and family perspectives and choices  Outcome: Completed     Problem: Patient/Family Goals  Goal: Patient/Family Long Term Goal  Description: Patient's Long Term Goal: To go home    Interventions:  - Telemetry monitoring  - Medication compliance  - Follow MD recommendations  - See additional Care Plan goals for specific interventions  Outcome: Completed  Goal: Patient/Family Short Term Goal  Description: Patient's Short Term Goal: Keep heart in normal rhythm    Interventions:   - Telemetry monitoring  - Medication compliance  - Follow MD recommendations  - See additional Care Plan goals for specific interventions  Outcome: Completed     Problem: Patient/Family Goals  Goal: Patient/Family Short Term Goal  Description: Patient's Short Term Goal: Keep heart in normal rhythm    Interventions:   - Telemetry monitoring  - Medication compliance  - Follow MD recommendations  - See additional Care Plan goals for specific interventions  Outcome: Completed     Problem: CARDIOVASCULAR - ADULT  Goal: Absence of cardiac arrhythmias or at baseline  Description: INTERVENTIONS:  - Continuous cardiac monitoring, monitor vital signs, obtain 12 lead EKG if indicated  - Evaluate effectiveness of antiarrhythmic and heart rate control medications as ordered  - Initiate emergency measures for life threatening arrhythmias  - Monitor electrolytes and administer replacement therapy as ordered  Outcome: Completed

## 2022-06-13 NOTE — PLAN OF CARE
Pt is alert & oriented x4. No c/o pain throughout the night. Currently on room air. Call light is within reach, bed is locked and in lowest position. Problem: Patient Centered Care  Goal: Patient preferences are identified and integrated in the patient's plan of care  Description: Interventions:  - What would you like us to know as we care for you?  I live at home with my .  - Provide timely, complete, and accurate information to patient/family  - Incorporate patient and family knowledge, values, beliefs, and cultural backgrounds into the planning and delivery of care  - Encourage patient/family to participate in care and decision-making at the level they choose  - Honor patient and family perspectives and choices  6/13/2022 0340 by Peter Mills RN  Outcome: Progressing  6/13/2022 0340 by Peter Mills RN  Outcome: Progressing     Problem: Patient/Family Goals  Goal: Patient/Family Long Term Goal  Description: Patient's Long Term Goal: To go home    Interventions:  - Telemetry monitoring  - Medication compliance  - Follow MD recommendations  - See additional Care Plan goals for specific interventions  6/13/2022 0340 by Peter Mills RN  Outcome: Progressing  6/13/2022 0340 by Peter Mills RN  Outcome: Progressing  Goal: Patient/Family Short Term Goal  Description: Patient's Short Term Goal: Keep heart in normal rhythm    Interventions:   - Telemetry monitoring  - Medication compliance  - Follow MD recommendations  - See additional Care Plan goals for specific interventions  6/13/2022 0340 by Peter Mills RN  Outcome: Progressing  6/13/2022 0340 by Peter Mills RN  Outcome: Progressing     Problem: CARDIOVASCULAR - ADULT  Goal: Absence of cardiac arrhythmias or at baseline  Description: INTERVENTIONS:  - Continuous cardiac monitoring, monitor vital signs, obtain 12 lead EKG if indicated  - Evaluate effectiveness of antiarrhythmic and heart rate control medications as ordered  - Initiate emergency measures for life threatening arrhythmias  - Monitor electrolytes and administer replacement therapy as ordered  Outcome: Progressing

## 2022-06-13 NOTE — DISCHARGE PLANNING
I called the pharmacy below to inquire about the copay of Multaq before patient discharges. А[place], [address] [facility] [phone], [phone]      The patient's copay is $600.55 for 30 day supply (with insurance per pharmacist). Patient and cardiology APN updated on the cost of medication.      Tosha Bell RN, Discharge Leader [id number]

## 2022-06-13 NOTE — DISCHARGE SUMMARY
Hospital Discharge Diagnoses: PAF    Lace+ Score: 42  59-90 High Risk  29-58 Medium Risk  0-28   Low Risk. TCM Follow-Up Recommendation:  LACE < 29: Low Risk of readmission after discharge from the hospital; Still recommend for TCM follow-up.     Please refer to dictated DC summary

## 2022-06-14 ENCOUNTER — PATIENT OUTREACH (OUTPATIENT)
Dept: CASE MANAGEMENT | Age: 77
End: 2022-06-14

## 2022-06-14 DIAGNOSIS — I48.0 PAROXYSMAL ATRIAL FIBRILLATION (HCC): ICD-10-CM

## 2022-06-14 DIAGNOSIS — Z02.9 ENCOUNTERS FOR UNSPECIFIED ADMINISTRATIVE PURPOSE: ICD-10-CM

## 2022-06-14 DIAGNOSIS — R07.9 ACUTE CHEST PAIN: ICD-10-CM

## 2022-06-14 DIAGNOSIS — I48.91 ATRIAL FIBRILLATION, UNSPECIFIED TYPE (HCC): ICD-10-CM

## 2022-06-14 PROCEDURE — 1111F DSCHRG MED/CURRENT MED MERGE: CPT

## 2022-06-14 NOTE — PROGRESS NOTES
NCM placed call to patient for TCM, LM requesting a call back to [phone] with a condition update.

## 2022-06-15 ENCOUNTER — ANTI-COAG VISIT (OUTPATIENT)
Dept: ANTICOAGULATION | Facility: CLINIC | Age: 77
End: 2022-06-15
Payer: MEDICARE

## 2022-06-15 DIAGNOSIS — Z51.81 ENCOUNTER FOR THERAPEUTIC DRUG MONITORING: ICD-10-CM

## 2022-06-15 DIAGNOSIS — Z79.01 LONG TERM (CURRENT) USE OF ANTICOAGULANTS: ICD-10-CM

## 2022-06-15 DIAGNOSIS — I48.91 ATRIAL FIBRILLATION, UNSPECIFIED TYPE (HCC): ICD-10-CM

## 2022-06-15 LAB — INR: 3.1 (ref 2–3)

## 2022-06-15 PROCEDURE — 1111F DSCHRG MED/CURRENT MED MERGE: CPT | Performed by: INTERNAL MEDICINE

## 2022-06-15 PROCEDURE — 93793 ANTICOAG MGMT PT WARFARIN: CPT | Performed by: INTERNAL MEDICINE

## 2022-06-15 PROCEDURE — 85610 PROTHROMBIN TIME: CPT | Performed by: INTERNAL MEDICINE

## 2022-06-15 NOTE — DISCHARGE SUMMARY
[facility]    PATIENT'S NAME: Anisa Monae   ATTENDING PHYSICIAN: Pinky Santa MD   PATIENT ACCOUNT#:   [id number]    LOCATION:  [address] [place], [place] [address] RECORD #:   [id number]       YOB: 1945  ADMISSION DATE:       06/12/2022      DISCHARGE DATE:  06/13/2022    DISCHARGE SUMMARY    DISCHARGE DIAGNOSES:    1. Atrial fibrillation with rapid ventricular response. 2.   Chest discomfort, present on admission. 3.   Palpitations, present on admission. 4.   Hypertension, present on admission. 5.   Hyperlipidemia, present on admission. HISTORY AND HOSPITAL COURSE:  The patient is a 77-year-old female with past medical history of atrial fibrillation who comes to the hospital endorsing chest pain, as well as palpitations. It had awakened her from her sleep. She came to the hospital.  In the emergency department, she was found to be in atrial fibrillation with RVR. She was given IV Lopressor once. Then, her rate became controlled. Cardiology was placed on consult. She was admitted to the hospital for further management. Cardiology started her on beta blockers, as well as Multaq. Currently she has remained hemodynamically stable. At this time she has been evaluated by Cardiology and deemed stable to be discharged home to follow up with primary care physician, as well as Cardiology as outpatient. She will be given a prescription for metoprolol, as well as Multaq. She is therapeutically anticoagulated with Coumadin. So, she will continue with that as well. She will have an outpatient stress test, as well as an echocardiogram done. All of the information was given to the patient. She verbalized understanding of information given. For a detailed review regarding the patient's hospitalization, please refer to the patient's chart. PHYSICAL EXAMINATION:    VITAL SIGNS:  Reviewed from the patient's chart and currently stable.   GENERAL:  The patient lying in bed, appears to be in no acute distress at this time. A and O x3. HEENT:  Extraocular movements are intact. Pupils equal, round, and reactive to light and accommodation. Atraumatic, normocephalic. CARDIAC:  S1, S2 appreciated. LUNGS:  Good air entry bilaterally. ABDOMEN:  Soft, nontender, nondistended. Positive bowel sounds. EXTREMITIES:  Peripheral pulses are positive. NEUROLOGIC:  No focal neurologic deficits noted at this time. LABORATORY DATA:  Reviewed. CONDITION ON DISCHARGE:  At this time, the patient is deemed stable to be discharged home to follow up with primary care physician. Total discharge time is greater than 30 minutes.     Dictated By Lonie Severe, MD  d: 06/13/2022 10:24:28  t: 06/13/2022 10:37:14  Psychiatric [id number]  [facility]/

## 2022-06-22 ENCOUNTER — OFFICE VISIT (OUTPATIENT)
Dept: INTERNAL MEDICINE CLINIC | Facility: CLINIC | Age: 77
End: 2022-06-22
Payer: MEDICARE

## 2022-06-22 VITALS
SYSTOLIC BLOOD PRESSURE: 107 MMHG | BODY MASS INDEX: 29.45 KG/M2 | WEIGHT: 156 LBS | HEART RATE: 52 BPM | DIASTOLIC BLOOD PRESSURE: 63 MMHG | HEIGHT: 61 IN

## 2022-06-22 DIAGNOSIS — E78.2 HYPERLIPIDEMIA, MIXED: ICD-10-CM

## 2022-06-22 DIAGNOSIS — I48.0 PAROXYSMAL ATRIAL FIBRILLATION (HCC): ICD-10-CM

## 2022-06-22 DIAGNOSIS — G47.33 OSA (OBSTRUCTIVE SLEEP APNEA): ICD-10-CM

## 2022-06-22 DIAGNOSIS — H25.13 AGE-RELATED NUCLEAR CATARACT OF BOTH EYES: Primary | ICD-10-CM

## 2022-06-22 PROBLEM — H26.8 PSEUDOEXFOLIATION (PXF) OF LENS CAPSULE: Status: RESOLVED | Noted: 2021-02-09 | Resolved: 2022-06-22

## 2022-06-22 PROBLEM — R73.9 HYPERGLYCEMIA: Status: RESOLVED | Noted: 2017-01-21 | Resolved: 2022-06-22

## 2022-06-22 PROBLEM — I48.91 ATRIAL FIBRILLATION, UNSPECIFIED TYPE (HCC): Status: RESOLVED | Noted: 2021-03-12 | Resolved: 2022-06-22

## 2022-06-22 PROBLEM — R07.9 ACUTE CHEST PAIN: Status: RESOLVED | Noted: 2022-06-12 | Resolved: 2022-06-22

## 2022-06-22 PROBLEM — H43.393 VITREOUS FLOATERS OF BOTH EYES: Status: RESOLVED | Noted: 2021-02-09 | Resolved: 2022-06-22

## 2022-06-22 PROBLEM — Z51.81 ENCOUNTER FOR THERAPEUTIC DRUG MONITORING: Status: RESOLVED | Noted: 2022-02-24 | Resolved: 2022-06-22

## 2022-06-22 PROBLEM — H40.003 GLAUCOMA SUSPECT OF BOTH EYES: Status: RESOLVED | Noted: 2021-02-09 | Resolved: 2022-06-22

## 2022-06-22 PROCEDURE — 99214 OFFICE O/P EST MOD 30 MIN: CPT | Performed by: INTERNAL MEDICINE

## 2022-06-22 PROCEDURE — 1126F AMNT PAIN NOTED NONE PRSNT: CPT | Performed by: INTERNAL MEDICINE

## 2022-06-22 PROCEDURE — 1111F DSCHRG MED/CURRENT MED MERGE: CPT | Performed by: INTERNAL MEDICINE

## 2022-06-23 ENCOUNTER — ANTI-COAG VISIT (OUTPATIENT)
Dept: ANTICOAGULATION | Facility: CLINIC | Age: 77
End: 2022-06-23
Payer: MEDICARE

## 2022-06-23 DIAGNOSIS — Z79.01 LONG TERM (CURRENT) USE OF ANTICOAGULANTS: ICD-10-CM

## 2022-06-23 DIAGNOSIS — Z51.81 ENCOUNTER FOR THERAPEUTIC DRUG MONITORING: ICD-10-CM

## 2022-06-23 DIAGNOSIS — I48.91 ATRIAL FIBRILLATION, UNSPECIFIED TYPE (HCC): ICD-10-CM

## 2022-06-23 LAB
INR: 2 (ref 0.8–1.2)
TEST STRIP EXPIRATION DATE: ABNORMAL DATE

## 2022-06-23 PROCEDURE — 85610 PROTHROMBIN TIME: CPT | Performed by: INTERNAL MEDICINE

## 2022-06-23 PROCEDURE — 1111F DSCHRG MED/CURRENT MED MERGE: CPT | Performed by: INTERNAL MEDICINE

## 2022-06-23 PROCEDURE — 93793 ANTICOAG MGMT PT WARFARIN: CPT | Performed by: INTERNAL MEDICINE

## 2022-06-27 ENCOUNTER — HOSPITAL ENCOUNTER (OUTPATIENT)
Dept: CV DIAGNOSTICS | Age: 77
Discharge: HOME | End: 2022-06-27
Attending: NURSE PRACTITIONER
Payer: MEDICARE

## 2022-06-27 DIAGNOSIS — I48.0 PAF (PAROXYSMAL ATRIAL FIBRILLATION) (HCC): ICD-10-CM

## 2022-06-27 DIAGNOSIS — G47.33 OSA (OBSTRUCTIVE SLEEP APNEA): ICD-10-CM

## 2022-06-27 DIAGNOSIS — E78.2 HYPERLIPIDEMIA, MIXED: ICD-10-CM

## 2022-06-27 DIAGNOSIS — I10 HYPERTENSION, ESSENTIAL: ICD-10-CM

## 2022-06-27 PROCEDURE — 93306 TTE W/DOPPLER COMPLETE: CPT | Performed by: NURSE PRACTITIONER

## 2022-06-30 ENCOUNTER — MED REC SCAN ONLY (OUTPATIENT)
Dept: INTERNAL MEDICINE CLINIC | Facility: CLINIC | Age: 77
End: 2022-06-30

## 2022-07-19 ENCOUNTER — ANTI-COAG VISIT (OUTPATIENT)
Dept: ANTICOAGULATION | Facility: CLINIC | Age: 77
End: 2022-07-19
Payer: MEDICARE

## 2022-07-19 DIAGNOSIS — Z79.01 LONG TERM (CURRENT) USE OF ANTICOAGULANTS: ICD-10-CM

## 2022-07-19 DIAGNOSIS — Z51.81 ENCOUNTER FOR THERAPEUTIC DRUG MONITORING: ICD-10-CM

## 2022-07-19 DIAGNOSIS — I48.91 ATRIAL FIBRILLATION, UNSPECIFIED TYPE (HCC): ICD-10-CM

## 2022-07-19 LAB
INR: 2.1 (ref 0.8–1.2)
TEST STRIP EXPIRATION DATE: ABNORMAL DATE

## 2022-07-19 PROCEDURE — 85610 PROTHROMBIN TIME: CPT | Performed by: INTERNAL MEDICINE

## 2022-07-19 PROCEDURE — 93793 ANTICOAG MGMT PT WARFARIN: CPT | Performed by: INTERNAL MEDICINE

## 2022-08-16 ENCOUNTER — ANTI-COAG VISIT (OUTPATIENT)
Dept: ANTICOAGULATION | Facility: CLINIC | Age: 77
End: 2022-08-16
Payer: MEDICARE

## 2022-08-16 DIAGNOSIS — Z79.01 LONG TERM (CURRENT) USE OF ANTICOAGULANTS: ICD-10-CM

## 2022-08-16 DIAGNOSIS — Z51.81 ENCOUNTER FOR THERAPEUTIC DRUG MONITORING: ICD-10-CM

## 2022-08-16 DIAGNOSIS — I48.91 ATRIAL FIBRILLATION, UNSPECIFIED TYPE (HCC): ICD-10-CM

## 2022-08-16 LAB
INR: 2 (ref 0.8–1.2)
TEST STRIP EXPIRATION DATE: ABNORMAL DATE

## 2022-08-16 PROCEDURE — 85610 PROTHROMBIN TIME: CPT | Performed by: INTERNAL MEDICINE

## 2022-08-16 PROCEDURE — 93793 ANTICOAG MGMT PT WARFARIN: CPT | Performed by: INTERNAL MEDICINE

## 2022-08-19 ENCOUNTER — TELEPHONE (OUTPATIENT)
Dept: INTERNAL MEDICINE CLINIC | Facility: CLINIC | Age: 77
End: 2022-08-19

## 2022-08-19 NOTE — TELEPHONE ENCOUNTER
Called Maeve to schedule AWV for November. She is out in the yard and will schedule her appointment through [address]

## 2022-09-08 ENCOUNTER — OFFICE VISIT (OUTPATIENT)
Dept: ENDOCRINOLOGY CLINIC | Facility: CLINIC | Age: 77
End: 2022-09-08
Payer: MEDICARE

## 2022-09-08 VITALS
BODY MASS INDEX: 31 KG/M2 | WEIGHT: 162 LBS | DIASTOLIC BLOOD PRESSURE: 65 MMHG | SYSTOLIC BLOOD PRESSURE: 138 MMHG | HEART RATE: 48 BPM

## 2022-09-08 DIAGNOSIS — M81.0 AGE-RELATED OSTEOPOROSIS WITHOUT CURRENT PATHOLOGICAL FRACTURE: Primary | ICD-10-CM

## 2022-09-08 PROCEDURE — 99213 OFFICE O/P EST LOW 20 MIN: CPT | Performed by: INTERNAL MEDICINE

## 2022-09-08 PROCEDURE — 1126F AMNT PAIN NOTED NONE PRSNT: CPT | Performed by: INTERNAL MEDICINE

## 2022-09-08 PROCEDURE — 96372 THER/PROPH/DIAG INJ SC/IM: CPT | Performed by: INTERNAL MEDICINE

## 2022-09-13 ENCOUNTER — ANTI-COAG VISIT (OUTPATIENT)
Dept: ANTICOAGULATION | Facility: CLINIC | Age: 77
End: 2022-09-13
Payer: MEDICARE

## 2022-09-13 DIAGNOSIS — Z79.01 LONG TERM (CURRENT) USE OF ANTICOAGULANTS: ICD-10-CM

## 2022-09-13 DIAGNOSIS — Z51.81 ENCOUNTER FOR THERAPEUTIC DRUG MONITORING: ICD-10-CM

## 2022-09-13 DIAGNOSIS — I48.91 ATRIAL FIBRILLATION, UNSPECIFIED TYPE (HCC): ICD-10-CM

## 2022-09-13 LAB
INR: 2.8 (ref 0.8–1.2)
TEST STRIP EXPIRATION DATE: ABNORMAL DATE

## 2022-09-13 PROCEDURE — 85610 PROTHROMBIN TIME: CPT | Performed by: INTERNAL MEDICINE

## 2022-09-13 PROCEDURE — 93793 ANTICOAG MGMT PT WARFARIN: CPT | Performed by: INTERNAL MEDICINE

## 2022-09-28 ENCOUNTER — OFFICE VISIT (OUTPATIENT)
Dept: INTERNAL MEDICINE CLINIC | Facility: CLINIC | Age: 77
End: 2022-09-28
Payer: MEDICARE

## 2022-09-28 ENCOUNTER — HOSPITAL ENCOUNTER (OUTPATIENT)
Dept: ULTRASOUND IMAGING | Facility: HOSPITAL | Age: 77
Discharge: HOME | End: 2022-09-28
Attending: INTERNAL MEDICINE
Payer: MEDICARE

## 2022-09-28 VITALS
HEIGHT: 61 IN | HEART RATE: 55 BPM | WEIGHT: 164 LBS | SYSTOLIC BLOOD PRESSURE: 133 MMHG | DIASTOLIC BLOOD PRESSURE: 80 MMHG | BODY MASS INDEX: 30.96 KG/M2

## 2022-09-28 DIAGNOSIS — M71.22 SYNOVIAL CYST OF LEFT POPLITEAL SPACE: ICD-10-CM

## 2022-09-28 DIAGNOSIS — M79.605 PAIN OF LEFT LOWER EXTREMITY: Primary | ICD-10-CM

## 2022-09-28 DIAGNOSIS — I48.0 PAROXYSMAL ATRIAL FIBRILLATION (HCC): ICD-10-CM

## 2022-09-28 DIAGNOSIS — G47.33 OSA (OBSTRUCTIVE SLEEP APNEA): ICD-10-CM

## 2022-09-28 DIAGNOSIS — M79.605 PAIN OF LEFT LOWER EXTREMITY: ICD-10-CM

## 2022-09-28 PROCEDURE — 1126F AMNT PAIN NOTED NONE PRSNT: CPT | Performed by: INTERNAL MEDICINE

## 2022-09-28 PROCEDURE — 99214 OFFICE O/P EST MOD 30 MIN: CPT | Performed by: INTERNAL MEDICINE

## 2022-09-28 PROCEDURE — 93971 EXTREMITY STUDY: CPT | Performed by: INTERNAL MEDICINE

## 2022-10-11 ENCOUNTER — ANTI-COAG VISIT (OUTPATIENT)
Dept: ANTICOAGULATION | Facility: CLINIC | Age: 77
End: 2022-10-11
Payer: MEDICARE

## 2022-10-11 DIAGNOSIS — Z79.01 LONG TERM (CURRENT) USE OF ANTICOAGULANTS: ICD-10-CM

## 2022-10-11 DIAGNOSIS — Z51.81 ENCOUNTER FOR THERAPEUTIC DRUG MONITORING: ICD-10-CM

## 2022-10-11 DIAGNOSIS — I48.91 ATRIAL FIBRILLATION, UNSPECIFIED TYPE (HCC): ICD-10-CM

## 2022-10-11 LAB — INR: 4.8 (ref 0.8–1.2)

## 2022-10-11 PROCEDURE — 85610 PROTHROMBIN TIME: CPT | Performed by: INTERNAL MEDICINE

## 2022-10-11 PROCEDURE — 93793 ANTICOAG MGMT PT WARFARIN: CPT | Performed by: INTERNAL MEDICINE

## 2022-10-17 ENCOUNTER — ANTI-COAG VISIT (OUTPATIENT)
Dept: ANTICOAGULATION | Facility: CLINIC | Age: 77
End: 2022-10-17
Payer: MEDICARE

## 2022-10-17 DIAGNOSIS — I48.91 ATRIAL FIBRILLATION, UNSPECIFIED TYPE (HCC): ICD-10-CM

## 2022-10-17 DIAGNOSIS — Z51.81 ENCOUNTER FOR THERAPEUTIC DRUG MONITORING: ICD-10-CM

## 2022-10-17 DIAGNOSIS — Z79.01 LONG TERM (CURRENT) USE OF ANTICOAGULANTS: ICD-10-CM

## 2022-10-17 LAB — INR: 1.6 (ref 0.8–1.2)

## 2022-10-19 ENCOUNTER — TELEPHONE (OUTPATIENT)
Dept: ANTICOAGULATION | Facility: CLINIC | Age: 77
End: 2022-10-19

## 2022-10-19 NOTE — TELEPHONE ENCOUNTER
Jonelle Srinivasan calling for mutual patient that has procedure on 11/09 for afib ablation with Dr. Josselin Gracia. Patient needs to hold rx Warfarin two days prior to procedure starting 11/07, last dose will be 11/06. Any questions Ara call at [phone],YXDO.

## 2022-10-19 NOTE — TELEPHONE ENCOUNTER
Message left on provider voicemail for [facility]- [address] Noted that message was received and no additional questions at this time.

## 2022-10-20 PROBLEM — I10 ESSENTIAL HYPERTENSION: Status: ACTIVE | Noted: 2022-02-24

## 2022-11-01 ENCOUNTER — OFFICE VISIT (OUTPATIENT)
Dept: OTOLARYNGOLOGY | Facility: CLINIC | Age: 77
End: 2022-11-01
Payer: MEDICARE

## 2022-11-01 DIAGNOSIS — H93.12 TINNITUS, LEFT: Primary | ICD-10-CM

## 2022-11-01 DIAGNOSIS — J34.89 NASAL CRUSTING: ICD-10-CM

## 2022-11-01 DIAGNOSIS — R05.1 ACUTE COUGH: ICD-10-CM

## 2022-11-01 DIAGNOSIS — H93.A2 PULSATILE TINNITUS OF LEFT EAR: ICD-10-CM

## 2022-11-01 PROCEDURE — 99213 OFFICE O/P EST LOW 20 MIN: CPT | Performed by: SPECIALIST

## 2022-11-01 RX ORDER — BENZONATATE 100 MG/1
100 CAPSULE ORAL 3 TIMES DAILY PRN
Qty: 30 CAPSULE | Refills: 0 | Status: SHIPPED | OUTPATIENT
Start: 2022-11-01

## 2022-11-01 NOTE — PATIENT INSTRUCTIONS
An audiogram was ordered to evaluate your underlying left-sided hearing loss. Please notify me after your ablation whether this improves the pulsatile tinnitus on the left. I placed you on mupirocin ointment apply with cotton swab to bilateral nares twice daily  I also gave you a prescription of Tessalon Perles 1 up to 3 times daily as needed for cough. Please be sure to notify me if there is an abnormality with your chest x-ray.

## 2022-11-02 NOTE — TELEPHONE ENCOUNTER
From: Judith Ordaz  To: Dany Bella MD  Sent: 10/9/2017 11:34 AM CDT  Subject: Test Results Question    Thank you for forwarding my 9/26/17 sleep study results.  As I am a patient at [facility], I want to discuss my results w Yes, Full Record

## 2022-11-04 ENCOUNTER — OFFICE VISIT (OUTPATIENT)
Dept: INTERNAL MEDICINE CLINIC | Facility: CLINIC | Age: 77
End: 2022-11-04
Payer: MEDICARE

## 2022-11-04 ENCOUNTER — ANTI-COAG VISIT (OUTPATIENT)
Dept: ANTICOAGULATION | Facility: CLINIC | Age: 77
End: 2022-11-04
Payer: MEDICARE

## 2022-11-04 VITALS
WEIGHT: 160 LBS | HEART RATE: 46 BPM | BODY MASS INDEX: 30.21 KG/M2 | HEIGHT: 61 IN | SYSTOLIC BLOOD PRESSURE: 147 MMHG | DIASTOLIC BLOOD PRESSURE: 72 MMHG

## 2022-11-04 DIAGNOSIS — Z51.81 ENCOUNTER FOR THERAPEUTIC DRUG MONITORING: ICD-10-CM

## 2022-11-04 DIAGNOSIS — I48.0 PAROXYSMAL ATRIAL FIBRILLATION (HCC): ICD-10-CM

## 2022-11-04 DIAGNOSIS — I48.91 ATRIAL FIBRILLATION, UNSPECIFIED TYPE (HCC): ICD-10-CM

## 2022-11-04 DIAGNOSIS — Z00.00 MEDICARE ANNUAL WELLNESS VISIT, SUBSEQUENT: Primary | ICD-10-CM

## 2022-11-04 DIAGNOSIS — I10 ESSENTIAL HYPERTENSION: ICD-10-CM

## 2022-11-04 DIAGNOSIS — G47.33 OSA (OBSTRUCTIVE SLEEP APNEA): ICD-10-CM

## 2022-11-04 DIAGNOSIS — E78.2 HYPERLIPIDEMIA, MIXED: ICD-10-CM

## 2022-11-04 DIAGNOSIS — Z00.00 ENCOUNTER FOR ANNUAL HEALTH EXAMINATION: ICD-10-CM

## 2022-11-04 DIAGNOSIS — Z79.01 LONG TERM (CURRENT) USE OF ANTICOAGULANTS: ICD-10-CM

## 2022-11-04 DIAGNOSIS — Z12.31 VISIT FOR SCREENING MAMMOGRAM: ICD-10-CM

## 2022-11-04 LAB — INR: 2.2 (ref 0.8–1.2)

## 2022-11-04 PROCEDURE — 85610 PROTHROMBIN TIME: CPT | Performed by: INTERNAL MEDICINE

## 2022-11-04 PROCEDURE — 93793 ANTICOAG MGMT PT WARFARIN: CPT | Performed by: INTERNAL MEDICINE

## 2022-11-04 RX ORDER — EAR PLUGS
EACH OTIC (EAR)
COMMUNITY
Start: 2022-09-28 | End: 2022-11-04

## 2022-11-07 ENCOUNTER — NURSE ONLY (OUTPATIENT)
Dept: LAB | Facility: HOSPITAL | Age: 77
End: 2022-11-07
Attending: INTERNAL MEDICINE
Payer: MEDICARE

## 2022-11-07 ENCOUNTER — HOSPITAL ENCOUNTER (OUTPATIENT)
Dept: GENERAL RADIOLOGY | Facility: HOSPITAL | Age: 77
Discharge: HOME | End: 2022-11-07
Attending: INTERNAL MEDICINE
Payer: MEDICARE

## 2022-11-07 DIAGNOSIS — E78.2 HYPERLIPIDEMIA, MIXED: ICD-10-CM

## 2022-11-07 DIAGNOSIS — Z01.818 PRE-OP TESTING: ICD-10-CM

## 2022-11-07 DIAGNOSIS — I10 ESSENTIAL HYPERTENSION: ICD-10-CM

## 2022-11-07 LAB
ALBUMIN SERPL-MCNC: 3.4 G/DL (ref 3.4–5)
ALBUMIN/GLOB SERPL: 0.9 {RATIO} (ref 1–2)
ALP LIVER SERPL-CCNC: 69 U/L
ALT SERPL-CCNC: 21 U/L
ANION GAP SERPL CALC-SCNC: 5 MMOL/L (ref 0–18)
AST SERPL-CCNC: 13 U/L (ref 15–37)
BASOPHILS # BLD AUTO: 0.05 X10(3) UL (ref 0–0.2)
BASOPHILS NFR BLD AUTO: 0.9 %
BILIRUB SERPL-MCNC: 0.5 MG/DL (ref 0.1–2)
BILIRUB UR QL: NEGATIVE
BUN BLD-MCNC: 16 MG/DL (ref 7–18)
BUN/CREAT SERPL: 22.5 (ref 10–20)
CALCIUM BLD-MCNC: 8.9 MG/DL (ref 8.5–10.1)
CHLORIDE SERPL-SCNC: 105 MMOL/L (ref 98–112)
CHOLEST SERPL-MCNC: 154 MG/DL (ref ?–200)
CO2 SERPL-SCNC: 30 MMOL/L (ref 21–32)
COLOR UR: YELLOW
CREAT BLD-MCNC: 0.71 MG/DL
DEPRECATED RDW RBC AUTO: 45.6 FL (ref 35.1–46.3)
EOSINOPHIL # BLD AUTO: 0.09 X10(3) UL (ref 0–0.7)
EOSINOPHIL NFR BLD AUTO: 1.5 %
ERYTHROCYTE [DISTWIDTH] IN BLOOD BY AUTOMATED COUNT: 12.5 % (ref 11–15)
FASTING PATIENT LIPID ANSWER: YES
FASTING STATUS PATIENT QL REPORTED: YES
GFR SERPLBLD BASED ON 1.73 SQ M-ARVRAT: 88 ML/MIN/1.73M2 (ref 60–?)
GLOBULIN PLAS-MCNC: 3.6 G/DL (ref 2.8–4.4)
GLUCOSE BLD-MCNC: 82 MG/DL (ref 70–99)
GLUCOSE UR-MCNC: NEGATIVE MG/DL
HCT VFR BLD AUTO: 39.9 %
HDLC SERPL-MCNC: 58 MG/DL (ref 40–59)
HGB BLD-MCNC: 13.5 G/DL
HGB UR QL STRIP.AUTO: NEGATIVE
IMM GRANULOCYTES # BLD AUTO: 0.03 X10(3) UL (ref 0–1)
IMM GRANULOCYTES NFR BLD: 0.5 %
KETONES UR-MCNC: NEGATIVE MG/DL
LDLC SERPL CALC-MCNC: 82 MG/DL (ref ?–100)
LYMPHOCYTES # BLD AUTO: 1.73 X10(3) UL (ref 1–4)
LYMPHOCYTES NFR BLD AUTO: 29.4 %
MCH RBC QN AUTO: 33.6 PG (ref 26–34)
MCHC RBC AUTO-ENTMCNC: 33.8 G/DL (ref 31–37)
MCV RBC AUTO: 99.3 FL
MONOCYTES # BLD AUTO: 0.45 X10(3) UL (ref 0.1–1)
MONOCYTES NFR BLD AUTO: 7.7 %
NEUTROPHILS # BLD AUTO: 3.53 X10 (3) UL (ref 1.5–7.7)
NEUTROPHILS # BLD AUTO: 3.53 X10(3) UL (ref 1.5–7.7)
NEUTROPHILS NFR BLD AUTO: 60 %
NITRITE UR QL STRIP.AUTO: NEGATIVE
NONHDLC SERPL-MCNC: 96 MG/DL (ref ?–130)
OSMOLALITY SERPL CALC.SUM OF ELEC: 290 MOSM/KG (ref 275–295)
PH UR: 5 [PH] (ref 5–8)
PLATELET # BLD AUTO: 267 10(3)UL (ref 150–450)
POTASSIUM SERPL-SCNC: 3.9 MMOL/L (ref 3.5–5.1)
PROT SERPL-MCNC: 7 G/DL (ref 6.4–8.2)
PROT UR-MCNC: NEGATIVE MG/DL
RBC # BLD AUTO: 4.02 X10(6)UL
SARS-COV-2 RNA RESP QL NAA+PROBE: NOT DETECTED
SODIUM SERPL-SCNC: 140 MMOL/L (ref 136–145)
SP GR UR STRIP: 1.02 (ref 1–1.03)
T4 FREE SERPL-MCNC: 0.9 NG/DL (ref 0.8–1.7)
TRIGL SERPL-MCNC: 70 MG/DL (ref 30–149)
TSI SER-ACNC: 0.99 MIU/ML (ref 0.36–3.74)
UROBILINOGEN UR STRIP-ACNC: <2
VIT C UR-MCNC: NEGATIVE MG/DL
VLDLC SERPL CALC-MCNC: 11 MG/DL (ref 0–30)
WBC # BLD AUTO: 5.9 X10(3) UL (ref 4–11)

## 2022-11-07 PROCEDURE — 71046 X-RAY EXAM CHEST 2 VIEWS: CPT | Performed by: INTERNAL MEDICINE

## 2022-11-07 PROCEDURE — 81001 URINALYSIS AUTO W/SCOPE: CPT

## 2022-11-07 PROCEDURE — 36415 COLL VENOUS BLD VENIPUNCTURE: CPT

## 2022-11-07 PROCEDURE — 84439 ASSAY OF FREE THYROXINE: CPT

## 2022-11-07 PROCEDURE — 80061 LIPID PANEL: CPT

## 2022-11-07 PROCEDURE — 85025 COMPLETE CBC W/AUTO DIFF WBC: CPT

## 2022-11-07 PROCEDURE — 80053 COMPREHEN METABOLIC PANEL: CPT

## 2022-11-07 PROCEDURE — 84443 ASSAY THYROID STIM HORMONE: CPT

## 2022-11-09 ENCOUNTER — HOSPITAL ENCOUNTER (OUTPATIENT)
Dept: INTERVENTIONAL RADIOLOGY/VASCULAR | Facility: HOSPITAL | Age: 77
Discharge: HOME | End: 2022-11-09
Attending: INTERNAL MEDICINE | Admitting: INTERNAL MEDICINE
Payer: MEDICARE

## 2022-11-09 VITALS
HEIGHT: 61 IN | SYSTOLIC BLOOD PRESSURE: 121 MMHG | HEART RATE: 71 BPM | WEIGHT: 161 LBS | OXYGEN SATURATION: 98 % | BODY MASS INDEX: 30.4 KG/M2 | DIASTOLIC BLOOD PRESSURE: 68 MMHG | TEMPERATURE: 97 F | RESPIRATION RATE: 23 BRPM

## 2022-11-09 DIAGNOSIS — I48.91 A-FIB (HCC): ICD-10-CM

## 2022-11-09 DIAGNOSIS — Z01.818 PRE-OP TESTING: Primary | ICD-10-CM

## 2022-11-09 LAB
INR BLD: 1.2 (ref 0.85–1.16)
ISTAT ACTIVATED CLOTTING TIME: 329 SECONDS (ref 125–137)
ISTAT ACTIVATED CLOTTING TIME: 364 SECONDS (ref 125–137)
PROTHROMBIN TIME: 15.2 SECONDS (ref 11.6–14.8)

## 2022-11-09 PROCEDURE — 02K83ZZ MAP CONDUCTION MECHANISM, PERCUTANEOUS APPROACH: ICD-10-PCS | Performed by: INTERNAL MEDICINE

## 2022-11-09 PROCEDURE — B246YZZ ULTRASONOGRAPHY OF RIGHT AND LEFT HEART USING OTHER CONTRAST: ICD-10-PCS | Performed by: INTERNAL MEDICINE

## 2022-11-09 PROCEDURE — 93623 PRGRMD STIMJ&PACG IV RX NFS: CPT

## 2022-11-09 PROCEDURE — 93609 INTRA-VNTR MAPG TCHYCAR SITE: CPT

## 2022-11-09 PROCEDURE — 4A023FZ MEASUREMENT OF CARDIAC RHYTHM, PERCUTANEOUS APPROACH: ICD-10-PCS | Performed by: INTERNAL MEDICINE

## 2022-11-09 PROCEDURE — 99153 MOD SED SAME PHYS/QHP EA: CPT

## 2022-11-09 PROCEDURE — 85610 PROTHROMBIN TIME: CPT | Performed by: INTERNAL MEDICINE

## 2022-11-09 PROCEDURE — 85347 COAGULATION TIME ACTIVATED: CPT

## 2022-11-09 PROCEDURE — 3E033KZ INTRODUCTION OF OTHER DIAGNOSTIC SUBSTANCE INTO PERIPHERAL VEIN, PERCUTANEOUS APPROACH: ICD-10-PCS | Performed by: INTERNAL MEDICINE

## 2022-11-09 PROCEDURE — 99152 MOD SED SAME PHYS/QHP 5/>YRS: CPT

## 2022-11-09 PROCEDURE — B24CYZZ ULTRASONOGRAPHY OF PERICARDIUM USING OTHER CONTRAST: ICD-10-PCS | Performed by: INTERNAL MEDICINE

## 2022-11-09 PROCEDURE — 93655 ICAR CATH ABLTJ DSCRT ARRHYT: CPT

## 2022-11-09 PROCEDURE — 02583ZZ DESTRUCTION OF CONDUCTION MECHANISM, PERCUTANEOUS APPROACH: ICD-10-PCS | Performed by: INTERNAL MEDICINE

## 2022-11-09 PROCEDURE — 36415 COLL VENOUS BLD VENIPUNCTURE: CPT

## 2022-11-09 PROCEDURE — 4A0234Z MEASUREMENT OF CARDIAC ELECTRICAL ACTIVITY, PERCUTANEOUS APPROACH: ICD-10-PCS | Performed by: INTERNAL MEDICINE

## 2022-11-09 PROCEDURE — 93656 COMPRE EP EVAL ABLTJ ATR FIB: CPT

## 2022-11-09 RX ORDER — ISOPROTERENOL HYDROCHLORIDE 0.2 MG/ML
INJECTION, SOLUTION INTRAMUSCULAR; INTRAVENOUS
Status: COMPLETED
Start: 2022-11-09 | End: 2022-11-09

## 2022-11-09 RX ORDER — MIDAZOLAM HYDROCHLORIDE 1 MG/ML
INJECTION INTRAMUSCULAR; INTRAVENOUS
Status: COMPLETED
Start: 2022-11-09 | End: 2022-11-09

## 2022-11-09 RX ORDER — ACETAMINOPHEN 325 MG/1
TABLET ORAL
Status: COMPLETED
Start: 2022-11-09 | End: 2022-11-09

## 2022-11-09 RX ORDER — SOTALOL HYDROCHLORIDE 80 MG/1
40 TABLET ORAL 2 TIMES DAILY
Status: SHIPPED | COMMUNITY
Start: 2022-11-09

## 2022-11-09 RX ORDER — ACETAMINOPHEN AND CODEINE PHOSPHATE 300; 30 MG/1; MG/1
2 TABLET ORAL EVERY 4 HOURS PRN
Status: DISCONTINUED | OUTPATIENT
Start: 2022-11-09 | End: 2022-11-09

## 2022-11-09 RX ORDER — WARFARIN SODIUM 5 MG/1
TABLET ORAL NIGHTLY
Status: SHIPPED | COMMUNITY
Start: 2022-11-09

## 2022-11-09 RX ORDER — LIDOCAINE HYDROCHLORIDE 20 MG/ML
INJECTION, SOLUTION EPIDURAL; INFILTRATION; INTRACAUDAL; PERINEURAL
Status: COMPLETED
Start: 2022-11-09 | End: 2022-11-09

## 2022-11-09 RX ORDER — SODIUM CHLORIDE 9 MG/ML
INJECTION, SOLUTION INTRAVENOUS CONTINUOUS
Status: DISCONTINUED | OUTPATIENT
Start: 2022-11-09 | End: 2022-11-09

## 2022-11-09 RX ORDER — HEPARIN SODIUM 1000 [USP'U]/ML
INJECTION, SOLUTION INTRAVENOUS; SUBCUTANEOUS
Status: COMPLETED
Start: 2022-11-09 | End: 2022-11-09

## 2022-11-09 RX ORDER — ACETAMINOPHEN 325 MG/1
650 TABLET ORAL EVERY 4 HOURS PRN
Status: DISCONTINUED | OUTPATIENT
Start: 2022-11-09 | End: 2022-11-09

## 2022-11-09 RX ORDER — ACETAMINOPHEN AND CODEINE PHOSPHATE 300; 30 MG/1; MG/1
1 TABLET ORAL EVERY 4 HOURS PRN
Status: DISCONTINUED | OUTPATIENT
Start: 2022-11-09 | End: 2022-11-09

## 2022-11-09 RX ORDER — PROTAMINE SULFATE 10 MG/ML
INJECTION, SOLUTION INTRAVENOUS
Status: COMPLETED
Start: 2022-11-09 | End: 2022-11-09

## 2022-11-09 RX ADMIN — ACETAMINOPHEN 650 MG: 325 TABLET ORAL at 12:00:00

## 2022-11-09 NOTE — IVS NOTE
DISCHARGE NOTE     Pt is able to sit up and ambulate without difficulty. Pt voided and tolerated fluids and food. Procedural site remains dry and intact with good circulation, motion, and sensation. No signs and symptoms of bleeding/hematoma noted. IV access removed  Instruction provided, patient/family verbalizes understanding. Dr. Von Odom spoke with patient/family post procedure.      Pt discharge via wheelchair to [address] [place] [address] B     Follow up Appointment: as scheduled    New Prescription: None

## 2022-11-13 ENCOUNTER — TELEPHONE (OUTPATIENT)
Dept: OTOLARYNGOLOGY | Facility: CLINIC | Age: 77
End: 2022-11-13

## 2022-11-13 DIAGNOSIS — H93.19 TINNITUS AURIUM, UNSPECIFIED LATERALITY: Primary | ICD-10-CM

## 2022-11-14 ENCOUNTER — LAB ENCOUNTER (OUTPATIENT)
Dept: LAB | Facility: HOSPITAL | Age: 77
End: 2022-11-14
Attending: NURSE PRACTITIONER
Payer: MEDICARE

## 2022-11-14 PROCEDURE — 81001 URINALYSIS AUTO W/SCOPE: CPT | Performed by: NURSE PRACTITIONER

## 2022-11-14 PROCEDURE — 87077 CULTURE AEROBIC IDENTIFY: CPT | Performed by: NURSE PRACTITIONER

## 2022-11-14 PROCEDURE — 87186 SC STD MICRODIL/AGAR DIL: CPT | Performed by: NURSE PRACTITIONER

## 2022-11-14 PROCEDURE — 87086 URINE CULTURE/COLONY COUNT: CPT | Performed by: NURSE PRACTITIONER

## 2022-11-14 NOTE — TELEPHONE ENCOUNTER
Regarding: Heartbeat ringing in my ear  I do have an appointment for the audiogram with some [address] partner but I think I would prefer to go to [place] [address] so you are NOT out of the loop. Please place [facility]. I appreciate so much all the care you have given me over the years!     Gale Alberto

## 2022-11-16 ENCOUNTER — ANTI-COAG VISIT (OUTPATIENT)
Dept: ANTICOAGULATION | Facility: CLINIC | Age: 77
End: 2022-11-16
Payer: MEDICARE

## 2022-11-16 DIAGNOSIS — I48.91 ATRIAL FIBRILLATION, UNSPECIFIED TYPE (HCC): ICD-10-CM

## 2022-11-16 DIAGNOSIS — Z79.01 LONG TERM (CURRENT) USE OF ANTICOAGULANTS: ICD-10-CM

## 2022-11-16 DIAGNOSIS — Z51.81 ENCOUNTER FOR THERAPEUTIC DRUG MONITORING: ICD-10-CM

## 2022-11-16 LAB — INR: 1.7 (ref 0.8–1.2)

## 2022-11-16 PROCEDURE — 93793 ANTICOAG MGMT PT WARFARIN: CPT | Performed by: INTERNAL MEDICINE

## 2022-11-16 PROCEDURE — 85610 PROTHROMBIN TIME: CPT | Performed by: INTERNAL MEDICINE

## 2022-11-17 ENCOUNTER — TELEPHONE (OUTPATIENT)
Dept: ANTICOAGULATION | Facility: CLINIC | Age: 77
End: 2022-11-17

## 2022-11-17 NOTE — TELEPHONE ENCOUNTER
Informed by PCP that patient will be starting keflex for UTI for 7 days. Per drugs. com, keflex may interact with warfarin and increase INR. Spoke with Oscar Moore. Advised on medication interaction. Instructed to add some greens/veggies to diet while taking the antibiotic. [facility] appointment scheduled for Monday 11/21 for INR check.

## 2022-11-21 ENCOUNTER — ANTI-COAG VISIT (OUTPATIENT)
Dept: ANTICOAGULATION | Facility: CLINIC | Age: 77
End: 2022-11-21
Payer: MEDICARE

## 2022-11-21 DIAGNOSIS — Z51.81 ENCOUNTER FOR THERAPEUTIC DRUG MONITORING: ICD-10-CM

## 2022-11-21 DIAGNOSIS — Z79.01 LONG TERM (CURRENT) USE OF ANTICOAGULANTS: ICD-10-CM

## 2022-11-21 DIAGNOSIS — I48.91 ATRIAL FIBRILLATION, UNSPECIFIED TYPE (HCC): ICD-10-CM

## 2022-11-21 LAB — INR: 2 (ref 0.8–1.2)

## 2022-11-21 PROCEDURE — 85610 PROTHROMBIN TIME: CPT | Performed by: INTERNAL MEDICINE

## 2022-11-21 PROCEDURE — 93793 ANTICOAG MGMT PT WARFARIN: CPT | Performed by: INTERNAL MEDICINE

## 2022-11-28 PROBLEM — I48.0 PAF (PAROXYSMAL ATRIAL FIBRILLATION) (HCC): Status: RESOLVED | Noted: 2022-02-24 | Resolved: 2022-11-28

## 2022-11-28 PROBLEM — H25.13 AGE-RELATED NUCLEAR CATARACT OF BOTH EYES: Status: RESOLVED | Noted: 2021-02-09 | Resolved: 2022-11-28

## 2022-12-06 ENCOUNTER — OFFICE VISIT (OUTPATIENT)
Dept: AUDIOLOGY | Facility: CLINIC | Age: 77
End: 2022-12-06
Payer: MEDICARE

## 2022-12-06 DIAGNOSIS — H90.42 SENSORINEURAL HEARING LOSS (SNHL) OF LEFT EAR WITH UNRESTRICTED HEARING OF RIGHT EAR: Primary | ICD-10-CM

## 2022-12-06 PROCEDURE — 92567 TYMPANOMETRY: CPT | Performed by: AUDIOLOGIST

## 2022-12-06 PROCEDURE — 92557 COMPREHENSIVE HEARING TEST: CPT | Performed by: AUDIOLOGIST

## 2022-12-12 ENCOUNTER — ANTI-COAG VISIT (OUTPATIENT)
Dept: ANTICOAGULATION | Facility: CLINIC | Age: 77
End: 2022-12-12
Payer: MEDICARE

## 2022-12-12 DIAGNOSIS — Z51.81 ENCOUNTER FOR THERAPEUTIC DRUG MONITORING: ICD-10-CM

## 2022-12-12 DIAGNOSIS — Z79.01 LONG TERM (CURRENT) USE OF ANTICOAGULANTS: ICD-10-CM

## 2022-12-12 DIAGNOSIS — I48.91 ATRIAL FIBRILLATION, UNSPECIFIED TYPE (HCC): ICD-10-CM

## 2022-12-12 LAB
INR: 2.3 (ref 0.8–1.2)
TEST STRIP EXPIRATION DATE: ABNORMAL DATE

## 2022-12-20 ENCOUNTER — OFFICE VISIT (OUTPATIENT)
Dept: INTERNAL MEDICINE CLINIC | Facility: CLINIC | Age: 77
End: 2022-12-20
Payer: MEDICARE

## 2022-12-20 VITALS
DIASTOLIC BLOOD PRESSURE: 70 MMHG | HEART RATE: 65 BPM | WEIGHT: 162 LBS | HEIGHT: 61 IN | OXYGEN SATURATION: 97 % | BODY MASS INDEX: 30.58 KG/M2 | SYSTOLIC BLOOD PRESSURE: 130 MMHG

## 2022-12-20 DIAGNOSIS — I48.0 PAROXYSMAL ATRIAL FIBRILLATION (HCC): ICD-10-CM

## 2022-12-20 DIAGNOSIS — E78.2 HYPERLIPIDEMIA, MIXED: ICD-10-CM

## 2022-12-20 DIAGNOSIS — Z01.818 PREOP EXAM FOR INTERNAL MEDICINE: Primary | ICD-10-CM

## 2022-12-20 DIAGNOSIS — I10 ESSENTIAL HYPERTENSION: ICD-10-CM

## 2022-12-20 DIAGNOSIS — G47.33 OSA (OBSTRUCTIVE SLEEP APNEA): ICD-10-CM

## 2022-12-20 PROCEDURE — 1126F AMNT PAIN NOTED NONE PRSNT: CPT | Performed by: INTERNAL MEDICINE

## 2022-12-20 PROCEDURE — 99215 OFFICE O/P EST HI 40 MIN: CPT | Performed by: INTERNAL MEDICINE

## 2022-12-27 ENCOUNTER — TELEPHONE (OUTPATIENT)
Dept: PHYSICAL THERAPY | Facility: HOSPITAL | Age: 77
End: 2022-12-27

## 2022-12-29 ENCOUNTER — LAB ENCOUNTER (OUTPATIENT)
Dept: LAB | Facility: HOSPITAL | Age: 77
End: 2022-12-29
Attending: INTERNAL MEDICINE
Payer: MEDICARE

## 2022-12-29 ENCOUNTER — HOSPITAL ENCOUNTER (OUTPATIENT)
Dept: GENERAL RADIOLOGY | Facility: HOSPITAL | Age: 77
Discharge: HOME | End: 2022-12-29
Attending: INTERNAL MEDICINE
Payer: MEDICARE

## 2022-12-29 DIAGNOSIS — E78.2 HYPERLIPIDEMIA, MIXED: ICD-10-CM

## 2022-12-29 DIAGNOSIS — I10 ESSENTIAL HYPERTENSION: ICD-10-CM

## 2022-12-29 DIAGNOSIS — I48.0 PAROXYSMAL ATRIAL FIBRILLATION (HCC): ICD-10-CM

## 2022-12-29 DIAGNOSIS — Z01.818 PREOP EXAM FOR INTERNAL MEDICINE: ICD-10-CM

## 2022-12-29 LAB
ALBUMIN SERPL-MCNC: 3.4 G/DL (ref 3.4–5)
ALBUMIN/GLOB SERPL: 0.8 {RATIO} (ref 1–2)
ALP LIVER SERPL-CCNC: 75 U/L
ALT SERPL-CCNC: 31 U/L
ANION GAP SERPL CALC-SCNC: 7 MMOL/L (ref 0–18)
APTT PPP: 45.4 SECONDS (ref 23.3–35.6)
AST SERPL-CCNC: 24 U/L (ref 15–37)
BASOPHILS # BLD AUTO: 0.05 X10(3) UL (ref 0–0.2)
BASOPHILS NFR BLD AUTO: 0.8 %
BILIRUB SERPL-MCNC: 0.5 MG/DL (ref 0.1–2)
BILIRUB UR QL: NEGATIVE
BUN BLD-MCNC: 25 MG/DL (ref 7–18)
BUN/CREAT SERPL: 33.8 (ref 10–20)
CALCIUM BLD-MCNC: 9.4 MG/DL (ref 8.5–10.1)
CHLORIDE SERPL-SCNC: 105 MMOL/L (ref 98–112)
CO2 SERPL-SCNC: 29 MMOL/L (ref 21–32)
COLOR UR: YELLOW
CREAT BLD-MCNC: 0.74 MG/DL
DEPRECATED RDW RBC AUTO: 45.6 FL (ref 35.1–46.3)
EOSINOPHIL # BLD AUTO: 0.12 X10(3) UL (ref 0–0.7)
EOSINOPHIL NFR BLD AUTO: 1.9 %
ERYTHROCYTE [DISTWIDTH] IN BLOOD BY AUTOMATED COUNT: 12.2 % (ref 11–15)
FASTING STATUS PATIENT QL REPORTED: YES
GFR SERPLBLD BASED ON 1.73 SQ M-ARVRAT: 83 ML/MIN/1.73M2 (ref 60–?)
GLOBULIN PLAS-MCNC: 4.3 G/DL (ref 2.8–4.4)
GLUCOSE BLD-MCNC: 81 MG/DL (ref 70–99)
GLUCOSE UR-MCNC: NEGATIVE MG/DL
HCT VFR BLD AUTO: 37.5 %
HGB BLD-MCNC: 12.5 G/DL
HGB UR QL STRIP.AUTO: NEGATIVE
IMM GRANULOCYTES # BLD AUTO: 0.03 X10(3) UL (ref 0–1)
IMM GRANULOCYTES NFR BLD: 0.5 %
INR BLD: 2.42 (ref 0.85–1.16)
KETONES UR-MCNC: NEGATIVE MG/DL
LYMPHOCYTES # BLD AUTO: 1.38 X10(3) UL (ref 1–4)
LYMPHOCYTES NFR BLD AUTO: 22.4 %
MCH RBC QN AUTO: 33.4 PG (ref 26–34)
MCHC RBC AUTO-ENTMCNC: 33.3 G/DL (ref 31–37)
MCV RBC AUTO: 100.3 FL
MONOCYTES # BLD AUTO: 0.65 X10(3) UL (ref 0.1–1)
MONOCYTES NFR BLD AUTO: 10.6 %
MRSA DNA SPEC QL NAA+PROBE: NEGATIVE
NEUTROPHILS # BLD AUTO: 3.93 X10 (3) UL (ref 1.5–7.7)
NEUTROPHILS # BLD AUTO: 3.93 X10(3) UL (ref 1.5–7.7)
NEUTROPHILS NFR BLD AUTO: 63.8 %
NITRITE UR QL STRIP.AUTO: NEGATIVE
OSMOLALITY SERPL CALC.SUM OF ELEC: 295 MOSM/KG (ref 275–295)
PH UR: 6 [PH] (ref 5–8)
PLATELET # BLD AUTO: 297 10(3)UL (ref 150–450)
POTASSIUM SERPL-SCNC: 4.4 MMOL/L (ref 3.5–5.1)
PROT SERPL-MCNC: 7.7 G/DL (ref 6.4–8.2)
PROT UR-MCNC: 30 MG/DL
PROTHROMBIN TIME: 25.9 SECONDS (ref 11.6–14.8)
RBC # BLD AUTO: 3.74 X10(6)UL
SODIUM SERPL-SCNC: 141 MMOL/L (ref 136–145)
SP GR UR STRIP: 1.02 (ref 1–1.03)
UROBILINOGEN UR STRIP-ACNC: <2
VIT C UR-MCNC: 40 MG/DL
WBC # BLD AUTO: 6.2 X10(3) UL (ref 4–11)

## 2022-12-29 PROCEDURE — 36415 COLL VENOUS BLD VENIPUNCTURE: CPT

## 2022-12-29 PROCEDURE — 87641 MR-STAPH DNA AMP PROBE: CPT

## 2022-12-29 PROCEDURE — 81001 URINALYSIS AUTO W/SCOPE: CPT

## 2022-12-29 PROCEDURE — 71046 X-RAY EXAM CHEST 2 VIEWS: CPT | Performed by: INTERNAL MEDICINE

## 2022-12-29 PROCEDURE — 85610 PROTHROMBIN TIME: CPT

## 2022-12-29 PROCEDURE — 80053 COMPREHEN METABOLIC PANEL: CPT

## 2022-12-29 PROCEDURE — 85730 THROMBOPLASTIN TIME PARTIAL: CPT

## 2022-12-29 PROCEDURE — 85025 COMPLETE CBC W/AUTO DIFF WBC: CPT

## 2023-01-05 ENCOUNTER — ORDER TRANSCRIPTION (OUTPATIENT)
Dept: PHYSICAL THERAPY | Facility: HOSPITAL | Age: 78
End: 2023-01-05

## 2023-01-05 ENCOUNTER — TELEPHONE (OUTPATIENT)
Dept: INTERNAL MEDICINE CLINIC | Facility: CLINIC | Age: 78
End: 2023-01-05

## 2023-01-05 DIAGNOSIS — Z96.652 S/P TOTAL KNEE ARTHROPLASTY, LEFT: Primary | ICD-10-CM

## 2023-01-06 NOTE — TELEPHONE ENCOUNTER
I was notified by DR [facility] that patient is cleared for surgery  No additonal EKG  needed  Please notify patient

## 2023-01-09 ENCOUNTER — ANTI-COAG VISIT (OUTPATIENT)
Dept: ANTICOAGULATION | Facility: CLINIC | Age: 78
End: 2023-01-09
Payer: MEDICARE

## 2023-01-09 DIAGNOSIS — Z79.01 LONG TERM (CURRENT) USE OF ANTICOAGULANTS: ICD-10-CM

## 2023-01-09 DIAGNOSIS — Z51.81 ENCOUNTER FOR THERAPEUTIC DRUG MONITORING: ICD-10-CM

## 2023-01-09 DIAGNOSIS — I48.91 ATRIAL FIBRILLATION, UNSPECIFIED TYPE (HCC): ICD-10-CM

## 2023-01-09 LAB
INR: 3 (ref 0.8–1.2)
TEST STRIP EXPIRATION DATE: ABNORMAL DATE

## 2023-01-09 NOTE — TELEPHONE ENCOUNTER
Jingshi Wanwei Message was sent to Pt to notify no additional EKG is needed . Medical clearance, lab results, and EKG faxed to surgeon office, confirmation received. Medical clearance, lab results, and EKG faxed to surgeon office, confirmation received.

## 2023-01-10 ENCOUNTER — LAB ENCOUNTER (OUTPATIENT)
Dept: LAB | Facility: HOSPITAL | Age: 78
End: 2023-01-10
Attending: ORTHOPAEDIC SURGERY
Payer: MEDICARE

## 2023-01-10 DIAGNOSIS — Z01.818 PRE-OP TESTING: ICD-10-CM

## 2023-01-10 LAB
ANTIBODY SCREEN: NEGATIVE
RH BLOOD TYPE: POSITIVE
RH BLOOD TYPE: POSITIVE

## 2023-01-10 PROCEDURE — 86850 RBC ANTIBODY SCREEN: CPT

## 2023-01-10 PROCEDURE — 86901 BLOOD TYPING SEROLOGIC RH(D): CPT

## 2023-01-10 PROCEDURE — 86900 BLOOD TYPING SEROLOGIC ABO: CPT

## 2023-01-10 PROCEDURE — 36415 COLL VENOUS BLD VENIPUNCTURE: CPT

## 2023-01-11 ENCOUNTER — ANTI-COAG VISIT (OUTPATIENT)
Dept: ANTICOAGULATION | Facility: CLINIC | Age: 78
End: 2023-01-11

## 2023-01-11 ENCOUNTER — TELEPHONE (OUTPATIENT)
Dept: INTERNAL MEDICINE CLINIC | Facility: CLINIC | Age: 78
End: 2023-01-11

## 2023-01-11 DIAGNOSIS — I48.91 ATRIAL FIBRILLATION, UNSPECIFIED TYPE (HCC): ICD-10-CM

## 2023-01-11 DIAGNOSIS — Z51.81 ENCOUNTER FOR THERAPEUTIC DRUG MONITORING: ICD-10-CM

## 2023-01-11 DIAGNOSIS — Z79.01 LONG TERM (CURRENT) USE OF ANTICOAGULANTS: ICD-10-CM

## 2023-01-11 LAB
INR: 1.7 (ref 0.8–1.2)
SARS-COV-2 RNA RESP QL NAA+PROBE: NOT DETECTED
TEST STRIP EXPIRATION DATE: ABNORMAL DATE

## 2023-01-11 PROCEDURE — 85610 PROTHROMBIN TIME: CPT | Performed by: FAMILY MEDICINE

## 2023-01-11 PROCEDURE — 93793 ANTICOAG MGMT PT WARFARIN: CPT | Performed by: FAMILY MEDICINE

## 2023-01-11 NOTE — TELEPHONE ENCOUNTER
Patient called, verified Name and . She is requesting to speak with Markus Soriano RN ([facility]) regarding INR test order that will determine if it is low enough to proceed with surgery on Friday, Left total knee arthroplasty. Message sent to Markus Soriano. Call transferred to [id number].

## 2023-01-11 NOTE — PROGRESS NOTES
Face to face. Continues to take ibuprofen/tylenol as needed for knee/arthritis pain- left knee surgery scheduled for 1/13/22- per surgeon's office, started holding warfarin on 1/9- would like INR less than 1.5 prior to surgery. States she will have [address] post procedure and they will check INR- unsure of agency at this time. Per protocol, patient to continue current dose, hold as instructed and recheck INR post procedure.

## 2023-01-13 ENCOUNTER — APPOINTMENT (OUTPATIENT)
Dept: GENERAL RADIOLOGY | Facility: HOSPITAL | Age: 78
End: 2023-01-13
Attending: PHYSICIAN ASSISTANT
Payer: MEDICARE

## 2023-01-13 ENCOUNTER — HOSPITAL ENCOUNTER (INPATIENT)
Facility: HOSPITAL | Age: 78
LOS: 2 days | Discharge: HOME | End: 2023-01-15
Attending: ORTHOPAEDIC SURGERY | Admitting: ORTHOPAEDIC SURGERY
Payer: MEDICARE

## 2023-01-13 ENCOUNTER — ANESTHESIA (OUTPATIENT)
Dept: SURGERY | Facility: HOSPITAL | Age: 78
End: 2023-01-13
Payer: MEDICARE

## 2023-01-13 ENCOUNTER — ANESTHESIA EVENT (OUTPATIENT)
Dept: SURGERY | Facility: HOSPITAL | Age: 78
End: 2023-01-13
Payer: MEDICARE

## 2023-01-13 DIAGNOSIS — Z01.818 PRE-OP TESTING: Primary | ICD-10-CM

## 2023-01-13 PROBLEM — M17.12 PRIMARY OSTEOARTHRITIS OF LEFT KNEE: Status: ACTIVE | Noted: 2023-01-13

## 2023-01-13 LAB
HCT VFR BLD AUTO: 34.8 %
HGB BLD-MCNC: 11.5 G/DL
INR BLD: 1.19 (ref 0.85–1.16)
PROTHROMBIN TIME: 15 SECONDS (ref 11.6–14.8)

## 2023-01-13 PROCEDURE — 99232 SBSQ HOSP IP/OBS MODERATE 35: CPT | Performed by: HOSPITALIST

## 2023-01-13 PROCEDURE — 0SRD0J9 REPLACEMENT OF LEFT KNEE JOINT WITH SYNTHETIC SUBSTITUTE, CEMENTED, OPEN APPROACH: ICD-10-PCS | Performed by: ORTHOPAEDIC SURGERY

## 2023-01-13 PROCEDURE — 73560 X-RAY EXAM OF KNEE 1 OR 2: CPT | Performed by: PHYSICIAN ASSISTANT

## 2023-01-13 DEVICE — RESURFACING PATELLA SIZE 2
Type: IMPLANTABLE DEVICE | Site: KNEE | Status: FUNCTIONAL
Brand: GMK PRIMARY TOTAL KNEE SYSTEM

## 2023-01-13 DEVICE — TIBIAL INSERT FIXED SPHERE FLEX #3/10 MM L
Type: IMPLANTABLE DEVICE | Site: KNEE | Status: FUNCTIONAL
Brand: GMK SPHERE TOTAL KNEE SYSTEM

## 2023-01-13 DEVICE — FIXED TIBIAL TRAY CEMENTED LEFT, SIZE 3
Type: IMPLANTABLE DEVICE | Site: KNEE | Status: FUNCTIONAL
Brand: GMK PRIMARY TOTAL KNEE SYSTEM

## 2023-01-13 DEVICE — FEMUR SPHERE CEMENTED LEFT, SIZE 3+
Type: IMPLANTABLE DEVICE | Site: KNEE | Status: FUNCTIONAL
Brand: GMK SPHERE TOTAL KNEE SYSTEM

## 2023-01-13 DEVICE — REFOBACIN BC R 1X40 US: Type: IMPLANTABLE DEVICE | Site: KNEE | Status: FUNCTIONAL

## 2023-01-13 RX ORDER — WARFARIN SODIUM 5 MG/1
5 TABLET ORAL NIGHTLY
Status: DISCONTINUED | OUTPATIENT
Start: 2023-01-14 | End: 2023-01-13

## 2023-01-13 RX ORDER — HYDROMORPHONE HYDROCHLORIDE 1 MG/ML
0.6 INJECTION, SOLUTION INTRAMUSCULAR; INTRAVENOUS; SUBCUTANEOUS EVERY 5 MIN PRN
Status: DISCONTINUED | OUTPATIENT
Start: 2023-01-13 | End: 2023-01-13 | Stop reason: HOSPADM

## 2023-01-13 RX ORDER — METOCLOPRAMIDE 10 MG/1
10 TABLET ORAL ONCE
Status: COMPLETED | OUTPATIENT
Start: 2023-01-13 | End: 2023-01-13

## 2023-01-13 RX ORDER — POLYETHYLENE GLYCOL 3350 17 G/17G
17 POWDER, FOR SOLUTION ORAL DAILY PRN
Status: DISCONTINUED | OUTPATIENT
Start: 2023-01-13 | End: 2023-01-15

## 2023-01-13 RX ORDER — CELECOXIB 200 MG/1
400 CAPSULE ORAL ONCE
Status: DISCONTINUED | OUTPATIENT
Start: 2023-01-13 | End: 2023-01-13 | Stop reason: HOSPADM

## 2023-01-13 RX ORDER — DIPHENHYDRAMINE HYDROCHLORIDE 50 MG/ML
25 INJECTION INTRAMUSCULAR; INTRAVENOUS ONCE AS NEEDED
Status: ACTIVE | OUTPATIENT
Start: 2023-01-13 | End: 2023-01-13

## 2023-01-13 RX ORDER — HYDROCODONE BITARTRATE AND ACETAMINOPHEN 7.5; 325 MG/1; MG/1
1 TABLET ORAL EVERY 6 HOURS PRN
Status: DISCONTINUED | OUTPATIENT
Start: 2023-01-13 | End: 2023-01-14

## 2023-01-13 RX ORDER — FAMOTIDINE 20 MG/1
20 TABLET, FILM COATED ORAL ONCE
Status: DISCONTINUED | OUTPATIENT
Start: 2023-01-13 | End: 2023-01-13 | Stop reason: HOSPADM

## 2023-01-13 RX ORDER — PANTOPRAZOLE SODIUM 20 MG/1
20 TABLET, DELAYED RELEASE ORAL
Status: DISCONTINUED | OUTPATIENT
Start: 2023-01-14 | End: 2023-01-15

## 2023-01-13 RX ORDER — CEFAZOLIN SODIUM/WATER 2 G/20 ML
2 SYRINGE (ML) INTRAVENOUS ONCE
Status: COMPLETED | OUTPATIENT
Start: 2023-01-13 | End: 2023-01-13

## 2023-01-13 RX ORDER — ACETAMINOPHEN 500 MG
1000 TABLET ORAL ONCE
Status: COMPLETED | OUTPATIENT
Start: 2023-01-13 | End: 2023-01-13

## 2023-01-13 RX ORDER — WARFARIN SODIUM 5 MG/1
5 TABLET ORAL NIGHTLY
Status: DISCONTINUED | OUTPATIENT
Start: 2023-01-13 | End: 2023-01-15

## 2023-01-13 RX ORDER — SODIUM CHLORIDE, SODIUM LACTATE, POTASSIUM CHLORIDE, CALCIUM CHLORIDE 600; 310; 30; 20 MG/100ML; MG/100ML; MG/100ML; MG/100ML
INJECTION, SOLUTION INTRAVENOUS CONTINUOUS
Status: DISCONTINUED | OUTPATIENT
Start: 2023-01-13 | End: 2023-01-13 | Stop reason: HOSPADM

## 2023-01-13 RX ORDER — NALBUPHINE HCL 10 MG/ML
2.5 AMPUL (ML) INJECTION EVERY 4 HOURS PRN
Status: ACTIVE | OUTPATIENT
Start: 2023-01-13 | End: 2023-01-14

## 2023-01-13 RX ORDER — HYDROMORPHONE HYDROCHLORIDE 1 MG/ML
0.6 INJECTION, SOLUTION INTRAMUSCULAR; INTRAVENOUS; SUBCUTANEOUS
Status: ACTIVE | OUTPATIENT
Start: 2023-01-13 | End: 2023-01-14

## 2023-01-13 RX ORDER — HYDROCODONE BITARTRATE AND ACETAMINOPHEN 7.5; 325 MG/1; MG/1
2 TABLET ORAL EVERY 6 HOURS PRN
Status: DISCONTINUED | OUTPATIENT
Start: 2023-01-13 | End: 2023-01-14

## 2023-01-13 RX ORDER — FAMOTIDINE 10 MG/ML
20 INJECTION, SOLUTION INTRAVENOUS 2 TIMES DAILY
Status: DISCONTINUED | OUTPATIENT
Start: 2023-01-13 | End: 2023-01-13

## 2023-01-13 RX ORDER — DIPHENHYDRAMINE HYDROCHLORIDE 50 MG/ML
12.5 INJECTION INTRAMUSCULAR; INTRAVENOUS EVERY 4 HOURS PRN
Status: ACTIVE | OUTPATIENT
Start: 2023-01-13 | End: 2023-01-14

## 2023-01-13 RX ORDER — MELATONIN
325
Status: DISCONTINUED | OUTPATIENT
Start: 2023-01-14 | End: 2023-01-15

## 2023-01-13 RX ORDER — SODIUM CHLORIDE, SODIUM LACTATE, POTASSIUM CHLORIDE, CALCIUM CHLORIDE 600; 310; 30; 20 MG/100ML; MG/100ML; MG/100ML; MG/100ML
INJECTION, SOLUTION INTRAVENOUS CONTINUOUS
Status: DISCONTINUED | OUTPATIENT
Start: 2023-01-13 | End: 2023-01-13

## 2023-01-13 RX ORDER — CEFAZOLIN SODIUM/WATER 2 G/20 ML
2 SYRINGE (ML) INTRAVENOUS ONCE
Status: DISCONTINUED | OUTPATIENT
Start: 2023-01-13 | End: 2023-01-13 | Stop reason: HOSPADM

## 2023-01-13 RX ORDER — NALOXONE HYDROCHLORIDE 0.4 MG/ML
0.08 INJECTION, SOLUTION INTRAMUSCULAR; INTRAVENOUS; SUBCUTANEOUS
Status: ACTIVE | OUTPATIENT
Start: 2023-01-13 | End: 2023-01-14

## 2023-01-13 RX ORDER — TRANEXAMIC ACID 10 MG/ML
INJECTION, SOLUTION INTRAVENOUS AS NEEDED
Status: DISCONTINUED | OUTPATIENT
Start: 2023-01-13 | End: 2023-01-13 | Stop reason: SURG

## 2023-01-13 RX ORDER — DIPHENHYDRAMINE HCL 25 MG
25 CAPSULE ORAL EVERY 4 HOURS PRN
Status: DISPENSED | OUTPATIENT
Start: 2023-01-13 | End: 2023-01-14

## 2023-01-13 RX ORDER — DIPHENHYDRAMINE HYDROCHLORIDE 50 MG/ML
12.5 INJECTION INTRAMUSCULAR; INTRAVENOUS EVERY 4 HOURS PRN
Status: DISCONTINUED | OUTPATIENT
Start: 2023-01-13 | End: 2023-01-15

## 2023-01-13 RX ORDER — EPHEDRINE SULFATE 50 MG/ML
INJECTION INTRAVENOUS AS NEEDED
Status: DISCONTINUED | OUTPATIENT
Start: 2023-01-13 | End: 2023-01-13 | Stop reason: SURG

## 2023-01-13 RX ORDER — CELECOXIB 200 MG/1
400 CAPSULE ORAL ONCE
Status: COMPLETED | OUTPATIENT
Start: 2023-01-13 | End: 2023-01-13

## 2023-01-13 RX ORDER — BUPIVACAINE HYDROCHLORIDE 7.5 MG/ML
INJECTION, SOLUTION INTRASPINAL AS NEEDED
Status: DISCONTINUED | OUTPATIENT
Start: 2023-01-13 | End: 2023-01-13 | Stop reason: SURG

## 2023-01-13 RX ORDER — DEXAMETHASONE SODIUM PHOSPHATE 4 MG/ML
VIAL (ML) INJECTION AS NEEDED
Status: DISCONTINUED | OUTPATIENT
Start: 2023-01-13 | End: 2023-01-13 | Stop reason: SURG

## 2023-01-13 RX ORDER — MORPHINE SULFATE 1 MG/ML
INJECTION, SOLUTION EPIDURAL; INTRATHECAL; INTRAVENOUS AS NEEDED
Status: DISCONTINUED | OUTPATIENT
Start: 2023-01-13 | End: 2023-01-13 | Stop reason: SURG

## 2023-01-13 RX ORDER — MORPHINE SULFATE 4 MG/ML
4 INJECTION, SOLUTION INTRAMUSCULAR; INTRAVENOUS EVERY 10 MIN PRN
Status: DISCONTINUED | OUTPATIENT
Start: 2023-01-13 | End: 2023-01-13 | Stop reason: HOSPADM

## 2023-01-13 RX ORDER — DOCUSATE SODIUM 100 MG/1
100 CAPSULE, LIQUID FILLED ORAL 2 TIMES DAILY
Status: DISCONTINUED | OUTPATIENT
Start: 2023-01-13 | End: 2023-01-15

## 2023-01-13 RX ORDER — METOPROLOL TARTRATE 5 MG/5ML
2.5 INJECTION INTRAVENOUS ONCE
Status: DISCONTINUED | OUTPATIENT
Start: 2023-01-13 | End: 2023-01-13 | Stop reason: HOSPADM

## 2023-01-13 RX ORDER — HYDROMORPHONE HYDROCHLORIDE 1 MG/ML
0.4 INJECTION, SOLUTION INTRAMUSCULAR; INTRAVENOUS; SUBCUTANEOUS
Status: ACTIVE | OUTPATIENT
Start: 2023-01-13 | End: 2023-01-14

## 2023-01-13 RX ORDER — NALOXONE HYDROCHLORIDE 0.4 MG/ML
80 INJECTION, SOLUTION INTRAMUSCULAR; INTRAVENOUS; SUBCUTANEOUS AS NEEDED
Status: DISCONTINUED | OUTPATIENT
Start: 2023-01-13 | End: 2023-01-13 | Stop reason: HOSPADM

## 2023-01-13 RX ORDER — ONDANSETRON 2 MG/ML
4 INJECTION INTRAMUSCULAR; INTRAVENOUS EVERY 6 HOURS PRN
Status: DISCONTINUED | OUTPATIENT
Start: 2023-01-13 | End: 2023-01-13 | Stop reason: HOSPADM

## 2023-01-13 RX ORDER — HYDROMORPHONE HYDROCHLORIDE 1 MG/ML
0.4 INJECTION, SOLUTION INTRAMUSCULAR; INTRAVENOUS; SUBCUTANEOUS EVERY 5 MIN PRN
Status: DISCONTINUED | OUTPATIENT
Start: 2023-01-13 | End: 2023-01-13 | Stop reason: HOSPADM

## 2023-01-13 RX ORDER — MORPHINE SULFATE 4 MG/ML
2 INJECTION, SOLUTION INTRAMUSCULAR; INTRAVENOUS EVERY 10 MIN PRN
Status: DISCONTINUED | OUTPATIENT
Start: 2023-01-13 | End: 2023-01-13 | Stop reason: HOSPADM

## 2023-01-13 RX ORDER — SENNOSIDES 8.6 MG
17.2 TABLET ORAL NIGHTLY
Status: DISCONTINUED | OUTPATIENT
Start: 2023-01-13 | End: 2023-01-15

## 2023-01-13 RX ORDER — SOTALOL HYDROCHLORIDE 80 MG/1
40 TABLET ORAL 2 TIMES DAILY
Status: DISCONTINUED | OUTPATIENT
Start: 2023-01-13 | End: 2023-01-15

## 2023-01-13 RX ORDER — SODIUM CHLORIDE 9 MG/ML
INJECTION, SOLUTION INTRAVENOUS CONTINUOUS
Status: DISCONTINUED | OUTPATIENT
Start: 2023-01-13 | End: 2023-01-15

## 2023-01-13 RX ORDER — HYDROMORPHONE HYDROCHLORIDE 1 MG/ML
0.2 INJECTION, SOLUTION INTRAMUSCULAR; INTRAVENOUS; SUBCUTANEOUS EVERY 5 MIN PRN
Status: DISCONTINUED | OUTPATIENT
Start: 2023-01-13 | End: 2023-01-13 | Stop reason: HOSPADM

## 2023-01-13 RX ORDER — BENZONATATE 100 MG/1
100 CAPSULE ORAL 3 TIMES DAILY PRN
Status: DISCONTINUED | OUTPATIENT
Start: 2023-01-13 | End: 2023-01-15

## 2023-01-13 RX ORDER — HALOPERIDOL 5 MG/ML
0.5 INJECTION INTRAMUSCULAR ONCE AS NEEDED
Status: ACTIVE | OUTPATIENT
Start: 2023-01-13 | End: 2023-01-13

## 2023-01-13 RX ORDER — MIDAZOLAM HYDROCHLORIDE 1 MG/ML
INJECTION INTRAMUSCULAR; INTRAVENOUS AS NEEDED
Status: DISCONTINUED | OUTPATIENT
Start: 2023-01-13 | End: 2023-01-13 | Stop reason: SURG

## 2023-01-13 RX ORDER — ONDANSETRON 2 MG/ML
4 INJECTION INTRAMUSCULAR; INTRAVENOUS ONCE AS NEEDED
Status: ACTIVE | OUTPATIENT
Start: 2023-01-13 | End: 2023-01-13

## 2023-01-13 RX ORDER — SODIUM CHLORIDE, SODIUM LACTATE, POTASSIUM CHLORIDE, CALCIUM CHLORIDE 600; 310; 30; 20 MG/100ML; MG/100ML; MG/100ML; MG/100ML
INJECTION, SOLUTION INTRAVENOUS CONTINUOUS PRN
Status: DISCONTINUED | OUTPATIENT
Start: 2023-01-13 | End: 2023-01-13 | Stop reason: SURG

## 2023-01-13 RX ORDER — DIPHENHYDRAMINE HCL 25 MG
25 CAPSULE ORAL EVERY 4 HOURS PRN
Status: DISCONTINUED | OUTPATIENT
Start: 2023-01-13 | End: 2023-01-15

## 2023-01-13 RX ORDER — METOCLOPRAMIDE 10 MG/1
10 TABLET ORAL ONCE
Status: DISCONTINUED | OUTPATIENT
Start: 2023-01-13 | End: 2023-01-13 | Stop reason: HOSPADM

## 2023-01-13 RX ORDER — BISACODYL 10 MG
10 SUPPOSITORY, RECTAL RECTAL
Status: DISCONTINUED | OUTPATIENT
Start: 2023-01-13 | End: 2023-01-15

## 2023-01-13 RX ORDER — ONDANSETRON 2 MG/ML
INJECTION INTRAMUSCULAR; INTRAVENOUS AS NEEDED
Status: DISCONTINUED | OUTPATIENT
Start: 2023-01-13 | End: 2023-01-13 | Stop reason: SURG

## 2023-01-13 RX ORDER — FAMOTIDINE 20 MG/1
20 TABLET, FILM COATED ORAL 2 TIMES DAILY
Status: DISCONTINUED | OUTPATIENT
Start: 2023-01-13 | End: 2023-01-13

## 2023-01-13 RX ORDER — MORPHINE SULFATE 10 MG/ML
6 INJECTION, SOLUTION INTRAMUSCULAR; INTRAVENOUS EVERY 10 MIN PRN
Status: DISCONTINUED | OUTPATIENT
Start: 2023-01-13 | End: 2023-01-13 | Stop reason: HOSPADM

## 2023-01-13 RX ORDER — PROCHLORPERAZINE EDISYLATE 5 MG/ML
5 INJECTION INTRAMUSCULAR; INTRAVENOUS ONCE AS NEEDED
Status: ACTIVE | OUTPATIENT
Start: 2023-01-13 | End: 2023-01-13

## 2023-01-13 RX ORDER — CEFAZOLIN SODIUM/WATER 2 G/20 ML
2 SYRINGE (ML) INTRAVENOUS EVERY 8 HOURS
Status: COMPLETED | OUTPATIENT
Start: 2023-01-13 | End: 2023-01-14

## 2023-01-13 RX ORDER — BUPIVACAINE HYDROCHLORIDE AND EPINEPHRINE 5; 5 MG/ML; UG/ML
INJECTION, SOLUTION PERINEURAL AS NEEDED
Status: DISCONTINUED | OUTPATIENT
Start: 2023-01-13 | End: 2023-01-13 | Stop reason: HOSPADM

## 2023-01-13 RX ORDER — ATORVASTATIN CALCIUM 10 MG/1
10 TABLET, FILM COATED ORAL NIGHTLY
Status: DISCONTINUED | OUTPATIENT
Start: 2023-01-13 | End: 2023-01-15

## 2023-01-13 RX ORDER — ACETAMINOPHEN 500 MG
1000 TABLET ORAL ONCE
Status: DISCONTINUED | OUTPATIENT
Start: 2023-01-13 | End: 2023-01-13 | Stop reason: HOSPADM

## 2023-01-13 RX ORDER — ACETAMINOPHEN 325 MG/1
650 TABLET ORAL EVERY 6 HOURS PRN
Status: ACTIVE | OUTPATIENT
Start: 2023-01-13 | End: 2023-01-14

## 2023-01-13 RX ORDER — ONDANSETRON 2 MG/ML
4 INJECTION INTRAMUSCULAR; INTRAVENOUS EVERY 6 HOURS PRN
Status: DISCONTINUED | OUTPATIENT
Start: 2023-01-13 | End: 2023-01-15

## 2023-01-13 RX ORDER — SODIUM PHOSPHATE, DIBASIC AND SODIUM PHOSPHATE, MONOBASIC 7; 19 G/133ML; G/133ML
1 ENEMA RECTAL ONCE AS NEEDED
Status: DISCONTINUED | OUTPATIENT
Start: 2023-01-13 | End: 2023-01-15

## 2023-01-13 RX ORDER — ASPIRIN 81 MG/1
81 TABLET ORAL 2 TIMES DAILY
Status: DISCONTINUED | OUTPATIENT
Start: 2023-01-13 | End: 2023-01-15

## 2023-01-13 RX ADMIN — MIDAZOLAM HYDROCHLORIDE 2 MG: 1 INJECTION INTRAMUSCULAR; INTRAVENOUS at 11:06:00

## 2023-01-13 RX ADMIN — BUPIVACAINE HYDROCHLORIDE 1.5 ML: 7.5 INJECTION, SOLUTION INTRASPINAL at 11:12:00

## 2023-01-13 RX ADMIN — SODIUM CHLORIDE, SODIUM LACTATE, POTASSIUM CHLORIDE, CALCIUM CHLORIDE: 600; 310; 30; 20 INJECTION, SOLUTION INTRAVENOUS at 12:45:00

## 2023-01-13 RX ADMIN — EPHEDRINE SULFATE 10 MG: 50 INJECTION INTRAVENOUS at 11:42:00

## 2023-01-13 RX ADMIN — MORPHINE SULFATE 0.2 MG: 1 INJECTION, SOLUTION EPIDURAL; INTRATHECAL; INTRAVENOUS at 11:12:00

## 2023-01-13 RX ADMIN — ONDANSETRON 4 MG: 2 INJECTION INTRAMUSCULAR; INTRAVENOUS at 11:21:00

## 2023-01-13 RX ADMIN — CEFAZOLIN SODIUM/WATER 2 G: 2 G/20 ML SYRINGE (ML) INTRAVENOUS at 11:23:00

## 2023-01-13 RX ADMIN — SODIUM CHLORIDE, SODIUM LACTATE, POTASSIUM CHLORIDE, CALCIUM CHLORIDE: 600; 310; 30; 20 INJECTION, SOLUTION INTRAVENOUS at 11:04:00

## 2023-01-13 RX ADMIN — DEXAMETHASONE SODIUM PHOSPHATE 4 MG: 4 MG/ML VIAL (ML) INJECTION at 11:21:00

## 2023-01-13 RX ADMIN — TRANEXAMIC ACID 1000 MG: 10 INJECTION, SOLUTION INTRAVENOUS at 11:23:00

## 2023-01-13 NOTE — CM/SW NOTE
CM requested department  [facility]) to initiate [facility] referral for [address]. F2F entered. JACE/SUAD will continue to follow. Yuval Ahn.  Basil High RN, BSN  Nurse   [phone]

## 2023-01-13 NOTE — PLAN OF CARE
Problem: Patient Centered Care  Goal: Patient preferences are identified and integrated in the patient's plan of care  Description: Interventions:  - What would you like us to know as we care for you?   - Provide timely, complete, and accurate information to patient/family  - Incorporate patient and family knowledge, values, beliefs, and cultural backgrounds into the planning and delivery of care  - Encourage patient/family to participate in care and decision-making at the level they choose  - Honor patient and family perspectives and choices  Outcome: Progressing     Problem: Patient/Family Goals  Goal: Patient/Family Long Term Goal  Description: Patient's Long Term Goal:     Interventions:  -   - See additional Care Plan goals for specific interventions  Outcome: Progressing  Goal: Patient/Family Short Term Goal  Description: Patient's Short Term Goal:     Interventions:   -   - See additional Care Plan goals for specific interventions  Outcome: Progressing     Problem: CARDIOVASCULAR - ADULT  Goal: Maintains optimal cardiac output and hemodynamic stability  Description: INTERVENTIONS:  - Monitor vital signs, rhythm, and trends  - Monitor for bleeding, hypotension and signs of decreased cardiac output  - Evaluate effectiveness of vasoactive medications to optimize hemodynamic stability  - Monitor arterial and/or venous puncture sites for bleeding and/or hematoma  - Assess quality of pulses, skin color and temperature  - Assess for signs of decreased coronary artery perfusion - ex.  Angina  - Evaluate fluid balance, assess for edema, trend weights  Outcome: Progressing  Goal: Absence of cardiac arrhythmias or at baseline  Description: INTERVENTIONS:  - Continuous cardiac monitoring, monitor vital signs, obtain 12 lead EKG if indicated  - Evaluate effectiveness of antiarrhythmic and heart rate control medications as ordered  - Initiate emergency measures for life threatening arrhythmias  - Monitor electrolytes and administer replacement therapy as ordered  Outcome: Progressing     Problem: SKIN/TISSUE INTEGRITY - ADULT  Goal: Skin integrity remains intact  Description: INTERVENTIONS  - Assess and document risk factors for pressure ulcer development  - Assess and document skin integrity  - Monitor for areas of redness and/or skin breakdown  - Initiate interventions, skin care algorithm/standards of care as needed  Outcome: Progressing  Goal: Incision(s), wounds(s) or drain site(s) healing without S/S of infection  Description: INTERVENTIONS:  - Assess and document risk factors for pressure ulcer development  - Assess and document skin integrity  - Assess and document dressing/incision, wound bed, drain sites and surrounding tissue  - Implement wound care per orders  - Initiate isolation precautions as appropriate  - Initiate Pressure Ulcer prevention bundle as indicated  Outcome: Progressing     Problem: MUSCULOSKELETAL - ADULT  Goal: Return mobility to safest level of function  Description: INTERVENTIONS:  - Assess patient stability and activity tolerance for standing, transferring and ambulating w/ or w/o assistive devices  - Assist with transfers and ambulation using safe patient handling equipment as needed  - Ensure adequate protection for wounds/incisions during mobilization  - Obtain PT/OT consults as needed  - Advance activity as appropriate  - Communicate ordered activity level and limitations with patient/family  Outcome: Progressing  Goal: Maintain proper alignment of affected body part  Description: INTERVENTIONS:  - Support and protect limb and body alignment per provider's orders  - Instruct and reinforce with patient and family use of appropriate assistive device and precautions (e.g. spinal or hip dislocation precautions)  Outcome: Progressing     Problem: PAIN - ADULT  Goal: Verbalizes/displays adequate comfort level or patient's stated pain goal  Description: INTERVENTIONS:  - Encourage pt to monitor pain and request assistance  - Assess pain using appropriate pain scale  - Administer analgesics based on type and severity of pain and evaluate response  - Implement non-pharmacological measures as appropriate and evaluate response  - Consider cultural and social influences on pain and pain management  - Manage/alleviate anxiety  - Utilize distraction and/or relaxation techniques  - Monitor for opioid side effects  - Notify MD/LIP if interventions unsuccessful or patient reports new pain  - Anticipate increased pain with activity and pre-medicate as appropriate  Outcome: Progressing     Problem: SAFETY ADULT - FALL  Goal: Free from fall injury  Description: INTERVENTIONS:  - Assess pt frequently for physical needs  - Identify cognitive and physical deficits and behaviors that affect risk of falls.   - [place] fall precautions as indicated by assessment.  - Educate pt/family on patient safety including physical limitations  - Instruct pt to call for assistance with activity based on assessment  - Modify environment to reduce risk of injury  - Provide assistive devices as appropriate  - Consider OT/PT consult to assist with strengthening/mobility  - Encourage toileting schedule  Outcome: Progressing     Problem: DISCHARGE PLANNING  Goal: Discharge to home or other facility with appropriate resources  Description: INTERVENTIONS:  - Identify barriers to discharge w/pt and caregiver  - Include patient/family/discharge partner in discharge planning  - Arrange for needed discharge resources and transportation as appropriate  - Identify discharge learning needs (meds, wound care, etc)  - Arrange for interpreters to assist at discharge as needed  - Consider post-discharge preferences of patient/family/discharge partner  - Complete POLST form as appropriate  - Assess patient's ability to be responsible for managing their own health  - Refer to Case Management Department for coordinating discharge planning if the patient needs post-hospital services based on physician/LIP order or complex needs related to functional status, cognitive ability or social support system  Outcome: Progressing

## 2023-01-13 NOTE — BRIEF OP NOTE
Pre-Operative Diagnosis: Osteoarthritis left knee     Post-Operative Diagnosis: Osteoarthritis left knee      Procedure Performed:   Left total knee arthroplasty    Surgeon(s) and Role:     * Grady Caldwell MD - Primary    Assistant(s):  Surgical Assistant.: Yennifer Cuadra  PA: Ramya Perez PA-C     Surgical Findings: OA     Specimen: path     Estimated Blood Loss: Blood Output: 50 mL (1/13/2023 12:32 PM)      Dictation Number:  [id number]    Omid Collier MD  1/13/2023  12:37 PM

## 2023-01-13 NOTE — CM/SW NOTE
Department  notified of request for [facility], aidin referrals started. Assigned CM/SW to follow up with pt/family on further discharge planning.      Peace Crowe   January 13, 2023   15:32

## 2023-01-13 NOTE — ANESTHESIA PROCEDURE NOTES
Spinal Block    Date/Time: 1/13/2023 11:04 AM  Performed by: Alva Bonilla CRNA  Authorized by: Melida Dominguez MD       General Information and Staff    Start Time:  1/13/2023 11:04 AM  End Time:  1/13/2023 11:12 AM  CRNA:  Alva Bonilla CRNA  Performed by:  CRNA  Patient Location:  OR  Site identification: surface landmarks    Reason for Block: at surgeon's request and surgical anesthesia    Preanesthetic Checklist: patient identified, IV checked, risks and benefits discussed, monitors and equipment checked, pre-op evaluation, timeout performed, anesthesia consent and sterile technique used      Procedure Details    Patient Position:  Sitting  Prep: ChloraPrep    Monitoring:  Cardiac monitor  Approach:  Midline  Location:  L3-4  Injection Technique:  Single-shot    Needle    Needle Type:  Pencil-tip  Needle Gauge:  24 G  Needle Length:  3.5 in    Assessment    Sensory Level:   Events: clear CSF, CSF aspirated, well tolerated and blood negative      Additional Comments

## 2023-01-13 NOTE — CM/SW NOTE
SW self referred to pt's case after chart review. PT/OT to eval. Pt would like [facility] upon DC. Referrals sent. F2F entered     Would need to present pt with choice of HHC once avail. Will need PT INR checks     PLAN: Home with HHC - pending accepting agency    DEBBI Simon, MSW ext.  [phone]

## 2023-01-14 LAB
DEPRECATED RDW RBC AUTO: 45 FL (ref 35.1–46.3)
ERYTHROCYTE [DISTWIDTH] IN BLOOD BY AUTOMATED COUNT: 12.4 % (ref 11–15)
HCT VFR BLD AUTO: 29 %
HGB BLD-MCNC: 9.8 G/DL
MCH RBC QN AUTO: 34 PG (ref 26–34)
MCHC RBC AUTO-ENTMCNC: 33.8 G/DL (ref 31–37)
MCV RBC AUTO: 100.7 FL
PLATELET # BLD AUTO: 229 10(3)UL (ref 150–450)
RBC # BLD AUTO: 2.88 X10(6)UL
WBC # BLD AUTO: 11.6 X10(3) UL (ref 4–11)

## 2023-01-14 PROCEDURE — 99233 SBSQ HOSP IP/OBS HIGH 50: CPT | Performed by: HOSPITALIST

## 2023-01-14 RX ORDER — HYDROCODONE BITARTRATE AND ACETAMINOPHEN 5; 325 MG/1; MG/1
1-2 TABLET ORAL EVERY 6 HOURS PRN
Status: DISCONTINUED | OUTPATIENT
Start: 2023-01-14 | End: 2023-01-15

## 2023-01-14 NOTE — ANESTHESIA POST-OP FOLLOW-UP NOTE
Guilherme Grant is POD#1 after   Surgical Procedures     Case IDs Date Procedure Surgeon Location Status    [id number] 1/13/23 Left total knee arthroplasty Logan Briones MD [facility] [place] [facility]      Ricardo Garner underwent spinal with duramorph for analgesia. Tolerated the procedure well. Today, denies headache, back pain, or residual LE weakness/numbness. Pt does endorse dizziness throughout the night, including fall while using bathroom. Would recommend reducing morphine dose in future. Injection site examined, no erythema, hematoma, or tenderness to palpation. Pain score is 0/10. No further anesthesia management needed at this time. Doing well on oral pain meds. All anesthetic questions answered.      Mary Puckett MD

## 2023-01-14 NOTE — PLAN OF CARE
POD #1. CMS done as ordered. CPM tolerated last night at 40 degree flexion. IV antibiotics given with no A/R. Ice pack applied continuously overnight on left knee. Dressing on surgical left knee intact and dry. No significant pain reported on the affected knee. Mcgraw removed per protocol.     Problem: Patient Centered Care  Goal: Patient preferences are identified and integrated in the patient's plan of care  Description: Interventions:  - What would you like us to know as we care for you?   - Provide timely, complete, and accurate information to patient/family  - Incorporate patient and family knowledge, values, beliefs, and cultural backgrounds into the planning and delivery of care  - Encourage patient/family to participate in care and decision-making at the level they choose  - Honor patient and family perspectives and choices  Outcome: Progressing     Problem: Patient/Family Goals  Goal: Patient/Family Long Term Goal  Description: Patient's Long Term Goal: To go home    Interventions:  - s/p left TKA, pain management, IV antibiotics for prophylaxis, PT/OT to treat, CPM as ordered  - See additional Care Plan goals for specific interventions  Outcome: Progressing  Goal: Patient/Family Short Term Goal  Description: Patient's Short Term Goal: To have less pain on left knee    Interventions:   - s/p left TKA, pain management, PT/OT to eval and treat, IVFs, standby assist  - See additional Care Plan goals for specific interventions  Outcome: Progressing     Problem: CARDIOVASCULAR - ADULT  Goal: Maintains optimal cardiac output and hemodynamic stability  Description: INTERVENTIONS:  - Monitor vital signs, rhythm, and trends  - Monitor for bleeding, hypotension and signs of decreased cardiac output  - Evaluate effectiveness of vasoactive medications to optimize hemodynamic stability  - Monitor arterial and/or venous puncture sites for bleeding and/or hematoma  - Assess quality of pulses, skin color and temperature  - Assess for signs of decreased coronary artery perfusion - ex.  Angina  - Evaluate fluid balance, assess for edema, trend weights  Outcome: Progressing  Goal: Absence of cardiac arrhythmias or at baseline  Description: INTERVENTIONS:  - Continuous cardiac monitoring, monitor vital signs, obtain 12 lead EKG if indicated  - Evaluate effectiveness of antiarrhythmic and heart rate control medications as ordered  - Initiate emergency measures for life threatening arrhythmias  - Monitor electrolytes and administer replacement therapy as ordered  Outcome: Progressing     Problem: SKIN/TISSUE INTEGRITY - ADULT  Goal: Skin integrity remains intact  Description: INTERVENTIONS  - Assess and document risk factors for pressure ulcer development  - Assess and document skin integrity  - Monitor for areas of redness and/or skin breakdown  - Initiate interventions, skin care algorithm/standards of care as needed  Outcome: Progressing  Goal: Incision(s), wounds(s) or drain site(s) healing without S/S of infection  Description: INTERVENTIONS:  - Assess and document risk factors for pressure ulcer development  - Assess and document skin integrity  - Assess and document dressing/incision, wound bed, drain sites and surrounding tissue  - Implement wound care per orders  - Initiate isolation precautions as appropriate  - Initiate Pressure Ulcer prevention bundle as indicated  Outcome: Progressing     Problem: MUSCULOSKELETAL - ADULT  Goal: Return mobility to safest level of function  Description: INTERVENTIONS:  - Assess patient stability and activity tolerance for standing, transferring and ambulating w/ or w/o assistive devices  - Assist with transfers and ambulation using safe patient handling equipment as needed  - Ensure adequate protection for wounds/incisions during mobilization  - Obtain PT/OT consults as needed  - Advance activity as appropriate  - Communicate ordered activity level and limitations with patient/family  Outcome: Progressing  Goal: Maintain proper alignment of affected body part  Description: INTERVENTIONS:  - Support and protect limb and body alignment per provider's orders  - Instruct and reinforce with patient and family use of appropriate assistive device and precautions (e.g. spinal or hip dislocation precautions)  Outcome: Progressing     Problem: PAIN - ADULT  Goal: Verbalizes/displays adequate comfort level or patient's stated pain goal  Description: INTERVENTIONS:  - Encourage pt to monitor pain and request assistance  - Assess pain using appropriate pain scale  - Administer analgesics based on type and severity of pain and evaluate response  - Implement non-pharmacological measures as appropriate and evaluate response  - Consider cultural and social influences on pain and pain management  - Manage/alleviate anxiety  - Utilize distraction and/or relaxation techniques  - Monitor for opioid side effects  - Notify MD/LIP if interventions unsuccessful or patient reports new pain  - Anticipate increased pain with activity and pre-medicate as appropriate  Outcome: Progressing     Problem: SAFETY ADULT - FALL  Goal: Free from fall injury  Description: INTERVENTIONS:  - Assess pt frequently for physical needs  - Identify cognitive and physical deficits and behaviors that affect risk of falls.   - [place] fall precautions as indicated by assessment.  - Educate pt/family on patient safety including physical limitations  - Instruct pt to call for assistance with activity based on assessment  - Modify environment to reduce risk of injury  - Provide assistive devices as appropriate  - Consider OT/PT consult to assist with strengthening/mobility  - Encourage toileting schedule  Outcome: Progressing     Problem: DISCHARGE PLANNING  Goal: Discharge to home or other facility with appropriate resources  Description: INTERVENTIONS:  - Identify barriers to discharge w/pt and caregiver  - Include patient/family/discharge partner in discharge planning  - Arrange for needed discharge resources and transportation as appropriate  - Identify discharge learning needs (meds, wound care, etc)  - Arrange for interpreters to assist at discharge as needed  - Consider post-discharge preferences of patient/family/discharge partner  - Complete POLST form as appropriate  - Assess patient's ability to be responsible for managing their own health  - Refer to Case Management Department for coordinating discharge planning if the patient needs post-hospital services based on physician/LIP order or complex needs related to functional status, cognitive ability or social support system  Outcome: Progressing

## 2023-01-14 NOTE — PHYSICAL THERAPY NOTE
Chart reviewed. Spoke with RN supervisor. Patient had near syncope in AM- advised to hold until fluid blous IV complete.   Will attempt after lunch

## 2023-01-14 NOTE — HOME CARE LIAISON
Received referral via [facility] for Home Health services. Spoke w/ patient and [facility] from [address] [place] [address], choice is [address]. Agency reserved in [address] [place] [address] and contact information placed on AVS.Financial interest disclosure provided. Notified SW.

## 2023-01-14 NOTE — DISCHARGE INSTRUCTIONS
Keep incision dry for 2 weeks. Change Mepilex dressing every 3-5 days. Ambulate with walker and assist.     Sometimes managing your health at home requires assistance. The [place] team has recognized your preference to use [facility]. They can be reached by phone at [phone]. The fax number for your reference is [phone]. A representative from the home health agency will contact you or your family to schedule your first visit. Check INR on Monday. Goal 2-3     Will continue aspirin until her INR reaches 1.8. Follow-up with Dr Jewels Guerrier in the office in 3 weeks.

## 2023-01-15 VITALS
RESPIRATION RATE: 18 BRPM | HEART RATE: 65 BPM | DIASTOLIC BLOOD PRESSURE: 57 MMHG | OXYGEN SATURATION: 96 % | HEIGHT: 61 IN | TEMPERATURE: 98 F | BODY MASS INDEX: 30.78 KG/M2 | WEIGHT: 163 LBS | SYSTOLIC BLOOD PRESSURE: 126 MMHG

## 2023-01-15 LAB
ANION GAP SERPL CALC-SCNC: 4 MMOL/L (ref 0–18)
BASOPHILS # BLD AUTO: 0.05 X10(3) UL (ref 0–0.2)
BASOPHILS NFR BLD AUTO: 0.5 %
BUN BLD-MCNC: 10 MG/DL (ref 7–18)
BUN/CREAT SERPL: 13.9 (ref 10–20)
CALCIUM BLD-MCNC: 8.6 MG/DL (ref 8.5–10.1)
CHLORIDE SERPL-SCNC: 109 MMOL/L (ref 98–112)
CO2 SERPL-SCNC: 28 MMOL/L (ref 21–32)
CREAT BLD-MCNC: 0.72 MG/DL
DEPRECATED RDW RBC AUTO: 46.7 FL (ref 35.1–46.3)
EOSINOPHIL # BLD AUTO: 0.23 X10(3) UL (ref 0–0.7)
EOSINOPHIL NFR BLD AUTO: 2.3 %
ERYTHROCYTE [DISTWIDTH] IN BLOOD BY AUTOMATED COUNT: 12.5 % (ref 11–15)
GFR SERPLBLD BASED ON 1.73 SQ M-ARVRAT: 86 ML/MIN/1.73M2 (ref 60–?)
GLUCOSE BLD-MCNC: 126 MG/DL (ref 70–99)
HCT VFR BLD AUTO: 33.7 %
HGB BLD-MCNC: 10.7 G/DL
IMM GRANULOCYTES # BLD AUTO: 0.04 X10(3) UL (ref 0–1)
IMM GRANULOCYTES NFR BLD: 0.4 %
INR BLD: 1.54 (ref 0.85–1.16)
LYMPHOCYTES # BLD AUTO: 1.36 X10(3) UL (ref 1–4)
LYMPHOCYTES NFR BLD AUTO: 13.9 %
MCH RBC QN AUTO: 32.5 PG (ref 26–34)
MCHC RBC AUTO-ENTMCNC: 31.8 G/DL (ref 31–37)
MCV RBC AUTO: 102.4 FL
MONOCYTES # BLD AUTO: 1.09 X10(3) UL (ref 0.1–1)
MONOCYTES NFR BLD AUTO: 11.1 %
NEUTROPHILS # BLD AUTO: 7.02 X10 (3) UL (ref 1.5–7.7)
NEUTROPHILS # BLD AUTO: 7.02 X10(3) UL (ref 1.5–7.7)
NEUTROPHILS NFR BLD AUTO: 71.8 %
OSMOLALITY SERPL CALC.SUM OF ELEC: 293 MOSM/KG (ref 275–295)
PLATELET # BLD AUTO: 251 10(3)UL (ref 150–450)
POTASSIUM SERPL-SCNC: 3.9 MMOL/L (ref 3.5–5.1)
PROTHROMBIN TIME: 18.2 SECONDS (ref 11.6–14.8)
RBC # BLD AUTO: 3.29 X10(6)UL
SODIUM SERPL-SCNC: 141 MMOL/L (ref 136–145)
WBC # BLD AUTO: 9.8 X10(3) UL (ref 4–11)

## 2023-01-15 PROCEDURE — 99232 SBSQ HOSP IP/OBS MODERATE 35: CPT | Performed by: HOSPITALIST

## 2023-01-15 RX ORDER — ASPIRIN 81 MG/1
81 TABLET ORAL 2 TIMES DAILY
Refills: 0 | Status: SHIPPED | COMMUNITY
Start: 2023-01-15

## 2023-01-15 RX ORDER — ONDANSETRON 4 MG/1
4 TABLET, ORALLY DISINTEGRATING ORAL EVERY 8 HOURS PRN
Qty: 10 TABLET | Refills: 0 | Status: SHIPPED | OUTPATIENT
Start: 2023-01-15

## 2023-01-15 RX ORDER — HYDROCODONE BITARTRATE AND ACETAMINOPHEN 5; 325 MG/1; MG/1
1-2 TABLET ORAL EVERY 6 HOURS PRN
Qty: 40 TABLET | Refills: 0 | Status: SHIPPED | OUTPATIENT
Start: 2023-01-15

## 2023-01-15 RX ORDER — ONDANSETRON 4 MG/1
4 TABLET, ORALLY DISINTEGRATING ORAL ONCE
Status: COMPLETED | OUTPATIENT
Start: 2023-01-15 | End: 2023-01-15

## 2023-01-15 RX ORDER — MELATONIN
325
Qty: 20 TABLET | Refills: 0 | Status: SHIPPED | OUTPATIENT
Start: 2023-01-16

## 2023-01-15 RX ORDER — PSEUDOEPHEDRINE HCL 30 MG
100 TABLET ORAL 2 TIMES DAILY
Qty: 20 CAPSULE | Refills: 0 | Status: SHIPPED | OUTPATIENT
Start: 2023-01-15

## 2023-01-15 NOTE — CM/SW NOTE
DC order present, notified [facility] RN of [facility].      Jhonatan Gavin, MSW, [facility]    [id number]

## 2023-01-15 NOTE — PLAN OF CARE
Up 1 assist walker today. Syncope episode x1 , unresponsive for 30 seconds, in bathroom while sitting on toilet; Dr Jose Young and Dr Sadia Stone aware. Bolus given; IV fluids continued throughout the day, BP is improved. DC plan is home with spouse with Home Health. [place] and ice gel packsfor pain management and tolerated CPM -2-50 : mepilex dressing without drainage. SCDs. Coumadin. Problem: Patient Centered Care  Goal: Patient preferences are identified and integrated in the patient's plan of care  Description: Interventions:  - What would you like us to know as we care for you?  *From home with spouse**  - Provide timely, complete, and accurate information to patient/family  - Incorporate patient and family knowledge, values, beliefs, and cultural backgrounds into the planning and delivery of care  - Encourage patient/family to participate in care and decision-making at the level they choose  - Honor patient and family perspectives and choices  Outcome: Progressing     Problem: Patient/Family Goals  Goal: Patient/Family Long Term Goal  Description: Patient's Long Term Goal: To go home    Interventions:  - s/p left TKA, pain management, IV antibiotics for prophylaxis, PT/OT to treat, CPM as ordered  - See additional Care Plan goals for specific interventions  Outcome: Progressing  Goal: Patient/Family Short Term Goal  Description: Patient's Short Term Goal: To have less pain on left knee    Interventions:   - s/p left TKA, pain management, PT/OT to eval and treat, IVFs, standby assist  - See additional Care Plan goals for specific interventions  Outcome: Progressing     Problem: CARDIOVASCULAR - ADULT  Goal: Maintains optimal cardiac output and hemodynamic stability  Description: INTERVENTIONS:  - Monitor vital signs, rhythm, and trends  - Monitor for bleeding, hypotension and signs of decreased cardiac output  - Evaluate effectiveness of vasoactive medications to optimize hemodynamic stability  - Monitor arterial and/or venous puncture sites for bleeding and/or hematoma  - Assess quality of pulses, skin color and temperature  - Assess for signs of decreased coronary artery perfusion - ex.  Angina  - Evaluate fluid balance, assess for edema, trend weights  Outcome: Progressing  Goal: Absence of cardiac arrhythmias or at baseline  Description: INTERVENTIONS:  - Continuous cardiac monitoring, monitor vital signs, obtain 12 lead EKG if indicated  - Evaluate effectiveness of antiarrhythmic and heart rate control medications as ordered  - Initiate emergency measures for life threatening arrhythmias  - Monitor electrolytes and administer replacement therapy as ordered  Outcome: Progressing     Problem: SKIN/TISSUE INTEGRITY - ADULT  Goal: Skin integrity remains intact  Description: INTERVENTIONS  - Assess and document risk factors for pressure ulcer development  - Assess and document skin integrity  - Monitor for areas of redness and/or skin breakdown  - Initiate interventions, skin care algorithm/standards of care as needed  Outcome: Progressing  Goal: Incision(s), wounds(s) or drain site(s) healing without S/S of infection  Description: INTERVENTIONS:  - Assess and document risk factors for pressure ulcer development  - Assess and document skin integrity  - Assess and document dressing/incision, wound bed, drain sites and surrounding tissue  - Implement wound care per orders  - Initiate isolation precautions as appropriate  - Initiate Pressure Ulcer prevention bundle as indicated  Outcome: Progressing     Problem: MUSCULOSKELETAL - ADULT  Goal: Return mobility to safest level of function  Description: INTERVENTIONS:  - Assess patient stability and activity tolerance for standing, transferring and ambulating w/ or w/o assistive devices  - Assist with transfers and ambulation using safe patient handling equipment as needed  - Ensure adequate protection for wounds/incisions during mobilization  - Obtain PT/OT consults as needed  - Advance activity as appropriate  - Communicate ordered activity level and limitations with patient/family  Outcome: Progressing  Goal: Maintain proper alignment of affected body part  Description: INTERVENTIONS:  - Support and protect limb and body alignment per provider's orders  - Instruct and reinforce with patient and family use of appropriate assistive device and precautions (e.g. spinal or hip dislocation precautions)  Outcome: Progressing     Problem: PAIN - ADULT  Goal: Verbalizes/displays adequate comfort level or patient's stated pain goal  Description: INTERVENTIONS:  - Encourage pt to monitor pain and request assistance  - Assess pain using appropriate pain scale  - Administer analgesics based on type and severity of pain and evaluate response  - Implement non-pharmacological measures as appropriate and evaluate response  - Consider cultural and social influences on pain and pain management  - Manage/alleviate anxiety  - Utilize distraction and/or relaxation techniques  - Monitor for opioid side effects  - Notify MD/LIP if interventions unsuccessful or patient reports new pain  - Anticipate increased pain with activity and pre-medicate as appropriate  Outcome: Progressing     Problem: SAFETY ADULT - FALL  Goal: Free from fall injury  Description: INTERVENTIONS:  - Assess pt frequently for physical needs  - Identify cognitive and physical deficits and behaviors that affect risk of falls.   - [place] fall precautions as indicated by assessment.  - Educate pt/family on patient safety including physical limitations  - Instruct pt to call for assistance with activity based on assessment  - Modify environment to reduce risk of injury  - Provide assistive devices as appropriate  - Consider OT/PT consult to assist with strengthening/mobility  - Encourage toileting schedule  Outcome: Progressing     Problem: DISCHARGE PLANNING  Goal: Discharge to home or other facility with appropriate resources  Description: INTERVENTIONS:  - Identify barriers to discharge w/pt and caregiver  - Include patient/family/discharge partner in discharge planning  - Arrange for needed discharge resources and transportation as appropriate  - Identify discharge learning needs (meds, wound care, etc)  - Arrange for interpreters to assist at discharge as needed  - Consider post-discharge preferences of patient/family/discharge partner  - Complete POLST form as appropriate  - Assess patient's ability to be responsible for managing their own health  - Refer to Case Management Department for coordinating discharge planning if the patient needs post-hospital services based on physician/LIP order or complex needs related to functional status, cognitive ability or social support system  Outcome: Progressing

## 2023-01-15 NOTE — PLAN OF CARE
Pt alert and oriented. Remote tele in place. Coumadin, ASA and SCDs for dvt prophylaxis. Voiding freely. Gel ice packs as needed. [place] prn for pain control. Ambulates with 1 assist + walker. CPM to bedside - pt able to complete 50 mins at HS. Pt had episode of syncope in bathroom per day shift RN. Pt reminded to notify staff if she should feel another episode coming on. Increasing staff presence while up in bathroom for improved safety. Plan for pt to discharge home with [facility] today when cleared.      Problem: Patient Centered Care  Goal: Patient preferences are identified and integrated in the patient's plan of care  Description: Interventions:  - What would you like us to know as we care for you?   - Provide timely, complete, and accurate information to patient/family  - Incorporate patient and family knowledge, values, beliefs, and cultural backgrounds into the planning and delivery of care  - Encourage patient/family to participate in care and decision-making at the level they choose  - Honor patient and family perspectives and choices  Outcome: Progressing     Problem: Patient/Family Goals  Goal: Patient/Family Long Term Goal  Description: Patient's Long Term Goal: To go home    Interventions:  - s/p left TKA, pain management, IV antibiotics for prophylaxis, PT/OT to treat, CPM as ordered  - See additional Care Plan goals for specific interventions  Outcome: Progressing  Goal: Patient/Family Short Term Goal  Description: Patient's Short Term Goal: To have less pain on left knee    Interventions:   - s/p left TKA, pain management, PT/OT to eval and treat, IVFs, standby assist  - See additional Care Plan goals for specific interventions  Outcome: Progressing     Problem: CARDIOVASCULAR - ADULT  Goal: Maintains optimal cardiac output and hemodynamic stability  Description: INTERVENTIONS:  - Monitor vital signs, rhythm, and trends  - Monitor for bleeding, hypotension and signs of decreased cardiac output  - Evaluate effectiveness of vasoactive medications to optimize hemodynamic stability  - Monitor arterial and/or venous puncture sites for bleeding and/or hematoma  - Assess quality of pulses, skin color and temperature  - Assess for signs of decreased coronary artery perfusion - ex.  Angina  - Evaluate fluid balance, assess for edema, trend weights  Outcome: Progressing  Goal: Absence of cardiac arrhythmias or at baseline  Description: INTERVENTIONS:  - Continuous cardiac monitoring, monitor vital signs, obtain 12 lead EKG if indicated  - Evaluate effectiveness of antiarrhythmic and heart rate control medications as ordered  - Initiate emergency measures for life threatening arrhythmias  - Monitor electrolytes and administer replacement therapy as ordered  Outcome: Progressing     Problem: SKIN/TISSUE INTEGRITY - ADULT  Goal: Skin integrity remains intact  Description: INTERVENTIONS  - Assess and document risk factors for pressure ulcer development  - Assess and document skin integrity  - Monitor for areas of redness and/or skin breakdown  - Initiate interventions, skin care algorithm/standards of care as needed  Outcome: Progressing  Goal: Incision(s), wounds(s) or drain site(s) healing without S/S of infection  Description: INTERVENTIONS:  - Assess and document risk factors for pressure ulcer development  - Assess and document skin integrity  - Assess and document dressing/incision, wound bed, drain sites and surrounding tissue  - Implement wound care per orders  - Initiate isolation precautions as appropriate  - Initiate Pressure Ulcer prevention bundle as indicated  Outcome: Progressing     Problem: MUSCULOSKELETAL - ADULT  Goal: Return mobility to safest level of function  Description: INTERVENTIONS:  - Assess patient stability and activity tolerance for standing, transferring and ambulating w/ or w/o assistive devices  - Assist with transfers and ambulation using safe patient handling equipment as needed  - Ensure adequate protection for wounds/incisions during mobilization  - Obtain PT/OT consults as needed  - Advance activity as appropriate  - Communicate ordered activity level and limitations with patient/family  Outcome: Progressing  Goal: Maintain proper alignment of affected body part  Description: INTERVENTIONS:  - Support and protect limb and body alignment per provider's orders  - Instruct and reinforce with patient and family use of appropriate assistive device and precautions (e.g. spinal or hip dislocation precautions)  Outcome: Progressing     Problem: PAIN - ADULT  Goal: Verbalizes/displays adequate comfort level or patient's stated pain goal  Description: INTERVENTIONS:  - Encourage pt to monitor pain and request assistance  - Assess pain using appropriate pain scale  - Administer analgesics based on type and severity of pain and evaluate response  - Implement non-pharmacological measures as appropriate and evaluate response  - Consider cultural and social influences on pain and pain management  - Manage/alleviate anxiety  - Utilize distraction and/or relaxation techniques  - Monitor for opioid side effects  - Notify MD/LIP if interventions unsuccessful or patient reports new pain  - Anticipate increased pain with activity and pre-medicate as appropriate  Outcome: Progressing     Problem: SAFETY ADULT - FALL  Goal: Free from fall injury  Description: INTERVENTIONS:  - Assess pt frequently for physical needs  - Identify cognitive and physical deficits and behaviors that affect risk of falls.   - [place] fall precautions as indicated by assessment.  - Educate pt/family on patient safety including physical limitations  - Instruct pt to call for assistance with activity based on assessment  - Modify environment to reduce risk of injury  - Provide assistive devices as appropriate  - Consider OT/PT consult to assist with strengthening/mobility  - Encourage toileting schedule  Outcome: Progressing     Problem: DISCHARGE PLANNING  Goal: Discharge to home or other facility with appropriate resources  Description: INTERVENTIONS:  - Identify barriers to discharge w/pt and caregiver  - Include patient/family/discharge partner in discharge planning  - Arrange for needed discharge resources and transportation as appropriate  - Identify discharge learning needs (meds, wound care, etc)  - Arrange for interpreters to assist at discharge as needed  - Consider post-discharge preferences of patient/family/discharge partner  - Complete POLST form as appropriate  - Assess patient's ability to be responsible for managing their own health  - Refer to Case Management Department for coordinating discharge planning if the patient needs post-hospital services based on physician/LIP order or complex needs related to functional status, cognitive ability or social support system  Outcome: Progressing

## 2023-01-15 NOTE — PLAN OF CARE
Progress adequate for discharge per MDs and therapists. Walker given. Scripts sent to pharm. [facility] is DC plan.

## 2023-01-15 NOTE — PHYSICAL THERAPY NOTE
PHYSICAL THERAPY TREATMENT NOTE - INPATIENT     Room Number: 430/430-A       Presenting Problem: L TKR    Problem List  Principal Problem:    Primary osteoarthritis of left knee  Active Problems:    Hyperlipidemia, mixed    Paroxysmal atrial fibrillation (HCC)    YASMIN (obstructive sleep apnea)      PHYSICAL THERAPY ASSESSMENT   Chart reviewed. SERVANDO Mac approved participation in physical therapy. PPE worn by therapist: mask and gloves. Patient was wearing a mask during session. Patient presented in bedside chair with did not rate pain. Patient with good  progress towards goals during this session. Education provided on Total knee exercise protocol, Physical therapy plan of care and physiological benefits of out of bed mobility. Patient with good carryover. Pt is received in the chair with her spouse present and was cleared for therapy session. Pt was SBA with all sit<>stand transfers with the RW. Pt was able to AMB about 400' with the RW SBA. Pt with decreased adelaide and step length with narrow ODETTE but with very good balance and safety awareness. Pt was able to AMB to the therapy gym for stair training. Pt was educated and able to negotiate 4 stairs with 1 HR and SC CGA. Pt was cued for proper sequencing and technique. Pt with good balance. Pt then was educated and able to negotiate 1 stoop step with the RW CGA. Pt cued for proper sequencing and technique with the RW. Pt also with good balance on the step. Pt was then able to AMB back to her room from the therapy gym. Pt is returned to the room and to sitting in the chair with all needs within reach. Discussed and educated pt on safe car transfers when dc to home. Pt and  with good understanding. Pt is on track to dc to home once medically cleared. Reported to the RN on the status of the pt.      Bed mobility: NT  Transfers: Supervision  Gait Assistance: Supervision  Distance (ft): 400'  Assistive Device: Rolling walker  Pattern: L Decreased stance time Patient was left in bedside chair at end of session with all needs in reach. The patient's Approx Degree of Impairment: 46.58% has been calculated based on documentation in the [facility] '6 clicks' Inpatient Basic Mobility Short Form. Research supports that patients with this level of impairment may benefit from Home with home health PT. RN aware of patient status post session. DISCHARGE RECOMMENDATIONS  PT Discharge Recommendations: Home with home health PT;24 hour care/supervision     PLAN  PT Treatment Plan: Gait training;Stair training    SUBJECTIVE  Pt was agreeable to therapy session. OBJECTIVE       WEIGHT BEARING RESTRICTION  Weight Bearing Restriction: L lower extremity           L Lower Extremity: Weight Bearing as Tolerated    PAIN ASSESSMENT   Rating:  (did not rate)  Location: L KNEE  Management Techniques: Activity promotion; Body mechanics; Relaxation;Repositioning    BALANCE                                                                                                                       Static Sitting: Good  Dynamic Sitting: Fair +           Static Standing: Fair  Dynamic Standing: Fair -    ACTIVITY TOLERANCE                         O2 WALK       AM-PAC '6-Clicks' INPATIENT SHORT FORM - BASIC MOBILITY  How much difficulty does the patient currently have. .. Patient Difficulty: Turning over in bed (including adjusting bedclothes, sheets and blankets)?: A Little   Patient Difficulty: Sitting down on and standing up from a chair with arms (e.g., wheelchair, bedside commode, etc.): A Little   Patient Difficulty: Moving from lying on back to sitting on the side of the bed?: A Little   How much help from another person does the patient currently need. ..    Help from Another: Moving to and from a bed to a chair (including a wheelchair)?: A Little   Help from Another: Need to walk in hospital room?: A Little   Help from Another: Climbing 3-5 steps with a railing?: A Little     AM-PAC Score:  Raw Score: 18   Approx Degree of Impairment: 46.58%   Standardized Score (AM-PAC Scale): 43.63   CMS Modifier (G-Code): CK        Patient End of Session: Up in chair;Needs met;Call light within reach;RN aware of session/findings; All patient questions and concerns addressed; Family present    CURRENT GOALS     Goals to be met by: 1/20/23  Patient Goal Patient's self-stated goal is: to go home   Goal #1 Patient is able to demonstrate supine - sit EOB @ level: modified independent     Goal #1   Current Status NT received in the chair   Goal #2 Patient is able to demonstrate transfers Sit to/from Stand at assistance level: modified independent     Goal #2  Current Status SBA with the RW   Goal #3 Patient is able to ambulate 300 feet with assistive device at assistance level: modified independent    Goal #3   Current Status 400' with the RW SBA   Goal #4 Patient will negotiate 5 stairs/one curb w/ assistive device and supervision   Goal #4   Current Status 4 stairs with 1 HR and SC CGA  1 stoop step with the RW CGA    Goal #5  AROM 0 degrees extension to 95 degrees flexion     Goal #5   Current Status IN PROGRESS   Goal #6 Patient independently performs home exercise program for ROM/strengthening per the instructions provided in preparation for discharge.    Goal #6  Current Status IN PROGRESS       PT Session Time: 23 minutes  Gait Training: 15 minutes  Therapeutic Activity: 8 minutes

## 2023-01-16 ENCOUNTER — ANTI-COAG VISIT (OUTPATIENT)
Dept: ANTICOAGULATION | Facility: CLINIC | Age: 78
End: 2023-01-16

## 2023-01-16 ENCOUNTER — TELEPHONE (OUTPATIENT)
Dept: INTERNAL MEDICINE CLINIC | Facility: CLINIC | Age: 78
End: 2023-01-16

## 2023-01-16 ENCOUNTER — PATIENT OUTREACH (OUTPATIENT)
Dept: CASE MANAGEMENT | Age: 78
End: 2023-01-16

## 2023-01-16 DIAGNOSIS — Z79.01 LONG TERM (CURRENT) USE OF ANTICOAGULANTS: ICD-10-CM

## 2023-01-16 DIAGNOSIS — Z02.9 ENCOUNTERS FOR UNSPECIFIED ADMINISTRATIVE PURPOSE: ICD-10-CM

## 2023-01-16 DIAGNOSIS — M17.12 PRIMARY OSTEOARTHRITIS OF LEFT KNEE: ICD-10-CM

## 2023-01-16 LAB — INR: 1.9 (ref 0.8–1.2)

## 2023-01-16 PROCEDURE — 1111F DSCHRG MED/CURRENT MED MERGE: CPT

## 2023-01-16 NOTE — TELEPHONE ENCOUNTER
Per Alexandra/nurse, they open patient for [address] today 01/16/2023 for skilled nursing and physical therapy.

## 2023-01-16 NOTE — PROGRESS NOTES
INR result received from Jennifer Sanchez. Knee surgery was done on 1/13/23 and she held warfarin for 4 days prior to surgery. Spoke with Ivana Meyer [address] [place] [address] ([phone]). Per protocol, patient to continue current warfarin dose and recheck INR in 1 week.

## 2023-01-16 NOTE — PROGRESS NOTES
Left message on mailbox for pt to call NCM back for TCM. NCM contact information included in message. Follow up appointments:     Follow up With Specialties Details Why Contact Info   Bhupendra Hernadez MD SURGERY, ORTHOPEDIC Schedule an appointment as soon as possible for a visit in 3 week(s) For wound re-check, follow up in 3 weeks [address] [facility]

## 2023-01-16 NOTE — OPERATIVE REPORT
[facility]    PATIENT'S NAME: Marium Aceves   ATTENDING PHYSICIAN: Dolores Jolly DO   OPERATING PHYSICIAN: Julius Eden MD   PATIENT ACCOUNT#:   [de-identified]    LOCATION:  [address] [place] [address] #:   [id number]       YOB: 1945  ADMISSION DATE:       01/13/2023      OPERATION DATE:  01/13/2023    OPERATIVE REPORT    PREOPERATIVE DIAGNOSIS:  Osteoarthritis, left knee. POSTOPERATIVE DIAGNOSIS:  Osteoarthritis, left knee. PROCEDURE PERFORMED:  Left total knee arthroplasty with Medacta MRI navigated patient specific instruments. ASSISTANT:  Juan Camacho PA-C.    COMPONENTS USED:  Medacta Sphere size 3+ femur, size 3 tibia, size 2 patella, and a 10 mm spacer. ESTIMATED BLOOD LOSS:  50 mL. INDICATIONS:  The patient is a 72-year-old female patient with advanced arthritis of the left knee. It has not responded to appropriate conservative management. After discussion of the options for treatment, the patient requested the above procedure. Our discussion included alternative treatments, and also included, but was not limited to, the potential risk of anesthetic complications, infection, DVT or PE, bleeding complications, periprosthetic fracture or extensor mechanism failure, potential need for revision surgery, nerve or vascular injury, unanticipated perioperative orthopedic or medical complications, etc.  Written consent was obtained. OPERATIVE TECHNIQUE:  The patient was brought to the operating room and given appropriate anesthesia. Prior to making an incision, a time out was performed by the surgical team.  A tourniquet was placed, and the knee was prepped and draped in the usual sterile fashion. After exsanguination with an Esmarch bandage, the tourniquet was inflated with the knee fully flexed. A longitudinal incision was made from just superior to the superomedial corner of the patella to the tibial tubercle.   A midvastus approach to the femur was used. The patellar cut was made first, and a patella protector was placed over the cut surface of the patella which was then subluxed into the lateral gutter. The anterior and posterior cruciate ligaments were excised, and the femoral template was placed over the distal femur and seated well. It was pinned anteriorly, and the distal reference holes were drilled. The distal femoral cutting guide was then attached to the anterior pins, and the distal femoral cut was made. The thickness of the distal femoral bone resection was measured and compared to the templated values. The pins were then translated into the anterior reference holes, and the distal femoral cutting guide was applied. The distal femoral guide was centered appropriately, pinned in place, and the distal femoral finishing cuts were made. Next, the meniscal remnants were excised, and the tibial guide was seated. The extramedullary tibial alignment guide was applied, and alignment was checked. The tibial template was then pinned into place and exchanged for the tibial cutting guide. Alignment of the cutting guide was rechecked with the extramedullary alignment guide. Hohmann retractor was placed behind the tibia to protect the neurovascular structures, and Hohmann retractors were placed medially and laterally to protect the collateral ligaments and the patellar tendon. The tibial cut was made and the tibial bone resection was taken and compared to the templated values. Overall alignment was then checked with the extramedullary alignment guide. The spacer block was used, and the overall alignment, flexion and extension gaps were checked and were excellent. The guide was used to size the patella. The holes were drilled for the patella. The trial components were then inserted. The knee came to full extension and balanced well with varus and valgus stress with symmetrical flexion and extension gaps with the spacer.   The tibial guide was then pinned into place and preparations were made for the stemmed portion of the tibial component. The trochlear recess was cut for the femur. The trial components were then removed, and the bone was prepared with pulsatile lavage. All three components were cemented in place using contemporary technique. Excess cement was removed, and trials again were performed. Stability and alignment were the same as with the provisional components. The actual polyethylene spacer was locked into place in the tibial tray. The tourniquet was deflated and hemostasis was achieved. The wound was closed in routine fashion. Sponge and instrument counts were correct at the end of the procedure. Estimated blood loss was 50 mL. Routine specimens were sent to Pathology. There were no complications. The patient was stable under the care of the anesthesiologist at the completion of the procedure.     Dictated By Isa Muse MD  d: 01/13/2023 12:38:43  t: 01/13/2023 18:07:59  Job [id number]  ERK/

## 2023-01-17 ENCOUNTER — TELEPHONE (OUTPATIENT)
Dept: INTERNAL MEDICINE CLINIC | Facility: CLINIC | Age: 78
End: 2023-01-17

## 2023-01-17 NOTE — TELEPHONE ENCOUNTER
Sent as FYI/TCM protocol:    Spoke to pt for TCM today. Pt declines TCM/HFU with Dr. Christa Parish at this time, confirms [facility] has seen her for start of care and has 2/01/2023 f/u appt with Dr. Thomas Mccormick. TCM/HFU appt recommended by 1/29/2023, as pt is a moderate risk for readmission. Please discuss with PCP and f/u with pt, accordingly. If no need for TCM/HFU, no need to contact patient. Thank you!

## 2023-01-23 ENCOUNTER — ANTI-COAG VISIT (OUTPATIENT)
Dept: ANTICOAGULATION | Facility: CLINIC | Age: 78
End: 2023-01-23

## 2023-01-23 ENCOUNTER — TELEPHONE (OUTPATIENT)
Dept: ANTICOAGULATION | Facility: CLINIC | Age: 78
End: 2023-01-23

## 2023-01-23 ENCOUNTER — TELEPHONE (OUTPATIENT)
Dept: INTERNAL MEDICINE CLINIC | Facility: CLINIC | Age: 78
End: 2023-01-23

## 2023-01-23 DIAGNOSIS — Z79.01 LONG TERM (CURRENT) USE OF ANTICOAGULANTS: ICD-10-CM

## 2023-01-23 LAB — INR: 3.1 (ref 0.8–1.2)

## 2023-01-23 NOTE — PROGRESS NOTES
INR result received from [facility] AT [facility]. Knee surgery was done on 1/13/23. Spoke with Romina Blackman [address] [place] [address] ([phone]). Reports patient stopped taking Norco over the weekend but is still taking Tylenol extra strength a few tablets daily. Advised that Tylenol may increase INR when taken consistently and with warfarin. Advised to continue taking Tylenol as needed and if INR remains elevated at next check, will decrease weekly dose per protocol. Per protocol, decrease weekly dose. Outside of protocol, instructed patient to hold one dose of warfarin tonight, then continue current dose and recheck INR in 1 week.

## 2023-01-23 NOTE — TELEPHONE ENCOUNTER
Today's INR= 3.1  Goal= 2.0-3.0    Knee surgery was done on 1/13/23. Patient stopped taking Norco over the weekend but is still taking Tylenol extra strength a few tablets daily. Advised that Tylenol may increase INR when taken consistently and with warfarin. Advised to continue taking Tylenol as needed and if INR remains elevated at next check, will decrease weekly dose per protocol. Advised to keep greens/veggies in diet. Per protocol, decrease weekly dose. Outside of protocol, instructed patient to hold one dose of warfarin tonight, then continue current dose and recheck INR in 1 week.

## 2023-01-30 ENCOUNTER — ANTI-COAG VISIT (OUTPATIENT)
Dept: ANTICOAGULATION | Facility: CLINIC | Age: 78
End: 2023-01-30

## 2023-01-30 ENCOUNTER — TELEPHONE (OUTPATIENT)
Dept: INTERNAL MEDICINE CLINIC | Facility: CLINIC | Age: 78
End: 2023-01-30

## 2023-01-30 DIAGNOSIS — Z79.01 LONG TERM (CURRENT) USE OF ANTICOAGULANTS: ICD-10-CM

## 2023-01-30 LAB — INR: 3.1 (ref 0.8–1.2)

## 2023-01-30 NOTE — TELEPHONE ENCOUNTER
Spoke with Wilda Greer- [facility]. Today was last [place] [id number] visit with the patient. Spoke with patient Maeve. See Anticoag note.

## 2023-02-01 RX ORDER — OMEPRAZOLE 20 MG/1
CAPSULE, DELAYED RELEASE ORAL
Qty: 90 CAPSULE | Refills: 3 | Status: SHIPPED | OUTPATIENT
Start: 2023-02-01

## 2023-02-01 NOTE — TELEPHONE ENCOUNTER
Refill passed per [employer], [facility] protocol.      Requested Prescriptions   Pending Prescriptions Disp Refills    OMEPRAZOLE 20 MG Oral Capsule Delayed Release [Pharmacy Med Name: OMEPRAZOLE DR 20 MG CAPSULE] 90 capsule 3     Sig: TAKE 1 CAPSULE BY MOUTH EVERY DAY IN THE MORNING BEFORE BREAKFAST       Gastrointestional Medication Protocol Passed - 2/1/2023  1:25 AM        Passed - In person appointment or virtual visit in the past 12 mos or appointment in next 3 mos     Recent Outpatient Visits              1 month ago Preop exam for internal medicine    Kelli Brewer MD    Office Visit    1 month ago Sensorineural hearing loss (SNHL) of left ear with unrestricted hearing of right ear    [address] [place],[address], [address] [facility],[address], [place] Beny Mccall    Office Visit    2 months ago Pre-op testing    [address] [facility]    2 months ago [employer] annual wellness visit, subsequent    Kelli Brewer MD    Office Visit    3 months ago Tinnitus, left    Curlee Glow, Rach Alba MD    Office Visit          Future Appointments         Provider Department Appt Notes    In 5 days [place], [address]    In 1 week [place] [facility], [address]    In 1 week [place], [address]    In 2 weeks [place] [facility], [address]    In 2 weeks [place], [address]    In 3 weeks [place], [address]    In 3 weeks [place], PT [address] Medicare/Suppl    In 4 weeks [facility], [address] Medicare/Suppl    In 1 month [facility], [address] Medicare/Suppl    In 1 month [address] [facility], [address] [facility], [place]     In 1 month [facility], [address] Medicare/Suppl    In 1 month [facility], [address] Medicare/Suppl    In 1 month [facility], [address] Medicare/Suppl    In 3 months Toshia Mederos  [facility], [place] Complete EE/Photos                     Recent Outpatient Visits              1 month ago Preop exam for internal medicine    Michelle Cornejo MD    Office Visit    1 month ago Sensorineural hearing loss (SNHL) of left ear with unrestricted hearing of right ear    [address] [facility],[address], [address], [place] Beny Lopes    Office Visit    2 months ago Pre-op testing    [address] [place], [address] Only    2 months ago [employer] annual wellness visit, subsequent    Michelle Cornejo MD    Office Visit    3 months ago Tinnitus, left    [address] [facility], Marco Erazo MD    Office Visit             Future Appointments         Provider Department Appt Notes    In 5 days [facility], [address] Medicare/Suppl    In 1 week [facility], [address]    In 1 week [facility], [address] [facility]  In 2 weeks Laveta D[facility], [address] Medicare/Suppl    In 2 weeks Laveta Ditty, [address] Medicare/Suppl    In 3 weeks Laveta Ditty, [address] Medicare/Suppl    In 3 weeks Laveta Ditty, [address] Medicare/Suppl    In 4 weeks Laveta Ditty, [address] Medicare/Suppl    In 1 month Laveta Ditty, [address] Medicare/Suppl    In 1 month [address] [facility], [facility]     In 1 month Laveta Ditty, [address] Medicare/Suppl    In 1 month Laveta Ditty, [address] Medicare/Suppl    In 1 month Laveta Ditty, [address] Medicare/Suppl    In 3 months MD Yesenia Trejo, [place] Complete EE/Photos

## 2023-02-06 ENCOUNTER — OFFICE VISIT (OUTPATIENT)
Dept: PHYSICAL THERAPY | Age: 78
End: 2023-02-06
Attending: ORTHOPAEDIC SURGERY
Payer: MEDICARE

## 2023-02-06 DIAGNOSIS — Z96.652 S/P TOTAL KNEE ARTHROPLASTY, LEFT: ICD-10-CM

## 2023-02-06 PROCEDURE — 97110 THERAPEUTIC EXERCISES: CPT | Performed by: PHYSICAL THERAPIST

## 2023-02-06 PROCEDURE — 97162 PT EVAL MOD COMPLEX 30 MIN: CPT | Performed by: PHYSICAL THERAPIST

## 2023-02-09 ENCOUNTER — OFFICE VISIT (OUTPATIENT)
Dept: PHYSICAL THERAPY | Age: 78
End: 2023-02-09
Attending: ORTHOPAEDIC SURGERY
Payer: MEDICARE

## 2023-02-09 PROCEDURE — 97110 THERAPEUTIC EXERCISES: CPT | Performed by: PHYSICAL THERAPIST

## 2023-02-11 ENCOUNTER — APPOINTMENT (OUTPATIENT)
Dept: GENERAL RADIOLOGY | Facility: HOSPITAL | Age: 78
End: 2023-02-11
Attending: EMERGENCY MEDICINE
Payer: MEDICARE

## 2023-02-11 ENCOUNTER — HOSPITAL ENCOUNTER (EMERGENCY)
Facility: HOSPITAL | Age: 78
Discharge: HOME | End: 2023-02-11
Attending: EMERGENCY MEDICINE
Payer: MEDICARE

## 2023-02-11 VITALS
OXYGEN SATURATION: 98 % | HEART RATE: 61 BPM | DIASTOLIC BLOOD PRESSURE: 58 MMHG | HEIGHT: 61 IN | TEMPERATURE: 99 F | WEIGHT: 159 LBS | BODY MASS INDEX: 30.02 KG/M2 | SYSTOLIC BLOOD PRESSURE: 147 MMHG | RESPIRATION RATE: 16 BRPM

## 2023-02-11 DIAGNOSIS — M75.32 CALCIFIC TENDONITIS OF LEFT SHOULDER: Primary | ICD-10-CM

## 2023-02-11 LAB
ANION GAP SERPL CALC-SCNC: 3 MMOL/L (ref 0–18)
ATRIAL RATE: 58 BPM
BASOPHILS # BLD AUTO: 0.05 X10(3) UL (ref 0–0.2)
BASOPHILS NFR BLD AUTO: 0.5 %
BUN BLD-MCNC: 21 MG/DL (ref 7–18)
BUN/CREAT SERPL: 27.6 (ref 10–20)
CALCIUM BLD-MCNC: 9.2 MG/DL (ref 8.5–10.1)
CHLORIDE SERPL-SCNC: 107 MMOL/L (ref 98–112)
CO2 SERPL-SCNC: 30 MMOL/L (ref 21–32)
CREAT BLD-MCNC: 0.76 MG/DL
DEPRECATED RDW RBC AUTO: 46.5 FL (ref 35.1–46.3)
EOSINOPHIL # BLD AUTO: 0.15 X10(3) UL (ref 0–0.7)
EOSINOPHIL NFR BLD AUTO: 1.5 %
ERYTHROCYTE [DISTWIDTH] IN BLOOD BY AUTOMATED COUNT: 12.5 % (ref 11–15)
GFR SERPLBLD BASED ON 1.73 SQ M-ARVRAT: 81 ML/MIN/1.73M2 (ref 60–?)
GLUCOSE BLD-MCNC: 100 MG/DL (ref 70–99)
HCT VFR BLD AUTO: 34.1 %
HGB BLD-MCNC: 11.3 G/DL
IMM GRANULOCYTES # BLD AUTO: 0.02 X10(3) UL (ref 0–1)
IMM GRANULOCYTES NFR BLD: 0.2 %
LYMPHOCYTES # BLD AUTO: 1.47 X10(3) UL (ref 1–4)
LYMPHOCYTES NFR BLD AUTO: 14.4 %
MCH RBC QN AUTO: 33.2 PG (ref 26–34)
MCHC RBC AUTO-ENTMCNC: 33.1 G/DL (ref 31–37)
MCV RBC AUTO: 100.3 FL
MONOCYTES # BLD AUTO: 0.8 X10(3) UL (ref 0.1–1)
MONOCYTES NFR BLD AUTO: 7.9 %
NEUTROPHILS # BLD AUTO: 7.69 X10 (3) UL (ref 1.5–7.7)
NEUTROPHILS # BLD AUTO: 7.69 X10(3) UL (ref 1.5–7.7)
NEUTROPHILS NFR BLD AUTO: 75.5 %
OSMOLALITY SERPL CALC.SUM OF ELEC: 293 MOSM/KG (ref 275–295)
P AXIS: 29 DEGREES
P-R INTERVAL: 170 MS
PLATELET # BLD AUTO: 265 10(3)UL (ref 150–450)
POTASSIUM SERPL-SCNC: 4.3 MMOL/L (ref 3.5–5.1)
Q-T INTERVAL: 416 MS
QRS DURATION: 90 MS
QTC CALCULATION (BEZET): 408 MS
R AXIS: -31 DEGREES
RBC # BLD AUTO: 3.4 X10(6)UL
SODIUM SERPL-SCNC: 140 MMOL/L (ref 136–145)
T AXIS: 41 DEGREES
TROPONIN I HIGH SENSITIVITY: 5 NG/L
VENTRICULAR RATE: 58 BPM
WBC # BLD AUTO: 10.2 X10(3) UL (ref 4–11)

## 2023-02-11 PROCEDURE — 73030 X-RAY EXAM OF SHOULDER: CPT | Performed by: EMERGENCY MEDICINE

## 2023-02-11 PROCEDURE — 93010 ELECTROCARDIOGRAM REPORT: CPT

## 2023-02-11 PROCEDURE — 96374 THER/PROPH/DIAG INJ IV PUSH: CPT

## 2023-02-11 PROCEDURE — 93005 ELECTROCARDIOGRAM TRACING: CPT

## 2023-02-11 PROCEDURE — 99284 EMERGENCY DEPT VISIT MOD MDM: CPT

## 2023-02-11 PROCEDURE — 80048 BASIC METABOLIC PNL TOTAL CA: CPT | Performed by: EMERGENCY MEDICINE

## 2023-02-11 PROCEDURE — 71045 X-RAY EXAM CHEST 1 VIEW: CPT | Performed by: EMERGENCY MEDICINE

## 2023-02-11 PROCEDURE — 84484 ASSAY OF TROPONIN QUANT: CPT | Performed by: EMERGENCY MEDICINE

## 2023-02-11 PROCEDURE — 85025 COMPLETE CBC W/AUTO DIFF WBC: CPT | Performed by: EMERGENCY MEDICINE

## 2023-02-11 RX ORDER — HYDROCODONE BITARTRATE AND ACETAMINOPHEN 5; 325 MG/1; MG/1
1-2 TABLET ORAL EVERY 6 HOURS PRN
Qty: 10 TABLET | Refills: 0 | Status: SHIPPED | OUTPATIENT
Start: 2023-02-11 | End: 2023-02-16

## 2023-02-11 RX ORDER — MORPHINE SULFATE 4 MG/ML
4 INJECTION, SOLUTION INTRAMUSCULAR; INTRAVENOUS ONCE
Status: COMPLETED | OUTPATIENT
Start: 2023-02-11 | End: 2023-02-11

## 2023-02-11 RX ORDER — METHYLPREDNISOLONE 4 MG/1
TABLET ORAL
Qty: 1 EACH | Refills: 0 | Status: SHIPPED | OUTPATIENT
Start: 2023-02-11

## 2023-02-11 NOTE — ED INITIAL ASSESSMENT (HPI)
Pt c/o left shoulder pain that woke her up. Denies chest pain or sob. Pt states she had an ablation done in January and has hx gout.

## 2023-02-11 NOTE — DISCHARGE INSTRUCTIONS
Take steroids as prescribed. Take Tylenol as needed for pain. Take Norco as needed for worsening pain. Follow-up with your orthopedist for further evaluation and treatment. Return to the emergency department if increasing pain, chest pain, fever, or other new symptoms develop.

## 2023-02-13 ENCOUNTER — OFFICE VISIT (OUTPATIENT)
Dept: PHYSICAL THERAPY | Age: 78
End: 2023-02-13
Attending: ORTHOPAEDIC SURGERY
Payer: MEDICARE

## 2023-02-13 PROCEDURE — 97110 THERAPEUTIC EXERCISES: CPT | Performed by: PHYSICAL THERAPIST

## 2023-02-14 ENCOUNTER — TELEPHONE (OUTPATIENT)
Dept: ANTICOAGULATION | Facility: CLINIC | Age: 78
End: 2023-02-14

## 2023-02-14 ENCOUNTER — ANTI-COAG VISIT (OUTPATIENT)
Dept: ANTICOAGULATION | Facility: CLINIC | Age: 78
End: 2023-02-14

## 2023-02-14 DIAGNOSIS — Z79.01 LONG TERM (CURRENT) USE OF ANTICOAGULANTS: ICD-10-CM

## 2023-02-14 DIAGNOSIS — Z51.81 ENCOUNTER FOR THERAPEUTIC DRUG MONITORING: ICD-10-CM

## 2023-02-14 DIAGNOSIS — I48.91 ATRIAL FIBRILLATION, UNSPECIFIED TYPE (HCC): ICD-10-CM

## 2023-02-14 LAB
INR: 5.3 (ref 0.8–1.2)
TEST STRIP EXPIRATION DATE: ABNORMAL DATE

## 2023-02-14 PROCEDURE — 93793 ANTICOAG MGMT PT WARFARIN: CPT | Performed by: INTERNAL MEDICINE

## 2023-02-14 PROCEDURE — 1111F DSCHRG MED/CURRENT MED MERGE: CPT | Performed by: INTERNAL MEDICINE

## 2023-02-14 PROCEDURE — 85610 PROTHROMBIN TIME: CPT | Performed by: INTERNAL MEDICINE

## 2023-02-15 ENCOUNTER — TELEPHONE (OUTPATIENT)
Dept: ENDOCRINOLOGY CLINIC | Facility: CLINIC | Age: 78
End: 2023-02-15

## 2023-02-15 NOTE — TELEPHONE ENCOUNTER
Last Prolia shot 09/08/22. Submitted IV to [facility]. Waiting for SOB, 2-5 business days.

## 2023-02-16 ENCOUNTER — OFFICE VISIT (OUTPATIENT)
Dept: PHYSICAL THERAPY | Age: 78
End: 2023-02-16
Attending: ORTHOPAEDIC SURGERY
Payer: MEDICARE

## 2023-02-16 PROCEDURE — 97110 THERAPEUTIC EXERCISES: CPT | Performed by: PHYSICAL THERAPIST

## 2023-02-16 NOTE — TELEPHONE ENCOUNTER
Pt's last Prolia injection was on 9/8/22. Pt will be due for next injection on or after 3/8/23.      Pt has an upcoming appt on 3/9/23for Prolia Injection with RN    Received pt's Prolia SOB via [employer] portal    PA Required: No  Prolia OOP COST: $0  Facility Fee: $0  Admin Fee: $0

## 2023-02-17 ENCOUNTER — TELEPHONE (OUTPATIENT)
Dept: ANTICOAGULATION | Facility: CLINIC | Age: 78
End: 2023-02-17

## 2023-02-17 ENCOUNTER — ANTI-COAG VISIT (OUTPATIENT)
Dept: ANTICOAGULATION | Facility: CLINIC | Age: 78
End: 2023-02-17

## 2023-02-17 DIAGNOSIS — I48.91 ATRIAL FIBRILLATION, UNSPECIFIED TYPE (HCC): ICD-10-CM

## 2023-02-17 DIAGNOSIS — Z51.81 ENCOUNTER FOR THERAPEUTIC DRUG MONITORING: ICD-10-CM

## 2023-02-17 DIAGNOSIS — Z79.01 LONG TERM (CURRENT) USE OF ANTICOAGULANTS: ICD-10-CM

## 2023-02-17 LAB
INR: 1.3 (ref 0.8–1.2)
TEST STRIP EXPIRATION DATE: ABNORMAL DATE

## 2023-02-17 PROCEDURE — 93793 ANTICOAG MGMT PT WARFARIN: CPT | Performed by: INTERNAL MEDICINE

## 2023-02-17 PROCEDURE — 85610 PROTHROMBIN TIME: CPT | Performed by: INTERNAL MEDICINE

## 2023-02-21 ENCOUNTER — TELEPHONE (OUTPATIENT)
Dept: PHYSICAL THERAPY | Facility: HOSPITAL | Age: 78
End: 2023-02-21

## 2023-02-21 ENCOUNTER — APPOINTMENT (OUTPATIENT)
Dept: PHYSICAL THERAPY | Age: 78
End: 2023-02-21
Attending: ORTHOPAEDIC SURGERY
Payer: MEDICARE

## 2023-02-23 ENCOUNTER — OFFICE VISIT (OUTPATIENT)
Dept: PHYSICAL THERAPY | Age: 78
End: 2023-02-23
Attending: ORTHOPAEDIC SURGERY
Payer: MEDICARE

## 2023-02-23 ENCOUNTER — ANTI-COAG VISIT (OUTPATIENT)
Dept: ANTICOAGULATION | Facility: CLINIC | Age: 78
End: 2023-02-23

## 2023-02-23 DIAGNOSIS — Z79.01 LONG TERM (CURRENT) USE OF ANTICOAGULANTS: ICD-10-CM

## 2023-02-23 DIAGNOSIS — I48.91 ATRIAL FIBRILLATION, UNSPECIFIED TYPE (HCC): ICD-10-CM

## 2023-02-23 DIAGNOSIS — Z51.81 ENCOUNTER FOR THERAPEUTIC DRUG MONITORING: ICD-10-CM

## 2023-02-23 LAB
INR: 2.1 (ref 0.8–1.2)
TEST STRIP EXPIRATION DATE: ABNORMAL DATE

## 2023-02-23 PROCEDURE — 85610 PROTHROMBIN TIME: CPT | Performed by: INTERNAL MEDICINE

## 2023-02-23 PROCEDURE — 97110 THERAPEUTIC EXERCISES: CPT | Performed by: PHYSICAL THERAPIST

## 2023-02-23 PROCEDURE — 93793 ANTICOAG MGMT PT WARFARIN: CPT | Performed by: INTERNAL MEDICINE

## 2023-02-27 ENCOUNTER — OFFICE VISIT (OUTPATIENT)
Dept: PHYSICAL THERAPY | Age: 78
End: 2023-02-27
Attending: ORTHOPAEDIC SURGERY
Payer: MEDICARE

## 2023-02-27 PROCEDURE — 97110 THERAPEUTIC EXERCISES: CPT | Performed by: PHYSICAL THERAPIST

## 2023-02-28 ENCOUNTER — HOSPITAL ENCOUNTER (OUTPATIENT)
Dept: MAMMOGRAPHY | Age: 78
Discharge: HOME | End: 2023-02-28
Attending: INTERNAL MEDICINE
Payer: MEDICARE

## 2023-02-28 DIAGNOSIS — Z12.31 VISIT FOR SCREENING MAMMOGRAM: ICD-10-CM

## 2023-02-28 PROCEDURE — 77067 SCR MAMMO BI INCL CAD: CPT | Performed by: INTERNAL MEDICINE

## 2023-02-28 PROCEDURE — 77063 BREAST TOMOSYNTHESIS BI: CPT | Performed by: INTERNAL MEDICINE

## 2023-03-02 ENCOUNTER — OFFICE VISIT (OUTPATIENT)
Dept: PHYSICAL THERAPY | Age: 78
End: 2023-03-02
Attending: ORTHOPAEDIC SURGERY
Payer: MEDICARE

## 2023-03-02 PROCEDURE — 97110 THERAPEUTIC EXERCISES: CPT | Performed by: PHYSICAL THERAPIST

## 2023-03-07 ENCOUNTER — TELEPHONE (OUTPATIENT)
Dept: ANTICOAGULATION | Facility: CLINIC | Age: 78
End: 2023-03-07

## 2023-03-07 ENCOUNTER — PATIENT MESSAGE (OUTPATIENT)
Dept: ANTICOAGULATION | Facility: CLINIC | Age: 78
End: 2023-03-07

## 2023-03-07 DIAGNOSIS — Z51.81 ENCOUNTER FOR THERAPEUTIC DRUG MONITORING: ICD-10-CM

## 2023-03-07 DIAGNOSIS — Z79.01 LONG TERM (CURRENT) USE OF ANTICOAGULANTS: ICD-10-CM

## 2023-03-07 DIAGNOSIS — I48.91 ATRIAL FIBRILLATION, UNSPECIFIED TYPE (HCC): Primary | ICD-10-CM

## 2023-03-08 ENCOUNTER — OFFICE VISIT (OUTPATIENT)
Dept: PHYSICAL THERAPY | Age: 78
End: 2023-03-08
Attending: ORTHOPAEDIC SURGERY
Payer: MEDICARE

## 2023-03-08 ENCOUNTER — TELEPHONE (OUTPATIENT)
Dept: ANTICOAGULATION | Facility: CLINIC | Age: 78
End: 2023-03-08

## 2023-03-08 PROCEDURE — 97110 THERAPEUTIC EXERCISES: CPT | Performed by: PHYSICAL THERAPIST

## 2023-03-08 NOTE — TELEPHONE ENCOUNTER
Refer to My Chart message received from [place] on 3/7/23. Due to a recent change in health, cardiology has recommended that she discontinue warfarin and start Eliquis. Episode of care for anticoagulation has been resolved. Please advise who can remove warfarin from her medication list- it was originally ordered by Dr. Lexi Tapia.

## 2023-03-08 NOTE — TELEPHONE ENCOUNTER
Glad to help Maeve- I hope it works out well for you! One other note. .. when you see cardiology, please ask them to remove warfarin from your medication list- it was ordered last by Dr. Blanca Shipley and so cardiology will have to update your medication list.     Otherwise, you are set!   Joaquin Lassiter

## 2023-03-13 ENCOUNTER — OFFICE VISIT (OUTPATIENT)
Dept: PHYSICAL THERAPY | Age: 78
End: 2023-03-13
Attending: ORTHOPAEDIC SURGERY
Payer: MEDICARE

## 2023-03-13 PROCEDURE — 97110 THERAPEUTIC EXERCISES: CPT | Performed by: PHYSICAL THERAPIST

## 2023-03-13 PROCEDURE — 97112 NEUROMUSCULAR REEDUCATION: CPT | Performed by: PHYSICAL THERAPIST

## 2023-03-16 ENCOUNTER — OFFICE VISIT (OUTPATIENT)
Dept: PHYSICAL THERAPY | Age: 78
End: 2023-03-16
Attending: ORTHOPAEDIC SURGERY
Payer: MEDICARE

## 2023-03-16 PROCEDURE — 97110 THERAPEUTIC EXERCISES: CPT | Performed by: PHYSICAL THERAPIST

## 2023-03-20 ENCOUNTER — OFFICE VISIT (OUTPATIENT)
Dept: PHYSICAL THERAPY | Age: 78
End: 2023-03-20
Attending: ORTHOPAEDIC SURGERY
Payer: MEDICARE

## 2023-03-20 PROCEDURE — 97112 NEUROMUSCULAR REEDUCATION: CPT | Performed by: PHYSICAL THERAPIST

## 2023-03-20 PROCEDURE — 97110 THERAPEUTIC EXERCISES: CPT | Performed by: PHYSICAL THERAPIST

## 2023-03-22 ENCOUNTER — APPOINTMENT (OUTPATIENT)
Dept: PHYSICAL THERAPY | Age: 78
End: 2023-03-22
Attending: PHYSICIAN ASSISTANT
Payer: MEDICARE

## 2023-03-30 ENCOUNTER — TELEPHONE (OUTPATIENT)
Dept: PHYSICAL THERAPY | Age: 78
End: 2023-03-30

## 2023-03-30 NOTE — TELEPHONE ENCOUNTER
Left patient voicemail re: 3pm PT appointment available today 3/30. If interested to call  to confirm if still open/available and to schedule. Stated that if pt not interested, no need to call.   Confirmed next scheduled appointment on 4/5/2023 at 11am.

## 2023-04-05 ENCOUNTER — OFFICE VISIT (OUTPATIENT)
Dept: PHYSICAL THERAPY | Age: 78
End: 2023-04-05
Attending: PHYSICIAN ASSISTANT
Payer: MEDICARE

## 2023-04-05 PROCEDURE — 97110 THERAPEUTIC EXERCISES: CPT | Performed by: PHYSICAL THERAPIST

## 2023-04-06 ENCOUNTER — NURSE ONLY (OUTPATIENT)
Dept: ENDOCRINOLOGY CLINIC | Facility: CLINIC | Age: 78
End: 2023-04-06

## 2023-04-06 ENCOUNTER — TELEPHONE (OUTPATIENT)
Dept: ENDOCRINOLOGY CLINIC | Facility: CLINIC | Age: 78
End: 2023-04-06

## 2023-04-06 DIAGNOSIS — M81.0 AGE-RELATED OSTEOPOROSIS WITHOUT CURRENT PATHOLOGICAL FRACTURE: Primary | ICD-10-CM

## 2023-04-06 PROCEDURE — 96372 THER/PROPH/DIAG INJ SC/IM: CPT | Performed by: INTERNAL MEDICINE

## 2023-04-06 NOTE — TELEPHONE ENCOUNTER
Dr. Loyda Verdugo,    I apologize for the miscommunication per previous message. When will be the next time patient can get cortisone injection? In 2 weeks possibly?

## 2023-04-06 NOTE — TELEPHONE ENCOUNTER
Dr. Ba Stevenson,     Please advise regarding when patient can get next cortisone injection when administered prolia today. Patient states Orthopedic MD recommended patient not get prolia for \"few weeks\" after last injection on 3/6 (resulting in being a month later of prolia schedule     RN unaware of Prolia with Cortisone interaction    Staff:  Daxhart message patient upon recommendation

## 2023-04-06 NOTE — PROGRESS NOTES
Patient presented into clinic for Prolia #5. Prolia 60mg administered in right upper arm. No side effects or complications, patient left in stable condition.     Patient scheduled FU OV with Dr. Saniya Machado in October

## 2023-04-10 ENCOUNTER — OFFICE VISIT (OUTPATIENT)
Dept: PHYSICAL THERAPY | Age: 78
End: 2023-04-10
Attending: PHYSICIAN ASSISTANT
Payer: MEDICARE

## 2023-04-10 ENCOUNTER — APPOINTMENT (OUTPATIENT)
Dept: PHYSICAL THERAPY | Age: 78
End: 2023-04-10
Attending: PHYSICIAN ASSISTANT
Payer: MEDICARE

## 2023-04-10 PROCEDURE — 97110 THERAPEUTIC EXERCISES: CPT | Performed by: PHYSICAL THERAPIST

## 2023-04-13 ENCOUNTER — APPOINTMENT (OUTPATIENT)
Dept: PHYSICAL THERAPY | Age: 78
End: 2023-04-13
Attending: PHYSICIAN ASSISTANT
Payer: MEDICARE

## 2023-04-17 ENCOUNTER — APPOINTMENT (OUTPATIENT)
Dept: PHYSICAL THERAPY | Age: 78
End: 2023-04-17
Attending: PHYSICIAN ASSISTANT
Payer: MEDICARE

## 2023-04-20 ENCOUNTER — APPOINTMENT (OUTPATIENT)
Dept: PHYSICAL THERAPY | Age: 78
End: 2023-04-20
Attending: PHYSICIAN ASSISTANT
Payer: MEDICARE

## 2023-04-22 ENCOUNTER — OFFICE VISIT (OUTPATIENT)
Dept: FAMILY MEDICINE CLINIC | Facility: CLINIC | Age: 78
End: 2023-04-22
Payer: MEDICARE

## 2023-04-22 VITALS
DIASTOLIC BLOOD PRESSURE: 63 MMHG | WEIGHT: 163 LBS | HEART RATE: 61 BPM | BODY MASS INDEX: 30.78 KG/M2 | TEMPERATURE: 98 F | HEIGHT: 61 IN | SYSTOLIC BLOOD PRESSURE: 141 MMHG | RESPIRATION RATE: 18 BRPM | OXYGEN SATURATION: 97 %

## 2023-04-22 DIAGNOSIS — N39.0 ACUTE UTI (URINARY TRACT INFECTION): Primary | ICD-10-CM

## 2023-04-22 DIAGNOSIS — R39.9 UTI SYMPTOMS: ICD-10-CM

## 2023-04-22 LAB
BILIRUBIN: NEGATIVE
GLUCOSE (URINE DIPSTICK): NEGATIVE MG/DL
KETONES (URINE DIPSTICK): NEGATIVE MG/DL
MULTISTIX EXPIRATION DATE: ABNORMAL DATE
MULTISTIX LOT#: ABNORMAL NUMERIC
NITRITE, URINE: NEGATIVE
PH, URINE: 6 (ref 4.5–8)
PROTEIN (URINE DIPSTICK): 100 MG/DL
SPECIFIC GRAVITY: 1.02 (ref 1–1.03)
URINE-COLOR: YELLOW
UROBILINOGEN,SEMI-QN: 0.2 MG/DL (ref 0–1.9)

## 2023-04-22 PROCEDURE — 87086 URINE CULTURE/COLONY COUNT: CPT

## 2023-04-22 PROCEDURE — 81003 URINALYSIS AUTO W/O SCOPE: CPT

## 2023-04-22 PROCEDURE — 99213 OFFICE O/P EST LOW 20 MIN: CPT

## 2023-04-22 RX ORDER — CEPHALEXIN 500 MG/1
500 CAPSULE ORAL 2 TIMES DAILY
Qty: 14 CAPSULE | Refills: 0 | Status: CANCELLED | OUTPATIENT
Start: 2023-04-22 | End: 2023-04-29

## 2023-04-22 RX ORDER — CEPHALEXIN 500 MG/1
500 CAPSULE ORAL 2 TIMES DAILY
Qty: 14 CAPSULE | Refills: 0 | Status: SHIPPED | OUTPATIENT
Start: 2023-04-22 | End: 2023-04-29

## 2023-04-26 ENCOUNTER — MED REC SCAN ONLY (OUTPATIENT)
Dept: INTERNAL MEDICINE CLINIC | Facility: CLINIC | Age: 78
End: 2023-04-26

## 2023-04-26 ENCOUNTER — APPOINTMENT (OUTPATIENT)
Dept: URBAN - METROPOLITAN AREA CLINIC 244 | Age: 78
Setting detail: DERMATOLOGY
End: 2023-04-26

## 2023-04-26 DIAGNOSIS — L57.0 ACTINIC KERATOSIS: ICD-10-CM

## 2023-04-26 DIAGNOSIS — L71.8 OTHER ROSACEA: ICD-10-CM

## 2023-04-26 DIAGNOSIS — L82.1 OTHER SEBORRHEIC KERATOSIS: ICD-10-CM

## 2023-04-26 DIAGNOSIS — D22 MELANOCYTIC NEVI: ICD-10-CM

## 2023-04-26 DIAGNOSIS — L81.4 OTHER MELANIN HYPERPIGMENTATION: ICD-10-CM

## 2023-04-26 PROBLEM — D22.5 MELANOCYTIC NEVI OF TRUNK: Status: ACTIVE | Noted: 2023-04-26

## 2023-04-26 PROCEDURE — OTHER LIQUID NITROGEN: OTHER

## 2023-04-26 PROCEDURE — 17000 DESTRUCT PREMALG LESION: CPT

## 2023-04-26 PROCEDURE — 17003 DESTRUCT PREMALG LES 2-14: CPT

## 2023-04-26 PROCEDURE — OTHER COUNSELING: OTHER

## 2023-04-26 PROCEDURE — OTHER PRESCRIPTION MEDICATION MANAGEMENT: OTHER

## 2023-04-26 PROCEDURE — 99214 OFFICE O/P EST MOD 30 MIN: CPT | Mod: 25

## 2023-04-26 ASSESSMENT — LOCATION DETAILED DESCRIPTION DERM
LOCATION DETAILED: LEFT RADIAL VENTRAL WRIST
LOCATION DETAILED: RIGHT SUPERIOR MEDIAL UPPER BACK
LOCATION DETAILED: RIGHT INFERIOR CENTRAL MALAR CHEEK
LOCATION DETAILED: UPPER STERNUM
LOCATION DETAILED: LEFT MEDIAL UPPER BACK
LOCATION DETAILED: LEFT INFERIOR MEDIAL MALAR CHEEK
LOCATION DETAILED: LEFT RADIAL DORSAL HAND

## 2023-04-26 ASSESSMENT — LOCATION SIMPLE DESCRIPTION DERM
LOCATION SIMPLE: RIGHT CHEEK
LOCATION SIMPLE: LEFT ARM
LOCATION SIMPLE: LEFT HAND
LOCATION SIMPLE: CHEST
LOCATION SIMPLE: LEFT CHEEK
LOCATION SIMPLE: RIGHT UPPER BACK
LOCATION SIMPLE: LEFT UPPER BACK

## 2023-04-26 ASSESSMENT — LOCATION ZONE DERM
LOCATION ZONE: ARM
LOCATION ZONE: TRUNK
LOCATION ZONE: HAND
LOCATION ZONE: FACE

## 2023-04-26 NOTE — PROCEDURE: PRESCRIPTION MEDICATION MANAGEMENT
Detail Level: Zone
Plan: defers topical tx for redness. defers laser
Render In Strict Bullet Format?: No

## 2023-05-02 ENCOUNTER — OFFICE VISIT (OUTPATIENT)
Dept: OTOLARYNGOLOGY | Facility: CLINIC | Age: 78
End: 2023-05-02

## 2023-05-02 VITALS — BODY MASS INDEX: 30.78 KG/M2 | WEIGHT: 163 LBS | HEIGHT: 61 IN

## 2023-05-02 DIAGNOSIS — J30.9 ALLERGIC RHINITIS WITH POSTNASAL DRIP: ICD-10-CM

## 2023-05-02 DIAGNOSIS — R09.82 ALLERGIC RHINITIS WITH POSTNASAL DRIP: ICD-10-CM

## 2023-05-02 DIAGNOSIS — H93.A2 PULSATILE TINNITUS OF LEFT EAR: Primary | ICD-10-CM

## 2023-05-02 PROCEDURE — 99213 OFFICE O/P EST LOW 20 MIN: CPT | Performed by: SPECIALIST

## 2023-05-02 RX ORDER — IPRATROPIUM BROMIDE 42 UG/1
2 SPRAY, METERED NASAL 3 TIMES DAILY
Qty: 15 ML | Refills: 5 | Status: SHIPPED | OUTPATIENT
Start: 2023-05-02

## 2023-05-02 NOTE — PATIENT INSTRUCTIONS
Asked to continue the Allegra and Atrovent spray which can be used for your postnasal drip. After your visit with Dr. Oral Boyce, If you need a CTA please let me know and order can be placed.

## 2023-05-22 ENCOUNTER — LAB ENCOUNTER (OUTPATIENT)
Dept: LAB | Facility: HOSPITAL | Age: 78
End: 2023-05-22
Attending: INTERNAL MEDICINE
Payer: MEDICARE

## 2023-05-22 DIAGNOSIS — I48.0 PAROXYSMAL ATRIAL FIBRILLATION (HCC): Primary | ICD-10-CM

## 2023-05-22 DIAGNOSIS — E78.2 MIXED HYPERLIPIDEMIA: ICD-10-CM

## 2023-05-22 LAB
ALBUMIN SERPL-MCNC: 3.3 G/DL (ref 3.4–5)
ALBUMIN/GLOB SERPL: 0.8 {RATIO} (ref 1–2)
ALP LIVER SERPL-CCNC: 64 U/L
ALT SERPL-CCNC: 21 U/L
ANION GAP SERPL CALC-SCNC: 2 MMOL/L (ref 0–18)
AST SERPL-CCNC: 15 U/L (ref 15–37)
BASOPHILS # BLD AUTO: 0.04 X10(3) UL (ref 0–0.2)
BASOPHILS NFR BLD AUTO: 0.7 %
BILIRUB SERPL-MCNC: 0.5 MG/DL (ref 0.1–2)
BUN BLD-MCNC: 20 MG/DL (ref 7–18)
BUN/CREAT SERPL: 27 (ref 10–20)
CALCIUM BLD-MCNC: 9.2 MG/DL (ref 8.5–10.1)
CHLORIDE SERPL-SCNC: 107 MMOL/L (ref 98–112)
CHOLEST SERPL-MCNC: 139 MG/DL (ref ?–200)
CO2 SERPL-SCNC: 31 MMOL/L (ref 21–32)
CREAT BLD-MCNC: 0.74 MG/DL
DEPRECATED RDW RBC AUTO: 46.3 FL (ref 35.1–46.3)
EOSINOPHIL # BLD AUTO: 0.15 X10(3) UL (ref 0–0.7)
EOSINOPHIL NFR BLD AUTO: 2.5 %
ERYTHROCYTE [DISTWIDTH] IN BLOOD BY AUTOMATED COUNT: 12.7 % (ref 11–15)
FASTING PATIENT LIPID ANSWER: YES
FASTING STATUS PATIENT QL REPORTED: YES
GFR SERPLBLD BASED ON 1.73 SQ M-ARVRAT: 83 ML/MIN/1.73M2 (ref 60–?)
GLOBULIN PLAS-MCNC: 4.4 G/DL (ref 2.8–4.4)
GLUCOSE BLD-MCNC: 82 MG/DL (ref 70–99)
HCT VFR BLD AUTO: 41.1 %
HDLC SERPL-MCNC: 57 MG/DL (ref 40–59)
HGB BLD-MCNC: 13.6 G/DL
IMM GRANULOCYTES # BLD AUTO: 0.02 X10(3) UL (ref 0–1)
IMM GRANULOCYTES NFR BLD: 0.3 %
LDLC SERPL CALC-MCNC: 70 MG/DL (ref ?–100)
LYMPHOCYTES # BLD AUTO: 1.6 X10(3) UL (ref 1–4)
LYMPHOCYTES NFR BLD AUTO: 26.8 %
MCH RBC QN AUTO: 32.9 PG (ref 26–34)
MCHC RBC AUTO-ENTMCNC: 33.1 G/DL (ref 31–37)
MCV RBC AUTO: 99.3 FL
MONOCYTES # BLD AUTO: 0.48 X10(3) UL (ref 0.1–1)
MONOCYTES NFR BLD AUTO: 8.1 %
NEUTROPHILS # BLD AUTO: 3.67 X10 (3) UL (ref 1.5–7.7)
NEUTROPHILS # BLD AUTO: 3.67 X10(3) UL (ref 1.5–7.7)
NEUTROPHILS NFR BLD AUTO: 61.6 %
NONHDLC SERPL-MCNC: 82 MG/DL (ref ?–130)
OSMOLALITY SERPL CALC.SUM OF ELEC: 292 MOSM/KG (ref 275–295)
PLATELET # BLD AUTO: 284 10(3)UL (ref 150–450)
POTASSIUM SERPL-SCNC: 4.4 MMOL/L (ref 3.5–5.1)
PROT SERPL-MCNC: 7.7 G/DL (ref 6.4–8.2)
RBC # BLD AUTO: 4.14 X10(6)UL
SODIUM SERPL-SCNC: 140 MMOL/L (ref 136–145)
TRIGL SERPL-MCNC: 54 MG/DL (ref 30–149)
VLDLC SERPL CALC-MCNC: 8 MG/DL (ref 0–30)
WBC # BLD AUTO: 6 X10(3) UL (ref 4–11)

## 2023-05-22 PROCEDURE — 80053 COMPREHEN METABOLIC PANEL: CPT

## 2023-05-22 PROCEDURE — 36415 COLL VENOUS BLD VENIPUNCTURE: CPT

## 2023-05-22 PROCEDURE — 85025 COMPLETE CBC W/AUTO DIFF WBC: CPT

## 2023-05-22 PROCEDURE — 80061 LIPID PANEL: CPT

## 2023-08-08 ENCOUNTER — PATIENT MESSAGE (OUTPATIENT)
Dept: OTOLARYNGOLOGY | Facility: CLINIC | Age: 78
End: 2023-08-08

## 2023-08-08 ENCOUNTER — HOSPITAL ENCOUNTER (OUTPATIENT)
Dept: CT IMAGING | Facility: HOSPITAL | Age: 78
Discharge: HOME | End: 2023-08-08
Attending: SPECIALIST
Payer: MEDICARE

## 2023-08-08 DIAGNOSIS — H93.A9 PULSATILE TINNITUS: ICD-10-CM

## 2023-08-08 LAB
CREAT BLD-MCNC: 0.9 MG/DL
EGFRCR SERPLBLD CKD-EPI 2021: 66 ML/MIN/1.73M2 (ref 60–?)

## 2023-08-08 PROCEDURE — 70496 CT ANGIOGRAPHY HEAD: CPT | Performed by: SPECIALIST

## 2023-08-08 PROCEDURE — 70498 CT ANGIOGRAPHY NECK: CPT | Performed by: SPECIALIST

## 2023-08-08 PROCEDURE — 82565 ASSAY OF CREATININE: CPT

## 2023-08-08 NOTE — TELEPHONE ENCOUNTER
From: Emily Stauffer  To: Rachelle Oenal MD  Sent: 8/8/2023 3:05 PM CDT  Subject: Today's ( 8-8-23)CT Scan    Dr. Ariella Medel,    Thank you for the voice mail message. I appreciate your quick response and will see you in the late fall about a follow up on the lymph node issue.     Emily Stauffer

## 2023-08-08 NOTE — PROGRESS NOTES
Negative CTA. Will recheck lymph node in 3 - 6 months. Left message for the patient to call to make an appointment.

## 2023-09-21 ENCOUNTER — OFFICE VISIT (OUTPATIENT)
Dept: OPHTHALMOLOGY | Facility: CLINIC | Age: 78
End: 2023-09-21

## 2023-09-21 DIAGNOSIS — H26.8 PSEUDOEXFOLIATION (PXF) OF LENS CAPSULE: ICD-10-CM

## 2023-09-21 DIAGNOSIS — H40.003 GLAUCOMA SUSPECT OF BOTH EYES: Primary | ICD-10-CM

## 2023-09-21 DIAGNOSIS — H25.13 AGE-RELATED NUCLEAR CATARACT OF BOTH EYES: ICD-10-CM

## 2023-09-21 DIAGNOSIS — H43.393 VITREOUS FLOATERS OF BOTH EYES: ICD-10-CM

## 2023-09-21 PROCEDURE — 92014 COMPRE OPH EXAM EST PT 1/>: CPT | Performed by: OPHTHALMOLOGY

## 2023-09-21 PROCEDURE — 92250 FUNDUS PHOTOGRAPHY W/I&R: CPT | Performed by: OPHTHALMOLOGY

## 2023-09-21 PROCEDURE — 92015 DETERMINE REFRACTIVE STATE: CPT | Performed by: OPHTHALMOLOGY

## 2023-09-21 NOTE — ASSESSMENT & PLAN NOTE
Discussed mild cataracts in both eyes that are not affecting vision and are not surgical at this time. New glasses Rx for separate distance and reading glasses per patient's choice.

## 2023-09-21 NOTE — PATIENT INSTRUCTIONS
Glaucoma suspect of both eyes  Discussed with patient that she is a glaucoma suspect based on increased cupping of the optic nerves in both eyes and pseudoexfoliation changes in the left eye. Fundus photos taken today. Will schedule glaucoma testing. Will not start medication, but will continue to observe. Patient verbalized understanding. Will see patient for next available visual field and OCT with no MD, if glaucoma testing is normal will see patient  in 1 year for a complete exam    Age-related nuclear cataract of both eyes  Discussed mild cataracts in both eyes that are not affecting vision and are not surgical at this time. New glasses Rx for separate distance and reading glasses per patient's choice. Pseudoexfoliation (PXF) of lens capsule  No treatment    Vitreous floaters of both eyes   There is no evidence of retinal pathology. All signs and symptoms of retinal detachment/tears explained in detail. Patient instructed to call the office if they experience increase in floaters, increase in flashes of light, loss of vision or curtain or veil effect.

## 2023-09-21 NOTE — PROGRESS NOTES
Nury Roe is a 68year old female. HPI:     HPI    Pt here today for a complete eye exam and photos. Pt states vision is stable and denies any ocular complaints.    Last edited by Kristi Avitia O.T. on 9/21/2023 10:44 AM.        Patient History:  Past Medical History:   Diagnosis Date    Age-related nuclear cataract of both eyes 02/09/2021    Arrhythmia     A-fib-2017 dx, ablation 11/9/22    Atrial fibrillation ([facility] [address] 01/2017    Ramírez's esophagus     Brachial neuritis 09/19/2009    Constipation     DUB (dysfunctional uterine bleeding) 1980    per ng LIBBY    Esophageal reflux     Esophagitis 2011    per ng eosinophilic egd w/ biopsy    Esophagitis 2012    pr ng egd w/ biopsy    Floater, vitreous, left 02/09/2021    Floater, vitreous, left 02/09/2021    Gastritis     Glaucoma suspect of both eyes 02/09/2021    Glaucoma suspect of both eyes 02/09/2021    Glaucoma suspect of both eyes 02/09/2021    Glaucoma suspect of both eyes 02/09/2021    Hearing impairment     left ear hearing aid    Heartburn 2011    per ng reflux, dysphagia    High blood pressure     High cholesterol     History of blood transfusion     r/t childbirth - no rx    Long term (current) use of anticoagulants 03/05/2019    Meniere disease     Osteopenia 10/20/2014    Osteopenia determined by x-ray 10/18/2016    Other and unspecified hyperlipidemia     Paroxysmal atrial fibrillation ([facility] [address] 02/15/2017    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    Seasonal allergic rhinitis 11/26/2018    Sensorineural hearing loss, unilateral 09/24/2011    Sleep apnea     mouth guard    Snoring 2006    per ng uvulectomy    Spinal stenosis of lumbar region 09/04/2008    Visual impairment     glasses       Surgical History: Nury Roe has a past surgical history that includes colonoscopy (08/26/2011); other surgical history (Right, ) (per ng torn ligament right ankle); hand/finger surgery unlisted (Right, ) (per ng hand synovitis surgery); injection; tendon origin/insertion (per ng injection tendon sheath, ligament); cortisone injection (Right, ) (per ng post op right forefoot); excise hand/foot neuroma (Right, ) (per ng neuroma 2 rt, hammertoe 3rt, naprosyn 500); excise hand/foot neuroma (Left, ) (per ng 2 left hammer toe 2 rt, hallux valqus naprosyn 500); excision of uvula () (per ng  uvula removed ); upper gi endoscopy performed (); upper gi endoscopy,exam; colonoscopy; hysterectomy (partial at 34); colonoscopy (N/A, 2020) (Procedure: COLONOSCOPY;  Surgeon: Jyoti Cedillo MD;  Location: [address] [place] [address] ENDOSCOPY); ep pulmonary vein/a-fib ablation (2022) ( ); and ep a-flutter ablation (2022) ( ).     Family History   Problem Relation Age of Onset    Cancer Father         per ng lung    Breast Cancer Mother 61        approx    Other (Other) Mother     Other (MS) Sister     Heart Disease Other         per ng CAD, vein, artery, liver disease, arthritis    Other (Other) Other         per ng lupus erythematosus    Cancer Paternal Uncle         liver cancer    Cancer Paternal Uncle         stomach cancer    Cancer Maternal Uncle         stomach cancer    Cancer Maternal Uncle     Colon Polyps Maternal Uncle         bone cancer    Macular degeneration Neg     Glaucoma Neg     Diabetes Neg        Social History:   Social History     Socioeconomic History    Marital status:    Tobacco Use    Smoking status: Former     Packs/day: 0.50     Years: 5.00     Additional pack years: 0.00     Total pack years: 2.50     Types: Cigarettes     Quit date: 1968     Years since quittin.0    Smokeless tobacco: Never   Vaping Use    Vaping Use: Never used   Substance and Sexual Activity    Alcohol use: Not Currently     Alcohol/week: 0.0 standard drinks of alcohol     Comment: formerly since 2016, rare alcohol currently    Drug use: No   Other Topics Concern    Caffeine Concern Yes     Comment: soda 12 oz       Medications:  Current Outpatient Medications   Medication Sig Dispense Refill    ipratropium 0.06 % Nasal Solution 2 sprays by Nasal route 3 (three) times daily. 15 mL 5    apixaban (ELIQUIS) 5 MG Oral Tab Eliquis 5 mg tablet, [RxNorm: 4912975]      omeprazole 20 MG Oral Capsule Delayed Release TAKE 1 CAPSULE BY MOUTH EVERY DAY IN THE MORNING BEFORE BREAKFAST 90 capsule 3    docusate sodium 100 MG Oral Cap Take 100 mg by mouth 2 (two) times daily. 20 capsule 0    ondansetron 4 MG Oral Tablet Dispersible Take 1 tablet (4 mg total) by mouth every 8 (eight) hours as needed for Nausea. 10 tablet 0    Probiotic Product (ALIGN OR) Take 1 tablet by mouth daily. Simethicone (GAS RELIEF 80 OR) Take 1 tablet by mouth as needed. sotalol 80 MG Oral Tab Take 0.5 tablets (40 mg total) by mouth 2 (two) times daily. mupirocin 2 % External Ointment Apply with cotton swab to bilateral nares twice daily as needed for crusting 15 g 2    benzonatate (TESSALON PERLES) 100 MG Oral Cap Take 1 capsule (100 mg total) by mouth 3 (three) times daily as needed for cough. 30 capsule 0    Cholecalciferol (VITAMIN D-3) 25 MCG (1000 UT) Oral Cap daily. atorvastatin 10 MG Oral Tab Take 1 tablet (10 mg total) by mouth nightly. 30 tablet 5    Fexofenadine HCl 180 MG Oral Tab Take 1 tablet (180 mg total) by mouth daily. Polyethylene Glycol 3350 (MIRALAX OR) Take by mouth. Cyanocobalamin (VITAMIN B 12 OR) Take 1,000 mg by mouth daily with breakfast.      Calcium Carbonate-Vitamin D (CALCIUM + D OR) Take by mouth daily. Take 500mg & 1000 IU Vit D daily      GLUCOSAMINE-CHONDROITIN OR Take 1 tablet by mouth daily. Take Glucosamine 1500mg and Chondroitin 1100mg two times a day. omega-3 fatty acids (FISH OIL) 1000 MG Oral Cap Take 500 mg by mouth daily.       Multiple Vitamins-Minerals (MULTI FOR HER OR) Take 1 tablet by mouth daily. methylPREDNISolone (MEDROL) 4 MG Oral Tablet Therapy Pack Dosepack: take as directed 1 each 0    ferrous sulfate 325 (65 FE) MG Oral Tab EC Take 1 tablet (325 mg total) by mouth daily with breakfast. 20 tablet 0    HYDROcodone-acetaminophen 5-325 MG Oral Tab Take 1-2 tablets by mouth every 6 (six) hours as needed. 40 tablet 0    aspirin 81 MG Oral Tab EC Take 1 tablet (81 mg total) by mouth in the morning and 1 tablet (81 mg total) before bedtime.  (Patient not taking: Reported on 1/17/2023)  0       Allergies:    Grass Pollen(K-O-R-*    OTHER (SEE COMMENTS)  Dust                    Runny nose    Comment:Headache, watery eyes  Mold                    Runny nose    Comment:Headache, watery eyes  Pollen                  Runny nose    Comment:Headache, watery eyes  Ragweed                 OTHER (SEE COMMENTS)    Comment:Watery eyes , Sneezing, HA's  Trees, [place]        Runny nose    Comment:Headache, watery eyes    ROS:       PHYSICAL EXAM:     Base Eye Exam       Visual Acuity (Snellen - Linear)         Right Left    Dist cc 20/20 20/20 -3    Near cc 20/20 20/25-      Correction: Glasses              Tonometry (Applanation, 10:59 AM)         Right Left    Pressure 16 16              Pachymetry (2/27/2021)         Right Left    Thickness 591/-4 585/-3              Pupils         Pupils    Right PERRL    Left PERRL              Visual Fields         Left Right     Full Full              Extraocular Movement         Right Left     Full, Ortho Full, Ortho              Dilation       Both eyes: 1.0% Mydriacyl and 2.5% Bin Synephrine @ 11:00 AM                  Slit Lamp and Fundus Exam       Slit Lamp Exam         Right Left    Lids/Lashes Dermatochalasis, Meibomian gland dysfunction Dermatochalasis, Meibomian gland dysfunction    Conjunctiva/Sclera Normal Normal    Cornea Clear trace pigment on the endo     Anterior Chamber Deep and quiet Deep and quiet    Iris Normal Normal    Lens pigment on anterior capsule, 1+NS, 1+cortical inferiorly Pseudoexfoliation, trace pigment on anterior capsule, 1+NS, 1+ cortical inferiorly    Vitreous Vitreous floaters Vitreous floaters              Fundus Exam         Right Left    Disc Good rim, Temporal crescent Sloping margin, Temporal crescent    C/D Ratio 0.4 0.8    Macula Normal Normal    Vessels Normal Normal    Periphery Normal Normal                  Refraction       Wearing Rx         Sphere Cylinder Axis    Right -0.75 Sphere     Left -1.75 +0.75 180      Type: Distance only              Wearing Rx #2         Sphere Cylinder Axis    Right +1.75 Sphere     Left +0.75 +0.75 180      Type: Reading only              Manifest Refraction         Sphere Cylinder [place] Dist [place] Add Near [place]    Right -0.75 Sphere  20/20 +2.50 20/20    Left -1.50 +0.75 180 20/20- +2.50 20/20              Final Rx         Sphere Cylinder [place] Dist VA Near VA    Right -0.75 Sphere  20/20     Left -1.50 +0.75 180 20/20-       Type: Distance only              Final Rx #2         Sphere Cylinder [place] Dist VA Near VA    Right +1.75 Sphere   20/20    Left +1.00 +0.75 180  20/20      Type: Reading only                     ASSESSMENT/PLAN:     Diagnoses and Plan:     Glaucoma suspect of both eyes  Discussed with patient that she is a glaucoma suspect based on increased cupping of the optic nerves in both eyes and pseudoexfoliation changes in the left eye. Fundus photos taken today. Will schedule glaucoma testing. Will not start medication, but will continue to observe. Patient verbalized understanding. Will see patient for next available visual field and OCT with no MD, if glaucoma testing is normal will see patient  in 1 year for a complete exam    Age-related nuclear cataract of both eyes  Discussed mild cataracts in both eyes that are not affecting vision and are not surgical at this time. New glasses Rx for separate distance and reading glasses per patient's choice. Pseudoexfoliation (PXF) of lens capsule  No treatment    Vitreous floaters of both eyes   There is no evidence of retinal pathology. All signs and symptoms of retinal detachment/tears explained in detail. Patient instructed to call the office if they experience increase in floaters, increase in flashes of light, loss of vision or curtain or veil effect.        Orders Placed This Encounter      Fundus Photos - OU - Both Eyes      OCT, Optic Nerve - OU - Both Eyes      Katz Visual Field - OU - Both Eyes      Meds This Visit:  Requested Prescriptions      No prescriptions requested or ordered in this encounter        Follow up instructions:  Return for Next Avail VF, OCT with no MD, If glaucoma diagnostics are wnl, 1 year dilated EE.    9/21/2023  Scribed by: Lisa Shelton MD

## 2023-09-21 NOTE — ASSESSMENT & PLAN NOTE
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 Fundus photos taken today. Will schedule glaucoma testing. Will not start medication, but will continue to observe. Patient verbalized understanding.     Will see patient for next available visual field and OCT with no MD, if glaucoma testing is normal will see patient  in 1 year for a complete exam

## 2023-09-22 ENCOUNTER — TELEPHONE (OUTPATIENT)
Dept: OPHTHALMOLOGY | Facility: CLINIC | Age: 78
End: 2023-09-22

## 2023-10-06 ENCOUNTER — OFFICE VISIT (OUTPATIENT)
Dept: ENDOCRINOLOGY CLINIC | Facility: CLINIC | Age: 78
End: 2023-10-06

## 2023-10-06 VITALS
WEIGHT: 163 LBS | DIASTOLIC BLOOD PRESSURE: 80 MMHG | SYSTOLIC BLOOD PRESSURE: 142 MMHG | BODY MASS INDEX: 31 KG/M2 | HEART RATE: 56 BPM

## 2023-10-06 DIAGNOSIS — E55.9 VITAMIN D DEFICIENCY: ICD-10-CM

## 2023-10-06 DIAGNOSIS — M81.0 AGE-RELATED OSTEOPOROSIS WITHOUT CURRENT PATHOLOGICAL FRACTURE: Primary | ICD-10-CM

## 2023-10-06 PROCEDURE — 96372 THER/PROPH/DIAG INJ SC/IM: CPT | Performed by: INTERNAL MEDICINE

## 2023-10-06 PROCEDURE — 1126F AMNT PAIN NOTED NONE PRSNT: CPT | Performed by: INTERNAL MEDICINE

## 2023-10-06 PROCEDURE — 99214 OFFICE O/P EST MOD 30 MIN: CPT | Performed by: INTERNAL MEDICINE

## 2023-10-07 ENCOUNTER — NURSE ONLY (OUTPATIENT)
Dept: OPHTHALMOLOGY | Facility: CLINIC | Age: 78
End: 2023-10-07

## 2023-10-07 DIAGNOSIS — H40.003 GLAUCOMA SUSPECT OF BOTH EYES: ICD-10-CM

## 2023-10-07 PROCEDURE — 92083 EXTENDED VISUAL FIELD XM: CPT | Performed by: OPHTHALMOLOGY

## 2023-10-07 PROCEDURE — 92133 CPTRZD OPH DX IMG PST SGM ON: CPT | Performed by: OPHTHALMOLOGY

## 2023-10-10 NOTE — PROGRESS NOTES
Awilda Kat is a 66year old female.     HPI:     HPI    Patient is here for a VF and OCT with no MD.    Last edited by Yogesh Bolaños OT on 10/7/2023  8:51 AM.        Patient History:  Past Medical History:   Diagnosis Date    Age-related nuclear cataract of both eyes 02/09/2021    Arrhythmia     A-fib-2017 dx, ablation 11/9/22    Atrial fibrillation ([facility] [address] 01/2017    Ramírez's esophagus     Brachial neuritis 09/19/2009    Constipation     DUB (dysfunctional uterine bleeding) 1980    per ng LIBBY    Esophageal reflux     Esophagitis 2011    per ng eosinophilic egd w/ biopsy    Esophagitis 2012    pr ng egd w/ biopsy    Floater, vitreous, left 02/09/2021    Floater, vitreous, left 02/09/2021    Gastritis     Glaucoma suspect of both eyes 02/09/2021    Glaucoma suspect of both eyes 02/09/2021    Glaucoma suspect of both eyes 02/09/2021    Glaucoma suspect of both eyes 02/09/2021    Hearing impairment     left ear hearing aid    Heartburn 2011    per ng reflux, dysphagia    High blood pressure     High cholesterol     History of blood transfusion     r/t childbirth - no rx    Long term (current) use of anticoagulants 03/05/2019    Meniere disease     Osteopenia 10/20/2014    Osteopenia determined by x-ray 10/18/2016    Other and unspecified hyperlipidemia     Paroxysmal atrial fibrillation ([facility] [address] 02/15/2017    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    Seasonal allergic rhinitis 11/26/2018    Sensorineural hearing loss, unilateral 09/24/2011    Sleep apnea     mouth guard    Snoring 2006    per ng uvulectomy    Spinal stenosis of lumbar region 09/04/2008    Visual impairment     glasses       Surgical History: Awilda Kat has a past surgical history that includes colonoscopy (08/26/2011); other surgical history (Right, 1979) (per ng torn ligament right ankle); hand/finger surgery unlisted (Right, ) (per ng hand synovitis surgery); injection; tendon origin/insertion (per ng injection tendon sheath, ligament); cortisone injection (Right, ) (per ng post op right forefoot); excise hand/foot neuroma (Right, ) (per ng neuroma 2 rt, hammertoe 3rt, naprosyn 500); excise hand/foot neuroma (Left, ) (per ng 2 left hammer toe 2 rt, hallux valqus naprosyn 500); excision of uvula () (per ng  uvula removed ); upper gi endoscopy performed (); upper gi endoscopy,exam; colonoscopy; hysterectomy (partial at 34); colonoscopy (N/A, 2020) (Procedure: COLONOSCOPY;  Surgeon: Kiara Barlow MD;  Location: [facility] ENDOSCOPY); ep pulmonary vein/a-fib ablation (2022) ( ); and ep a-flutter ablation (2022) ( ).     Family History   Problem Relation Age of Onset    Cancer Father         per ng lung    Breast Cancer Mother 61        approx    Other (Other) Mother     Other (MS) Sister     Heart Disease Other         per ng CAD, vein, artery, liver disease, arthritis    Other (Other) Other         per ng lupus erythematosus    Cancer Paternal Uncle         liver cancer    Cancer Paternal Uncle         stomach cancer    Cancer Maternal Uncle         stomach cancer    Cancer Maternal Uncle     Colon Polyps Maternal Uncle         bone cancer    Macular degeneration Neg     Glaucoma Neg     Diabetes Neg        Social History:   Social History     Socioeconomic History    Marital status:    Tobacco Use    Smoking status: Former     Packs/day: 0.50     Years: 5.00     Additional pack years: 0.00     Total pack years: 2.50     Types: Cigarettes     Quit date: 1968     Years since quittin.0    Smokeless tobacco: Never   Vaping Use    Vaping Use: Never used   Substance and Sexual Activity    Alcohol use: Not Currently     Alcohol/week: 0.0 standard drinks of alcohol     Comment: formerly since 2016, rare alcohol currently    Drug use: No   Other Topics Concern    Caffeine Concern Yes     Comment: soda 12 oz       Medications:  Current Outpatient Medications   Medication Sig Dispense Refill    ipratropium 0.06 % Nasal Solution 2 sprays by Nasal route 3 (three) times daily. 15 mL 5    apixaban (ELIQUIS) 5 MG Oral Tab Eliquis 5 mg tablet, [RxNorm: 6323120]      methylPREDNISolone (MEDROL) 4 MG Oral Tablet Therapy Pack Dosepack: take as directed 1 each 0    omeprazole 20 MG Oral Capsule Delayed Release TAKE 1 CAPSULE BY MOUTH EVERY DAY IN THE MORNING BEFORE BREAKFAST 90 capsule 3    docusate sodium 100 MG Oral Cap Take 100 mg by mouth 2 (two) times daily. 20 capsule 0    ferrous sulfate 325 (65 FE) MG Oral Tab EC Take 1 tablet (325 mg total) by mouth daily with breakfast. 20 tablet 0    HYDROcodone-acetaminophen 5-325 MG Oral Tab Take 1-2 tablets by mouth every 6 (six) hours as needed. 40 tablet 0    ondansetron 4 MG Oral Tablet Dispersible Take 1 tablet (4 mg total) by mouth every 8 (eight) hours as needed for Nausea. 10 tablet 0    aspirin 81 MG Oral Tab EC Take 1 tablet (81 mg total) by mouth in the morning and 1 tablet (81 mg total) before bedtime. (Patient not taking: Reported on 1/17/2023)  0    Probiotic Product (ALIGN OR) Take 1 tablet by mouth daily. Simethicone (GAS RELIEF 80 OR) Take 1 tablet by mouth as needed. sotalol 80 MG Oral Tab Take 0.5 tablets (40 mg total) by mouth 2 (two) times daily. mupirocin 2 % External Ointment Apply with cotton swab to bilateral nares twice daily as needed for crusting 15 g 2    benzonatate (TESSALON PERLES) 100 MG Oral Cap Take 1 capsule (100 mg total) by mouth 3 (three) times daily as needed for cough. 30 capsule 0    Cholecalciferol (VITAMIN D-3) 25 MCG (1000 UT) Oral Cap daily. atorvastatin 10 MG Oral Tab Take 1 tablet (10 mg total) by mouth nightly. 30 tablet 5    Fexofenadine HCl 180 MG Oral Tab Take 1 tablet (180 mg total) by mouth daily. Polyethylene Glycol 3350 (MIRALAX OR) Take by mouth.       Cyanocobalamin (VITAMIN B 12 OR) Take 1,000 mg by mouth daily with breakfast.      Calcium Carbonate-Vitamin D (CALCIUM + D OR) Take by mouth daily. Take 500mg & 1000 IU Vit D daily      GLUCOSAMINE-CHONDROITIN OR Take 1 tablet by mouth daily. Take Glucosamine 1500mg and Chondroitin 1100mg two times a day. omega-3 fatty acids (FISH OIL) 1000 MG Oral Cap Take 500 mg by mouth daily. Multiple Vitamins-Minerals (MULTI FOR HER OR) Take 1 tablet by mouth daily. Allergies:    Grass Pollen(K-O-R-*    OTHER (SEE COMMENTS)  Dust                    Runny nose    Comment:Headache, watery eyes  Mold                    Runny nose    Comment:Headache, watery eyes  Pollen                  Runny nose    Comment:Headache, watery eyes  Ragweed                 OTHER (SEE COMMENTS)    Comment:Watery eyes , Sneezing, HA's  [place]rees, [place]        Runny nose    Comment:Headache, watery eyes    ROS:       PHYSICAL EXAM:   Not recorded          ASSESSMENT/PLAN:     Diagnoses and Plan:     Glaucoma suspect of both eyes  Normal visual field, both eyes. Normal OCT, both eyes. No orders of the defined types were placed in this encounter.       Meds This Visit:  Requested Prescriptions      No prescriptions requested or ordered in this encounter        Follow up instructions:  Return in about 11 months (around 9/7/2024) for Dilated exam.    10/10/2023  Scribed by: Klever Mortensen MD

## 2023-10-13 ENCOUNTER — OFFICE VISIT (OUTPATIENT)
Dept: INTERNAL MEDICINE CLINIC | Facility: CLINIC | Age: 78
End: 2023-10-13

## 2023-10-13 VITALS
DIASTOLIC BLOOD PRESSURE: 72 MMHG | BODY MASS INDEX: 30.78 KG/M2 | HEIGHT: 61 IN | WEIGHT: 163 LBS | SYSTOLIC BLOOD PRESSURE: 110 MMHG | HEART RATE: 64 BPM

## 2023-10-13 DIAGNOSIS — Z79.01 LONG TERM (CURRENT) USE OF ANTICOAGULANTS: ICD-10-CM

## 2023-10-13 DIAGNOSIS — E78.2 HYPERLIPIDEMIA, MIXED: ICD-10-CM

## 2023-10-13 DIAGNOSIS — Z01.818 PREOP EXAM FOR INTERNAL MEDICINE: Primary | ICD-10-CM

## 2023-10-13 DIAGNOSIS — I10 ESSENTIAL HYPERTENSION: ICD-10-CM

## 2023-10-13 DIAGNOSIS — I48.0 PAROXYSMAL ATRIAL FIBRILLATION (HCC): ICD-10-CM

## 2023-10-13 DIAGNOSIS — G47.33 OSA (OBSTRUCTIVE SLEEP APNEA): ICD-10-CM

## 2023-10-13 PROBLEM — I48.91 ATRIAL FIBRILLATION, UNSPECIFIED TYPE (HCC): Status: RESOLVED | Noted: 2023-03-07 | Resolved: 2023-10-13

## 2023-10-13 PROBLEM — H43.393 VITREOUS FLOATERS OF BOTH EYES: Status: RESOLVED | Noted: 2021-02-09 | Resolved: 2023-10-13

## 2023-10-13 PROBLEM — Z51.81 ENCOUNTER FOR THERAPEUTIC DRUG MONITORING: Status: RESOLVED | Noted: 2023-03-07 | Resolved: 2023-10-13

## 2023-10-13 PROCEDURE — 1126F AMNT PAIN NOTED NONE PRSNT: CPT | Performed by: INTERNAL MEDICINE

## 2023-10-13 PROCEDURE — 99215 OFFICE O/P EST HI 40 MIN: CPT | Performed by: INTERNAL MEDICINE

## 2023-10-17 ENCOUNTER — LAB ENCOUNTER (OUTPATIENT)
Dept: LAB | Facility: HOSPITAL | Age: 78
End: 2023-10-17
Attending: INTERNAL MEDICINE
Payer: MEDICARE

## 2023-10-17 ENCOUNTER — HOSPITAL ENCOUNTER (OUTPATIENT)
Dept: GENERAL RADIOLOGY | Facility: HOSPITAL | Age: 78
Discharge: HOME | End: 2023-10-17
Attending: INTERNAL MEDICINE
Payer: MEDICARE

## 2023-10-17 DIAGNOSIS — M81.0 AGE-RELATED OSTEOPOROSIS WITHOUT CURRENT PATHOLOGICAL FRACTURE: ICD-10-CM

## 2023-10-17 DIAGNOSIS — I48.0 PAROXYSMAL ATRIAL FIBRILLATION (HCC): ICD-10-CM

## 2023-10-17 DIAGNOSIS — E78.2 HYPERLIPIDEMIA, MIXED: ICD-10-CM

## 2023-10-17 DIAGNOSIS — Z01.818 PREOP EXAM FOR INTERNAL MEDICINE: ICD-10-CM

## 2023-10-17 DIAGNOSIS — E55.9 VITAMIN D DEFICIENCY: ICD-10-CM

## 2023-10-17 LAB
ALBUMIN SERPL-MCNC: 3.4 G/DL (ref 3.4–5)
ALBUMIN/GLOB SERPL: 0.8 {RATIO} (ref 1–2)
ALP LIVER SERPL-CCNC: 77 U/L
ALT SERPL-CCNC: 21 U/L
ANION GAP SERPL CALC-SCNC: 7 MMOL/L (ref 0–18)
APTT PPP: 38.1 SECONDS (ref 23.3–35.6)
AST SERPL-CCNC: 16 U/L (ref 15–37)
BASOPHILS # BLD AUTO: 0.04 X10(3) UL (ref 0–0.2)
BASOPHILS NFR BLD AUTO: 0.8 %
BILIRUB SERPL-MCNC: 0.4 MG/DL (ref 0.1–2)
BILIRUB UR QL: NEGATIVE
BUN BLD-MCNC: 19 MG/DL (ref 7–18)
BUN/CREAT SERPL: 25.3 (ref 10–20)
CALCIUM BLD-MCNC: 8.7 MG/DL (ref 8.5–10.1)
CHLORIDE SERPL-SCNC: 108 MMOL/L (ref 98–112)
CLARITY UR: CLEAR
CO2 SERPL-SCNC: 28 MMOL/L (ref 21–32)
CREAT BLD-MCNC: 0.75 MG/DL
DEPRECATED RDW RBC AUTO: 44.2 FL (ref 35.1–46.3)
EGFRCR SERPLBLD CKD-EPI 2021: 81 ML/MIN/1.73M2 (ref 60–?)
EOSINOPHIL # BLD AUTO: 0.1 X10(3) UL (ref 0–0.7)
EOSINOPHIL NFR BLD AUTO: 2 %
ERYTHROCYTE [DISTWIDTH] IN BLOOD BY AUTOMATED COUNT: 12.1 % (ref 11–15)
FASTING STATUS PATIENT QL REPORTED: YES
GLOBULIN PLAS-MCNC: 4.5 G/DL (ref 2.8–4.4)
GLUCOSE BLD-MCNC: 83 MG/DL (ref 70–99)
GLUCOSE UR-MCNC: NORMAL MG/DL
HCT VFR BLD AUTO: 37.4 %
HGB BLD-MCNC: 12.6 G/DL
HGB UR QL STRIP.AUTO: NEGATIVE
IMM GRANULOCYTES # BLD AUTO: 0.03 X10(3) UL (ref 0–1)
IMM GRANULOCYTES NFR BLD: 0.6 %
INR BLD: 1.01 (ref 0.8–1.2)
KETONES UR-MCNC: NEGATIVE MG/DL
LEUKOCYTE ESTERASE UR QL STRIP.AUTO: 500
LYMPHOCYTES # BLD AUTO: 1.51 X10(3) UL (ref 1–4)
LYMPHOCYTES NFR BLD AUTO: 30.4 %
MCH RBC QN AUTO: 33.2 PG (ref 26–34)
MCHC RBC AUTO-ENTMCNC: 33.7 G/DL (ref 31–37)
MCV RBC AUTO: 98.7 FL
MONOCYTES # BLD AUTO: 0.41 X10(3) UL (ref 0.1–1)
MONOCYTES NFR BLD AUTO: 8.3 %
MRSA DNA SPEC QL NAA+PROBE: NEGATIVE
NEUTROPHILS # BLD AUTO: 2.87 X10 (3) UL (ref 1.5–7.7)
NEUTROPHILS # BLD AUTO: 2.87 X10(3) UL (ref 1.5–7.7)
NEUTROPHILS NFR BLD AUTO: 57.9 %
NITRITE UR QL STRIP.AUTO: NEGATIVE
OSMOLALITY SERPL CALC.SUM OF ELEC: 297 MOSM/KG (ref 275–295)
PH UR: 6.5 [PH] (ref 5–8)
PLATELET # BLD AUTO: 290 10(3)UL (ref 150–450)
POTASSIUM SERPL-SCNC: 4 MMOL/L (ref 3.5–5.1)
PROT SERPL-MCNC: 7.9 G/DL (ref 6.4–8.2)
PROT UR-MCNC: NEGATIVE MG/DL
PROTHROMBIN TIME: 13.9 SECONDS (ref 11.6–14.8)
PTH-INTACT SERPL-MCNC: 102.4 PG/ML (ref 18.5–88)
RBC # BLD AUTO: 3.79 X10(6)UL
SODIUM SERPL-SCNC: 143 MMOL/L (ref 136–145)
SP GR UR STRIP: 1.01 (ref 1–1.03)
UROBILINOGEN UR STRIP-ACNC: NORMAL
VIT D+METAB SERPL-MCNC: 47.5 NG/ML (ref 30–100)
WBC # BLD AUTO: 5 X10(3) UL (ref 4–11)

## 2023-10-17 PROCEDURE — 85025 COMPLETE CBC W/AUTO DIFF WBC: CPT

## 2023-10-17 PROCEDURE — 71046 X-RAY EXAM CHEST 2 VIEWS: CPT | Performed by: INTERNAL MEDICINE

## 2023-10-17 PROCEDURE — 87641 MR-STAPH DNA AMP PROBE: CPT

## 2023-10-17 PROCEDURE — 36415 COLL VENOUS BLD VENIPUNCTURE: CPT

## 2023-10-17 PROCEDURE — 85610 PROTHROMBIN TIME: CPT

## 2023-10-17 PROCEDURE — 81001 URINALYSIS AUTO W/SCOPE: CPT

## 2023-10-17 PROCEDURE — 87086 URINE CULTURE/COLONY COUNT: CPT

## 2023-10-17 PROCEDURE — 85730 THROMBOPLASTIN TIME PARTIAL: CPT

## 2023-10-17 PROCEDURE — 84080 ASSAY ALKALINE PHOSPHATASES: CPT

## 2023-10-17 PROCEDURE — 80053 COMPREHEN METABOLIC PANEL: CPT

## 2023-10-17 PROCEDURE — 82306 VITAMIN D 25 HYDROXY: CPT

## 2023-10-17 PROCEDURE — 83970 ASSAY OF PARATHORMONE: CPT

## 2023-10-20 LAB — ALKALINE PHOSPHATASE BONE SPECIFIC: 10 UG/L

## 2023-10-31 ENCOUNTER — TELEPHONE (OUTPATIENT)
Dept: INTERNAL MEDICINE CLINIC | Facility: CLINIC | Age: 78
End: 2023-10-31

## 2023-10-31 NOTE — TELEPHONE ENCOUNTER
Vadim Ching From Dr. Karlos Espinoza office is asking for Pre Op clearance for the patient. Was seen on 10/13/23 for clearance. Please fax clearance letter to Fax no.  [phone]    The surgery is scheduled for 11/7/23 Tuesday.

## 2023-11-02 ENCOUNTER — ORDER TRANSCRIPTION (OUTPATIENT)
Dept: PHYSICAL THERAPY | Age: 78
End: 2023-11-02

## 2023-11-02 DIAGNOSIS — Z96.651 TOTAL KNEE REPLACEMENT STATUS, RIGHT: Primary | ICD-10-CM

## 2023-11-06 NOTE — H&P
[facility]    History & Physical    Dmitriy Lemos Patient Status:  Surgery Admit Kristi Gabriel    1945 MRN [id number]   Location [place] [address] Attending Nidia Swanson, *   Hosp Day # 0 PCP Rey Greer MD     Date:  2023  Date of Admission:  (Not on file)    History provided by:patient  Chief Complaint:   Right knee pain    HPI:   Dmitriy Lemos is a(n) 66year old female. She is under our care for osteoarthritis of that knee. She had a cortisone injection in the knee back in April. She had a fair amount of relief following that injection but now her symptoms have returned. She has significant pain in the medial and posterior aspect of the knee. We have discussed knee replacement in the past.  She did very well following knee replacement on her left knee. She is ready to proceed with a right knee replacement.     History     Past Medical History:   Diagnosis Date    Age-related nuclear cataract of both eyes 2021    Arrhythmia     A-fib- dx, ablation 22    Atrial fibrillation ([address] 2017    Ramírez's esophagus     Brachial neuritis 2009    Constipation     DUB (dysfunctional uterine bleeding)     per ng LIBBY    Esophageal reflux     Esophagitis     per ng eosinophilic egd w/ biopsy    Esophagitis     pr ng egd w/ biopsy    Floater, vitreous, left 2021    Floater, vitreous, left 2021    Gastritis     Glaucoma suspect of both eyes 2021    Glaucoma suspect of both eyes 2021    Glaucoma suspect of both eyes 2021    Glaucoma suspect of both eyes 2021    Hearing impairment     left ear hearing aid    Heartburn     per ng reflux, dysphagia    High blood pressure     High cholesterol     History of blood transfusion     r/t childbirth - no rx    Long term (current) use of anticoagulants 2019    Meniere disease     Osteopenia 10/20/2014    Osteopenia determined by x-ray 10/18/2016    Other and unspecified hyperlipidemia     Paroxysmal atrial fibrillation ([facility] 02/15/2017    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    Seasonal allergic rhinitis 11/26/2018    Sensorineural hearing loss, unilateral 09/24/2011    Sleep apnea     mouth guard    Snoring 2006    per ng uvulectomy    Spinal stenosis of lumbar region 09/04/2008    Visual impairment     glasses     Past Surgical History:   Procedure Laterality Date    COLONOSCOPY  08/26/2011    COLONOSCOPY      COLONOSCOPY N/A 02/03/2020    Procedure: COLONOSCOPY;  Surgeon: Doug Fitzpatrick MD;  Location: [address] [place] [address] ENDOSCOPY    CORTISONE INJECTION Right 2010    per ng post op right forefoot    EP A-FLUTTER ABLATION  11/09/2022         EP PULMONARY VEIN/A-FIB ABLATION  11/09/2022         EXCISE HAND/FOOT NEUROMA Right 2007    per ng neuroma 2 rt, hammertoe 3rt, naprosyn 500    EXCISE HAND/FOOT NEUROMA Left 2009    per ng 2 left hammer toe 2 rt, hallux valqus naprosyn 500    EXCISION OF UVULA  2006    per ng  uvula removed     HAND/FINGER SURGERY UNLISTED Right 2011    per ng hand synovitis surgery    HYSTERECTOMY      partial at 34    INJECTION; TENDON ORIGIN/INSERTION      per ng injection tendon sheath, ligament    OTHER SURGICAL HISTORY Right 1979    per ng torn ligament right ankle    UPPER GI ENDOSCOPY PERFORMED  2015    UPPER GI ENDOSCOPY,EXAM       Family History   Problem Relation Age of Onset    Cancer Father         per ng lung    Breast Cancer Mother 61        approx    Other (Other) Mother     Other (MS) Sister     Heart Disease Other         per ng CAD, vein, artery, liver disease, arthritis    Other (Other) Other         per ng lupus erythematosus    Cancer Paternal Uncle         liver cancer    Cancer Paternal Uncle         stomach cancer    Cancer Maternal Uncle stomach cancer    Cancer Maternal Uncle     Colon Polyps Maternal Uncle         bone cancer    Macular degeneration Neg     Glaucoma Neg     Diabetes Neg      Social History:  Social History     Socioeconomic History    Marital status:    Tobacco Use    Smoking status: Former     Packs/day: 0.50     Years: 5.00     Additional pack years: 0.00     Total pack years: 2.50     Types: Cigarettes     Quit date: 1968     Years since quittin.1     Passive exposure: Past    Smokeless tobacco: Never   Vaping Use    Vaping Use: Never used   Substance and Sexual Activity    Alcohol use: Not Currently     Alcohol/week: 0.0 standard drinks of alcohol     Comment: formerly since 2016, rare alcohol currently    Drug use: No   Other Topics Concern    Caffeine Concern Yes     Comment: soda 12 oz     Allergies/Medications: Allergies:   Grass Pollen(K-O-R-*    OTHER (SEE COMMENTS)  Dust                    Runny nose    Comment:Headache, watery eyes  Mold                    Runny nose    Comment:Headache, watery eyes  Pollen                  Runny nose    Comment:Headache, watery eyes  Ragweed                 OTHER (SEE COMMENTS)    Comment:Watery eyes , Sneezing, HA's  [place], [place]        Runny nose    Comment:Headache, watery eyes  No medications prior to admission. Review of Systems:   Pertinent items are noted in HPI. Physical Exam:   Vital Signs:  Height 5' 1\" (1.549 m), weight 162 lb (73.5 kg), not currently breastfeeding. General appearance: alert, appears stated age and cooperative  Extremities: No palpable effusion. No redness or warmth. Crepitus with motion. She has full extension and 120 degrees of flexion. No collateral instability. No pain with straight leg raise. No pain with internal and external rotation of the hip.         Results:     Lab Results   Component Value Date    WBC 5.0 10/17/2023    HGB 12.6 10/17/2023    HCT 37.4 10/17/2023    .0 10/17/2023    CREATSERUM 0.75 10/17/2023    BUN 19 (H) 10/17/2023     10/17/2023    K 4.0 10/17/2023     10/17/2023    CO2 28.0 10/17/2023    GLU 83 10/17/2023    CA 8.7 10/17/2023    ALB 3.4 10/17/2023    ALKPHO 77 10/17/2023    BILT 0.4 10/17/2023    TP 7.9 10/17/2023    AST 16 10/17/2023    ALT 21 10/17/2023    PTT 38.1 (H) 10/17/2023    INR 1.01 10/17/2023    T4F 0.9 11/07/2022    TSH 0.988 11/07/2022    DDIMER 2.53 01/21/2017    ESRML 26 03/11/2022    CRP <0.29 03/11/2022    MG 2.4 06/13/2022    PHOS 2.8 06/13/2022    TROP 0.00 01/21/2017       Standing AP of both knees with lateral and skyline views of both knees demonstrates a well aligned well fixed left total knee arthroplasty. There is advanced osteoarthritis in the right knee with complete loss of the medial joint space, subchondral sclerosis and osteophyte formation. No results found. Assessment/Plan:     Ms. Anne Mccrary has advanced osteoarthritis of her right knee. Her symptoms have not responded to conservative management. We discussed options for treatment and she would like to proceed with right total knee arthroplasty. We discussed the surgery and the expected risks and benefits. We also discussed the expected recovery time. Answered all of her questions. She understands and wishes to proceed. She has received her clearances.       Corey Galindo PA-C  11/6/2023

## 2023-11-07 ENCOUNTER — APPOINTMENT (OUTPATIENT)
Dept: GENERAL RADIOLOGY | Facility: HOSPITAL | Age: 78
End: 2023-11-07
Attending: PHYSICIAN ASSISTANT
Payer: MEDICARE

## 2023-11-07 ENCOUNTER — HOSPITAL ENCOUNTER (INPATIENT)
Facility: HOSPITAL | Age: 78
LOS: 2 days | Discharge: HOME HEALTH | End: 2023-11-09
Attending: ORTHOPAEDIC SURGERY | Admitting: ORTHOPAEDIC SURGERY
Payer: MEDICARE

## 2023-11-07 ENCOUNTER — ANESTHESIA (OUTPATIENT)
Dept: SURGERY | Facility: HOSPITAL | Age: 78
End: 2023-11-07
Payer: MEDICARE

## 2023-11-07 ENCOUNTER — ANESTHESIA EVENT (OUTPATIENT)
Dept: SURGERY | Facility: HOSPITAL | Age: 78
End: 2023-11-07
Payer: MEDICARE

## 2023-11-07 PROBLEM — M17.11 PRIMARY OSTEOARTHRITIS OF RIGHT KNEE: Status: ACTIVE | Noted: 2023-11-07

## 2023-11-07 LAB
HCT VFR BLD AUTO: 35.2 %
HGB BLD-MCNC: 11.8 G/DL

## 2023-11-07 PROCEDURE — 99222 1ST HOSP IP/OBS MODERATE 55: CPT | Performed by: HOSPITALIST

## 2023-11-07 PROCEDURE — 0SRC0J9 REPLACEMENT OF RIGHT KNEE JOINT WITH SYNTHETIC SUBSTITUTE, CEMENTED, OPEN APPROACH: ICD-10-PCS | Performed by: ORTHOPAEDIC SURGERY

## 2023-11-07 PROCEDURE — 73560 X-RAY EXAM OF KNEE 1 OR 2: CPT | Performed by: PHYSICIAN ASSISTANT

## 2023-11-07 DEVICE — GMK SPHERE KNEE: Type: IMPLANTABLE DEVICE | Site: KNEE

## 2023-11-07 DEVICE — TIBIAL INSERT FIXED SPHERE FLEX   #3/10 MM R E-CROSS
Type: IMPLANTABLE DEVICE | Status: FUNCTIONAL
Brand: GMK SPHERE TOTAL KNEE SYSTEM

## 2023-11-07 DEVICE — RESURFACING PATELLA SIZE 2
Type: IMPLANTABLE DEVICE | Site: KNEE | Status: FUNCTIONAL
Brand: GMK PRIMARY TOTAL KNEE SYSTEM

## 2023-11-07 DEVICE — FIXED TIBIAL TRAY CEMENTED RIGHT, SIZE 3
Type: IMPLANTABLE DEVICE | Site: KNEE | Status: FUNCTIONAL
Brand: GMK PRIMARY TOTAL KNEE SYSTEM

## 2023-11-07 DEVICE — IMPLANTABLE DEVICE
Type: IMPLANTABLE DEVICE | Site: KNEE | Status: FUNCTIONAL
Brand: REFOBACIN® BONE CEMENT R

## 2023-11-07 DEVICE — PRIMARY EXTENSION STEM Ø 11, 30 MM
Type: IMPLANTABLE DEVICE | Site: KNEE | Status: FUNCTIONAL
Brand: GMK PRIMARY TOTAL KNEE SYSTEM

## 2023-11-07 DEVICE — FEMUR SPHERE CEMENTED RIGHT, SIZE 3+
Type: IMPLANTABLE DEVICE | Site: KNEE | Status: FUNCTIONAL
Brand: GMK SPHERE TOTAL KNEE SYSTEM

## 2023-11-07 RX ORDER — CEFAZOLIN SODIUM/WATER 2 G/20 ML
2 SYRINGE (ML) INTRAVENOUS EVERY 8 HOURS
Qty: 40 ML | Refills: 0 | Status: COMPLETED | OUTPATIENT
Start: 2023-11-07 | End: 2023-11-07

## 2023-11-07 RX ORDER — MORPHINE SULFATE 4 MG/ML
4 INJECTION, SOLUTION INTRAMUSCULAR; INTRAVENOUS EVERY 10 MIN PRN
Status: DISCONTINUED | OUTPATIENT
Start: 2023-11-07 | End: 2023-11-07 | Stop reason: HOSPADM

## 2023-11-07 RX ORDER — MORPHINE SULFATE 4 MG/ML
2 INJECTION, SOLUTION INTRAMUSCULAR; INTRAVENOUS EVERY 10 MIN PRN
Status: DISCONTINUED | OUTPATIENT
Start: 2023-11-07 | End: 2023-11-07 | Stop reason: HOSPADM

## 2023-11-07 RX ORDER — DEXAMETHASONE SODIUM PHOSPHATE 4 MG/ML
VIAL (ML) INJECTION AS NEEDED
Status: DISCONTINUED | OUTPATIENT
Start: 2023-11-07 | End: 2023-11-07 | Stop reason: SURG

## 2023-11-07 RX ORDER — MORPHINE SULFATE 1 MG/ML
INJECTION, SOLUTION EPIDURAL; INTRATHECAL; INTRAVENOUS AS NEEDED
Status: DISCONTINUED | OUTPATIENT
Start: 2023-11-07 | End: 2023-11-07 | Stop reason: SURG

## 2023-11-07 RX ORDER — FAMOTIDINE 20 MG/1
20 TABLET, FILM COATED ORAL 2 TIMES DAILY
Status: DISCONTINUED | OUTPATIENT
Start: 2023-11-07 | End: 2023-11-07

## 2023-11-07 RX ORDER — HYDROMORPHONE HYDROCHLORIDE 1 MG/ML
0.4 INJECTION, SOLUTION INTRAMUSCULAR; INTRAVENOUS; SUBCUTANEOUS EVERY 5 MIN PRN
Status: DISCONTINUED | OUTPATIENT
Start: 2023-11-07 | End: 2023-11-07 | Stop reason: HOSPADM

## 2023-11-07 RX ORDER — CEFAZOLIN SODIUM/WATER 2 G/20 ML
2 SYRINGE (ML) INTRAVENOUS ONCE
Status: COMPLETED | OUTPATIENT
Start: 2023-11-07 | End: 2023-11-07

## 2023-11-07 RX ORDER — MIDAZOLAM HYDROCHLORIDE 1 MG/ML
INJECTION INTRAMUSCULAR; INTRAVENOUS AS NEEDED
Status: DISCONTINUED | OUTPATIENT
Start: 2023-11-07 | End: 2023-11-07 | Stop reason: SURG

## 2023-11-07 RX ORDER — HYDROMORPHONE HYDROCHLORIDE 1 MG/ML
0.2 INJECTION, SOLUTION INTRAMUSCULAR; INTRAVENOUS; SUBCUTANEOUS EVERY 2 HOUR PRN
Status: DISCONTINUED | OUTPATIENT
Start: 2023-11-07 | End: 2023-11-09

## 2023-11-07 RX ORDER — ONDANSETRON 2 MG/ML
INJECTION INTRAMUSCULAR; INTRAVENOUS AS NEEDED
Status: DISCONTINUED | OUTPATIENT
Start: 2023-11-07 | End: 2023-11-07 | Stop reason: SURG

## 2023-11-07 RX ORDER — ATORVASTATIN CALCIUM 10 MG/1
10 TABLET, FILM COATED ORAL NIGHTLY
Status: DISCONTINUED | OUTPATIENT
Start: 2023-11-07 | End: 2023-11-09

## 2023-11-07 RX ORDER — BUPIVACAINE HYDROCHLORIDE AND EPINEPHRINE 5; 5 MG/ML; UG/ML
INJECTION, SOLUTION PERINEURAL AS NEEDED
Status: DISCONTINUED | OUTPATIENT
Start: 2023-11-07 | End: 2023-11-07 | Stop reason: HOSPADM

## 2023-11-07 RX ORDER — METOCLOPRAMIDE 10 MG/1
10 TABLET ORAL ONCE
Status: COMPLETED | OUTPATIENT
Start: 2023-11-07 | End: 2023-11-07

## 2023-11-07 RX ORDER — BISACODYL 10 MG
10 SUPPOSITORY, RECTAL RECTAL
Status: DISCONTINUED | OUTPATIENT
Start: 2023-11-07 | End: 2023-11-09

## 2023-11-07 RX ORDER — HYDROMORPHONE HYDROCHLORIDE 1 MG/ML
0.4 INJECTION, SOLUTION INTRAMUSCULAR; INTRAVENOUS; SUBCUTANEOUS EVERY 2 HOUR PRN
Status: DISCONTINUED | OUTPATIENT
Start: 2023-11-07 | End: 2023-11-09

## 2023-11-07 RX ORDER — ACETAMINOPHEN 325 MG/1
650 TABLET ORAL EVERY 6 HOURS PRN
Status: DISCONTINUED | OUTPATIENT
Start: 2023-11-07 | End: 2023-11-08

## 2023-11-07 RX ORDER — HYDROMORPHONE HYDROCHLORIDE 1 MG/ML
0.2 INJECTION, SOLUTION INTRAMUSCULAR; INTRAVENOUS; SUBCUTANEOUS EVERY 5 MIN PRN
Status: DISCONTINUED | OUTPATIENT
Start: 2023-11-07 | End: 2023-11-07 | Stop reason: HOSPADM

## 2023-11-07 RX ORDER — CELECOXIB 200 MG/1
400 CAPSULE ORAL ONCE
Status: COMPLETED | OUTPATIENT
Start: 2023-11-07 | End: 2023-11-07

## 2023-11-07 RX ORDER — FAMOTIDINE 10 MG/ML
20 INJECTION, SOLUTION INTRAVENOUS 2 TIMES DAILY
Status: DISCONTINUED | OUTPATIENT
Start: 2023-11-07 | End: 2023-11-07

## 2023-11-07 RX ORDER — LABETALOL HYDROCHLORIDE 5 MG/ML
5 INJECTION, SOLUTION INTRAVENOUS EVERY 5 MIN PRN
Status: DISCONTINUED | OUTPATIENT
Start: 2023-11-07 | End: 2023-11-07 | Stop reason: HOSPADM

## 2023-11-07 RX ORDER — HYDROMORPHONE HYDROCHLORIDE 1 MG/ML
0.6 INJECTION, SOLUTION INTRAMUSCULAR; INTRAVENOUS; SUBCUTANEOUS EVERY 5 MIN PRN
Status: DISCONTINUED | OUTPATIENT
Start: 2023-11-07 | End: 2023-11-07 | Stop reason: HOSPADM

## 2023-11-07 RX ORDER — EPHEDRINE SULFATE 50 MG/ML
INJECTION INTRAVENOUS AS NEEDED
Status: DISCONTINUED | OUTPATIENT
Start: 2023-11-07 | End: 2023-11-07 | Stop reason: SURG

## 2023-11-07 RX ORDER — ONDANSETRON 2 MG/ML
4 INJECTION INTRAMUSCULAR; INTRAVENOUS ONCE AS NEEDED
Status: ACTIVE | OUTPATIENT
Start: 2023-11-07 | End: 2023-11-07

## 2023-11-07 RX ORDER — DIPHENHYDRAMINE HCL 25 MG
25 CAPSULE ORAL EVERY 4 HOURS PRN
Status: DISCONTINUED | OUTPATIENT
Start: 2023-11-07 | End: 2023-11-08

## 2023-11-07 RX ORDER — POLYETHYLENE GLYCOL 3350 17 G/17G
17 POWDER, FOR SOLUTION ORAL DAILY PRN
Status: DISCONTINUED | OUTPATIENT
Start: 2023-11-07 | End: 2023-11-09

## 2023-11-07 RX ORDER — MELATONIN
325
Status: DISCONTINUED | OUTPATIENT
Start: 2023-11-07 | End: 2023-11-09

## 2023-11-07 RX ORDER — HYDROCODONE BITARTRATE AND ACETAMINOPHEN 7.5; 325 MG/1; MG/1
2 TABLET ORAL EVERY 6 HOURS PRN
Status: DISCONTINUED | OUTPATIENT
Start: 2023-11-07 | End: 2023-11-08

## 2023-11-07 RX ORDER — ENEMA 19; 7 G/133ML; G/133ML
1 ENEMA RECTAL ONCE AS NEEDED
Status: DISCONTINUED | OUTPATIENT
Start: 2023-11-07 | End: 2023-11-09

## 2023-11-07 RX ORDER — METOCLOPRAMIDE HYDROCHLORIDE 5 MG/ML
10 INJECTION INTRAMUSCULAR; INTRAVENOUS EVERY 8 HOURS PRN
Status: DISCONTINUED | OUTPATIENT
Start: 2023-11-07 | End: 2023-11-07 | Stop reason: HOSPADM

## 2023-11-07 RX ORDER — MORPHINE SULFATE 10 MG/ML
6 INJECTION, SOLUTION INTRAMUSCULAR; INTRAVENOUS EVERY 10 MIN PRN
Status: DISCONTINUED | OUTPATIENT
Start: 2023-11-07 | End: 2023-11-07 | Stop reason: HOSPADM

## 2023-11-07 RX ORDER — IPRATROPIUM BROMIDE 42 UG/1
2 SPRAY, METERED NASAL 3 TIMES DAILY
Status: DISCONTINUED | OUTPATIENT
Start: 2023-11-07 | End: 2023-11-09

## 2023-11-07 RX ORDER — DOCUSATE SODIUM 100 MG/1
100 CAPSULE, LIQUID FILLED ORAL 2 TIMES DAILY
Status: DISCONTINUED | OUTPATIENT
Start: 2023-11-07 | End: 2023-11-09

## 2023-11-07 RX ORDER — HALOPERIDOL 5 MG/ML
0.5 INJECTION INTRAMUSCULAR ONCE AS NEEDED
Status: ACTIVE | OUTPATIENT
Start: 2023-11-07 | End: 2023-11-07

## 2023-11-07 RX ORDER — LIDOCAINE HYDROCHLORIDE 10 MG/ML
INJECTION, SOLUTION EPIDURAL; INFILTRATION; INTRACAUDAL; PERINEURAL AS NEEDED
Status: DISCONTINUED | OUTPATIENT
Start: 2023-11-07 | End: 2023-11-07 | Stop reason: SURG

## 2023-11-07 RX ORDER — DIPHENHYDRAMINE HYDROCHLORIDE 50 MG/ML
25 INJECTION INTRAMUSCULAR; INTRAVENOUS ONCE AS NEEDED
Status: ACTIVE | OUTPATIENT
Start: 2023-11-07 | End: 2023-11-07

## 2023-11-07 RX ORDER — HYDROMORPHONE HYDROCHLORIDE 1 MG/ML
0.4 INJECTION, SOLUTION INTRAMUSCULAR; INTRAVENOUS; SUBCUTANEOUS
Status: DISCONTINUED | OUTPATIENT
Start: 2023-11-07 | End: 2023-11-08

## 2023-11-07 RX ORDER — ACETAMINOPHEN 500 MG
1000 TABLET ORAL ONCE
Status: COMPLETED | OUTPATIENT
Start: 2023-11-07 | End: 2023-11-07

## 2023-11-07 RX ORDER — SOTALOL HYDROCHLORIDE 80 MG/1
40 TABLET ORAL 2 TIMES DAILY
Status: DISCONTINUED | OUTPATIENT
Start: 2023-11-07 | End: 2023-11-09

## 2023-11-07 RX ORDER — NALOXONE HYDROCHLORIDE 0.4 MG/ML
0.08 INJECTION, SOLUTION INTRAMUSCULAR; INTRAVENOUS; SUBCUTANEOUS
Status: DISCONTINUED | OUTPATIENT
Start: 2023-11-07 | End: 2023-11-08

## 2023-11-07 RX ORDER — FAMOTIDINE 20 MG/1
20 TABLET, FILM COATED ORAL ONCE
Status: DISCONTINUED | OUTPATIENT
Start: 2023-11-07 | End: 2023-11-07 | Stop reason: HOSPADM

## 2023-11-07 RX ORDER — ONDANSETRON 2 MG/ML
4 INJECTION INTRAMUSCULAR; INTRAVENOUS EVERY 6 HOURS PRN
Status: DISCONTINUED | OUTPATIENT
Start: 2023-11-07 | End: 2023-11-09

## 2023-11-07 RX ORDER — ONDANSETRON 2 MG/ML
4 INJECTION INTRAMUSCULAR; INTRAVENOUS EVERY 6 HOURS PRN
Status: DISCONTINUED | OUTPATIENT
Start: 2023-11-07 | End: 2023-11-07 | Stop reason: HOSPADM

## 2023-11-07 RX ORDER — NALBUPHINE HYDROCHLORIDE 10 MG/ML
2.5 INJECTION, SOLUTION INTRAMUSCULAR; INTRAVENOUS; SUBCUTANEOUS EVERY 4 HOURS PRN
Status: DISCONTINUED | OUTPATIENT
Start: 2023-11-07 | End: 2023-11-08

## 2023-11-07 RX ORDER — SODIUM CHLORIDE, SODIUM LACTATE, POTASSIUM CHLORIDE, CALCIUM CHLORIDE 600; 310; 30; 20 MG/100ML; MG/100ML; MG/100ML; MG/100ML
INJECTION, SOLUTION INTRAVENOUS CONTINUOUS
Status: DISCONTINUED | OUTPATIENT
Start: 2023-11-07 | End: 2023-11-08

## 2023-11-07 RX ORDER — SODIUM CHLORIDE 9 MG/ML
INJECTION, SOLUTION INTRAVENOUS CONTINUOUS
Status: DISCONTINUED | OUTPATIENT
Start: 2023-11-07 | End: 2023-11-08

## 2023-11-07 RX ORDER — BUPIVACAINE HYDROCHLORIDE 7.5 MG/ML
INJECTION, SOLUTION INTRASPINAL AS NEEDED
Status: DISCONTINUED | OUTPATIENT
Start: 2023-11-07 | End: 2023-11-07 | Stop reason: SURG

## 2023-11-07 RX ORDER — PANTOPRAZOLE SODIUM 20 MG/1
20 TABLET, DELAYED RELEASE ORAL
Status: DISCONTINUED | OUTPATIENT
Start: 2023-11-07 | End: 2023-11-09

## 2023-11-07 RX ORDER — HYDROCODONE BITARTRATE AND ACETAMINOPHEN 5; 325 MG/1; MG/1
1 TABLET ORAL EVERY 4 HOURS PRN
Status: DISCONTINUED | OUTPATIENT
Start: 2023-11-07 | End: 2023-11-09

## 2023-11-07 RX ORDER — TRANEXAMIC ACID 10 MG/ML
INJECTION, SOLUTION INTRAVENOUS AS NEEDED
Status: DISCONTINUED | OUTPATIENT
Start: 2023-11-07 | End: 2023-11-07 | Stop reason: SURG

## 2023-11-07 RX ORDER — SODIUM CHLORIDE, SODIUM LACTATE, POTASSIUM CHLORIDE, CALCIUM CHLORIDE 600; 310; 30; 20 MG/100ML; MG/100ML; MG/100ML; MG/100ML
INJECTION, SOLUTION INTRAVENOUS CONTINUOUS
Status: DISCONTINUED | OUTPATIENT
Start: 2023-11-07 | End: 2023-11-07 | Stop reason: HOSPADM

## 2023-11-07 RX ORDER — DIPHENHYDRAMINE HYDROCHLORIDE 50 MG/ML
12.5 INJECTION INTRAMUSCULAR; INTRAVENOUS EVERY 4 HOURS PRN
Status: DISCONTINUED | OUTPATIENT
Start: 2023-11-07 | End: 2023-11-09

## 2023-11-07 RX ORDER — DIPHENHYDRAMINE HYDROCHLORIDE 50 MG/ML
12.5 INJECTION INTRAMUSCULAR; INTRAVENOUS EVERY 4 HOURS PRN
Status: DISCONTINUED | OUTPATIENT
Start: 2023-11-07 | End: 2023-11-08

## 2023-11-07 RX ORDER — SENNOSIDES 8.6 MG
17.2 TABLET ORAL NIGHTLY
Status: DISCONTINUED | OUTPATIENT
Start: 2023-11-07 | End: 2023-11-09

## 2023-11-07 RX ORDER — HYDROMORPHONE HYDROCHLORIDE 1 MG/ML
0.6 INJECTION, SOLUTION INTRAMUSCULAR; INTRAVENOUS; SUBCUTANEOUS
Status: DISCONTINUED | OUTPATIENT
Start: 2023-11-07 | End: 2023-11-08

## 2023-11-07 RX ORDER — NALOXONE HYDROCHLORIDE 0.4 MG/ML
0.08 INJECTION, SOLUTION INTRAMUSCULAR; INTRAVENOUS; SUBCUTANEOUS AS NEEDED
Status: DISCONTINUED | OUTPATIENT
Start: 2023-11-07 | End: 2023-11-07 | Stop reason: HOSPADM

## 2023-11-07 RX ORDER — PROCHLORPERAZINE EDISYLATE 5 MG/ML
5 INJECTION INTRAMUSCULAR; INTRAVENOUS ONCE AS NEEDED
Status: COMPLETED | OUTPATIENT
Start: 2023-11-07 | End: 2023-11-07

## 2023-11-07 RX ORDER — HYDROCODONE BITARTRATE AND ACETAMINOPHEN 7.5; 325 MG/1; MG/1
1 TABLET ORAL EVERY 6 HOURS PRN
Status: DISCONTINUED | OUTPATIENT
Start: 2023-11-07 | End: 2023-11-08

## 2023-11-07 RX ORDER — DIPHENHYDRAMINE HCL 25 MG
25 CAPSULE ORAL EVERY 4 HOURS PRN
Status: DISCONTINUED | OUTPATIENT
Start: 2023-11-07 | End: 2023-11-09

## 2023-11-07 RX ADMIN — EPHEDRINE SULFATE 5 MG: 50 INJECTION INTRAVENOUS at 08:36:00

## 2023-11-07 RX ADMIN — SODIUM CHLORIDE, SODIUM LACTATE, POTASSIUM CHLORIDE, CALCIUM CHLORIDE: 600; 310; 30; 20 INJECTION, SOLUTION INTRAVENOUS at 07:30:00

## 2023-11-07 RX ADMIN — CEFAZOLIN SODIUM/WATER 2 G: 2 G/20 ML SYRINGE (ML) INTRAVENOUS at 07:52:00

## 2023-11-07 RX ADMIN — MORPHINE SULFATE 0.2 MG: 1 INJECTION, SOLUTION EPIDURAL; INTRATHECAL; INTRAVENOUS at 07:41:00

## 2023-11-07 RX ADMIN — ONDANSETRON 4 MG: 2 INJECTION INTRAMUSCULAR; INTRAVENOUS at 08:56:00

## 2023-11-07 RX ADMIN — MIDAZOLAM HYDROCHLORIDE 2 MG: 1 INJECTION INTRAMUSCULAR; INTRAVENOUS at 07:32:00

## 2023-11-07 RX ADMIN — LIDOCAINE HYDROCHLORIDE 50 MG: 10 INJECTION, SOLUTION EPIDURAL; INFILTRATION; INTRACAUDAL; PERINEURAL at 07:43:00

## 2023-11-07 RX ADMIN — EPHEDRINE SULFATE 5 MG: 50 INJECTION INTRAVENOUS at 08:15:00

## 2023-11-07 RX ADMIN — TRANEXAMIC ACID 1000 MG: 10 INJECTION, SOLUTION INTRAVENOUS at 07:51:00

## 2023-11-07 RX ADMIN — SODIUM CHLORIDE, SODIUM LACTATE, POTASSIUM CHLORIDE, CALCIUM CHLORIDE: 600; 310; 30; 20 INJECTION, SOLUTION INTRAVENOUS at 09:03:00

## 2023-11-07 RX ADMIN — EPHEDRINE SULFATE 10 MG: 50 INJECTION INTRAVENOUS at 07:50:00

## 2023-11-07 RX ADMIN — EPHEDRINE SULFATE 5 MG: 50 INJECTION INTRAVENOUS at 08:44:00

## 2023-11-07 RX ADMIN — BUPIVACAINE HYDROCHLORIDE 1.6 ML: 7.5 INJECTION, SOLUTION INTRASPINAL at 07:41:00

## 2023-11-07 RX ADMIN — LIDOCAINE HYDROCHLORIDE 5 MG: 10 INJECTION, SOLUTION EPIDURAL; INFILTRATION; INTRACAUDAL; PERINEURAL at 07:38:00

## 2023-11-07 RX ADMIN — LIDOCAINE HYDROCHLORIDE 5 MG: 10 INJECTION, SOLUTION EPIDURAL; INFILTRATION; INTRACAUDAL; PERINEURAL at 07:40:00

## 2023-11-07 RX ADMIN — DEXAMETHASONE SODIUM PHOSPHATE 4 MG: 4 MG/ML VIAL (ML) INJECTION at 07:57:00

## 2023-11-07 NOTE — INTERVAL H&P NOTE
Pre-op Diagnosis: Osteoarthritis right knee    The above referenced H&P was reviewed by Adelaide Ann MD on 11/7/2023, the patient was examined and no significant changes have occurred in the patient's condition since the H&P was performed. I discussed with the patient and/or legal representative the potential benefits, risks and side effects of this procedure; the likelihood of the patient achieving goals; and potential problems that might occur during recuperation. I discussed reasonable alternatives to the procedure, including risks, benefits and side effects related to the alternatives and risks related to not receiving this procedure. We will proceed with procedure as planned.

## 2023-11-07 NOTE — OPERATIVE REPORT
[facility]    PATIENT'S NAME: Luke Valiente   ATTENDING PHYSICIAN: Lola Thomas MD   OPERATING PHYSICIAN: Lola Thomas MD   PATIENT ACCOUNT#:   [de-identified]    LOCATION:  [address] [place] #:   [id number]       YOB: 1945  ADMISSION DATE:       11/07/2023      OPERATION DATE:  11/07/2023    OPERATIVE REPORT    PREOPERATIVE DIAGNOSIS:  Osteoarthritis, right knee. POSTOPERATIVE DIAGNOSIS:  Osteoarthritis, right knee. PROCEDURE PERFORMED:  Right total knee arthroplasty with Medacta MRI navigated patient specific instruments. ASSISTANT:  Rosalind Mckinney PA-C.    COMPONENTS USED:  Medacta sphere size 3+ femur, size 3 tibia, size 2 patella, 11 mm x 30 mm tibial stem extension, and 10 mm spacer. INDICATIONS:  The patient is a 51-year-old female patient with advanced arthritis of the right knee. It has not responded to appropriate conservative management. After discussion of the options for treatment, the patient requested the above procedure. Our discussion included alternative treatments, and also included, but was not limited to, the potential risk of anesthetic complications, infection, DVT or PE, bleeding complications, periprosthetic fracture or extensor mechanism failure, potential need for revision surgery, nerve or vascular injury, unanticipated perioperative orthopedic or medical complications, etc.  Written consent was obtained. OPERATIVE TECHNIQUE:  The patient was brought to the operating room and given appropriate anesthesia. Prior to making an incision, a time out was performed by the surgical team.  A tourniquet was placed, and the knee was prepped and draped in the usual sterile fashion. After exsanguination with an Esmarch bandage, the tourniquet was inflated with the knee fully flexed. A longitudinal incision was made from just superior to the superomedial corner of the patella to the tibial tubercle.   A midvastus approach to the femur was used. The patellar cut was made first, and a patella protector was placed over the cut surface of the patella which was then subluxed into the lateral gutter. The anterior and posterior cruciate ligaments were excised, and the femoral template was placed over the distal femur and seated well. It was pinned anteriorly, and the distal reference holes were drilled. The distal femoral cutting guide was then attached to the anterior pins, and the distal femoral cut was made. The thickness of the distal femoral bone resection was measured and compared to the templated values. The pins were then translated into the anterior reference holes, and the distal femoral cutting guide was applied. The distal femoral guide was centered appropriately, pinned in place, and the distal femoral finishing cuts were made. Next, the meniscal remnants were excised, and the tibial guide was seated. The extramedullary tibial alignment guide was applied, and alignment was checked. The tibial template was then pinned into place and exchanged for the tibial cutting guide. Alignment of the cutting guide was rechecked with the extramedullary alignment guide. Hohmann retractor was placed behind the tibia to protect the neurovascular structures, and Hohmann retractors were placed medially and laterally to protect the collateral ligaments and the patellar tendon. The tibial cut was made and the tibial bone resection was taken and compared to the templated values. Overall alignment was then checked with the extramedullary alignment guide. The spacer block was used, and the overall alignment, flexion and extension gaps were checked and were excellent. The guide was used to size the patella. The holes were drilled for the patella. The trial components were then inserted. The knee came to full extension and balanced well with varus and valgus stress with symmetrical flexion and extension gaps with the spacer.   The tibial guide was then pinned into place and preparations were made for the stemmed portion of the tibial component. The trochlear recess was cut for the femur. The trial components were then removed, and the bone was prepared with pulsatile lavage. All three components were cemented in place using contemporary technique. Excess cement was removed, and trials again were performed. Stability and alignment were the same as with the provisional components. The actual polyethylene spacer was locked into place in the tibial tray. The tourniquet was deflated and hemostasis was achieved. The wound was closed in routine fashion. Sponge and instrument counts were correct at the end of the procedure. Estimated blood loss was 50 mL. Routine specimens were sent to Pathology. There were no complications. The patient was stable under the care of the anesthesiologist at the completion of the procedure. She had a large degenerative subchondral cyst in the anterior aspect of the tibia, so I added a tibial stem extension for additional support and filled the cyst in with bone cement.     Dictated By Nilay Levy MD  d: 11/07/2023 08:50:15  t: 11/07/2023 11:06:12  [facility] [id number]  [facility]/

## 2023-11-07 NOTE — BRIEF OP NOTE
Pre-Operative Diagnosis: Osteoarthritis right knee     Post-Operative Diagnosis: Osteoarthritis right knee      Procedure Performed:   Right total knee arthroplasty    Surgeon(s) and Role:     * Jasbir Saucedo MD - Primary    Assistant(s):  Surgical Assistant.: Zainab Christianson  PA: Jacquie Multani PA-C     Surgical Findings: OA     Specimen: path     Estimated Blood Loss: Blood Output: 50 mL (11/7/2023  8:46 AM)      Dictation Number:  [id number]    Dagoberto Crandall MD  11/7/2023  8:49 AM

## 2023-11-07 NOTE — CM/SW NOTE
Department  [facility]. Assigned CM/SW to follow up with pt/family on further discharge planning.        Beatriz HANSON [facility]

## 2023-11-07 NOTE — PHYSICAL THERAPY NOTE
PHYSICAL THERAPY KNEE EVALUATION - INPATIENT       Room Number: 402/402-A  Evaluation Date: 11/7/2023  Type of Evaluation: Initial  Physician Order: PT Eval and Treat    Presenting Problem: s/p R TKA on 11/7     Reason for Therapy: Mobility Dysfunction and Discharge Planning    PHYSICAL THERAPY ASSESSMENT     Patient is a 66year old female admitted 11/7/2023 for R TKA. Patient's current functional deficits include weakness, impaired rom, impaired balance and functional mobility, which are below the patient's pre-admission status. Patient will benefit from continued IP PT services to address these deficits in preparation for discharge. RN approved participation with physical therapy. Pt was received resting in bed and agreeable to activity. Educated on VARKAUS, TKA mobility and exercise protocol, role of PT and PT plan of care. Pt verbalized understanding. pt with no c/o pain this date but c/o nausea therefore activity was limited. Bed mobility: SBA supine>sit and scooting to EOB  Transfers: CGA for STS with RW. Verbal cues provided for safe hand placement and body mechanics. Gait: ambulated 25 f with RW and CGA. Slow, shuffled gait, decreased weight bearing and stance on RLE, flexed posture with cues given to correct. No LOB. Pt was left sitting in chair with needs within reach, handoff to RN complete. The patient's Approx Degree of Impairment: 46.58% has been calculated based on documentation in the [facility] '6 clicks' Inpatient Basic Mobility Short Form. Research supports that patients with this level of impairment may benefit from home with [facility] PT.    DISCHARGE RECOMMENDATIONS  PT Discharge Recommendations: 24 hour care/supervision;Home with home health PT    PLAN  PT Treatment Plan: Bed mobility; Body mechanics; Endurance; Energy conservation;Patient education;Gait training;Range of motion;Strengthening;Stair training;Transfer training;Balance training  Rehab Potential : Good  Frequency (Obs): BID    PHYSICAL THERAPY MEDICAL/SOCIAL HISTORY     Problem List  Principal Problem:    Primary osteoarthritis of right knee  Active Problems:    Paroxysmal atrial fibrillation (HCC)    YASMIN (obstructive sleep apnea)    HOME SITUATION  Home Situation  Type of Home: House  Home Layout: One level (+basement)  Stairs to Enter : 4  Railing: Yes  Lives With: Spouse  Drives: Yes  Patient Owned Equipment: Rolling walker  Patient Regularly Uses: None     Prior Level of [place]: independent with ADLs and mobility without assistive device. SUBJECTIVE  Agreeable to activity. PHYSICAL THERAPY EXAMINATION     OBJECTIVE  Precautions: None  Fall Risk: Standard fall risk    WEIGHT BEARING RESTRICTION  R Lower Extremity: Weight Bearing as Tolerated    PAIN ASSESSMENT  Ratin    COGNITION  Overall Cognitive Status:  WFL - within functional limits    RANGE OF MOTION AND STRENGTH ASSESSMENT  Upper extremity ROM and strength are within functional limits. Lower extremity ROM is within functional limits. Lower extremity strength is within functional limits. BALANCE  Static Sitting: Good  Dynamic Sitting: Fair +  Static Standing: Fair  Dynamic [facility] '6-Clicks' INPATIENT SHORT FORM - BASIC MOBILITY  How much difficulty does the patient currently have. .. Patient Difficulty: Turning over in bed (including adjusting bedclothes, sheets and blankets)?: A Little   Patient Difficulty: Sitting down on and standing up from a chair with arms (e.g., wheelchair, bedside commode, etc.): A Little   Patient Difficulty: Moving from lying on back to sitting on the side of the bed?: A Little   How much help from another person does the patient currently need. ..    Help from Another: Moving to and from a bed to a chair (including a wheelchair)?: A Little   Help from Another: Need to walk in hospital room?: A Little   Help from Another: Climbing 3-5 steps with a railing?: A Little     AM-PAC Score:  Raw Score: 18   Approx Degree of Impairment: 46.58%   Standardized Score (AM-PAC Scale): 43.63   CMS Modifier (G-Code): CK    FUNCTIONAL ABILITY STATUS  Functional Mobility/Gait Assessment  Gait Assistance: Contact guard assist  Distance (ft): 25  Assistive Device: Rolling walker  Pattern: Shuffle;R Decreased stance time (flexed posture, slow pace)    Bed Mobility: SBA    Transfers: CGA    Exercise/Education Provided:  Bed mobility  Body mechanics  Energy conservation  Functional activity tolerated  Gait training  Posture  ROM  Lower therapeutic exercise: Ankle pumps  Heel slides  LAQ  Quad sets  Sitting knee flexion  Transfer training    Patient End of Session: Up in chair;Needs met;Call light within reach;RN aware of session/findings; All patient questions and concerns addressed; Ice applied    CURRENT GOALS    Goals to be met by: 11/14/23  Patient Goal Patient's self-stated goal is: go home   Goal #1 Patient is able to demonstrate supine - sit EOB @ level: modified independent     Goal #1   Current Status    Goal #2 Patient is able to demonstrate transfers Sit to/from Stand at assistance level: modified independent     Goal #2  Current Status    Goal #3 Patient is able to ambulate 150 feet with assistive device at assistance level: supervision   Goal #3   Current Status    Goal #4 Patient will negotiate 4 stairs/one curb w/ assistive device and supervision   Goal #4   Current Status    Goal #5  AROM 0 degrees extension to 95 degrees flexion     Goal #5   Current Status    Goal #6 Patient independently performs home exercise program for ROM/strengthening per the instructions provided in preparation for discharge.    Goal #6  Current Status        Patient Evaluation Complexity Level:  History Low - no personal factors and/or co-morbidities   Examination of body systems Low - addressing 1-2 elements   Clinical Presentation Low - Stable   Clinical Decision Making Low Complexity         Therapeutic Activity: 23 minutes

## 2023-11-07 NOTE — HOME CARE LIAISON
Received referral via [facility] for Home Health services. Spoke w/ patient and her  who is agreeable with [facility].  Contact information placed on AVS.

## 2023-11-07 NOTE — CM/SW NOTE
Received MDO for post op. Met with patient and spouse Dani at bedside for assessment. Patient and spouse live in a one level home w/ no stairs. Patient is independent with ADLs at baseline. She owns a walker, cane, RTS & shower chair. She had knee surgery in 1/2023 and went home w/ [facility]. Discussed discharge plan, patient will return home w/ family support. She would like home health services, discussed list of options. Patient prefers [address], notified [place] liaison.     Jose Eduardo Vance, [address]

## 2023-11-07 NOTE — ANESTHESIA PROCEDURE NOTES
Spinal Block    Date/Time: 11/7/2023 7:38 AM    Performed by: Rachel Marie CRNA  Authorized by: Justine Patterson MD      General Information and Staff    Start Time:  11/7/2023 7:38 AM  End Time:  11/7/2023 7:41 AM  Anesthesiologist:  Justine Patterson MD  CRNA:  Rachel Marie CRNA  Performed by:  CRNA and anesthesiologist  Patient Location:  OR  Site identification: surface landmarks    Reason for Block: at surgeon's request, post-op pain management and surgical anesthesia    Preanesthetic Checklist: patient identified, IV checked, risks and benefits discussed, monitors and equipment checked, pre-op evaluation, timeout performed, anesthesia consent and sterile technique used      Procedure Details    Patient Position:  Sitting  Prep: ChloraPrep and patient draped    Monitoring:  Cardiac monitor and continuous pulse ox  Approach:  Midline  Location:  L3-4  Injection Technique:  Single-shot    Needle    Needle Type:  Pencil-tip  Needle Gauge:  24 G  Needle Length:  3.5 in    Assessment    Sensory Level:  T8  Events: clear CSF, CSF aspirated, well tolerated and blood negative      Additional Comments

## 2023-11-07 NOTE — DISCHARGE INSTRUCTIONS
Keep incision dry for 2 weeks. Change Mepilex dressing every 3-5 days. Ambulate with walker and assist.    Sometimes managing your health at home requires assistance. The [facility] team has recognized your preference to use [facility]. They can be reached by phone at [phone]. The fax number for your reference is [phone]. A representative from the home health agency will contact you or your family to schedule your first visit.

## 2023-11-07 NOTE — CONSULTS
[facility]    PATIENT'S NAME: Owen Sahni MING   ATTENDING PHYSICIAN: Marcial Coronle MD   CONSULTING PHYSICIAN: Tara Delgado MD   PATIENT ACCOUNT#:   [id number]    LOCATION:  [address] [place] [address] RECORD #:   [id number]       YOB: 1945  ADMISSION DATE:       11/07/2023      CONSULT DATE:  11/07/2023    REPORT OF CONSULTATION      REASON FOR ADMISSION:  Advanced primary osteoarthritis, right knee. HISTORY OF PRESENT ILLNESS:  The patient is a 66-year-old  female with chronic pain in her right knee and difficulty with her gait, exhausted outpatient conservative medical options, evaluated by Orthopedic Surgery, Dr. Rosairo Courser, and scheduled today for right total knee arthroplasty. Preoperatively she had lumbar epidural block, postoperatively brought into PACU for further monitoring. PAST MEDICAL HISTORY:  Paroxysmal atrial fibrillation, anticoagulated with Eliquis; obstructive sleep apnea, uses a mouthpiece appliance; generalized osteoarthritis; gastroesophageal reflux disease and esophagitis. PAST SURGICAL HISTORY:  Pulmonary veins; atrial flutter/atrial fibrillation ablation procedure; left foot neuroma resection; vulvectomy and hysterectomy. MEDICATIONS:  Please see medication reconciliation list.    ALLERGIES:  No known drug allergies. She has multiple seasonal allergies. SOCIAL HISTORY:  Ex-tobacco user. No current tobacco, alcohol, or drug use. Lives with her . Independent in her basic activities of daily living. Uses a walker. FAMILY HISTORY:  Mother had breast cancer. Father had lung cancer. REVIEW OF SYSTEMS:  Patient reports currently no knee pain, no chest pain, no shortness of breath. Other 12-point review of systems negative. PHYSICAL EXAMINATION:    GENERAL:  Alert. Oriented to time, place, and person. No acute distress.   VITAL SIGNS:  Temperature 98.2, pulse 62, respiratory rate 16, blood pressure 143/53, pulse oximetry 96% on room air. HEENT:  Atraumatic. Oropharynx clear. Moist mucous membranes. Ears, nose normal.  Eyes:  Anicteric sclerae. NECK:  Supple. No lymphadenopathy. Trachea midline. Full range of motion. LUNGS:  Clear to auscultation bilaterally. Normal respiratory effort. HEART:  Regular rate and rhythm. S1 and S2 auscultated. No murmur. ABDOMEN:  Soft, nondistended. No tenderness. Positive bowel sounds. EXTREMITIES:  No edema, clubbing, or cyanosis. Right knee dressing. NEUROLOGIC:  Decreased sensation and muscle movement, both lower extremities, expected post spinal block. No other focal findings. ASSESSMENT AND PLAN:    1. Primary osteoarthritis, right knee, status post right total knee arthroplasty. 2.   History of paroxysmal atrial fibrillation. 3.   Obstructive sleep apnea. Patient will be admitted to general medical floor. Pain control. Neurovascular checks. Eliquis for DVT prophylaxis. Resume full-dose Eliquis when okay with Orthopedic Surgery. Physical and occupational therapy. Continue home medications. Obstructive sleep apnea protocol and monitoring.      Dictated By Jean Carlos Smiley MD  d: 11/07/2023 10:51:16  t: 11/07/2023 11:15:46  Job [id number]  /

## 2023-11-08 LAB
DEPRECATED RDW RBC AUTO: 43.2 FL (ref 35.1–46.3)
ERYTHROCYTE [DISTWIDTH] IN BLOOD BY AUTOMATED COUNT: 12.3 % (ref 11–15)
HCT VFR BLD AUTO: 34 %
HGB BLD-MCNC: 11.5 G/DL
MCH RBC QN AUTO: 32.6 PG (ref 26–34)
MCHC RBC AUTO-ENTMCNC: 33.8 G/DL (ref 31–37)
MCV RBC AUTO: 96.3 FL
PLATELET # BLD AUTO: 256 10(3)UL (ref 150–450)
RBC # BLD AUTO: 3.53 X10(6)UL
WBC # BLD AUTO: 11.1 X10(3) UL (ref 4–11)

## 2023-11-08 PROCEDURE — 99233 SBSQ HOSP IP/OBS HIGH 50: CPT | Performed by: HOSPITALIST

## 2023-11-08 RX ORDER — SODIUM CHLORIDE 9 MG/ML
INJECTION, SOLUTION INTRAVENOUS CONTINUOUS
Status: ACTIVE | OUTPATIENT
Start: 2023-11-08 | End: 2023-11-08

## 2023-11-08 NOTE — PLAN OF CARE
Pt is A&Ox4. SA, wears mouthguard at night, RA. Remote tele. Mcgraw removed in AM. PRN Norco given for pain. CPM machine in place; tolerated well. IV fluids infusing. IV antibiotics given. Surgical site clean, dry, and intact. Safety plan in place with frequent rounding. Plan DC home pending PT evaluation.      Problem: Patient Centered Care  Goal: Patient preferences are identified and integrated in the patient's plan of care  Description: Interventions:  - What would you like us to know as we care for you?   - Provide timely, complete, and accurate information to patient/family  - Incorporate patient and family knowledge, values, beliefs, and cultural backgrounds into the planning and delivery of care  - Encourage patient/family to participate in care and decision-making at the level they choose  - Honor patient and family perspectives and choices  Outcome: Progressing     Problem: Patient/Family Goals  Goal: Patient/Family Short Term Goal  Description: Patient's Short Term Goal:     Interventions:   - See additional Care Plan goals for specific interventions  Outcome: Progressing     Problem: PAIN - ADULT  Goal: Verbalizes/displays adequate comfort level or patient's stated pain goal  Description: INTERVENTIONS:  - Encourage pt to monitor pain and request assistance  - Assess pain using appropriate pain scale  - Administer analgesics based on type and severity of pain and evaluate response  - Implement non-pharmacological measures as appropriate and evaluate response  - Consider cultural and social influences on pain and pain management  - Manage/alleviate anxiety  - Utilize distraction and/or relaxation techniques  - Monitor for opioid side effects  - Notify MD/LIP if interventions unsuccessful or patient reports new pain  - Anticipate increased pain with activity and pre-medicate as appropriate  Outcome: Progressing     Problem: RISK FOR INFECTION - ADULT  Goal: Absence of fever/infection during anticipated neutropenic period  Description: INTERVENTIONS  - Monitor WBC  - Administer growth factors as ordered  - Implement neutropenic guidelines  Outcome: Progressing     Problem: SAFETY ADULT - FALL  Goal: Free from fall injury  Description: INTERVENTIONS:  - Assess pt frequently for physical needs  - Identify cognitive and physical deficits and behaviors that affect risk of falls.   - [place] fall precautions as indicated by assessment.  - Educate pt/family on patient safety including physical limitations  - Instruct pt to call for assistance with activity based on assessment  - Modify environment to reduce risk of injury  - Provide assistive devices as appropriate  - Consider OT/PT consult to assist with strengthening/mobility  - Encourage toileting schedule  Outcome: Progressing     Problem: DISCHARGE PLANNING  Goal: Discharge to home or other facility with appropriate resources  Description: INTERVENTIONS:  - Identify barriers to discharge w/pt and caregiver  - Include patient/family/discharge partner in discharge planning  - Arrange for needed discharge resources and transportation as appropriate  - Identify discharge learning needs (meds, wound care, etc)  - Arrange for interpreters to assist at discharge as needed  - Consider post-discharge preferences of patient/family/discharge partner  - Complete POLST form as appropriate  - Assess patient's ability to be responsible for managing their own health  - Refer to Case Management Department for coordinating discharge planning if the patient needs post-hospital services based on physician/LIP order or complex needs related to functional status, cognitive ability or social support system  Outcome: Progressing     Problem: CARDIOVASCULAR - ADULT  Goal: Maintains optimal cardiac output and hemodynamic stability  Description: INTERVENTIONS:  - Monitor vital signs, rhythm, and trends  - Monitor for bleeding, hypotension and signs of decreased cardiac output  - Evaluate effectiveness of vasoactive medications to optimize hemodynamic stability  - Monitor arterial and/or venous puncture sites for bleeding and/or hematoma  - Assess quality of pulses, skin color and temperature  - Assess for signs of decreased coronary artery perfusion - ex.  Angina  - Evaluate fluid balance, assess for edema, trend weights  Outcome: Progressing     Problem: SKIN/TISSUE INTEGRITY - ADULT  Goal: Skin integrity remains intact  Description: INTERVENTIONS  - Assess and document risk factors for pressure ulcer development  - Assess and document skin integrity  - Monitor for areas of redness and/or skin breakdown  - Initiate interventions, skin care algorithm/standards of care as needed  Outcome: Progressing  Goal: Incision(s), wounds(s) or drain site(s) healing without S/S of infection  Description: INTERVENTIONS:  - Assess and document risk factors for pressure ulcer development  - Assess and document skin integrity  - Assess and document dressing/incision, wound bed, drain sites and surrounding tissue  - Implement wound care per orders  - Initiate isolation precautions as appropriate  - Initiate Pressure Ulcer prevention bundle as indicated  Outcome: Progressing     Problem: MUSCULOSKELETAL - ADULT  Goal: Return mobility to safest level of function  Description: INTERVENTIONS:  - Assess patient stability and activity tolerance for standing, transferring and ambulating w/ or w/o assistive devices  - Assist with transfers and ambulation using safe patient handling equipment as needed  - Ensure adequate protection for wounds/incisions during mobilization  - Obtain PT/OT consults as needed  - Advance activity as appropriate  - Communicate ordered activity level and limitations with patient/family  Outcome: Progressing  Goal: Maintain proper alignment of affected body part  Description: INTERVENTIONS:  - Support and protect limb and body alignment per provider's orders  - Instruct and reinforce with patient and family use of appropriate assistive device and precautions (e.g. spinal or hip dislocation precautions)  Outcome: Progressing

## 2023-11-08 NOTE — PHYSICAL THERAPY NOTE
PHYSICAL THERAPY KNEE TREATMENT NOTE - INPATIENT     Room Number: 193/908-I             Presenting Problem: s/p R TKA on   Co-Morbidities : L TKA in 2023    Problem List  Principal Problem:    Primary osteoarthritis of right knee  Active Problems:    Paroxysmal atrial fibrillation (HCC)    YASMIN (obstructive sleep apnea)      PHYSICAL THERAPY ASSESSMENT     AM  session. Min a for bed mobility and transfer;extra time provided to complete task. EOB sitting balance activity with emphasis on core stabilization. Pt educated on dep breathing and relaxation technique. Pt amb 2 x 75 ft with RW and CGA;provided cuing for gait pattern as well as for postural awareness. There ex. The patient's Approx Degree of Impairment: 46.58% has been calculated based on documentation in the [facility] '6 clicks' Inpatient Basic Mobility Short Form. Research supports that patients with this level of impairment may benefit from 24 hour care/supervision. DISCHARGE RECOMMENDATIONS  PT Discharge Recommendations: 24 hour care/supervision    PLAN  PT Treatment Plan: Bed mobility; Body mechanics; Endurance  Frequency (Obs): BID      SUBJECTIVE  Pt reports being ready for PT RX    OBJECTIVE  Precautions: Limb alert - left    WEIGHT BEARING STATUS        R Lower Extremity: Weight Bearing as Tolerated       PAIN ASSESSMENT   Ratin          BALANCE  Static Sitting: Good  Dynamic Sitting: Fair +  Static Standing: Fair  Dynamic Standing: Fair -    ACTIVITY TOLERANCE           BP: 105/50  BP Location: Right arm  BP Method: Automatic  Patient Position: Sitting    O2 WALK       AM-PAC '6-Clicks' INPATIENT SHORT FORM - BASIC MOBILITY  How much difficulty does the patient currently have. ..   Patient Difficulty: Turning over in bed (including adjusting bedclothes, sheets and blankets)?: A Little   Patient Difficulty: Sitting down on and standing up from a chair with arms (e.g., wheelchair, bedside commode, etc.): A Little   Patient Difficulty: Moving from lying on back to sitting on the side of the bed?: A Little   How much help from another person does the patient currently need. .. Help from Another: Moving to and from a bed to a chair (including a wheelchair)?: A Little   Help from Another: Need to walk in hospital room?: A Little   Help from Another: Climbing 3-5 steps with a railing?: A Little     AM-PAC Score:  Raw Score: 18   Approx Degree of Impairment: 46.58%   Standardized Score (AM-PAC Scale): 43.63   CMS Modifier (G-Code): CK    FUNCTIONAL ABILITY STATUS  Functional Mobility/Gait Assessment  Gait Assistance: Contact guard assist  Distance (ft): 2 x 75  Assistive Device: Rolling walker  Pattern: Shuffle;R Decreased stance time    Additional Information:     Exercises AM Session PM Session   Ankle Pumps 20 reps  reps   Quad Sets 20 reps  reps   Glut Sets 20 reps  reps                                               Comments: Pt participated in group session, tolerance was     Knee ROM                 Patient End of Session: Up in chair    CURRENT GOALS    Patient Goal Patient's self-stated goal is: go home   Goal #1 Patient is able to demonstrate supine - sit EOB @ level: modified independent     Goal #1   Current Status Min a   Goal #2 Patient is able to demonstrate transfers Sit to/from Stand at assistance level: modified independent     Goal #2  Current Status Min a   Goal #3 Patient is able to ambulate 150 feet with assistive device at assistance level: supervision   Goal #3   Current Status Pt amb 2 x 75 ft with RW and CGA   Goal #4 Patient will negotiate 4 stairs/one curb w/ assistive device and supervision   Goal #4   Current Status NT   Goal #5  AROM 0 degrees extension to 95 degrees flexion     Goal #5   Current Status In progress   Goal #6 Patient independently performs home exercise program for ROM/strengthening per the instructions provided in preparation for discharge. Goal #6  Current Status In progress   Gait-15 minutes; There activity-15 minutes.

## 2023-11-08 NOTE — PHYSICAL THERAPY NOTE
PHYSICAL THERAPY KNEE TREATMENT NOTE - INPATIENT     Room Number: 120/381-O             Presenting Problem: s/p R TKA on   Co-Morbidities : L TKA in 2023    Problem List  Principal Problem:    Primary osteoarthritis of right knee  Active Problems:    Paroxysmal atrial fibrillation (HCC)    YASMIN (obstructive sleep apnea)      PHYSICAL THERAPY ASSESSMENT     PM  session. Min a for bed mobility and transfer;extra time provided to complete task. EOB sitting balance activity with emphasis on core stabilization. Pt educated on dep breathing and relaxation technique. Pt amb 2 x 75 ft with RW and CGA;provided cuing for gait pattern as well as for postural awareness. Unsteady gait;R knee buckle twice. There ex. The patient's Approx Degree of Impairment: 46.58% has been calculated based on documentation in the [facility] '6 clicks' Inpatient Basic Mobility Short Form. Research supports that patients with this level of impairment may benefit from 24 hour care/supervision. DISCHARGE RECOMMENDATIONS  PT Discharge Recommendations: 24 hour care/supervision    PLAN  PT Treatment Plan: Bed mobility; Body mechanics; Endurance; Patient education;Gait training  Frequency (Obs): Daily      SUBJECTIVE  Pt reports being ready for PT RX    OBJECTIVE  Precautions: Limb alert - left    WEIGHT BEARING STATUS        R Lower Extremity: Weight Bearing as Tolerated       PAIN ASSESSMENT   Ratin          BALANCE  Static Sitting: Good  Dynamic Sitting: Fair +  Static Standing: Fair  Dynamic Standing: Fair -    ACTIVITY TOLERANCE           BP: 105/50  BP Location: Right arm  BP Method: Automatic  Patient Position: Sitting    O2 WALK       AM-PAC '6-Clicks' INPATIENT SHORT FORM - BASIC MOBILITY  How much difficulty does the patient currently have. ..   Patient Difficulty: Turning over in bed (including adjusting bedclothes, sheets and blankets)?: A Little   Patient Difficulty: Sitting down on and standing up from a chair with arms (e.g., wheelchair, bedside commode, etc.): A Little   Patient Difficulty: Moving from lying on back to sitting on the side of the bed?: A Little   How much help from another person does the patient currently need. .. Help from Another: Moving to and from a bed to a chair (including a wheelchair)?: A Little   Help from Another: Need to walk in hospital room?: A Little   Help from Another: Climbing 3-5 steps with a railing?: A Little     AM-PAC Score:  Raw Score: 18   Approx Degree of Impairment: 46.58%   Standardized Score (AM-PAC Scale): 43.63   CMS Modifier (G-Code): CK    FUNCTIONAL ABILITY STATUS  Functional Mobility/Gait Assessment  Gait Assistance: Contact guard assist  Distance (ft): 2 x 75  Assistive Device: Rolling walker  Pattern: R Decreased stance time; Shuffle    Additional Information:     Exercises AM Session PM Session   Ankle Pumps 20 reps 20 reps   Quad Sets 20 reps  20 reps   Glut Sets 20 reps 20 reps                                               Comments: Pt participated in group session, tolerance was     Knee ROM                 Patient End of Session: Up in chair;Call light within reach;RN aware of session/findings; All patient questions and concerns addressed    CURRENT GOALS    Patient Goal Patient's self-stated goal is: go home   Goal #1 Patient is able to demonstrate supine - sit EOB @ level: modified independent     Goal #1   Current Status Min a   Goal #2 Patient is able to demonstrate transfers Sit to/from Stand at assistance level: modified independent     Goal #2  Current Status Min a   Goal #3 Patient is able to ambulate 150 feet with assistive device at assistance level: supervision   Goal #3   Current Status Pt amb 2 x 75 ft with RW and CGA;unsteady gait   Goal #4 Patient will negotiate 4 stairs/one curb w/ assistive device and supervision   Goal #4   Current Status NT   Goal #5  AROM 0 degrees extension to 95 degrees flexion     Goal #5   Current Status In progress   Goal #6 Patient independently performs home exercise program for ROM/strengthening per the instructions provided in preparation for discharge. Goal #6  Current Status In progress   Gait-15 minutes; There  activity-15 minutes.

## 2023-11-08 NOTE — PLAN OF CARE
Post-op day #1. Dressing in place to Right knee. Monitoring vital signs- stable at this time. No acute changes noted throughout shift. Tolerating diet. Voiding up to bathroom, patient is a check void. SCDs and eliquis for DVT prophylaxis. Receiving IV fluids per MD order, lightheadedness during therapy. Pain medication provided as needed. Up with standby assist and a walker. Encouraged frequent ambulation and use of incentive spirometer. Fall precautions maintained- bed alarm on, bed locked in lowest position, call light and personal belongings within reach, non-skid socks in place to bilateral feet. Frequent rounding by nursing staff. Plan to discharge home with home health once medically cleared.     Problem: Patient Centered Care  Goal: Patient preferences are identified and integrated in the patient's plan of care  Description: Interventions:  - What would you like us to know as we care for you?   - Provide timely, complete, and accurate information to patient/family  - Incorporate patient and family knowledge, values, beliefs, and cultural backgrounds into the planning and delivery of care  - Encourage patient/family to participate in care and decision-making at the level they choose  - Honor patient and family perspectives and choices  Outcome: Progressing     Problem: Patient/Family Goals  Goal: Patient/Family Long Term Goal  Description: Patient's Long Term Goal:     Interventions:  - See additional Care Plan goals for specific interventions  Outcome: Progressing  Goal: Patient/Family Short Term Goal  Description: Patient's Short Term Goal:     Interventions:   - See additional Care Plan goals for specific interventions  Outcome: Progressing     Problem: PAIN - ADULT  Goal: Verbalizes/displays adequate comfort level or patient's stated pain goal  Description: INTERVENTIONS:  - Encourage pt to monitor pain and request assistance  - Assess pain using appropriate pain scale  - Administer analgesics based on type and severity of pain and evaluate response  - Implement non-pharmacological measures as appropriate and evaluate response  - Consider cultural and social influences on pain and pain management  - Manage/alleviate anxiety  - Utilize distraction and/or relaxation techniques  - Monitor for opioid side effects  - Notify MD/LIP if interventions unsuccessful or patient reports new pain  - Anticipate increased pain with activity and pre-medicate as appropriate  Outcome: Progressing     Problem: RISK FOR INFECTION - ADULT  Goal: Absence of fever/infection during anticipated neutropenic period  Description: INTERVENTIONS  - Monitor WBC  - Administer growth factors as ordered  - Implement neutropenic guidelines  Outcome: Progressing     Problem: SAFETY ADULT - FALL  Goal: Free from fall injury  Description: INTERVENTIONS:  - Assess pt frequently for physical needs  - Identify cognitive and physical deficits and behaviors that affect risk of falls.   - [place] fall precautions as indicated by assessment.  - Educate pt/family on patient safety including physical limitations  - Instruct pt to call for assistance with activity based on assessment  - Modify environment to reduce risk of injury  - Provide assistive devices as appropriate  - Consider OT/PT consult to assist with strengthening/mobility  - Encourage toileting schedule  Outcome: Progressing     Problem: DISCHARGE PLANNING  Goal: Discharge to home or other facility with appropriate resources  Description: INTERVENTIONS:  - Identify barriers to discharge w/pt and caregiver  - Include patient/family/discharge partner in discharge planning  - Arrange for needed discharge resources and transportation as appropriate  - Identify discharge learning needs (meds, wound care, etc)  - Arrange for interpreters to assist at discharge as needed  - Consider post-discharge preferences of patient/family/discharge partner  - Complete POLST form as appropriate  - Assess patient's ability to be responsible for managing their own health  - Refer to Case Management Department for coordinating discharge planning if the patient needs post-hospital services based on physician/LIP order or complex needs related to functional status, cognitive ability or social support system  Outcome: Progressing     Problem: CARDIOVASCULAR - ADULT  Goal: Maintains optimal cardiac output and hemodynamic stability  Description: INTERVENTIONS:  - Monitor vital signs, rhythm, and trends  - Monitor for bleeding, hypotension and signs of decreased cardiac output  - Evaluate effectiveness of vasoactive medications to optimize hemodynamic stability  - Monitor arterial and/or venous puncture sites for bleeding and/or hematoma  - Assess quality of pulses, skin color and temperature  - Assess for signs of decreased coronary artery perfusion - ex.  Angina  - Evaluate fluid balance, assess for edema, trend weights  Outcome: Progressing     Problem: SKIN/TISSUE INTEGRITY - ADULT  Goal: Skin integrity remains intact  Description: INTERVENTIONS  - Assess and document risk factors for pressure ulcer development  - Assess and document skin integrity  - Monitor for areas of redness and/or skin breakdown  - Initiate interventions, skin care algorithm/standards of care as needed  Outcome: Progressing  Goal: Incision(s), wounds(s) or drain site(s) healing without S/S of infection  Description: INTERVENTIONS:  - Assess and document risk factors for pressure ulcer development  - Assess and document skin integrity  - Assess and document dressing/incision, wound bed, drain sites and surrounding tissue  - Implement wound care per orders  - Initiate isolation precautions as appropriate  - Initiate Pressure Ulcer prevention bundle as indicated  Outcome: Progressing     Problem: MUSCULOSKELETAL - ADULT  Goal: Return mobility to safest level of function  Description: INTERVENTIONS:  - Assess patient stability and activity tolerance for standing, transferring and ambulating w/ or w/o assistive devices  - Assist with transfers and ambulation using safe patient handling equipment as needed  - Ensure adequate protection for wounds/incisions during mobilization  - Obtain PT/OT consults as needed  - Advance activity as appropriate  - Communicate ordered activity level and limitations with patient/family  Outcome: Progressing  Goal: Maintain proper alignment of affected body part  Description: INTERVENTIONS:  - Support and protect limb and body alignment per provider's orders  - Instruct and reinforce with patient and family use of appropriate assistive device and precautions (e.g. spinal or hip dislocation precautions)  Outcome: Progressing

## 2023-11-08 NOTE — CONSULTS
Pod 1 right knee arhtroplasty with duramorph spinal  pt tolerated procedure well good post op pain relief  no headache no backache  cpm

## 2023-11-09 VITALS
TEMPERATURE: 98 F | SYSTOLIC BLOOD PRESSURE: 135 MMHG | WEIGHT: 162 LBS | HEART RATE: 68 BPM | RESPIRATION RATE: 16 BRPM | DIASTOLIC BLOOD PRESSURE: 48 MMHG | HEIGHT: 61 IN | BODY MASS INDEX: 30.58 KG/M2 | OXYGEN SATURATION: 95 %

## 2023-11-09 LAB
ANION GAP SERPL CALC-SCNC: 4 MMOL/L (ref 0–18)
BUN BLD-MCNC: 13 MG/DL (ref 9–23)
BUN/CREAT SERPL: 15.7 (ref 10–20)
CALCIUM BLD-MCNC: 9.2 MG/DL (ref 8.7–10.4)
CHLORIDE SERPL-SCNC: 106 MMOL/L (ref 98–112)
CO2 SERPL-SCNC: 30 MMOL/L (ref 21–32)
CREAT BLD-MCNC: 0.83 MG/DL
DEPRECATED RDW RBC AUTO: 45.3 FL (ref 35.1–46.3)
EGFRCR SERPLBLD CKD-EPI 2021: 72 ML/MIN/1.73M2 (ref 60–?)
ERYTHROCYTE [DISTWIDTH] IN BLOOD BY AUTOMATED COUNT: 12.6 % (ref 11–15)
GLUCOSE BLD-MCNC: 108 MG/DL (ref 70–99)
HCT VFR BLD AUTO: 31.6 %
HGB BLD-MCNC: 10.3 G/DL
MCH RBC QN AUTO: 32 PG (ref 26–34)
MCHC RBC AUTO-ENTMCNC: 32.6 G/DL (ref 31–37)
MCV RBC AUTO: 98.1 FL
OSMOLALITY SERPL CALC.SUM OF ELEC: 291 MOSM/KG (ref 275–295)
PLATELET # BLD AUTO: 233 10(3)UL (ref 150–450)
POTASSIUM SERPL-SCNC: 4.5 MMOL/L (ref 3.5–5.1)
RBC # BLD AUTO: 3.22 X10(6)UL
SODIUM SERPL-SCNC: 140 MMOL/L (ref 136–145)
WBC # BLD AUTO: 10.9 X10(3) UL (ref 4–11)

## 2023-11-09 PROCEDURE — 99239 HOSP IP/OBS DSCHRG MGMT >30: CPT | Performed by: HOSPITALIST

## 2023-11-09 RX ORDER — HYDROCODONE BITARTRATE AND ACETAMINOPHEN 5; 325 MG/1; MG/1
1 TABLET ORAL EVERY 4 HOURS PRN
Qty: 40 TABLET | Refills: 0 | Status: SHIPPED | OUTPATIENT
Start: 2023-11-09

## 2023-11-09 RX ORDER — PSEUDOEPHEDRINE HCL 30 MG
100 TABLET ORAL 2 TIMES DAILY
Qty: 20 CAPSULE | Refills: 0 | Status: SHIPPED | OUTPATIENT
Start: 2023-11-09

## 2023-11-09 RX ORDER — MELATONIN
325
Qty: 20 TABLET | Refills: 0 | Status: SHIPPED | OUTPATIENT
Start: 2023-11-09

## 2023-11-09 NOTE — PHYSICAL THERAPY NOTE
PHYSICAL THERAPY KNEE TREATMENT NOTE - INPATIENT     Room Number: 929/161-D             Presenting Problem: s/p R TKA on   Co-Morbidities : L TKA in 2023    Problem List  Principal Problem:    Primary osteoarthritis of right knee  Active Problems:    Paroxysmal atrial fibrillation (HCC)    YASMIN (obstructive sleep apnea)      PHYSICAL THERAPY ASSESSMENT     AM  session. Min a for bed mobility and transfer;extra time provided to complete task. EOB sitting balance activity with emphasis on core stabilization. Pt educated on dep breathing and relaxation technique. Pt amb 2 x 75 ft with RW and CGA;provided cuing for gait pattern as well as for postural awareness. R knee buckle once. There ex. The patient's Approx Degree of Impairment: 46.58% has been calculated based on documentation in the [facility] '6 clicks' Inpatient Basic Mobility Short Form. Research supports that patients with this level of impairment may benefit from 24 hour care/supervision. DISCHARGE RECOMMENDATIONS  PT Discharge Recommendations: 24 hour care/supervision    PLAN  PT Treatment Plan: Body mechanics; Endurance; Patient education;Gait training  Frequency (Obs): Daily      SUBJECTIVE  Pt reports being ready for PT RX    OBJECTIVE  Precautions: Limb alert - left    WEIGHT BEARING STATUS        R Lower Extremity: Weight Bearing as Tolerated       PAIN ASSESSMENT   Ratin          BALANCE  Static Sitting: Good  Dynamic Sitting: Fair +  Static Standing: Fair  Dynamic Standing: Fair -    ACTIVITY TOLERANCE                         O2 WALK       AM-PAC '6-Clicks' INPATIENT SHORT FORM - BASIC MOBILITY  How much difficulty does the patient currently have. ..   Patient Difficulty: Turning over in bed (including adjusting bedclothes, sheets and blankets)?: A Little   Patient Difficulty: Sitting down on and standing up from a chair with arms (e.g., wheelchair, bedside commode, etc.): A Little   Patient Difficulty: Moving from lying on back to sitting on the side of the bed?: A Little   How much help from another person does the patient currently need. .. Help from Another: Moving to and from a bed to a chair (including a wheelchair)?: A Little   Help from Another: Need to walk in hospital room?: A Little   Help from Another: Climbing 3-5 steps with a railing?: A Little     AM-PAC Score:  Raw Score: 18   Approx Degree of Impairment: 46.58%   Standardized Score (AM-PAC Scale): 43.63   CMS Modifier (G-Code): CK    FUNCTIONAL ABILITY STATUS  Functional Mobility/Gait Assessment  Gait Assistance: Contact guard assist  Distance (ft): 2 x 75  Assistive Device: Rolling walker  Pattern: R Decreased stance time    Additional Information:     Exercises AM Session PM Session   Ankle Pumps 20 reps  reps   Quad Sets 20 reps  reps   Glut Sets 20 reps  reps                                               Comments: Pt participated in group session, tolerance was     Knee ROM                 Patient End of Session: Up in chair;Call light within reach;RN aware of session/findings; All patient questions and concerns addressed    CURRENT GOALS    Patient Goal Patient's self-stated goal is: go home   Goal #1 Patient is able to demonstrate supine - sit EOB @ level: modified independent     Goal #1   Current Status Min a   Goal #2 Patient is able to demonstrate transfers Sit to/from Stand at assistance level: modified independent     Goal #2  Current Status Min a   Goal #3 Patient is able to ambulate 150 feet with assistive device at assistance level: supervision   Goal #3   Current Status Pt amb 2 x 75 ft with RW and CGA   Goal #4 Patient will negotiate 4 stairs/one curb w/ assistive device and supervision   Goal #4   Current Status NT   Goal #5  AROM 0 degrees extension to 95 degrees flexion     Goal #5   Current Status In progress   Goal #6 Patient independently performs home exercise program for ROM/strengthening per the instructions provided in preparation for discharge.    Goal #6  Current Status In progress   Gait-15 minutes; There  activity-15 minutes.

## 2023-11-09 NOTE — PLAN OF CARE
Problem: Patient Centered Care  Goal: Patient preferences are identified and integrated in the patient's plan of care  Description: Interventions:  - What would you like us to know as we care for you?   - Provide timely, complete, and accurate information to patient/family  - Incorporate patient and family knowledge, values, beliefs, and cultural backgrounds into the planning and delivery of care  - Encourage patient/family to participate in care and decision-making at the level they choose  - Honor patient and family perspectives and choices  Outcome: Progressing     Problem: Patient/Family Goals  Goal: Patient/Family Long Term Goal  Description: Patient's Long Term Goal:     Interventions:  - See additional Care Plan goals for specific interventions  Outcome: Progressing  Goal: Patient/Family Short Term Goal  Description: Patient's Short Term Goal:    Interventions:   - See additional Care Plan goals for specific interventions  Outcome: Progressing     Problem: PAIN - ADULT  Goal: Verbalizes/displays adequate comfort level or patient's stated pain goal  Description: INTERVENTIONS:  - Encourage pt to monitor pain and request assistance  - Assess pain using appropriate pain scale  - Administer analgesics based on type and severity of pain and evaluate response  - Implement non-pharmacological measures as appropriate and evaluate response  - Consider cultural and social influences on pain and pain management  - Manage/alleviate anxiety  - Utilize distraction and/or relaxation techniques  - Monitor for opioid side effects  - Notify MD/LIP if interventions unsuccessful or patient reports new pain  - Anticipate increased pain with activity and pre-medicate as appropriate  Outcome: Progressing     Problem: RISK FOR INFECTION - ADULT  Goal: Absence of fever/infection during anticipated neutropenic period  Description: INTERVENTIONS  - Monitor WBC  - Administer growth factors as ordered  - Implement neutropenic guidelines  Outcome: Progressing     Problem: SAFETY ADULT - FALL  Goal: Free from fall injury  Description: INTERVENTIONS:  - Assess pt frequently for physical needs  - Identify cognitive and physical deficits and behaviors that affect risk of falls.   - [place] fall precautions as indicated by assessment.  - Educate pt/family on patient safety including physical limitations  - Instruct pt to call for assistance with activity based on assessment  - Modify environment to reduce risk of injury  - Provide assistive devices as appropriate  - Consider OT/PT consult to assist with strengthening/mobility  - Encourage toileting schedule  Outcome: Progressing     Problem: DISCHARGE PLANNING  Goal: Discharge to home or other facility with appropriate resources  Description: INTERVENTIONS:  - Identify barriers to discharge w/pt and caregiver  - Include patient/family/discharge partner in discharge planning  - Arrange for needed discharge resources and transportation as appropriate  - Identify discharge learning needs (meds, wound care, etc)  - Arrange for interpreters to assist at discharge as needed  - Consider post-discharge preferences of patient/family/discharge partner  - Complete POLST form as appropriate  - Assess patient's ability to be responsible for managing their own health  - Refer to Case Management Department for coordinating discharge planning if the patient needs post-hospital services based on physician/LIP order or complex needs related to functional status, cognitive ability or social support system  Outcome: Progressing     Problem: CARDIOVASCULAR - ADULT  Goal: Maintains optimal cardiac output and hemodynamic stability  Description: INTERVENTIONS:  - Monitor vital signs, rhythm, and trends  - Monitor for bleeding, hypotension and signs of decreased cardiac output  - Evaluate effectiveness of vasoactive medications to optimize hemodynamic stability  - Monitor arterial and/or venous puncture sites for bleeding and/or hematoma  - Assess quality of pulses, skin color and temperature  - Assess for signs of decreased coronary artery perfusion - ex.  Angina  - Evaluate fluid balance, assess for edema, trend weights  Outcome: Progressing     Problem: SKIN/TISSUE INTEGRITY - ADULT  Goal: Skin integrity remains intact  Description: INTERVENTIONS  - Assess and document risk factors for pressure ulcer development  - Assess and document skin integrity  - Monitor for areas of redness and/or skin breakdown  - Initiate interventions, skin care algorithm/standards of care as needed  Outcome: Progressing  Goal: Incision(s), wounds(s) or drain site(s) healing without S/S of infection  Description: INTERVENTIONS:  - Assess and document risk factors for pressure ulcer development  - Assess and document skin integrity  - Assess and document dressing/incision, wound bed, drain sites and surrounding tissue  - Implement wound care per orders  - Initiate isolation precautions as appropriate  - Initiate Pressure Ulcer prevention bundle as indicated  Outcome: Progressing     Problem: MUSCULOSKELETAL - ADULT  Goal: Return mobility to safest level of function  Description: INTERVENTIONS:  - Assess patient stability and activity tolerance for standing, transferring and ambulating w/ or w/o assistive devices  - Assist with transfers and ambulation using safe patient handling equipment as needed  - Ensure adequate protection for wounds/incisions during mobilization  - Obtain PT/OT consults as needed  - Advance activity as appropriate  - Communicate ordered activity level and limitations with patient/family  Outcome: Progressing  Goal: Maintain proper alignment of affected body part  Description: INTERVENTIONS:  - Support and protect limb and body alignment per provider's orders  - Instruct and reinforce with patient and family use of appropriate assistive device and precautions (e.g. spinal or hip dislocation precautions)  Outcome: Zo Mcfarlane rested well, gave norco for pain, mobility improving, will discharge to home with home health.

## 2023-11-09 NOTE — PHYSICAL THERAPY NOTE
PHYSICAL THERAPY KNEE TREATMENT NOTE - INPATIENT     Room Number: 530/390-N             Presenting Problem: s/p R TKA on   Co-Morbidities : L TKA in 2023    Problem List  Principal Problem:    Primary osteoarthritis of right knee  Active Problems:    Paroxysmal atrial fibrillation (HCC)    YASMIN (obstructive sleep apnea)      PHYSICAL THERAPY ASSESSMENT     PM  session. Min a for bed mobility and transfer;extra time provided to complete task. EOB sitting balance activity with emphasis on core stabilization. Pt educated on dep breathing and relaxation technique. Pt amb 2 x 100 ft with RW and CGA;provided cuing for gait pattern as well as for postural awareness. No c/o knee buckle. Navigated 4 stairs with CGA. There ex. The patient's Approx Degree of Impairment: 46.58% has been calculated based on documentation in the [facility] '6 clicks' Inpatient Basic Mobility Short Form. Research supports that patients with this level of impairment may benefit from 24 hour care/supervision. DISCHARGE RECOMMENDATIONS  PT Discharge Recommendations: 24 hour care/supervision    PLAN  PT Treatment Plan: Bed mobility; Body mechanics; Endurance; Patient education;Gait training  Frequency (Obs): Daily      SUBJECTIVE  Pt reports being ready for PT RX    OBJECTIVE  Precautions: Limb alert - left    WEIGHT BEARING STATUS        R Lower Extremity: Weight Bearing as Tolerated       PAIN ASSESSMENT   Ratin          BALANCE  Static Sitting: Good  Dynamic Sitting: Fair +  Static Standing: Fair  Dynamic Standing: Fair -    ACTIVITY TOLERANCE                         O2 WALK       AM-PAC '6-Clicks' INPATIENT SHORT FORM - BASIC MOBILITY  How much difficulty does the patient currently have. ..   Patient Difficulty: Turning over in bed (including adjusting bedclothes, sheets and blankets)?: A Little   Patient Difficulty: Sitting down on and standing up from a chair with arms (e.g., wheelchair, bedside commode, etc.): A Little   Patient Difficulty: Moving from lying on back to sitting on the side of the bed?: A Little   How much help from another person does the patient currently need. .. Help from Another: Moving to and from a bed to a chair (including a wheelchair)?: A Little   Help from Another: Need to walk in hospital room?: A Little   Help from Another: Climbing 3-5 steps with a railing?: A Little     AM-PAC Score:  Raw Score: 18   Approx Degree of Impairment: 46.58%   Standardized Score (AM-PAC Scale): 43.63   CMS Modifier (G-Code): CK    FUNCTIONAL ABILITY STATUS  Functional Mobility/Gait Assessment  Gait Assistance: Contact guard assist  Distance (ft): 2 x 100  Assistive Device: Rolling walker  Pattern: R Decreased stance time  Stairs: Stairs  How Many Stairs: 4  Device: 1 Rail  Assist: Contact guard assist  Pattern: Ascend and Descend    Additional Information:     Exercises AM Session PM Session   Ankle Pumps 20 reps 20 reps   Quad Sets 20 reps  20 reps   Glut Sets 20 reps 20 reps                                               Comments: Pt participated in group session, tolerance was     Knee ROM                 Patient End of Session: Up in chair;Call light within reach;RN aware of session/findings; All patient questions and concerns addressed    CURRENT GOALS    Patient Goal Patient's self-stated goal is: go home   Goal #1 Patient is able to demonstrate supine - sit EOB @ level: modified independent     Goal #1   Current Status Min a   Goal #2 Patient is able to demonstrate transfers Sit to/from Stand at assistance level: modified independent     Goal #2  Current Status Min a   Goal #3 Patient is able to ambulate 150 feet with assistive device at assistance level: supervision   Goal #3   Current Status Pt amb 2 x 100 ft with RW and CGA   Goal #4 Patient will negotiate 4 stairs/one curb w/ assistive device and supervision   Goal #4   Current Status Navigated 15 stairs with CGA   Goal #5  AROM 0 degrees extension to 95 degrees flexion     Goal #5 Current Status In progress   Goal #6 Patient independently performs home exercise program for ROM/strengthening per the instructions provided in preparation for discharge. Goal #6  Current Status In progress   Gait-15 minutes; There  activity-15 minutes; there ex-15 minutes

## 2023-11-10 ENCOUNTER — PATIENT OUTREACH (OUTPATIENT)
Dept: CASE MANAGEMENT | Age: 78
End: 2023-11-10

## 2023-11-10 ENCOUNTER — TELEPHONE (OUTPATIENT)
Dept: INTERNAL MEDICINE CLINIC | Facility: CLINIC | Age: 78
End: 2023-11-10

## 2023-11-10 DIAGNOSIS — M17.11 PRIMARY OSTEOARTHRITIS OF RIGHT KNEE: Primary | ICD-10-CM

## 2023-11-10 DIAGNOSIS — Z02.9 ENCOUNTERS FOR UNSPECIFIED ADMINISTRATIVE PURPOSE: ICD-10-CM

## 2023-11-10 PROCEDURE — 1125F AMNT PAIN NOTED PAIN PRSNT: CPT

## 2023-11-10 PROCEDURE — 1111F DSCHRG MED/CURRENT MED MERGE: CPT

## 2023-11-10 NOTE — TELEPHONE ENCOUNTER
Spoke to the pt today for TCM. The patient was recently hospitalized for right TKA. The pt does not have a TCM-HFU appt scheduled at this time and declined scheduling when offered by the [facility]. The patient would prefer to only follow up with Dr. Ernie Parmar at this time. A TCM/HFU appt is recommended by 11/23/2023 as the pt is a moderate risk for readmission. Please advise. BOOK BY DATE (last date for TCM): 11/23/2023      Please f/u with the pt and assist with scheduling a TCM/HFU appt. Thank you!

## 2023-11-10 NOTE — PROGRESS NOTES
Initial Post Discharge Follow Up   Discharge Date: 11/9/23  Contact Date: 11/10/2023    Consent Verification:  Assessment Completed With: Patient  HIPAA Verified? Yes    Discharge Dx:   Primary osteoarthritis, right knee  S/p right TKA 11/7/2023 by Dr. Landers Flavin    History of paroxysmal atrial fibrillation. Hx of YASMIN       General:   How have you been since your discharge from the hospital? The patient reports doing well at home. The patient did admit to right knee pain with movement rated 7/10. The patient reported the right knee incision dressing was changed by the [facility] RN this morning. The patient denies any redness, pain, edema, discharge/bleeding, foul odor or heat coming from the incision site. The incision was described as intact and soft to the touch. The patient also denies any malaise or fever. The patient denies any bilateral calf edema, pain, warmth, redness or tenderness. The patient denies any chest pain, trouble breathing, coughing, dizziness, irregular heart beat/palpitations, or sweats. The patient also denies any hematuria, melena, hematochezia, or bleeding from the gums, nose or cuts. The patient reported a BP reading of 125/70. The patient also reported continued left had soreness after the IV removal. The patient denies any increased pain, discharge, redness, edema or hematomas. Do you have any pain since discharge? Yes  Where: right knee pain    Rating on pain scale 1-10, 10 being the worst pain you have ever experienced, what is current pain: 7 (with movement) 2/10 (at rest)  Alleviating factors: elevation/ice   Aggravating factors: movement   Is the pain manageable at home? Yes  How well was your pain managed while in the hospital?   On a scale of 1-5   1- Very Poor and 5- Very well   Very Well  When you were leaving the hospital were your discharge instructions reviewed with you? Yes  How well were your discharge instructions explained to you?    On a scale of 1-5   1- Very Poor and 5- Very well   Very Well  Do you have any questions about your discharge instructions? No; NCM did review the following in detail:   Keep incision dry for 2 weeks. Change Mepilex dressing every 3-5 days. Ambulate with walker and assist.  Before leaving the hospital was your diagnoses explained to you? Yes  Do you have any questions about your diagnoses? No  Are you able to perform normal daily activities of living as you have prior to your hospital stay (dressing, bathing, ambulating to the bathroom, etc)? yes; NCM did review/stress the importance of fall precautions.   (NCM) Was patient given a different diet per AVS? no      Medications:   Current Outpatient Medications   Medication Sig Dispense Refill    docusate sodium 100 MG Oral Cap Take 100 mg by mouth 2 (two) times daily. 20 capsule 0    ferrous sulfate 325 (65 FE) MG Oral Tab EC Take 1 tablet (325 mg total) by mouth daily with breakfast. 20 tablet 0    HYDROcodone-acetaminophen 5-325 MG Oral Tab Take 1 tablet by mouth every 4 (four) hours as needed. 40 tablet 0    ipratropium 0.06 % Nasal Solution 2 sprays by Nasal route 3 (three) times daily. 15 mL 5    apixaban (ELIQUIS) 5 MG Oral Tab Take 1 tablet (5 mg total) by mouth 2 (two) times daily. Last dose to be taken on Nov.4th,2023      omeprazole 20 MG Oral Capsule Delayed Release TAKE 1 CAPSULE BY MOUTH EVERY DAY IN THE MORNING BEFORE BREAKFAST 90 capsule 3    Probiotic Product (ALIGN OR) Take 1 tablet by mouth daily. Simethicone (GAS RELIEF 80 OR) Take 1 tablet by mouth as needed. sotalol 80 MG Oral Tab Take 0.5 tablets (40 mg total) by mouth 2 (two) times daily. mupirocin 2 % External Ointment Apply with cotton swab to bilateral nares twice daily as needed for crusting 15 g 2    benzonatate (TESSALON PERLES) 100 MG Oral Cap Take 1 capsule (100 mg total) by mouth 3 (three) times daily as needed for cough.  30 capsule 0    atorvastatin 10 MG Oral Tab Take 1 tablet (10 mg total) by mouth nightly. 30 tablet 5    Fexofenadine HCl 180 MG Oral Tab Take 1 tablet (180 mg total) by mouth daily. Polyethylene Glycol 3350 (MIRALAX OR) Take by mouth. Cyanocobalamin (VITAMIN B 12 OR) Take 1,000 mg by mouth daily with breakfast.      Calcium Carbonate-Vitamin D (CALCIUM + D OR) Take by mouth daily. Take 500mg & 1000 IU Vit D daily      GLUCOSAMINE-CHONDROITIN OR Take 1 tablet by mouth daily. Take Glucosamine 1500mg and Chondroitin 1100mg two times a day. omega-3 fatty acids (FISH OIL) 1000 MG Oral Cap Take 500 mg by mouth daily. Multiple Vitamins-Minerals (MULTI FOR HER OR) Take 1 tablet by mouth daily. Were there any changes to your current medication(s) noted on the AVS? Yes  If so, were these medication changes discussed with you prior to leaving the hospital? Yes  If a new medication was prescribed:    Was the new medication's purpose & side effects reviewed? Yes  Do you have any questions about your new medication? No  Did you  your discharge medications when you left the hospital? Yes  Let's go over your medications together to make sure we are not missing anything. Medications Reviewed  Are there any reasons that keep you from taking your medication as prescribed? No  Are you having any concerns with constipation? No  Did patient receive their flu shot (Sept-March)? Yes    Discharge medications reviewed/discussed/and reconciled against outpatient medications with patient. Any changes or updates to medications sent to PCP. Patient Acknowledged     Referrals/orders at D/C:  Referrals/orders placed at D/C? yes  What services:   Home health   (If HH was ordered) Has HH been set up? Yes; Home visits will begin today. If Yes: With Whom: [facility]  They can be reached by phone at [phone]. Except for [facility] mentioned above, have you scheduled these other services?  Yes The patient is scheduled to begin outpatient PT on 11/29/2023. If yes, have you started these services? no  The patient also reported having an echo scheduled on 12/6/2023. DME ordered at D/C? Yes  What? Incentive Spirometer   Have you received your (DME)? yes; The patient has continued deep breathing exercises as directed. The patient has a walker, cane, RTS and shower chair at home. Discharge orders, AVS reviewed and discussed with patient. Any changes or updates to orders sent to PCP. Patient Acknowledged      SDOH:   Transportation:   Transportation Needs: No Transportation Needs (11/10/2023)    Transportation Needs     Lack of Transportation: No       Financial Strain:   Financial Resource Strain: Low Risk  (11/10/2023)    Financial Resource Strain     Difficulty of Paying Living Expenses: Not hard at all     Med Affordability: No       Diagnosis specifics:  Ortho Spine:  Has there been a change in your incision/wound since d/c?  no  Are you following your exercise program (exercises for total joints and walking only for spine surgery)  yes   When you were in the hospital after your surgery, were you offered anything other than medications to help with your pain management? yes  Do you remember what this might have been?         (put X by all the patient mentions:          X repositioning            sleep/relaxation kit              music           TV         I-pad                       reading          X   Cold-pack      other__________________________)   Did you understand when to take your pain medication at home? yes  On a scale of 1-5   1- Very Poor and 5- Very well   Very well  Comments:  Was it clear how to use less narcotic pain medication as your pain decreased at home? yes   On a scale of 1-5   1- Very Poor/unclear and 5- Very well/clear  Very well  Comments:  Were you satisfied with the discharge process?   Yes     GENERAL:   Were you able to participate in any of the following educational opportunities:   Pre op class in person no   Pre op class online no   Discharge class in person no   Discharge video  no  Follow up appointments:      Your appointments       Date & Time Appointment Department ([place])    Nov 29, 2023  3:00 PM CST PT Ortho Post Op Evaluation with Francena Okeene, PT [address] ([address])        Dec 04, 2023  1:15 PM CST Physical Therapy Ortho Treatment with Francena Okeene, [address] ([address])        Dec 06, 2023 12:30 PM CST Physical Therapy Ortho Treatment with Francena Okeene, [address] ([address])        Dec 12, 2023 10:15 AM CST Physical Therapy Ortho Treatment with Francena Okeene, [address] ([address])        Dec 14, 2023  9:30 AM CST Physical Therapy Ortho Treatment with Francena Okeene, [address] ([address])        Dec 19, 2023 11:00 AM CST Physical Therapy Ortho Treatment with Francena Okeene, [address] ([address])        Dec 21, 2023  9:30 AM CST Physical Therapy Ortho Treatment with Francena Okeene, [address] ([address])        Dec 26, 2023 11:00 AM CST Physical Therapy Ortho Treatment with Francena Okeene, [address] ([address])        Dec 28, 2023 11:00 AM CST Physical Therapy Ortho Treatment with Francena Okeene, PT [address] ([address])        Jan 08, 2024  9:30 AM CST Physical Therapy Ortho Treatment with Francena Okeene, [address] [place])        Addi 10, 2024 11:00 AM CST Physical Therapy Ortho Treatment with Vale Terrell, [address] ([address])        Jan 16, 2024  9:30 AM CST Physical Therapy Ortho Treatment with Vale Terrell, [address] ([address])        Sep 25, 2024 10:45 AM CDT Exam - Established with Georges Felix MD [address] [place],[address], [address] [facility],[address], [place] ([facility])              [address] [place] [address], [address] [facility] [address] [id number] [facility] 110 [id number] [phone]            TCC  Was TCC ordered: No    PCP (If no TCC appointment)  Does patient already have a PCP appointment scheduled? No  NCM Attempted to schedule PCP office TCM appointment with patient   If no appointment scheduled: Explain: The patient would prefer to only follow up with Dr. Carline Garcia at this time. A TE was sent to the PCP office for an appointment request.     Specialist    Does the patient have any other follow up appointment(s) needing to be scheduled? Yes  If yes: NCM reviewed upcoming specialist appointment with patient: Yes  The patient is scheduled with Dr. Carline Garcia on 11/29/2023. Is there any reason as to why you cannot make your appointment(s)? No     Needs post D/C:   Now that you are home, are there any needs or concerns you need addressed before your next visit with your PCP?  (DME, meds, questions, etc.): No    Interventions by NCM:    Reviewed all discharge instructions with the patient with a focus on post-op directions, pain management, wound care and fall precautions. All medications were reviewed and educated on the importance of taking the medications as directed. Patient symptoms were assessed, education was completed for signs/symptoms to monitor, and reviewed when to contact providers vs go to the ED/call 911. Appointments were reviewed and stressed the importance of a close follow up with providers. A TE was sent to the PCP office for an appointment request.  [place] orders were confirmed and scheduled. Confirmed patient has DME and understands proper use. The patient received the influenza vaccine this season. The patient verbalized understanding and will contact the office with any further questions or concerns. Overall Rating:    How would you rate the care you received while in the hospital? excellent    CCM referral placed:    No    BOOK BY DATE: 11/23/2023

## 2023-11-29 ENCOUNTER — OFFICE VISIT (OUTPATIENT)
Dept: PHYSICAL THERAPY | Age: 78
End: 2023-11-29
Attending: PHYSICIAN ASSISTANT
Payer: MEDICARE

## 2023-11-29 PROCEDURE — 97110 THERAPEUTIC EXERCISES: CPT | Performed by: PHYSICAL THERAPIST

## 2023-11-29 PROCEDURE — 97161 PT EVAL LOW COMPLEX 20 MIN: CPT | Performed by: PHYSICAL THERAPIST

## 2023-12-04 ENCOUNTER — OFFICE VISIT (OUTPATIENT)
Dept: PHYSICAL THERAPY | Age: 78
End: 2023-12-04
Attending: PHYSICIAN ASSISTANT
Payer: MEDICARE

## 2023-12-04 PROCEDURE — 97140 MANUAL THERAPY 1/> REGIONS: CPT | Performed by: PHYSICAL THERAPIST

## 2023-12-04 PROCEDURE — 97110 THERAPEUTIC EXERCISES: CPT | Performed by: PHYSICAL THERAPIST

## 2023-12-04 NOTE — PROGRESS NOTES
Diagnosis:   S/p R TKA, R knee pain   Referring Provider: Jennifer Gordon MD Date of Evaluation:    11/29/2023    Precautions:   A fib, hx of L ankle/B feet surgery, hx of L TKA Next MD visit:   1/4/2024  Date of Surgery: n/a   Insurance Primary/Secondary: MEDICARE / [address] [place] [address]     # Auth Visits:  POC every 10 visits      Subjective: Completing HEP. R knee is okay. Pain: 4-5/10      Objective:     AROM: (* denotes performed with pain)  Knee   Flexion: R 102 (105 post ROM); L 112  Extension: R -3; L 0      Hypomobile R patella inferior glide    Assessment: Pt progressing, demonstrating improvements in R knee AROM, although deficits persist.  Reviewed importance of R knee ROM at this time. Progressing R knee ROM, R knee/hip strengthening, flexibility. Pt with c/o R greater trochanteric pain with supine sleeping, denies c/o any other time. Reviewed considerations for c/o as well nicole/helpful; see below. Pt nicole session well. Goals:    (to be met in 10 visits)  Patient to report independence with PT HEP to facilitate self management and to maintain progress made in PT. Patient to walk/stand each at least 15 min without assistive device. Patient to reciprocally negotiate 1 flight of stairs. Patient to have at least 110 deg active R knee flexion to facilitate transfers, stair negotiation. Patient to have active R knee ext to 0 deg for terminal knee ext at heel strike of gait. Plan: Continue PT 2-3 x weekly for 10 visits. Progress R knee ROM, gait, hip/knee strengthening, functional exercises. F/u on R lateral hip c/o. Date: 12/4/2023  TX#: 2/10 Date:                 TX#: 3/ Date:                 TX#: 4/ Date:                 TX#: 5/ Date: Tx#: 6/   There Ex:   HEP review/practice; see below.  Verbal/visual cuing for LAQ  Supine R SAQ 20 x 3 sec each  Supine PT A R knee flexion AAROM with LE supported on bal 5 x 20 sec each  Bridge on red ball x 10 reps  Supine L/R hamstrings stretch using belt x 40 sec each  Self R patella inferior glides x ~ 20 sec (HEP)  Pt c/o R greater trochanter pain when sleeping; advised may consider/practiced: across body piriformis stretch x 20 sec, B hip ER using RTB x 20 reps, avoid sleeping on R hip, standing hip abd in effort to reduce c/o         Manual:   R distal LE edema mobilization distal to surgical scar  R patella inferior glides  R knee ext ROM/mobilization 5 x 20 sec each                     HEP:Seated R knee flexion Rom/stretch, Seated R knee ext ROM/stretch, seated LAQ, Standing hip abd/ext with UE support.   May consider for lateral hip/trochanter c/o as helpful: seated B hip ER using RTB, across body piriformis stretch    Charges: 2 TE (30 min), 1 manual (10 min)       Total Timed Treatment: 40 min  Total Treatment Time: 45 min

## 2023-12-06 ENCOUNTER — OFFICE VISIT (OUTPATIENT)
Dept: PHYSICAL THERAPY | Age: 78
End: 2023-12-06
Attending: PHYSICIAN ASSISTANT
Payer: MEDICARE

## 2023-12-06 PROCEDURE — 97110 THERAPEUTIC EXERCISES: CPT | Performed by: PHYSICAL THERAPIST

## 2023-12-06 PROCEDURE — 97140 MANUAL THERAPY 1/> REGIONS: CPT | Performed by: PHYSICAL THERAPIST

## 2023-12-06 NOTE — PROGRESS NOTES
Diagnosis:   S/p R TKA, R knee pain   Referring Provider: Calrine Garcia MD Date of Evaluation:    11/29/2023    Precautions:   A fib, hx of L ankle/B feet surgery, hx of L TKA Next MD visit:   1/4/2024  Date of Surgery: 11/7/2023   Insurance Primary/Secondary: MEDICARE / [employer]     # Auth Visits:  POC every 10 visits      Subjective: R knee not feeling so good right now, stood a lot this morning. Completing HEP. Exercises helping a little with hip c/o. Pain: 4/10      Objective:     AROM: (* denotes performed with pain)  Knee   Flexion: R 101 (105 post ROM); L 112  Extension: R 0; L 0      Hypomobile R patella inferior glide    Assessment:   Pt reported that R knee felt better at close session. Pt with improved R knee ext AROM today. Continue to encourage regular R knee ROM as part of HEP. Initiated step up today; pt nicole well, able to complete 6 inch step up R LE with min c/o. Pt progressing well overall but will benefit from continued PT to address residual deficits. Reviewed hip exercises in effort to reduce greater trochanter c/o; advised physician consultation if c/o do not resolve. Pt working on weaning from [facility] initially at home as well nicole/safe. Goals:    (to be met in 10 visits)  Patient to report independence with PT HEP to facilitate self management and to maintain progress made in PT. Patient to walk/stand each at least 15 min without assistive device. Patient to reciprocally negotiate 1 flight of stairs. Patient to have at least 110 deg active R knee flexion to facilitate transfers, stair negotiation. Patient to have active R knee ext to 0 deg for terminal knee ext at heel strike of gait. Plan: Continue PT 2-3 x weekly for 10 visits. Progress R knee ROM, gait, hip/knee strengthening, functional exercises. F/u on R lateral hip c/o. Date: 12/4/2023  TX#: 2/10 Date:  12/6/2023               TX#: 3/10 Date:                 TX#: 4/ Date:                 TX#: 5/ Date:    Tx#: 6/ There Ex:   HEP review/practice; see below. Verbal/visual cuing for LAQ  Supine R SAQ 20 x 3 sec each  Supine PT A R knee flexion AAROM with LE supported on bal 5 x 20 sec each  Bridge on red ball x 10 reps  Supine L/R hamstrings stretch using belt x 40 sec each  Self R patella inferior glides x ~ 20 sec (HEP)  Pt c/o R greater trochanter pain when sleeping; advised may consider/practiced: across body piriformis stretch x 20 sec, B hip ER using RTB x 20 reps, avoid sleeping on R hip, standing hip abd in effort to reduce c/o   There Ex:   Stationary bike - next session   Supine R SAQ 20 x 3 sec each  Supine PT A R knee flexion AAROM with LE supported on bal 5 x 20 sec each  Bridge on red ball x 10 reps  Supine L/R hamstrings stretch using x 40 sec each  4 and 6 inch step up R LE x 5 reps (HEP)  HEP review/progression; see below.       Manual:   R distal LE edema mobilization distal to surgical scar  R patella inferior glides  R knee ext ROM/mobilization 5 x 20 sec each Manual:   R distal LE edema mobilization distal to surgical scar  R patella sup/inferior ad med/lat glides  R knee ext ROM/mobilization 5 x 20 sec each  STM L/R greater trochanter region using foam roller                    HEP:Seated R knee flexion Rom/stretch, Seated R knee ext ROM/stretch, seated LAQ, Standing hip abd/ext with UE support, mini squat, step up  May consider for lateral hip/trochanter c/o as helpful: seated B hip ER using RTB, across body piriformis stretch, walking without SPC in home as able/safe    Charges: 2 TE (25 min), 1 manual (15 min)       Total Timed Treatment: 40 min  Total Treatment Time: 45 min

## 2023-12-12 ENCOUNTER — OFFICE VISIT (OUTPATIENT)
Dept: PHYSICAL THERAPY | Age: 78
End: 2023-12-12
Attending: PHYSICIAN ASSISTANT
Payer: MEDICARE

## 2023-12-12 PROCEDURE — 97110 THERAPEUTIC EXERCISES: CPT | Performed by: PHYSICAL THERAPIST

## 2023-12-12 PROCEDURE — 97140 MANUAL THERAPY 1/> REGIONS: CPT | Performed by: PHYSICAL THERAPIST

## 2023-12-12 NOTE — PROGRESS NOTES
Diagnosis:   S/p R TKA, R knee pain   Referring Provider: Thomas Mccormick MD Date of Evaluation:    11/29/2023    Precautions:   A fib, hx of L ankle/B feet surgery, hx of L TKA Next MD visit:   1/4/2024  Date of Surgery: 11/7/2023   Insurance Primary/Secondary: MEDICARE / [address] [place] [address]     # Auth Visits:  POC every 10 visits      Subjective: Concerned about my progress s/p R TKA. Completing HEP. Aspercream helps with hip pain. Pain: 5/10      Objective:     AROM: (* denotes performed with pain)  Knee   Flexion: R 104 (105 post ROM); L 112  Extension: R 0; L 0      Hypomobile R patella inferior glide  Restriction R quadriceps    Assessment:   Pt progressing well overall s/p R TKA, including with ROM, weaning from [facility], stairs. Continue to encourage HEP compliance, including ROM. Descending stairs continues to be a challenge; continue to address with there ex and HEP. Reviewed quad stretches and patella mobilization as part of HEP. Pt to consult physician/Dr. Thomas Mccormick if lateral hip c/o persists. Pt will benefit from continued PT to address residual deficits. Goals:    (to be met in 10 visits)  Patient to report independence with PT HEP to facilitate self management and to maintain progress made in PT. Patient to walk/stand each at least 15 min without assistive device. Patient to reciprocally negotiate 1 flight of stairs. Patient to have at least 110 deg active R knee flexion to facilitate transfers, stair negotiation. Patient to have active R knee ext to 0 deg for terminal knee ext at heel strike of gait. Plan: Continue PT 2-3 x weekly for 10 visits. Progress R knee ROM, gait, hip/knee strengthening, functional exercises, wean from [facility]. Date: 12/4/2023  TX#: 2/10 Date:  12/6/2023               TX#: 3/10 Date:  12/12/2023               TX#: 4/10 Date:                 TX#: 5/ Date: Tx#: 6/   There Ex:   HEP review/practice; see below.  Verbal/visual cuing for LAQ  Supine R SAQ 20 x 3 sec each  Supine PT A R knee flexion AAROM with LE supported on bal 5 x 20 sec each  Bridge on red ball x 10 reps  Supine L/R hamstrings stretch using belt x 40 sec each  Self R patella inferior glides x ~ 20 sec (HEP)  Pt c/o R greater trochanter pain when sleeping; advised may consider/practiced: across body piriformis stretch x 20 sec, B hip ER using RTB x 20 reps, avoid sleeping on R hip, standing hip abd in effort to reduce c/o   There Ex:   Stationary bike - next session   Supine R SAQ 20 x 3 sec each  Supine PT A R knee flexion AAROM with LE supported on bal 5 x 20 sec each  Bridge on red ball x 10 reps  Supine L/R hamstrings stretch using x 40 sec each  4 and 6 inch step up R LE x 5 reps (HEP)  HEP review/progression; see below.  There Ex:   Stationary bike: partial ROM 20 sec hold x 4 min  Supine PT A R knee flexion AAROM with LE supported on bal 5 x 20 sec each  Bridge on red ball 2 x 10 reps   Supine R quad stretch x 20 sec; reviewed s/l version with strap (HEP)  Standing L/R hamstrings stretch 2 x 20 sec each  B gastroc stretch on slant board 2 x 20 sec each  4 inch step up over down R LE x 10 reps  6  inch step up x 5 reps R LE, 8 inch x 8 reps  Lateral steps at bar x ~ 16 feet, using YTB prox to knees 3 x ~ 16 feet      Manual:   R distal LE edema mobilization distal to surgical scar  R patella inferior glides  R knee ext ROM/mobilization 5 x 20 sec each Manual:   R distal LE edema mobilization distal to surgical scar  R patella sup/inferior and med/lat glides  R knee ext ROM/mobilization 5 x 20 sec each  STM L/R greater trochanter region using foam roller Manual:   R distal LE edema mobilization distal to surgical scar - pt declined, not completed  R patella glides sup/inferior (HEP, pt practiced)   R knee surgical scar massage/mobilization (HEP review)  R knee ext ROM/mobilization 5 x 20 sec each  S/L R quad stretches 2 x 20 sec each                   HEP:Seated R knee flexion Rom/stretch, Seated R knee ext ROM/stretch, seated LAQ, Standing hip abd/ext with UE support, mini squat, step up, supine or s/l quad stretches, patella sup/inf glides.   May consider for lateral hip/trochanter c/o as helpful: seated B hip ER using RTB, across body piriformis stretch, walking without SPC in home as able/safe    Charges: 2 TE (30 min), 1 manual (10 min)       Total Timed Treatment: 40 min  Total Treatment Time: 45 min

## 2023-12-14 ENCOUNTER — OFFICE VISIT (OUTPATIENT)
Dept: PHYSICAL THERAPY | Age: 78
End: 2023-12-14
Attending: PHYSICIAN ASSISTANT
Payer: MEDICARE

## 2023-12-14 PROCEDURE — 97140 MANUAL THERAPY 1/> REGIONS: CPT | Performed by: PHYSICAL THERAPIST

## 2023-12-14 PROCEDURE — 97110 THERAPEUTIC EXERCISES: CPT | Performed by: PHYSICAL THERAPIST

## 2023-12-14 NOTE — PROGRESS NOTES
Diagnosis:   S/p R TKA, R knee pain   Referring Provider: Bhavana Estrada MD Date of Evaluation:    11/29/2023    Precautions:   A fib, hx of L ankle/B feet surgery, hx of L TKA Next MD visit:   1/4/2024  Date of Surgery: 11/7/2023   Insurance Primary/Secondary: MEDICARE / Tashia Berna     # Auth Visits:  POC every 10 visits      Subjective: Deny any new knee c/o. Completing HEP. Pain: 5/10      Objective:     AROM: (* denotes performed with pain)  Knee   Flexion: R 106 ; L 112  Extension: R 0; L 0      Hypomobile R patella inferior glide  Restriction R quadriceps    Assessment:   Pt continues to progress well s/p R TKA, demonstrating progressive improvements in R knee AROM, function, gait, stair negotiation, weaning from [facility] but deficits persist for which she will benefit from continued care. Pt with continued R LE edema that seems WNL given time since TKA. Reviewed R LE elevation, cold pack, compression socks for edema management. Pt nicole session well. Reviewed/practiced quad stretches and patella mobilization as part of HEP. Goals:    (to be met in 10 visits)  Patient to report independence with PT HEP to facilitate self management and to maintain progress made in PT. Patient to walk/stand each at least 15 min without assistive device. Patient to reciprocally negotiate 1 flight of stairs. Patient to have at least 110 deg active R knee flexion to facilitate transfers, stair negotiation. Patient to have active R knee ext to 0 deg for terminal knee ext at heel strike of gait. Plan: Continue PT 2-3 x weekly for 10 visits. Progress R knee ROM, gait, hip/knee strengthening, functional exercises, wean from [facility], edema management. Date: 12/4/2023  TX#: 2/10 Date:  12/6/2023               TX#: 3/10 Date:  12/12/2023               TX#: 4/10 Date:  12/14/2023           TX#: 5/10 Date: Tx#: 6/ There Ex:   HEP review/practice; see below.  Verbal/visual cuing for LAQ  Supine R SAQ 20 x 3 sec each  Supine PT A R knee flexion AAROM with LE supported on bal 5 x 20 sec each  Bridge on red ball x 10 reps  Supine L/R hamstrings stretch using belt x 40 sec each  Self R patella inferior glides x ~ 20 sec (HEP)  Pt c/o R greater trochanter pain when sleeping; advised may consider/practiced: across body piriformis stretch x 20 sec, B hip ER using RTB x 20 reps, avoid sleeping on R hip, standing hip abd in effort to reduce c/o   There Ex:   Stationary bike - next session   Supine R SAQ 20 x 3 sec each  Supine PT A R knee flexion AAROM with LE supported on bal 5 x 20 sec each  Bridge on red ball x 10 reps  Supine L/R hamstrings stretch using x 40 sec each  4 and 6 inch step up R LE x 5 reps (HEP)  HEP review/progression; see below.  There Ex:   Stationary bike: partial ROM 20 sec hold x 4 min  Supine PT A R knee flexion AAROM with LE supported on bal 5 x 20 sec each  Bridge on red ball 2 x 10 reps   Supine R quad stretch x 20 sec; reviewed s/l version with strap (HEP)  Standing L/R hamstrings stretch 2 x 20 sec each  B gastroc stretch on slant board 2 x 20 sec each  4 inch step up over down R LE x 10 reps  6  inch step up x 5 reps R LE, 8 inch x 8 reps  Lateral steps at bar x ~ 16 feet, using YTB prox to knees 3 x ~ 16 feet  There Ex:   Reviewed/practiced surgical scar and patella mobilization for HEP  Stationary bike: partial ROM 20 sec hold - full ROM x 4 min  Supine PT A R knee flexion AAROM with LE supported on bal 5 x 20 sec each  Bridge on red ball 2 x 10 reps   Supine R quad stretch 2  x 20 sec; reviewed s/l version with strap (HEP) - supine better nicole by pt  Standing L/R hamstrings stretch 2 x 20 sec each - not completed, insufficient time  B gastroc stretch on slant board 2 x 20 sec each  4 and 6 inch step up over down R LE x 8 reps each   Lateral steps at bar x ~ 16 feet, using YTB prox to knees 3 x ~ 16 feet     Manual:   R distal LE edema mobilization distal to surgical scar  R patella inferior glides  R knee ext ROM/mobilization 5 x 20 sec each Manual:   R distal LE edema mobilization distal to surgical scar  R patella sup/inferior and med/lat glides  R knee ext ROM/mobilization 5 x 20 sec each  STM L/R greater trochanter region using foam roller Manual:   R distal LE edema mobilization distal to surgical scar - pt declined, not completed  R patella glides sup/inferior (HEP, pt practiced)   R knee surgical scar massage/mobilization (HEP review)  R knee ext ROM/mobilization 5 x 20 sec each  S/L R quad stretches 2 x 20 sec each Manual:   R patella glides sup/inferior   R knee surgical scar massage/mobilization   R knee ext ROM/mobilization 5 x 20 sec each  S/L R quad stretches 2 x 20 sec each                   HEP:Seated R knee flexion Rom/stretch, Seated R knee ext ROM/stretch, seated LAQ, Standing hip abd/ext with UE support, mini squat, step up, supine or s/l quad stretches, patella sup/inf glides.   May consider for lateral hip/trochanter c/o as helpful: seated B hip ER using RTB, across body piriformis stretch, walking without SPC in home as able/safe    Charges: 2 TE (30 min), 1 manual (10 min)       Total Timed Treatment: 40 min  Total Treatment Time: 45 min

## 2023-12-18 ENCOUNTER — OFFICE VISIT (OUTPATIENT)
Dept: INTERNAL MEDICINE CLINIC | Facility: CLINIC | Age: 78
End: 2023-12-18

## 2023-12-18 ENCOUNTER — NURSE TRIAGE (OUTPATIENT)
Dept: INTERNAL MEDICINE CLINIC | Facility: CLINIC | Age: 78
End: 2023-12-18

## 2023-12-18 VITALS
WEIGHT: 165 LBS | SYSTOLIC BLOOD PRESSURE: 140 MMHG | DIASTOLIC BLOOD PRESSURE: 77 MMHG | HEART RATE: 62 BPM | HEIGHT: 61 IN | BODY MASS INDEX: 31.15 KG/M2

## 2023-12-18 DIAGNOSIS — R35.0 FREQUENCY OF URINATION: Primary | ICD-10-CM

## 2023-12-18 DIAGNOSIS — I10 ESSENTIAL HYPERTENSION: ICD-10-CM

## 2023-12-18 LAB
GLUCOSE (URINE DIPSTICK): NEGATIVE MG/DL
KETONES (URINE DIPSTICK): NEGATIVE MG/DL
MULTISTIX LOT#: ABNORMAL NUMERIC
NITRITE, URINE: NEGATIVE
PH, URINE: 5 (ref 4.5–8)
SPECIFIC GRAVITY: 1.02 (ref 1–1.03)
URINE-COLOR: YELLOW
UROBILINOGEN,SEMI-QN: 0.2 MG/DL (ref 0–1.9)

## 2023-12-18 PROCEDURE — 99213 OFFICE O/P EST LOW 20 MIN: CPT | Performed by: NURSE PRACTITIONER

## 2023-12-18 PROCEDURE — 81002 URINALYSIS NONAUTO W/O SCOPE: CPT | Performed by: NURSE PRACTITIONER

## 2023-12-18 RX ORDER — CEPHALEXIN 500 MG/1
500 CAPSULE ORAL 2 TIMES DAILY
Qty: 14 CAPSULE | Refills: 0 | Status: SHIPPED | OUTPATIENT
Start: 2023-12-18 | End: 2023-12-25

## 2023-12-19 ENCOUNTER — OFFICE VISIT (OUTPATIENT)
Dept: PHYSICAL THERAPY | Age: 78
End: 2023-12-19
Attending: PHYSICIAN ASSISTANT
Payer: MEDICARE

## 2023-12-19 PROCEDURE — 97140 MANUAL THERAPY 1/> REGIONS: CPT | Performed by: PHYSICAL THERAPIST

## 2023-12-19 PROCEDURE — 97110 THERAPEUTIC EXERCISES: CPT | Performed by: PHYSICAL THERAPIST

## 2023-12-19 NOTE — PROGRESS NOTES
Diagnosis:   S/p R TKA, R knee pain   Referring Provider: Eden Jewell MD Date of Evaluation:    11/29/2023    Precautions:   A fib, hx of L ankle/B feet surgery, hx of L TKA Next MD visit:   1/4/2024  Date of Surgery: 11/7/2023   Insurance Primary/Secondary: MEDICARE / Stephanienatan Dominiqueeaston     # Auth Visits:  POC every 10 visits      Subjective: Deny any new knee c/o. Completing HEP. Pain: 5/10      Objective:     AROM: (* denotes performed with pain)  Knee   Flexion: R 108 ( 109 post ROM) ; L 112  Extension: R -2; L 0      Hypomobile R patella inferior glide  Restriction R quadriceps    Assessment:   Pt continues to progress well s/p R TKA, demonstrating progressive improvements in R knee AROM, function, gait, stair negotiation, weaning from [facility] but deficits persist for which she will benefit from continued care. Pt challenged with steps; will continue to work on steps. Pt with continued R LE edema that seems WNL given time since TKA. Reviewed R LE elevation, cold pack, compression socks for edema management. Added edema mobilization to today's session as well. Pt nicole session well. Reviewed/practiced quad stretches as part of HEP; pt well without c/o following. Goals:    (to be met in 10 visits)  Patient to report independence with PT HEP to facilitate self management and to maintain progress made in PT. Patient to walk/stand each at least 15 min without assistive device. Patient to reciprocally negotiate 1 flight of stairs. Patient to have at least 110 deg active R knee flexion to facilitate transfers, stair negotiation. Patient to have active R knee ext to 0 deg for terminal knee ext at heel strike of gait. Plan: Continue PT 2-3 x weekly for 10 visits. Progress R knee ROM, gait, steps, hip/knee strengthening, functional exercises, wean from [facility], edema management.   Date:  12/6/2023               TX#: 3/10 Date:  12/12/2023               TX#: 4/10 Date:  12/14/2023           TX#: 5/10 Date: 12/19/2023  Tx#: 6/10   There Ex:   Stationary bike - next session   Supine R SAQ 20 x 3 sec each  Supine PT A R knee flexion AAROM with LE supported on bal 5 x 20 sec each  Bridge on red ball x 10 reps  Supine L/R hamstrings stretch using x 40 sec each  4 and 6 inch step up R LE x 5 reps (HEP)  HEP review/progression; see below.  There Ex:   Stationary bike: partial ROM 20 sec hold x 4 min  Supine PT A R knee flexion AAROM with LE supported on bal 5 x 20 sec each  Bridge on red ball 2 x 10 reps   Supine R quad stretch x 20 sec; reviewed s/l version with strap (HEP)  Standing L/R hamstrings stretch 2 x 20 sec each  B gastroc stretch on slant board 2 x 20 sec each  4 inch step up over down R LE x 10 reps  6  inch step up x 5 reps R LE, 8 inch x 8 reps  Lateral steps at bar x ~ 16 feet, using YTB prox to knees 3 x ~ 16 feet  There Ex:   Reviewed/practiced surgical scar and patella mobilization for HEP  Stationary bike: partial ROM 20 sec hold - full ROM x 4 min  Supine PT A R knee flexion AAROM with LE supported on bal 5 x 20 sec each  Bridge on red ball 2 x 10 reps   Supine R quad stretch 2  x 20 sec; reviewed s/l version with strap (HEP) - supine better nicole by pt  Standing L/R hamstrings stretch 2 x 20 sec each - not completed, insufficient time  B gastroc stretch on slant board 2 x 20 sec each  4 and 6 inch step up over down R LE x 8 reps each   Lateral steps at bar x ~ 16 feet, using YTB prox to knees 3 x ~ 16 feet  There Ex:   Reviewed/practiced surgical scar mobilization and s/l R quad stretch with strap 2 x 20 sec each for HEP  Stationary bike: partial ROM 20 sec hold - full ROM x 4 min  Supine PT A R knee flexion AAROM with LE supported on bal 5 x 20 sec each  Bridge on red ball 2 x 10 reps   4 inch step up over down R LE x 8 reps each   Lateral steps at bar x ~ 16 feet, using YTB prox to knees 3 x ~ 16 feet   Standing L/R hamstrings stretch 2 x 20 sec each   B gastroc stretch on slant board 2 x 20 sec each  Marching on AirEx: 10 x 3 sec     Manual:   R distal LE edema mobilization distal to surgical scar  R patella sup/inferior and med/lat glides  R knee ext ROM/mobilization 5 x 20 sec each  STM L/R greater trochanter region using foam roller Manual:   R distal LE edema mobilization distal to surgical scar - pt declined, not completed  R patella glides sup/inferior (HEP, pt practiced)   R knee surgical scar massage/mobilization (HEP review)  R knee ext ROM/mobilization 5 x 20 sec each  S/L R quad stretches 2 x 20 sec each Manual:   R patella glides sup/inferior   R knee surgical scar massage/mobilization   R knee ext ROM/mobilization 5 x 20 sec each  S/L R quad stretches 2 x 20 sec each  Manual:   Edema mobilization distal R LE  R patella glides sup/inferior   R knee surgical scar massage/mobilization   R knee ext ROM/mobilization 5 x 20 sec each  S/L R quad stretches 2 x 20 sec each                  HEP:Seated R knee flexion Rom/stretch, Seated R knee ext ROM/stretch, seated LAQ, Standing hip abd/ext with UE support, mini squat, step up, supine or s/l quad stretches, patella sup/inf glides.   May consider for lateral hip/trochanter c/o as helpful: seated B hip ER using RTB, across body piriformis stretch, walking without SPC in home as able/safe    Charges: 2 TE (30 min), 1 manual (10 min)       Total Timed Treatment: 40 min  Total Treatment Time: 45 min

## 2023-12-21 ENCOUNTER — OFFICE VISIT (OUTPATIENT)
Dept: PHYSICAL THERAPY | Age: 78
End: 2023-12-21
Attending: PHYSICIAN ASSISTANT
Payer: MEDICARE

## 2023-12-21 PROCEDURE — 97110 THERAPEUTIC EXERCISES: CPT | Performed by: PHYSICAL THERAPIST

## 2023-12-21 PROCEDURE — 97140 MANUAL THERAPY 1/> REGIONS: CPT | Performed by: PHYSICAL THERAPIST

## 2023-12-21 NOTE — PROGRESS NOTES
Diagnosis:   S/p R TKA, R knee pain   Referring Provider: Raj Gusman MD Date of Evaluation:    11/29/2023    Precautions:   A fib, hx of L ankle/B feet surgery, hx of L TKA Next MD visit:   1/4/2024  Date of Surgery: 11/7/2023   Insurance Primary/Secondary: MEDICARE / [address] [place] [address]     # Auth Visits:  POC every 10 visits      Subjective: R knee sore; I was on my feet for ~ 5 hours yesterday. Purchased compressions socks. Not sure if massage last session helped a lot with swelling. Stairs still challenging. Pain:  4-6/10      Objective:     AROM: (* denotes performed with pain)  Knee   Flexion: R 108; L 112  Extension: R -2; L 0      Hypomobile R patella inferior glide  Restriction R quadriceps (improvement noted)    Assessment:   Pt with improved tolerance for stairs today, able to progress from 4  to 6 inch step with UE A. Pt has weaned from [facility], denies loss of balance or falls. Pt will benefit from continued PT to address residual R knee ROM, strength, functional deficits s/p R TKA. Pt with continued R LE edema that seems WNL given time since TKA. Reviewed R LE elevation, cold pack, compression socks for edema management. Pt confirmed has purchased compression socks. Goals:    (to be met in 10 visits)  Patient to report independence with PT HEP to facilitate self management and to maintain progress made in PT. Patient to walk/stand each at least 15 min without assistive device. Patient to reciprocally negotiate 1 flight of stairs. Patient to have at least 110 deg active R knee flexion to facilitate transfers, stair negotiation. Patient to have active R knee ext to 0 deg for terminal knee ext at heel strike of gait. Plan: Continue PT 2-3 x weekly for 10 visits. Progress R knee ROM, gait, steps, hip/knee strengthening, functional exercises, wean from [facility], edema management.   Date:  12/12/2023               TX#: 4/10 Date:  12/14/2023           TX#: 5/10 Date: 12/19/2023  Tx#: 6/10 Date: 12/21/2023  Tx #: 7/10   There Ex:   Stationary bike: partial ROM 20 sec hold x 4 min  Supine PT A R knee flexion AAROM with LE supported on bal 5 x 20 sec each  Bridge on red ball 2 x 10 reps   Supine R quad stretch x 20 sec; reviewed s/l version with strap (HEP)  Standing L/R hamstrings stretch 2 x 20 sec each  B gastroc stretch on slant board 2 x 20 sec each  4 inch step up over down R LE x 10 reps  6  inch step up x 5 reps R LE, 8 inch x 8 reps  Lateral steps at bar x ~ 16 feet, using YTB prox to knees 3 x ~ 16 feet  There Ex:   Reviewed/practiced surgical scar and patella mobilization for HEP  Stationary bike: partial ROM 20 sec hold - full ROM x 4 min  Supine PT A R knee flexion AAROM with LE supported on bal 5 x 20 sec each  Bridge on red ball 2 x 10 reps   Supine R quad stretch 2  x 20 sec; reviewed s/l version with strap (HEP) - supine better nicole by pt  Standing L/R hamstrings stretch 2 x 20 sec each - not completed, insufficient time  B gastroc stretch on slant board 2 x 20 sec each  4 and 6 inch step up over down R LE x 8 reps each   Lateral steps at bar x ~ 16 feet, using YTB prox to knees 3 x ~ 16 feet  There Ex:   Reviewed/practiced surgical scar mobilization and s/l R quad stretch with strap 2 x 20 sec each for HEP  Stationary bike: partial ROM 20 sec hold - full ROM x 4 min  Supine PT A R knee flexion AAROM with LE supported on bal 5 x 20 sec each  Bridge on red ball 2 x 10 reps   4 inch step up over down R LE x 8 reps each   Lateral steps at bar x ~ 16 feet, using YTB prox to knees 3 x ~ 16 feet   Standing L/R hamstrings stretch 2 x 20 sec each   B gastroc stretch on slant board 2 x 20 sec each  Marching on AirEx: 10 x 3 sec   There Ex:   Stationary bike - pt declined, already competed 10 min this morning  Supine PT A R knee flexion AAROM with LE supported on ball 4 x 30 sec each  Bridge on red ball 2 x 10 reps   4 inch step up over down R LE 5 reps  Mini staircase (4, ~ 6 inch steps: up/down reciprocally B UE A x 1, unilateral UE A x 2  Lateral steps at bar using YTB prox to knees 3 x ~ 16 feet   Standing L/R hamstrings stretch 2 x 20 sec each   Hep modification/reviewe; see below. B gastroc stretch on slant board 2 x 20 sec each  Heel to toe walking forward and backward 3 x ~ 16 feet   Manual:   R distal LE edema mobilization distal to surgical scar - pt declined, not completed  R patella glides sup/inferior (HEP, pt practiced)   R knee surgical scar massage/mobilization (HEP review)  R knee ext ROM/mobilization 5 x 20 sec each  S/L R quad stretches 2 x 20 sec each Manual:   R patella glides sup/inferior   R knee surgical scar massage/mobilization   R knee ext ROM/mobilization 5 x 20 sec each  S/L R quad stretches 2 x 20 sec each  Manual:   Edema mobilization distal R LE  R patella glides sup/inferior   R knee surgical scar massage/mobilization   R knee ext ROM/mobilization 5 x 20 sec each  S/L R quad stretches 2 x 20 sec each  Manual:   R patella glides sup/inferior   R knee surgical scar massage/mobilization   R knee ext ROM/mobilization 5 x 20 sec each  S/L R quad stretches 2 x 20 sec each/PT A/pt used belt                 HEP:Seated R knee flexion Rom/stretch, Seated R knee ext ROM/stretch, seated LAQ, Standing hip abd/ext with UE support, sit - stand (increased R knee flexion), step up, supine or s/l quad stretches, patella sup/inf glides.   May consider for lateral hip/trochanter c/o as helpful: seated B hip ER using RTB, across body piriformis stretch, walking without SPC in home as able/safe    Charges: 2 TE (30 min), 1 manual (10 min)       Total Timed Treatment: 40 min  Total Treatment Time: 40 min

## 2023-12-26 ENCOUNTER — OFFICE VISIT (OUTPATIENT)
Dept: PHYSICAL THERAPY | Age: 78
End: 2023-12-26
Attending: PHYSICIAN ASSISTANT
Payer: MEDICARE

## 2023-12-26 PROCEDURE — 97110 THERAPEUTIC EXERCISES: CPT | Performed by: PHYSICAL THERAPIST

## 2023-12-26 PROCEDURE — 97140 MANUAL THERAPY 1/> REGIONS: CPT | Performed by: PHYSICAL THERAPIST

## 2023-12-26 NOTE — PROGRESS NOTES
ProgressSummary  Pt has attended 8 visits in Physical Therapy. Diagnosis:   S/p R TKA, R knee pain   Referring Provider: Violeta Carrillo MD Date of Evaluation:    11/29/2023    Precautions:   A fib, hx of L ankle/B feet surgery, hx of L TKA Next MD visit:   1/4/2024  Date of Surgery: 11/7/2023   Insurance Primary/Secondary: MEDICARE / Moriah Leo     # Auth Visits:  POC every 10 visits      Subjective: Using stationary bike daily. Completing HEP. I can walk well when I focus on it; if not, my walking is not as good. Min - no SPC use. Stairs are still a challenge. Using compression socks, think that they help with swelling. Pain:  4-6/10      Objective:     AROM: (* denotes performed with pain)  Knee   Flexion: R 106; L 112  Extension: R -2; L 0      R patella mobility WNL  Restriction R quadriceps     Mild pitting edema distal R LE (improvement noted)    Knee MMT:   Flexion: R 4-/5* limited by c/o pain, L 4+/5  Extension: R 4+/5, L 5-/5    Gait: mild lateral trunk lean swing through, pt able to self correct; no assistive device use  Pt able to negotiate 6 inch step using R LE with effort/mild c/o/UE A. Assessment:   Patient progressing fairly well with PT, demonstrating improvements in R knee AROM, gait, strength, ADL tolerance, although deficits persist. Pt able to correct lateral trunk lean during gait when she focuses on quality of gait; will continue to work on hip strengthening as well to address. Pt has self weaned from [facility]. Pt able to negotiate 6 inch step now with effort, working toward standard 8 inch step. Pt reports compression sock use which seems to be helping with edema. Pt will benefit from continued PT to address residual R knee ROM, strength, functional deficits s/p R TKA. Pt tolerating sessions well overall.      Goals:    (to be met in 10 visits)  Patient to report independence with PT HEP to facilitate self management and to maintain progress made in PT.  - MET  Patient to walk/stand each at least 15 min without assistive device. - PROGRESS  Patient to reciprocally negotiate 1 flight of stairs. - PROGRESS  Patient to have at least 110 deg active R knee flexion to facilitate transfers, stair negotiation. - PROGRESS  Patient to have active R knee ext to 0 deg for terminal knee ext at heel strike of gait. - NEARLY MET    Plan: Continue PT 2-3 x weekly for up 16-18 sessions. Progress R knee ROM, gait, steps, hip/knee strengthening, functional exercises, edema management. Date: 12/19/2023  Tx#: 6/10 Date: 12/21/2023  Tx #: 7/10 Date: 12/26/2023  Tx #: 8/16-18    There Ex:   Reviewed/practiced surgical scar mobilization and s/l R quad stretch with strap 2 x 20 sec each for HEP  Stationary bike: partial ROM 20 sec hold - full ROM x 4 min  Supine PT A R knee flexion AAROM with LE supported on bal 5 x 20 sec each  Bridge on red ball 2 x 10 reps   4 inch step up over down R LE x 8 reps each   Lateral steps at bar x ~ 16 feet, using YTB prox to knees 3 x ~ 16 feet   Standing L/R hamstrings stretch 2 x 20 sec each   B gastroc stretch on slant board 2 x 20 sec each  Marching on AirEx: 10 x 3 sec   There Ex:   Stationary bike - pt declined, already competed 10 min this morning  Supine PT A R knee flexion AAROM with LE supported on ball 4 x 30 sec each  Bridge on red ball 2 x 10 reps   4 inch step up over down R LE 5 reps  Mini staircase (4, ~ 6 inch steps: up/down reciprocally B UE A x 1, unilateral UE A x 2  Lateral steps at bar using YTB prox to knees 3 x ~ 16 feet   Standing L/R hamstrings stretch 2 x 20 sec each   Hep modification/reviewe; see below.    B gastroc stretch on slant board 2 x 20 sec each  Heel to toe walking forward and backward 3 x ~ 16 feet There Ex:   Stationary bike - pt declined, will complete at home  Standing R knee flexion ROM/stretch using staircase 3 x 20 sec each  Standing L/R hamstrings/knee ext stretch 2 x 20 sec each   B gastroc stretch on slant board 2 x 20 sec each  Lateral steps and monster walk at bar using YTB prox to knees 2 x ~ 16 feet, at ankles x ~ 16 feet each - mild knee c/o  Reformer: B squats x 15 reps [place]; R unilateral squat x 10 reps [place] - pt c/o pressure on shoulders, stopped - discontinue going forward  4 inch step up over down R LE x 5 reps  Mini staircase (4, ~ 6 inch steps: up/down reciprocally unilateral UE A x 2  Reviewed considerations for lateral hip c/o: piriformis stretches, seated hip ER with band, MHP, STM if/as helpful, physician consultation     Manual:   Edema mobilization distal R LE  R patella glides sup/inferior   R knee surgical scar massage/mobilization   R knee ext ROM/mobilization 5 x 20 sec each  S/L R quad stretches 2 x 20 sec each  Manual:   R patella glides sup/inferior   R knee surgical scar massage/mobilization   R knee ext ROM/mobilization 5 x 20 sec each  S/L R quad stretches 2 x 20 sec each/PT A/pt used belt Manual:   R patella glides sup/inferior - min due to WNL  R knee surgical scar massage/mobilization   R knee ext ROM/mobilization 4 x 30 sec each  S/L R quad stretches  - not completed today               HEP:Seated R knee flexion Rom/stretch, Seated R knee ext ROM/stretch, seated LAQ, Standing hip abd/ext with UE support, sit - stand (increased R knee flexion), step up, supine or s/l quad stretches, patella sup/inf glides. May consider for lateral hip/trochanter c/o as helpful: seated B hip ER using RTB, across body piriformis stretch, walking without SPC in home as able/safe    Charges: 2 TE (30 min), 1 manual (10 min)       Total Timed Treatment: 40 min  Total Treatment Time: 45 min  LEFS to be re-administered at discharge    Plan: Continue skilled Physical Therapy 2-3 x/week or a total of 16-18 visits over a 90 day period.  Treatment will include: there ex, there activities, manual therapy, NM Re-ed, MHP, cold pack as needed       Patient/Family/Caregiver was advised of these findings, precautions, and treatment options and has agreed to actively participate in planning and for this course of care. Thank you for your referral. If you have any questions, please contact me at Dept: [phone]. Sincerely,  Electronically signed by therapist: Alena Conner PT     Physician's certification required:  Yes  Please co-sign or sign and return this letter via fax as soon as possible to [phone]. I certify the need for these services furnished under this plan of treatment and while under my care.     X___________________________________________________ Date____________________    Certification From: 22/27/2917  To:3/25/2024

## 2023-12-28 ENCOUNTER — OFFICE VISIT (OUTPATIENT)
Dept: PHYSICAL THERAPY | Age: 78
End: 2023-12-28
Attending: ORTHOPAEDIC SURGERY
Payer: MEDICARE

## 2023-12-28 PROCEDURE — 97140 MANUAL THERAPY 1/> REGIONS: CPT | Performed by: PHYSICAL THERAPIST

## 2023-12-28 PROCEDURE — 97110 THERAPEUTIC EXERCISES: CPT | Performed by: PHYSICAL THERAPIST

## 2023-12-28 NOTE — PROGRESS NOTES
Diagnosis:   S/p R TKA, R knee pain   Referring Provider: Karlos Espinoza MD Date of Evaluation:    11/29/2023    Precautions:   A fib, hx of L ankle/B feet surgery, hx of L TKA Next MD visit:   1/4/2024  Date of Surgery: 11/7/2023   Insurance Primary/Secondary: MEDICARE / [employer]      # Auth Visits:  POC every 10 visits      Subjective: Using stationary bike daily. Completing HEP. Deny SPC use. Using compression socks, think that they help with swelling. Pain:  4-6/10      Objective:     AROM: (* denotes performed with pain)  Knee   Flexion: R 110; L 112  Extension: R 0; L +2      R patella mobility WNL  Restriction R > L  quadriceps     Mild pitting edema distal R LE (improvement noted)    Knee MMT:   Flexion: R 4-/5* limited by c/o pain, L 4+/5  Extension: R 4+/5, L 5-/5    Gait: mild lateral trunk lean swing through, pt able to self correct; no assistive device use  Pt able to negotiate 6 inch step using R LE with effort/mild c/o/UE A. Assessment:   Pt with improved R knee AROM, able to complete 8 inch step up today. Pt progressing well overall. She will benefit from continued PT to address residual R knee ROM, LE strength, functional deficits, quality of gait. Pt tolerated session well. Pt reports compression sock use which seems to be helping with edema. HEP reviewed, including importance of compliance, particularly if pt does not attend formal PT next week. Goals:    (to be met in 10 visits)  Patient to report independence with PT HEP to facilitate self management and to maintain progress made in PT.  - MET  Patient to walk/stand each at least 15 min without assistive device. - PROGRESS  Patient to reciprocally negotiate 1 flight of stairs. - PROGRESS  Patient to have at least 110 deg active R knee flexion to facilitate transfers, stair negotiation. - PROGRESS  Patient to have active R knee ext to 0 deg for terminal knee ext at heel strike of gait.  - NEARLY MET    Plan: Continue PT 2-3 x weekly for up 16-18 sessions. Pt declined attending PT next week. Progress R knee ROM, gait, steps, hip/knee strengthening, functional exercises, edema management. Date: 12/19/2023  Tx#: 6/10 Date: 12/21/2023  Tx #: 7/10 Date: 12/26/2023  Tx #: 8/16-18  Date: 12/28/2023  Tx #: 9/16-18   There Ex:   Reviewed/practiced surgical scar mobilization and s/l R quad stretch with strap 2 x 20 sec each for HEP  Stationary bike: partial ROM 20 sec hold - full ROM x 4 min  Supine PT A R knee flexion AAROM with LE supported on bal 5 x 20 sec each  Bridge on red ball 2 x 10 reps   4 inch step up over down R LE x 8 reps each   Lateral steps at bar x ~ 16 feet, using YTB prox to knees 3 x ~ 16 feet   Standing L/R hamstrings stretch 2 x 20 sec each   B gastroc stretch on slant board 2 x 20 sec each  Marching on AirEx: 10 x 3 sec   There Ex:   Stationary bike - pt declined, already competed 10 min this morning  Supine PT A R knee flexion AAROM with LE supported on ball 4 x 30 sec each  Bridge on red ball 2 x 10 reps   4 inch step up over down R LE 5 reps  Mini staircase (4, ~ 6 inch steps: up/down reciprocally B UE A x 1, unilateral UE A x 2  Lateral steps at bar using YTB prox to knees 3 x ~ 16 feet   Standing L/R hamstrings stretch 2 x 20 sec each   Hep modification/reviewe; see below.    B gastroc stretch on slant board 2 x 20 sec each  Heel to toe walking forward and backward 3 x ~ 16 feet There Ex:   Stationary bike - pt declined, will complete at home  Standing R knee flexion ROM/stretch using staircase 3 x 20 sec each  Standing L/R hamstrings/knee ext stretch 2 x 20 sec each   B gastroc stretch on slant board 2 x 20 sec each  Lateral steps and monster walk at bar using YTB prox to knees 2 x ~ 16 feet, at ankles x ~ 16 feet each - mild knee c/o  Reformer: B squats x 15 reps [place]; R unilateral squat x 10 reps [place] - pt c/o pressure on shoulders, stopped - discontinue going forward  4 inch step up over down R LE x 5 reps  Mini staircase (4, ~ 6 inch steps: up/down reciprocally unilateral UE A x 2  Reviewed considerations for lateral hip c/o: piriformis stretches, seated hip ER with band, MHP, STM if/as helpful, physician consultation   There Ex:   Stationary bike - pt declined, will complete at home  Standing R knee flexion ROM/stretch using staircase 3 x 20 sec each  Standing L/R hamstrings/knee ext stretch 2 x 20 sec each   Seated R knee flexion using RTB x 10 reps  Bridge on red ball x 15 reps  4 inch step up over down R LE x 5 reps  Mini staircase (4, ~ 6 inch steps: up/down reciprocally unilateral UE A x 2  4 inch lateral step up with contralateral hip abd x 10 reps each L/R LE  8 inch step up x 5 reps R LE  HEP review; see below. Manual:   Edema mobilization distal R LE  R patella glides sup/inferior   R knee surgical scar massage/mobilization   R knee ext ROM/mobilization 5 x 20 sec each  S/L R quad stretches 2 x 20 sec each  Manual:   R patella glides sup/inferior   R knee surgical scar massage/mobilization   R knee ext ROM/mobilization 5 x 20 sec each  S/L R quad stretches 2 x 20 sec each/PT A/pt used belt Manual:   R patella glides sup/inferior - min due to WNL  R knee surgical scar massage/mobilization   R knee ext ROM/mobilization 4 x 30 sec each  S/L R quad stretches  - not completed today Manual:   R patella glides sup/inferior - min due to WNL  R knee surgical scar massage/mobilization   R knee ext ROM/mobilization 4 x 30 sec each  S/L L/R quad stretches  2 x 20 sec each                 HEP:Seated R knee flexion Rom/stretch, Seated R knee ext ROM/stretch, seated LAQ, Standing hip abd/ext with UE support, sit - stand (increased R knee flexion), step up, supine or s/l quad stretches, patella sup/inf glides.   May consider for lateral hip/trochanter c/o as helpful: seated B hip ER using RTB, across body piriformis stretch, walking without SPC in home as able/safe    Charges: 2 TE (30 min), 1 manual (10 min) Total Timed Treatment: 40 min  Total Treatment Time: 45 min

## 2024-01-08 ENCOUNTER — OFFICE VISIT (OUTPATIENT)
Dept: PHYSICAL THERAPY | Age: 79
End: 2024-01-08
Attending: PHYSICIAN ASSISTANT
Payer: MEDICARE

## 2024-01-08 PROCEDURE — 97110 THERAPEUTIC EXERCISES: CPT | Performed by: PHYSICAL THERAPIST

## 2024-01-08 PROCEDURE — 97140 MANUAL THERAPY 1/> REGIONS: CPT | Performed by: PHYSICAL THERAPIST

## 2024-01-08 PROCEDURE — 97112 NEUROMUSCULAR REEDUCATION: CPT | Performed by: PHYSICAL THERAPIST

## 2024-01-08 NOTE — PROGRESS NOTES
Diagnosis:   S/p R TKA, R knee pain   Referring Provider: MD Anselmo Date of Evaluation:    11/29/2023    Precautions:   A fib, hx of L ankle/B feet surgery, hx of L TKA Next MD visit:   2/2024  Date of Surgery: 11/7/2023   Insurance Primary/Secondary: MEDICARE / [employer] [place] INDEMNITY     # Auth Visits:  POC every 10 visits      Subjective: Have been very compliant with HEP. Used stationary bike for 5 min before PT session today. Had f/u with DOUGLAS Torres: may continue compression socks as nicole/helpful, swelling WNL s/p R TKA, no concerns, f/u with him again in 1 month.    Pain:  4-6/10    Objective:     AROM: (* denotes performed with pain)  Knee   Flexion: R 108; L 112  Extension: R -1; L +2      R patella mobility WNL  Restriction R > L  quadriceps     Gait: mild lateral trunk lean swing through, pt able to self correct; no assistive device use  Pt able to negotiate 8 inch step using R LE with effort/mild c/o/UE A.    Assessment:   Pt progressing well overall s/p R TKA.  R knee ROM, step, gait, strength deficits/eccentric weakness persist for which pt will benefit from continued care. Pt likely appropriate for discharge in 4-6 sessions due to progress. Pt nicole session well overall.     Goals:    (to be met in 10 visits)  Patient to report independence with PT HEP to facilitate self management and to maintain progress made in PT.  - MET  Patient to walk/stand each at least 15 min without assistive device. - PROGRESS  Patient to reciprocally negotiate 1 flight of stairs. - PROGRESS  Patient to have at least 110 deg active R knee flexion to facilitate transfers, stair negotiation. - PROGRESS  Patient to have active R knee ext to 0 deg for terminal knee ext at heel strike of gait. - NEARLY MET    Plan: Continue PT 2-3 x weekly for up 16-18 sessions. See Assessment. Progress R knee ROM, gait, steps, hip/knee strengthening, functional exercises, edema management.  Date: 12/21/2023  Tx #: 7/10 Date: 12/26/2023  Tx #:  8/16-18  Date: 12/28/2023  Tx #: 9/16-18 Date: 1/8/2024  Tx #: 10/16-18   There Ex:   Stationary bike - pt declined, already competed 10 min this morning  Supine PT A R knee flexion AAROM with LE supported on ball 4 x 30 sec each  Bridge on red ball 2 x 10 reps   4 inch step up over down R LE 5 reps  Mini staircase (4, ~ 6 inch steps: up/down reciprocally B UE A x 1, unilateral UE A x 2  Lateral steps at bar using YTB prox to knees 3 x ~ 16 feet   Standing L/R hamstrings stretch 2 x 20 sec each   Hep modification/reviewe; see below.   B gastroc stretch on slant board 2 x 20 sec each  Heel to toe walking forward and backward 3 x ~ 16 feet There Ex:   Stationary bike - pt declined, will complete at home  Standing R knee flexion ROM/stretch using staircase 3 x 20 sec each  Standing L/R hamstrings/knee ext stretch 2 x 20 sec each   B gastroc stretch on slant board 2 x 20 sec each  Lateral steps and monster walk at bar using YTB prox to knees 2 x ~ 16 feet, at ankles x ~ 16 feet each - mild knee c/o  Reformer: B squats x 15 reps [place]; R unilateral squat x 10 reps [place] - pt c/o pressure on shoulders, stopped - discontinue going forward  4 inch step up over down R LE x 5 reps  Mini staircase (4, ~ 6 inch steps: up/down reciprocally unilateral UE A x 2  Reviewed considerations for lateral hip c/o: piriformis stretches, seated hip ER with band, MHP, STM if/as helpful, physician consultation   There Ex:   Stationary bike - pt declined, will complete at home  Standing R knee flexion ROM/stretch using staircase 3 x 20 sec each  Standing L/R hamstrings/knee ext stretch 2 x 20 sec each   Seated R knee flexion using RTB x 10 reps  Bridge on red ball x 15 reps  4 inch step up over down R LE x 5 reps  Mini staircase (4, ~ 6 inch steps: up/down reciprocally unilateral UE A x 2  4 inch lateral step up with contralateral hip abd x 10 reps each L/R LE  8 inch step up x 5 reps R LE  HEP review; see below.  here Ex:    Stationary bike - pt declined, completed at home  Standing R knee flexion ROM/stretch using staircase 2 x 20 sec each  Standing L/R hamstrings/knee ext stretch 2 x 20 sec each   Supine PT A R knee flexion AAROM with LE supported on ball 4 x 30 sec each  S/L L/R hip abd x 15 reps   Sit - stand from chair height plinth x 10 reps  4 inch step up over down x 5 reps  Mini staircase (4, ~ 6 inch steps: up/down reciprocally unilateral UE A x 2  8 inch step up over down x 5 reps R LE  B gastroc stretch on slant board 2 x 20 sec each   Manual:   R patella glides sup/inferior   R knee surgical scar massage/mobilization   R knee ext ROM/mobilization 5 x 20 sec each  S/L R quad stretches 2 x 20 sec each/PT A/pt used belt Manual:   R patella glides sup/inferior - min due to WNL  R knee surgical scar massage/mobilization   R knee ext ROM/mobilization 4 x 30 sec each  S/L R quad stretches  - not completed today Manual:   R patella glides sup/inferior - min due to WNL  R knee surgical scar massage/mobilization   R knee ext ROM/mobilization 4 x 30 sec each  S/L L/R quad stretches  2 x 20 sec each Manual:   R patella glides sup/inferior - min due to WNL  R knee surgical scar massage/mobilization   R knee ext ROM/mobilization 4 x 30 sec each  S/L L/R quad stretches  2 x 20 sec each      NM Re-ed:   Alternate L/R anterior and lateral steps onto AirEx x 10 reps each   Anterior/posterior heel to toe walking at bar 3 x ~ 16 feet  Gait: encouraged upright gait as able/well nicole vs lateral lean         HEP:Seated R knee flexion Rom/stretch, Seated R knee ext ROM/stretch, seated LAQ, Standing hip abd/ext with UE support, sit - stand (increased R knee flexion), step up, supine or s/l quad stretches, patella sup/inf glides.  May consider for lateral hip/trochanter c/o as helpful: seated B hip ER using RTB, across body piriformis stretch, walking without SPC in home as able/safe    Charges: 1 TE (20 min), 1 manual (10 min), 1 NM Re-ed (10 min)      Total Timed Treatment: 40 min  Total Treatment Time: 45 min

## 2024-01-10 ENCOUNTER — OFFICE VISIT (OUTPATIENT)
Dept: PHYSICAL THERAPY | Age: 79
End: 2024-01-10
Attending: PHYSICIAN ASSISTANT
Payer: MEDICARE

## 2024-01-10 PROCEDURE — 97110 THERAPEUTIC EXERCISES: CPT | Performed by: PHYSICAL THERAPIST

## 2024-01-10 PROCEDURE — 97140 MANUAL THERAPY 1/> REGIONS: CPT | Performed by: PHYSICAL THERAPIST

## 2024-01-10 NOTE — PROGRESS NOTES
Diagnosis:   S/p R TKA, R knee pain   Referring Provider: MD Anselmo Date of Evaluation:    11/29/2023    Precautions:   A fib, hx of L ankle/B feet surgery, hx of L TKA Next MD visit:   2/2024  Date of Surgery: 11/7/2023   Insurance Primary/Secondary: MEDICARE / [employer] [place] INDEMNITY     # Auth Visits:  POC every 10 visits      Subjective: Completing HEP. Used exercise bike for 10 min this morning. No assistive device use. Was a little sore after last session.    Pain:  4-6/10    Objective:     Standing: L iliac crest elevated vs R, thoracic curve (pt reports Dr. Briones informed her that she has a scoliosis.)    AROM: (* denotes performed with pain)  Knee   Flexion: R 116; L 112  Extension: R -1; L +2      R patella mobility WNL  Restriction R > L  quadriceps (improvement noted)    Gait: mild lateral trunk lean swing through, pt able to self correct; no assistive device use  Pt able to negotiate 8 inch step using R LE with min c/o/minUE A.    Assessment:   Pt continues to progress well s/p R TKA, demonstrating progressive improvements in R knee AROM, function, ADL tolerance. Pt with improved R knee flexion AROM as compared to  last session. Will continue to work on ROM until stabilizes/plateaus. Pt with improved tolerance for steps today. No further progression of interventions today due to pt reporting some soreness following last session. Pt tolerated session well. Spinal scoliosis in additional to hip weakness, knee pain may be contributing to altered gait mechanics. Pt on track for discharge in ~ 4 sessions due to progress.    Goals:    (to be met in 10 visits)  Patient to report independence with PT HEP to facilitate self management and to maintain progress made in PT.  - MET  Patient to walk/stand each at least 15 min without assistive device. - PROGRESS  Patient to reciprocally negotiate 1 flight of stairs. - PROGRESS  Patient to have at least 110 deg active R knee flexion to facilitate transfers, stair  negotiation. - PROGRESS  Patient to have active R knee ext to 0 deg for terminal knee ext at heel strike of gait. - NEARLY MET    Plan: Continue PT 2-3 x weekly for up 16-18 sessions. Consider discharge in 3-4 sessions due to progress. Progress R knee ROM, gait, steps, hip/knee strengthening, functional exercises, edema management.  Date: 12/26/2023  Tx #: 8/16-18  Date: 12/28/2023  Tx #: 9/16-18 Date: 1/8/2024  Tx #: 10/16-18 Date: 1/10/2023  Tx #: 11/16-18   There Ex:   Stationary bike - pt declined, will complete at home  Standing R knee flexion ROM/stretch using staircase 3 x 20 sec each  Standing L/R hamstrings/knee ext stretch 2 x 20 sec each   B gastroc stretch on slant board 2 x 20 sec each  Lateral steps and monster walk at bar using YTB prox to knees 2 x ~ 16 feet, at ankles x ~ 16 feet each - mild knee c/o  Reformer: B squats x 15 reps [place]; R unilateral squat x 10 reps [place] - pt c/o pressure on shoulders, stopped - discontinue going forward  4 inch step up over down R LE x 5 reps  Mini staircase (4, ~ 6 inch steps: up/down reciprocally unilateral UE A x 2  Reviewed considerations for lateral hip c/o: piriformis stretches, seated hip ER with band, MHP, STM if/as helpful, physician consultation   There Ex:   Stationary bike - pt declined, will complete at home  Standing R knee flexion ROM/stretch using staircase 3 x 20 sec each  Standing L/R hamstrings/knee ext stretch 2 x 20 sec each   Seated R knee flexion using RTB x 10 reps  Bridge on red ball x 15 reps  4 inch step up over down R LE x 5 reps  Mini staircase (4, ~ 6 inch steps: up/down reciprocally unilateral UE A x 2  4 inch lateral step up with contralateral hip abd x 10 reps each L/R LE  8 inch step up x 5 reps R LE  HEP review; see below.  There Ex:   Stationary bike - pt declined, completed at home  Standing R knee flexion ROM/stretch using staircase 2 x 20 sec each  Standing L/R hamstrings/knee ext stretch 2 x 20 sec each   Supine  PT A R knee flexion AAROM with LE supported on ball 4 x 30 sec each  S/L L/R hip abd x 15 reps   Sit - stand from chair height plinth x 10 reps  4 inch step up over down x 5 reps  Mini staircase (4, ~ 6 inch steps: up/down reciprocally unilateral UE A x 2  8 inch step up over down x 5 reps R LE  B gastroc stretch on slant board 2 x 20 sec each There Ex:   Stationary bike - pt declined, completed at home  Supine PT A R knee flexion AAROM with LE supported on ball 4 x 30 sec each  Bridge on red ball x 15 reps  S/L L/R hip abd x 15 reps   Standing L/R hamstrings/knee ext stretch 2 x 20 sec each   Pt c/o difficulty reaching behind back using L UE mornings, participated in PT in past under my care for shoulder; advised pt may consider/practiced hand behind back IR stretch using strap for HEP for  3 x 5 sec each (HEP consideration as well nicole/helpful; stop if c/o/poorly tolerated)  L/R gastroc stretch on bottom step of staircase x 20 sec  Mini staircase (4, ~ 6 inch steps: up/down reciprocally unilateral UE A x 2  8 inch step up over down x 5 reps R LE     Manual:   R patella glides sup/inferior - min due to WNL  R knee surgical scar massage/mobilization   R knee ext ROM/mobilization 4 x 30 sec each  S/L R quad stretches  - not completed today Manual:   R patella glides sup/inferior - min due to WNL  R knee surgical scar massage/mobilization   R knee ext ROM/mobilization 4 x 30 sec each  S/L L/R quad stretches  2 x 20 sec each Manual:   R patella glides sup/inferior - min due to WNL  R knee surgical scar massage/mobilization   R knee ext ROM/mobilization 4 x 30 sec each  S/L L/R quad stretches  2 x 20 sec each Manual:   R patella glides sup/inferior - min due to WNL  R knee surgical scar massage/mobilization   R knee ext ROM/mobilization 4 x 30 sec each  S/L L/R quad stretches  2 x 20 sec each     NM Re-ed:   Alternate L/R anterior and lateral steps onto AirEx x 10 reps each   Anterior/posterior heel to toe walking at bar  3 x ~ 16 feet  Gait: encouraged upright gait as able/well nicole vs lateral lean NM Re-ed:   Anterior/posterior heel to toe walking at bar 3 x ~ 16 feet  L/R forward and lateral steps onto/over Air Ex          HEP:Seated R knee flexion Rom/stretch, Seated R knee ext ROM/stretch, seated LAQ, Standing hip abd/ext with UE support, sit - stand (increased R knee flexion), step up, supine or s/l quad stretches, patella sup/inf glides.  May consider for lateral hip/trochanter c/o as helpful: seated B hip ER using RTB, across body piriformis stretch, walking without SPC in home as able/safe    Charges: 2 TE (25 min), 1 manual (10 min), 0 NM Re-ed (5 min)     Total Timed Treatment: 40 min  Total Treatment Time: 45 min

## 2024-01-16 ENCOUNTER — OFFICE VISIT (OUTPATIENT)
Dept: PHYSICAL THERAPY | Age: 79
End: 2024-01-16
Attending: PHYSICIAN ASSISTANT
Payer: MEDICARE

## 2024-01-16 PROCEDURE — 97140 MANUAL THERAPY 1/> REGIONS: CPT | Performed by: PHYSICAL THERAPIST

## 2024-01-16 PROCEDURE — 97110 THERAPEUTIC EXERCISES: CPT | Performed by: PHYSICAL THERAPIST

## 2024-01-16 NOTE — PROGRESS NOTES
Diagnosis:   S/p R TKA, R knee pain   Referring Provider: MD Anselmo Date of Evaluation:    11/29/2023    Precautions:   A fib, hx of L ankle/B feet surgery, hx of L TKA Next MD visit:   2/2024  Date of Surgery: 11/7/2023   Insurance Primary/Secondary: MEDICARE / [employer] [place] INDEMNITY     # Auth Visits:  POC every 10 visits      Subjective: Completing HEP. Used exercise bike for 5 min this morning. C/o outer R knee pain. Stood to cook a lot on Sunday.      Pain:  4-6/10    Objective:     AROM: (* denotes performed with pain)  Knee   Flexion: R 115  (120 deg post ROM); L 116  Extension: R -1; L +1      Hypomobile R patella inferior glide     Restriction R > L  quadriceps (improvement noted)  Restriction R ITB length vs L  + c/o distal R ITB    Gait: mild lateral trunk lean swing through, pt able to self correct; no assistive device use  Pt able to negotiate 8 inch step using R LE with min c/o/minUE A.    Assessment:   Pt with outer R knee pain pain suggestive of ITB irritation. Added STM using foam roller to address. Encouraged MHP 10-15 min, massage at home, in addition to hip strengthening to address. Advised pt to avoid symptom provocation. C/o may partially be related to increase standing/WB Sunday. Pt continues to progress well overall, demonstrating improvements in R knee AROM, stair, ADL tolerance, approaching discharge from PT due to progress.    Goals:    (to be met in 10 visits)  Patient to report independence with PT HEP to facilitate self management and to maintain progress made in PT.  - MET  Patient to walk/stand each at least 15 min without assistive device. - PROGRESS  Patient to reciprocally negotiate 1 flight of stairs. - PROGRESS  Patient to have at least 110 deg active R knee flexion to facilitate transfers, stair negotiation. - PROGRESS  Patient to have active R knee ext to 0 deg for terminal knee ext at heel strike of gait. - NEARLY MET    Plan: Continue PT 2-3 x weekly for up 16-18 sessions.  Consider discharge in ~3-4 sessions due to progress. F/u on ITB c/o. Progress R knee ROM, gait, steps, hip/knee strengthening, functional exercises, edema management.  Date: 12/28/2023  Tx #: 9/16-18 Date: 1/8/2024  Tx #: 10/16-18 Date: 1/10/2023  Tx #: 11/16-18 Date: 1/16/2024  Tx #: 12/16-18   There Ex:   Stationary bike - pt declined, will complete at home  Standing R knee flexion ROM/stretch using staircase 3 x 20 sec each  Standing L/R hamstrings/knee ext stretch 2 x 20 sec each   Seated R knee flexion using RTB x 10 reps  Bridge on red ball x 15 reps  4 inch step up over down R LE x 5 reps  Mini staircase (4, ~ 6 inch steps: up/down reciprocally unilateral UE A x 2  4 inch lateral step up with contralateral hip abd x 10 reps each L/R LE  8 inch step up x 5 reps R LE  HEP review; see below.  There Ex:   Stationary bike - pt declined, completed at home  Standing R knee flexion ROM/stretch using staircase 2 x 20 sec each  Standing L/R hamstrings/knee ext stretch 2 x 20 sec each   Supine PT A R knee flexion AAROM with LE supported on ball 4 x 30 sec each  S/L L/R hip abd x 15 reps   Sit - stand from chair height plinth x 10 reps  4 inch step up over down x 5 reps  Mini staircase (4, ~ 6 inch steps: up/down reciprocally unilateral UE A x 2  8 inch step up over down x 5 reps R LE  B gastroc stretch on slant board 2 x 20 sec each There Ex:   Stationary bike - pt declined, completed at home  Supine PT A R knee flexion AAROM with LE supported on ball 4 x 30 sec each  Bridge on red ball x 15 reps  S/L L/R hip abd x 15 reps   Standing L/R hamstrings/knee ext stretch 2 x 20 sec each   Pt c/o difficulty reaching behind back using L UE mornings, participated in PT in past under my care for shoulder; advised pt may consider/practiced hand behind back IR stretch using strap for HEP for  3 x 5 sec each (HEP consideration as well nicole/helpful; stop if c/o/poorly tolerated)  L/R gastroc stretch on bottom step of staircase x 20  sec  Mini staircase (4, ~ 6 inch steps: up/down reciprocally unilateral UE A x 2  8 inch step up over down x 5 reps R LE   There Ex:   Stationary bike - pt declined, completed at home  Supine PT A R knee flexion AAROM with LE supported on ball 3 x 30 sec each  Bridge on red ball x 17 reps  S/L L/R hip abd, ER - pt c/o distal ITB c/o, stopped  Standing L/R hamstrings/knee ext stretch 2 x 20 sec each   Standing L/R ITB stretch 2 x 20 sec each   B gastroc stretch on slant board x 40 sec each   Mini staircase (4, ~ 6 inch steps): up/down reciprocally x 2  8 inch step up/over/down using L/R LE x 4 reps- pt c/o ITB discomfort, stopped  HEP review; see below.      Manual:   R patella glides sup/inferior - min due to WNL  R knee surgical scar massage/mobilization   R knee ext ROM/mobilization 4 x 30 sec each  S/L L/R quad stretches  2 x 20 sec each Manual:   R patella glides sup/inferior - min due to WNL  R knee surgical scar massage/mobilization   R knee ext ROM/mobilization 4 x 30 sec each  S/L L/R quad stretches  2 x 20 sec each Manual:   R patella glides sup/inferior - min due to WNL  R knee surgical scar massage/mobilization   R knee ext ROM/mobilization 4 x 30 sec each  S/L L/R quad stretches  2 x 20 sec each Manual:   STM ITB using foam roller  R patella glides inf glides - advised to continue at home  R knee surgical scar massage/mobilization   R knee ext ROM/mobilization 4 x 30 sec each  S/L L/R quad stretches  2 x 20 sec each    NM Re-ed:   Alternate L/R anterior and lateral steps onto AirEx x 10 reps each   Anterior/posterior heel to toe walking at bar 3 x ~ 16 feet  Gait: encouraged upright gait as able/well nicole vs lateral lean NM Re-ed:   Anterior/posterior heel to toe walking at bar 3 x ~ 16 feet  L/R forward and lateral steps onto/over Air Ex           HEP:Seated R knee flexion Rom/stretch, Seated R knee ext ROM/stretch, seated LAQ, Standing hip abd/ext with UE support, sit - stand (increased R knee flexion),  step up, supine or s/l quad stretches, patella sup/inf glides.  May consider for lateral hip/trochanter c/o as helpful: seated B hip ER using RTB, across body piriformis stretch, walking without SPC in home as able/safe; standing ITB stretch, ITB STM    Charges: 2 TE (25 min), 1 manual (15 min) Total Timed Treatment: 40 min  Total Treatment Time: 45 min

## 2024-01-19 ENCOUNTER — TELEPHONE (OUTPATIENT)
Dept: PHYSICAL THERAPY | Facility: HOSPITAL | Age: 79
End: 2024-01-19

## 2024-01-19 ENCOUNTER — APPOINTMENT (OUTPATIENT)
Dept: PHYSICAL THERAPY | Age: 79
End: 2024-01-19
Attending: PHYSICIAN ASSISTANT
Payer: MEDICARE

## 2024-01-22 ENCOUNTER — TELEPHONE (OUTPATIENT)
Dept: PHYSICAL THERAPY | Age: 79
End: 2024-01-22

## 2024-01-22 ENCOUNTER — APPOINTMENT (OUTPATIENT)
Dept: PHYSICAL THERAPY | Age: 79
End: 2024-01-22
Attending: PHYSICIAN ASSISTANT
Payer: MEDICARE

## 2024-01-22 RX ORDER — OMEPRAZOLE 20 MG/1
20 CAPSULE, DELAYED RELEASE ORAL
Qty: 90 CAPSULE | Refills: 3 | Status: SHIPPED | OUTPATIENT
Start: 2024-01-22

## 2024-01-22 NOTE — TELEPHONE ENCOUNTER
Refill passed per [facility] protocol.    Requested Prescriptions   Pending Prescriptions Disp Refills    OMEPRAZOLE 20 MG Oral Capsule Delayed Release [Pharmacy Med Name: OMEPRAZOLE DR 20 MG CAPSULE] 90 capsule 3     Sig: TAKE 1 CAPSULE BY MOUTH EVERY DAY IN THE MORNING BEFORE BREAKFAST       Gastrointestional Medication Protocol Passed - 1/20/2024 12:09 AM        Passed - In person appointment or virtual visit in the past 12 mos or appointment in next 3 mos     Recent Outpatient Visits              6 days ago     [facility] Conchita Nicole PT    Office Visit    1 week ago     [facility] Conchita Nicole PT    Office Visit    2 weeks ago     [facility] BonnieConchita PT    Office Visit    3 weeks ago     [facility] Conchita Nicole, PT    [facility] Visit    3 weeks [facility]          Future Appointments         Provider Department Appt Notes    In 4 days Conchita Nicole PT [facility] Medicare/Suppl    In 1 week LMB LISA RM1; LMB DEXA RM1 [facility] DEXA - [place]     In 8 months Urban New MD [facility] EP/EE                  Future Appointments         Provider Department Appt Notes    In 4 days BonnieConchita starks PT [facility] Medicare/Suppl    In 1 week LMB LISA RM1; LMB DEXA RM1 [facility] DEXA - [place]     In 8 months [facility]          Recent Outpatient Visits              6 days ago     [facility] Conchita Nicole PT    Office Visit    1 week ago     [facility] Conchita Nicole, PT    Office Visit    2 weeks ago     [facility] Conchita Nicole, PT    Office Visit    3 weeks ago     [facility] Conchita Nicole, PT    Office Visit    3 weeks ago     [facility] Conchita Nicole, PT    Office Visit

## 2024-01-26 ENCOUNTER — APPOINTMENT (OUTPATIENT)
Dept: PHYSICAL THERAPY | Age: 79
End: 2024-01-26
Attending: PHYSICIAN ASSISTANT
Payer: MEDICARE

## 2024-01-30 ENCOUNTER — TELEPHONE (OUTPATIENT)
Dept: PHYSICAL THERAPY | Facility: HOSPITAL | Age: 79
End: 2024-01-30

## 2024-01-30 ENCOUNTER — HOSPITAL ENCOUNTER (OUTPATIENT)
Dept: BONE DENSITY | Age: 79
Discharge: HOME | End: 2024-01-30
Attending: INTERNAL MEDICINE
Payer: MEDICARE

## 2024-01-30 DIAGNOSIS — M81.0 AGE-RELATED OSTEOPOROSIS WITHOUT CURRENT PATHOLOGICAL FRACTURE: ICD-10-CM

## 2024-01-30 PROCEDURE — 77080 DXA BONE DENSITY AXIAL: CPT | Performed by: INTERNAL MEDICINE

## 2024-01-31 ENCOUNTER — TELEPHONE (OUTPATIENT)
Dept: PHYSICAL THERAPY | Age: 79
End: 2024-01-31

## 2024-01-31 NOTE — TELEPHONE ENCOUNTER
Called patient to confirm 2/1 8:45am PT appointment. Advised pt to call [phone] if reschedule necessary.

## 2024-02-01 ENCOUNTER — OFFICE VISIT (OUTPATIENT)
Dept: PHYSICAL THERAPY | Age: 79
End: 2024-02-01
Attending: PHYSICIAN ASSISTANT
Payer: MEDICARE

## 2024-02-01 PROCEDURE — 97140 MANUAL THERAPY 1/> REGIONS: CPT | Performed by: PHYSICAL THERAPIST

## 2024-02-01 PROCEDURE — 97110 THERAPEUTIC EXERCISES: CPT | Performed by: PHYSICAL THERAPIST

## 2024-02-01 NOTE — PROGRESS NOTES
Ngoc  Pt has attended 13 visits in Physical Therapy.     Diagnosis:   S/p R TKA, R knee pain   Referring Provider: MD Anselmo Date of Evaluation:    11/29/20    Precautions:   A fib, hx of L ankle/B feet surgery, hx of L TKA Next MD visit:   2/2024  Date of Surgery: 11/7/2023   Insurance Primary/Secondary: MEDICARE / [employer] INDEMN[place]     # Auth Visits:  POC every 10 visits      Subjective: Deny R knee c/o, able to negotiate stairs, walk ~ 45 min, not using assistive device. Completing HEP, including for hips.  Hips not bothering me too much anymore.  ITB massage the other session really helped..  Can continue with HEP after today's session.     Pain:  2/10    Objective:     Standing: L iliac crest elevated vs R  Distal R LE edema, skin color WNL, calf soft without c/o upon palpation    AROM: (* denotes performed with pain)  Knee   Flexion: R 115; L 115  Extension: R  0 ; L +1        Slightly hypomobile R patella inferior glide; L/R patella mobility otherwise WNL    Restriction R > L  quadriceps     Gait: WFL  Pt able to negotiate 8 inch step using L/R LE without c/o, fair-good eccentric control  SLS: L/R each 2-3 sec, increased effort R LE      Knee MMT:   Flexion: R 4+/5 L 4+/5  Extension: R 4+/5, L 4+/5    Hip abd MMT: L/R each 4/5      Assessment:   Pt returns to PT after last attending 1/16/2024 due to respiratory illness; she has since recovered, has been under physician care for c/o.  Pt progressing well overall s/p R TKA. Pt tolerating ADL well with min - no c/o.  Pt reports managing lateral hip c/o/bursitis with PT HEP.  Hep reviewed with pt, encouraged pt to remain active. Pt appropriate for discharge to [facility] due to progress, goals met.     Goals:    (to be met in 10 visits)  Patient to report independence with PT HEP to facilitate self management and to maintain progress made in PT.  - MET  Patient to walk/stand each at least 15 min without assistive device. - MET  Patient to reciprocally  negotiate 1 flight of stairs. - MET  Patient to have at least 110 deg active R knee flexion to facilitate transfers, stair negotiation. - MET  Patient to have active R knee ext to 0 deg for terminal knee ext at heel strike of gait. - MET    Plan: Discharge patient from PT to HEP as well nicole/appropriate due to progress, goals met unless advised otherwise by surgeon/PAAdeolaC at f/u appointment next week.   Date: 1/8/2024  Tx #: 10/16-18 Date: 1/10/2023  Tx #: 11/16-18 Date: 1/16/2024  Tx #: 12/16-18 Date: 2/1/2024  Tx #: 13/16-18   There Ex:   Stationary bike - pt declined, completed at home  Standing R knee flexion ROM/stretch using staircase 2 x 20 sec each  Standing L/R hamstrings/knee ext stretch 2 x 20 sec each   Supine PT A R knee flexion AAROM with LE supported on ball 4 x 30 sec each  S/L L/R hip abd x 15 reps   Sit - stand from chair height plinth x 10 reps  4 inch step up over down x 5 reps  Mini staircase (4, ~ 6 inch steps: up/down reciprocally unilateral UE A x 2  8 inch step up over down x 5 reps R LE  B gastroc stretch on slant board 2 x 20 sec each There Ex:   Stationary bike - pt declined, completed at home  Supine PT A R knee flexion AAROM with LE supported on ball 4 x 30 sec each  Bridge on red ball x 15 reps  S/L L/R hip abd x 15 reps   Standing L/R hamstrings/knee ext stretch 2 x 20 sec each   Pt c/o difficulty reaching behind back using L UE mornings, participated in PT in past under my care for shoulder; advised pt may consider/practiced hand behind back IR stretch using strap for HEP for  3 x 5 sec each (HEP consideration as well nicole/helpful; stop if c/o/poorly tolerated)  L/R gastroc stretch on bottom step of staircase x 20 sec  Mini staircase (4, ~ 6 inch steps: up/down reciprocally unilateral UE A x 2  8 inch step up over down x 5 reps R LE   There Ex:   Stationary bike - pt declined, completed at home  Supine PT A R knee flexion AAROM with LE supported on ball 3 x 30 sec each  Bridge on red  ball x 17 reps  S/L L/R hip abd, ER - pt c/o distal ITB c/o, stopped  Standing L/R hamstrings/knee ext stretch 2 x 20 sec each   Standing L/R ITB stretch 2 x 20 sec each   B gastroc stretch on slant board x 40 sec each   Mini staircase (4, ~ 6 inch steps): up/down reciprocally x 2  8 inch step up/over/down using L/R LE x 4 reps- pt c/o ITB discomfort, stopped  HEP review; see below.    There Ex:   HEP review/modification/practice; see below. May discontinue knee ROM after 1 more week. Effort for stationary bike, stair negotiation, walking daily. Hip interventions a few days weekly in effort to reduce risk for hip bursitis.   Bridge on red ball x 20 reps   Supine L/R hamstrings stretches x 20 sec each  S/l L/R clamshells x 20 reps  PT A s/L L/R quad stretches 4 x 20 sec each   Manual:   R patella glides sup/inferior - min due to WNL  R knee surgical scar massage/mobilization   R knee ext ROM/mobilization 4 x 30 sec each  S/L L/R quad stretches  2 x 20 sec each Manual:   R patella glides sup/inferior - min due to WNL  R knee surgical scar massage/mobilization   R knee ext ROM/mobilization 4 x 30 sec each  S/L L/R quad stretches  2 x 20 sec each Manual:   STM ITB using foam roller  R patella glides inf glides - advised to continue at home  R knee surgical scar massage/mobilization   R knee ext ROM/mobilization 4 x 30 sec each  S/L L/R quad stretches  2 x 20 sec each Manual:   STM L/R ITB using foam roller  S/L R knee flexion ROM 4 x 30 sec each - no change in flexion ROM, remained 115 deg   NM Re-ed:   Alternate L/R anterior and lateral steps onto AirEx x 10 reps each   Anterior/posterior heel to toe walking at bar 3 x ~ 16 feet  Gait: encouraged upright gait as able/well nicole vs lateral lean NM Re-ed:   Anterior/posterior heel to toe walking at bar 3 x ~ 16 feet  L/R forward and lateral steps onto/over Air Ex            HEP:Seated R knee flexion Rom/stretch, Seated R knee ext ROM/stretch,  Standing hip abd/ext with UE  support, stairs, supine or s/l quad stretches, stationary bike.  May consider for lateral hip/trochanter c/o as helpful: seated B hip ER using RTB, across body piriformis stretch, standing ITB stretch, ITB STM    Charges: 2 TE (25 min), 1 manual (15 min) Total Timed Treatment: 40 min  Total Treatment Time: 45 min  LEFS - not re-administered; will attempt to send to PT via [employer]    Plan:  Discharge patient from PT to HEP as well nicole/appropriate due to progress, goals met unless advised otherwise by surgeon/PA-C at f/u appointment next week.    Patient/Family/Caregiver was advised of these findings, precautions, and treatment options and has agreed to actively participate in planning and for this course of care.    Thank you for your referral. If you have any questions, please contact me at Dept: [phone].    Sincerely,  Electronically signed by therapist: Conchita Nicole, PT     Physician's certification required:  Yes  Please co-sign or sign and return this letter via fax as soon as possible to [phone].   I certify the need for these services furnished under this plan of treatment and while under my care.    X___________________________________________________ Date____________________    Certification From: 2/1/2024  To:5/1/2024

## 2024-03-01 ENCOUNTER — TELEPHONE (OUTPATIENT)
Dept: ENDOCRINOLOGY CLINIC | Facility: CLINIC | Age: 79
End: 2024-03-01

## 2024-03-01 RX ORDER — ZOLEDRONIC ACID 5 MG/100ML
5 INJECTION, SOLUTION INTRAVENOUS ONCE
Qty: 100 ML | Refills: 0 | Status: SHIPPED | OUTPATIENT
Start: 2024-03-01 | End: 2024-03-01

## 2024-03-04 ENCOUNTER — TELEPHONE (OUTPATIENT)
Dept: ENDOCRINOLOGY CLINIC | Facility: CLINIC | Age: 79
End: 2024-03-04

## 2024-03-04 NOTE — TELEPHONE ENCOUNTER
Patient's last Prolia was on 10/06/23. Patient will be due for next injection on or after 04/6/24.    Patient has no upcoming appointment on for Prolia injection     Submitted Prolia IV via [employer] portal. Awaiting SOB, 3-5 business days       [facility] app[facility] with the nurse and one in October with Dr. John.

## 2024-03-04 NOTE — TELEPHONE ENCOUNTER
----- Message from Frances Ly CMA sent at 2/1/2024 10:13 AM CST -----  Regarding: Reclast  Please start Reclast order.

## 2024-03-05 NOTE — TELEPHONE ENCOUNTER
Received pt's Prolia SOB via [employer] Fax    PA Required: No    Prolia OOP COST: 0%    Facility Fee:NA    Admin Fee:0%

## 2024-03-10 ENCOUNTER — PATIENT MESSAGE (OUTPATIENT)
Dept: ENDOCRINOLOGY CLINIC | Facility: CLINIC | Age: 79
End: 2024-03-10

## 2024-03-11 PROBLEM — H40.003 GLAUCOMA SUSPECT OF BOTH EYES: Status: RESOLVED | Noted: 2021-02-09 | Resolved: 2024-03-11

## 2024-03-11 NOTE — TELEPHONE ENCOUNTER
Spoke to patient in regards to her Reclast forms, forms were faxed to the infusion center successfully. Patient Is aware the infusion center will contact her once they review her forms.     Forms placed in triage cabinet.

## 2024-03-12 ENCOUNTER — LAB ENCOUNTER (OUTPATIENT)
Dept: LAB | Age: 79
End: 2024-03-12
Attending: INTERNAL MEDICINE
Payer: MEDICARE

## 2024-03-12 ENCOUNTER — OFFICE VISIT (OUTPATIENT)
Dept: INTERNAL MEDICINE CLINIC | Facility: CLINIC | Age: 79
End: 2024-03-12
Payer: MEDICARE

## 2024-03-12 ENCOUNTER — OFFICE VISIT (OUTPATIENT)
Dept: OTOLARYNGOLOGY | Facility: CLINIC | Age: 79
End: 2024-03-12
Payer: MEDICARE

## 2024-03-12 VITALS
WEIGHT: 161 LBS | BODY MASS INDEX: 30.4 KG/M2 | HEART RATE: 59 BPM | SYSTOLIC BLOOD PRESSURE: 119 MMHG | DIASTOLIC BLOOD PRESSURE: 69 MMHG | HEIGHT: 61 IN

## 2024-03-12 VITALS — BODY MASS INDEX: 30.4 KG/M2 | WEIGHT: 161 LBS | HEIGHT: 61 IN

## 2024-03-12 DIAGNOSIS — H93.12 TINNITUS, LEFT: ICD-10-CM

## 2024-03-12 DIAGNOSIS — Z79.01 LONG TERM (CURRENT) USE OF ANTICOAGULANTS: ICD-10-CM

## 2024-03-12 DIAGNOSIS — M19.079 ARTHRITIS OF ANKLE JOINT, UNSPECIFIED LATERALITY: ICD-10-CM

## 2024-03-12 DIAGNOSIS — J30.9 ALLERGIC RHINITIS WITH POSTNASAL DRIP: ICD-10-CM

## 2024-03-12 DIAGNOSIS — E78.2 HYPERLIPIDEMIA, MIXED: ICD-10-CM

## 2024-03-12 DIAGNOSIS — Z00.00 ENCOUNTER FOR ANNUAL HEALTH EXAMINATION: ICD-10-CM

## 2024-03-12 DIAGNOSIS — M17.12 PRIMARY OSTEOARTHRITIS OF LEFT KNEE: ICD-10-CM

## 2024-03-12 DIAGNOSIS — Z12.31 VISIT FOR SCREENING MAMMOGRAM: ICD-10-CM

## 2024-03-12 DIAGNOSIS — H25.13 AGE-RELATED NUCLEAR CATARACT OF BOTH EYES: ICD-10-CM

## 2024-03-12 DIAGNOSIS — Z00.00 MEDICARE ANNUAL WELLNESS VISIT, SUBSEQUENT: Primary | ICD-10-CM

## 2024-03-12 DIAGNOSIS — H26.8 PSEUDOEXFOLIATION (PXF) OF LENS CAPSULE: ICD-10-CM

## 2024-03-12 DIAGNOSIS — R09.82 ALLERGIC RHINITIS WITH POSTNASAL DRIP: ICD-10-CM

## 2024-03-12 DIAGNOSIS — M17.11 PRIMARY OSTEOARTHRITIS OF RIGHT KNEE: ICD-10-CM

## 2024-03-12 DIAGNOSIS — R59.1 HEAD AND NECK LYMPHADENOPATHY: Primary | ICD-10-CM

## 2024-03-12 DIAGNOSIS — G47.33 OSA (OBSTRUCTIVE SLEEP APNEA): ICD-10-CM

## 2024-03-12 DIAGNOSIS — M81.0 SENILE OSTEOPOROSIS: Primary | ICD-10-CM

## 2024-03-12 DIAGNOSIS — Z00.00 MEDICARE ANNUAL WELLNESS VISIT, SUBSEQUENT: ICD-10-CM

## 2024-03-12 DIAGNOSIS — G56.02 CARPAL TUNNEL SYNDROME OF LEFT WRIST: ICD-10-CM

## 2024-03-12 DIAGNOSIS — I48.0 PAROXYSMAL ATRIAL FIBRILLATION (HCC): ICD-10-CM

## 2024-03-12 LAB
ALBUMIN SERPL-MCNC: 4.3 G/DL (ref 3.2–4.8)
ALBUMIN/GLOB SERPL: 1.2 {RATIO} (ref 1–2)
ALP LIVER SERPL-CCNC: 76 U/L
ALT SERPL-CCNC: 12 U/L
ANION GAP SERPL CALC-SCNC: 0 MMOL/L (ref 0–18)
AST SERPL-CCNC: 20 U/L (ref ?–34)
BASOPHILS # BLD AUTO: 0.05 X10(3) UL (ref 0–0.2)
BASOPHILS NFR BLD AUTO: 0.7 %
BILIRUB SERPL-MCNC: 0.6 MG/DL (ref 0.2–1.1)
BILIRUB UR QL: NEGATIVE
BUN BLD-MCNC: 18 MG/DL (ref 9–23)
BUN/CREAT SERPL: 20 (ref 10–20)
CALCIUM BLD-MCNC: 10.2 MG/DL (ref 8.7–10.4)
CHLORIDE SERPL-SCNC: 108 MMOL/L (ref 98–112)
CHOLEST SERPL-MCNC: 151 MG/DL (ref ?–200)
CLARITY UR: CLEAR
CO2 SERPL-SCNC: 29 MMOL/L (ref 21–32)
COLOR UR: YELLOW
CREAT BLD-MCNC: 0.9 MG/DL
DEPRECATED RDW RBC AUTO: 43 FL (ref 35.1–46.3)
EGFRCR SERPLBLD CKD-EPI 2021: 65 ML/MIN/1.73M2 (ref 60–?)
EOSINOPHIL # BLD AUTO: 0.1 X10(3) UL (ref 0–0.7)
EOSINOPHIL NFR BLD AUTO: 1.5 %
ERYTHROCYTE [DISTWIDTH] IN BLOOD BY AUTOMATED COUNT: 12.4 % (ref 11–15)
FASTING PATIENT LIPID ANSWER: YES
FASTING STATUS PATIENT QL REPORTED: YES
GLOBULIN PLAS-MCNC: 3.7 G/DL (ref 2.8–4.4)
GLUCOSE BLD-MCNC: 91 MG/DL (ref 70–99)
GLUCOSE UR-MCNC: NORMAL MG/DL
HCT VFR BLD AUTO: 38 %
HDLC SERPL-MCNC: 47 MG/DL (ref 40–59)
HGB BLD-MCNC: 12.9 G/DL
HGB UR QL STRIP.AUTO: NEGATIVE
HYALINE CASTS #/AREA URNS AUTO: PRESENT /LPF
IMM GRANULOCYTES # BLD AUTO: 0.02 X10(3) UL (ref 0–1)
IMM GRANULOCYTES NFR BLD: 0.3 %
KETONES UR-MCNC: NEGATIVE MG/DL
LDLC SERPL CALC-MCNC: 86 MG/DL (ref ?–100)
LEUKOCYTE ESTERASE UR QL STRIP.AUTO: 250
LYMPHOCYTES # BLD AUTO: 1.51 X10(3) UL (ref 1–4)
LYMPHOCYTES NFR BLD AUTO: 22.4 %
MCH RBC QN AUTO: 32.5 PG (ref 26–34)
MCHC RBC AUTO-ENTMCNC: 33.9 G/DL (ref 31–37)
MCV RBC AUTO: 95.7 FL
MONOCYTES # BLD AUTO: 0.55 X10(3) UL (ref 0.1–1)
MONOCYTES NFR BLD AUTO: 8.1 %
NEUTROPHILS # BLD AUTO: 4.52 X10 (3) UL (ref 1.5–7.7)
NEUTROPHILS # BLD AUTO: 4.52 X10(3) UL (ref 1.5–7.7)
NEUTROPHILS NFR BLD AUTO: 67 %
NITRITE UR QL STRIP.AUTO: NEGATIVE
NONHDLC SERPL-MCNC: 104 MG/DL (ref ?–130)
OSMOLALITY SERPL CALC.SUM OF ELEC: 285 MOSM/KG (ref 275–295)
PH UR: 6 [PH] (ref 5–8)
PLATELET # BLD AUTO: 306 10(3)UL (ref 150–450)
POTASSIUM SERPL-SCNC: 4.8 MMOL/L (ref 3.5–5.1)
PROT SERPL-MCNC: 8 G/DL (ref 5.7–8.2)
RBC # BLD AUTO: 3.97 X10(6)UL
SODIUM SERPL-SCNC: 137 MMOL/L (ref 136–145)
SP GR UR STRIP: 1.02 (ref 1–1.03)
T4 FREE SERPL-MCNC: 1 NG/DL (ref 0.8–1.7)
TRIGL SERPL-MCNC: 99 MG/DL (ref 30–149)
TSI SER-ACNC: 1.04 MIU/ML (ref 0.55–4.78)
UROBILINOGEN UR STRIP-ACNC: NORMAL
VLDLC SERPL CALC-MCNC: 16 MG/DL (ref 0–30)
WBC # BLD AUTO: 6.8 X10(3) UL (ref 4–11)

## 2024-03-12 PROCEDURE — 36415 COLL VENOUS BLD VENIPUNCTURE: CPT

## 2024-03-12 PROCEDURE — 85025 COMPLETE CBC W/AUTO DIFF WBC: CPT

## 2024-03-12 PROCEDURE — 84439 ASSAY OF FREE THYROXINE: CPT

## 2024-03-12 PROCEDURE — 87077 CULTURE AEROBIC IDENTIFY: CPT

## 2024-03-12 PROCEDURE — 87086 URINE CULTURE/COLONY COUNT: CPT

## 2024-03-12 PROCEDURE — 84443 ASSAY THYROID STIM HORMONE: CPT

## 2024-03-12 PROCEDURE — 87186 SC STD MICRODIL/AGAR DIL: CPT

## 2024-03-12 PROCEDURE — 80061 LIPID PANEL: CPT

## 2024-03-12 PROCEDURE — 99213 OFFICE O/P EST LOW 20 MIN: CPT | Performed by: SPECIALIST

## 2024-03-12 PROCEDURE — 81001 URINALYSIS AUTO W/SCOPE: CPT

## 2024-03-12 PROCEDURE — 80053 COMPREHEN METABOLIC PANEL: CPT

## 2024-03-12 RX ORDER — ZOLEDRONIC ACID 5 MG/100ML
5 INJECTION, SOLUTION INTRAVENOUS ONCE
OUTPATIENT
Start: 2024-03-12

## 2024-03-12 RX ORDER — BENZONATATE 100 MG/1
100 CAPSULE ORAL 3 TIMES DAILY PRN
Qty: 30 CAPSULE | Refills: 0 | Status: SHIPPED | OUTPATIENT
Start: 2024-03-12

## 2024-03-12 NOTE — PATIENT INSTRUCTIONS
Maeve Garner's SCREENING SCHEDULE   Tests on this list are recommended by your physician but may not be covered, or covered at this frequency, by your insurer.   Please check with your insurance carrier before scheduling to verify coverage.   PREVENTATIVE SERVICES FREQUENCY &  COVERAGE DETAILS LAST COMPLETION DATE   Diabetes Screening    Fasting Blood Sugar /  Glucose    One screening every 12 months if never tested or if previously tested but not diagnosed with pre-diabetes   One screening every 6 months if diagnosed with pre-diabetes Lab Results   Component Value Date     (H) 11/09/2023        Cardiovascular Disease Screening    Lipid Panel  Cholesterol  Lipoprotein (HDL)  Triglycerides Covered every 5 years for all Medicare beneficiaries without apparent signs or symptoms of cardiovascular disease Lab Results   Component Value Date    CHOLEST 139 05/22/2023    HDL 57 05/22/2023    LDL 70 05/22/2023    TRIG 54 05/22/2023         Electrocardiogram (EKG)   Covered if needed at Welcome to Medicare, and non-screening if indicated for medical reasons 02/11/2023      Ultrasound Screening for Abdominal Aortic Aneurysm (AAA) Covered once in a lifetime for one of the following risk factors   • Men who are 65-75 years old and have ever smoked   • Anyone with a family history -     Colorectal Cancer Screening  Covered for ages 50-85; only need ONE of the following:    Colonoscopy   Covered every 10 years    Covered every 2 years if patient is at high risk or previous colonoscopy was abnormal 02/03/2020    No recommendations at this time    Flexible Sigmoidoscopy   Covered every 4 years -    Fecal Occult Blood Test Covered annually -   Bone Density Screening    Bone density screening    Covered every 2 years after age 65 if diagnosed with risk of osteoporosis or estrogen deficiency.    Covered yearly for long-term glucocorticoid medication use (Steroids) Last Dexa Scan:    XR DEXA BONE DENSITOMETRY  (CPT=77080) 01/30/2024      No recommendations at this time   Pap and Pelvic    Pap   Covered every 2 years for women at normal risk; Annually if at high risk -  No recommendations at this time    Chlamydia Annually if high risk -  No recommendations at this time   Screening Mammogram    Mammogram     Recommend annually for all female patients aged 40 and older    One baseline mammogram covered for patients aged 35-39 02/28/2023    Health Maintenance   Topic Date Due   • Mammogram  Discontinued       Immunizations    Influenza Covered once per flu season  Please get every year 09/17/2023  No recommendations at this time    Pneumococcal Each vaccine ([id number] & [id number]) covered once after 65 Prevnar 13: 04/14/2017    [id number]: 11/23/2021     No recommendations at this time    Hepatitis B One screening covered for patients with certain risk factors   -  No recommendations at this time    Tetanus Toxoid Not covered by Medicare Part B unless medically necessary (cut with metal); may be covered with your pharmacy prescription benefits -    Tetanus, Diptheria and Pertusis TD and TDaP Not covered by Medicare Part B -  No recommendations at this time    Zoster Not covered by Medicare Part B; may be covered with your pharmacy  prescription benefits -  No recommendations at this time

## 2024-03-12 NOTE — PROGRESS NOTES
Subjective:   Maeve Garner is a 78 year old female who presents for a Medicare Subsequent Annual Wellness visit (Pt already had Initial Annual Wellness) and scheduled follow up of multiple significant but stable problems.   Follow up of chronic problems  she is doing well  Hx of YASMIN mild  on oral appliance  Hx of knee replacement  she is doing well  Hx of atial fib  s/p ablation  HTN  Long standing history of hypertension     sympotms  :        Headache no  dizziness        no                             Blurred vision no  palpitaionsSyncope no  Chest pain  no  PND  Orthopnea no  Weakness  No  Low salt diet    yes    Compliance with exercise stays active  Compliance with medication yes                                              History/Other:   Fall Risk Assessment:   She has been screened for Falls and is High Risk. Fall Prevention information provided to patient in After Visit Summary.    Do you feel unsteady when standing or walking?: No  Do you worry about falling?: Yes  Have you fallen in the past year?: No     Cognitive Assessment:   She had a completely normal cognitive assessment - see flowsheet entries       Functional Ability/Status:   Maeve Garner has some abnormal functions as listed below:  She has Hearing problems based on screening of functional status.      Depression Screening (PHQ-2/PHQ-9): PHQ-2 SCORE: 0  , done 3/7/2024            Advanced Directives:   She has a Living Will on file in [facility]; reviewed and discussed documents with patient (and family/surrogate if present).  She does have a POA but we do NOT have it on file in [facility].    Patient has Advance Care Planning documents but we do not have a copy in EMR. Discussed Advanced Care Planning with patient and instructed patient to get our office a copy to be scanned into EMR.      Patient Active Problem List   Diagnosis    Hyperlipidemia, mixed    Paroxysmal atrial fibrillation (HCC)    YASMIN (obstructive sleep apnea)     Age-related nuclear cataract of both eyes    Pseudoexfoliation (PXF) of lens capsule    Long term (current) use of anticoagulants    Primary osteoarthritis of left knee    Primary osteoarthritis of right knee    Senile osteoporosis     Allergies:  She is allergic to grass pollen(k-o-r-t-swt maycol); dust; mold; pollen; ragweed; and trees, box elder.    Current Medications:  Outpatient Medications Marked as Taking for the 3/12/24 encounter (Office Visit) with Nura Bates MD   Medication Sig    Gluc-Chonn-MSM-Boswellia-Vit D (GLUCOSAMINE CHONDROITIN + D3 OR) Take by mouth.    omeprazole 20 MG Oral Capsule Delayed Release Take 1 capsule (20 mg total) by mouth every morning before breakfast.    docusate sodium 100 MG Oral Cap Take 100 mg by mouth 2 (two) times daily.    ipratropium 0.06 % Nasal Solution 2 sprays by Nasal route 3 (three) times daily.    apixaban (ELIQUIS) 5 MG Oral Tab Take 1 tablet (5 mg total) by mouth 2 (two) times daily. Last dose to be taken on Nov.4th,2023    Probiotic Product (ALIGN OR) Take 1 tablet by mouth daily.    Simethicone (GAS RELIEF 80 OR) Take 1 tablet by mouth as needed.    sotalol 80 MG Oral Tab Take 0.5 tablets (40 mg total) by mouth 2 (two) times daily.    mupirocin 2 % External Ointment Apply with cotton swab to bilateral nares twice daily as needed for crusting    atorvastatin 10 MG Oral Tab Take 1 tablet (10 mg total) by mouth nightly.    Fexofenadine HCl 180 MG Oral Tab Take 1 tablet (180 mg total) by mouth daily.    Polyethylene Glycol 3350 (MIRALAX OR) Take by mouth.    Cyanocobalamin (VITAMIN B 12 OR) Take 1,000 mg by mouth daily with breakfast.    Calcium Carbonate-Vitamin D (CALCIUM + D OR) Take by mouth daily. Take 500mg & 1000 IU Vit D daily    omega-3 fatty acids (FISH OIL) 1000 MG Oral Cap Take 500 mg by mouth daily.    Multiple Vitamins-Minerals (MULTI FOR HER OR) Take 1 tablet by mouth daily.         Medical History:  She  has a past medical history of Age-related  nuclear cataract of both eyes (02/09/2021), Arrhythmia, Atrial fibrillation (HCC) (01/2017), Ramírez's esophagus, Brachial neuritis (09/19/2009), Constipation, DUB (dysfunctional uterine bleeding) (1980), Esophageal reflux, Esophagitis (2011), Esophagitis (2012), Floater, vitreous, left (02/09/2021), Floater, vitreous, left (02/09/2021), Gastritis, Glaucoma suspect of both eyes (02/09/2021), Glaucoma suspect of both eyes (02/09/2021), Glaucoma suspect of both eyes (02/09/2021), Glaucoma suspect of both eyes (02/09/2021), Hearing impairment, Heartburn (2011), High blood pressure, High cholesterol, History of blood transfusion, Long term (current) use of anticoagulants (03/05/2019), Meniere disease, Osteopenia (10/20/2014), Osteopenia determined by x-ray (10/18/2016), Other and unspecified hyperlipidemia, Paroxysmal atrial fibrillation (HCC) (02/15/2017), Pseudoexfoliation (PXF) of lens capsule (02/09/2021), Pseudoexfoliation (PXF) of lens capsule (02/09/2021), Pseudoexfoliation (PXF) of lens capsule (02/09/2021), Pseudoexfoliation (PXF) of lens capsule (02/09/2021), Seasonal allergic rhinitis (11/26/2018), Sensorineural hearing loss, unilateral (09/24/2011), Sleep apnea, Snoring (2006), Spinal stenosis of lumbar region (09/04/2008), and Visual impairment.  Surgical History:  She  has a past surgical history that includes colonoscopy (08/26/2011); other surgical history (Right, 1979); hand/finger surgery unlisted (Right, 2011); injection; tendon origin/insertion; cortisone injection (Right, 2010); excise hand/foot neuroma (Right, 2007); excise hand/foot neuroma (Left, 2009); excision of uvula (2006); upper gi endoscopy performed (2015); upper gi endoscopy,exam; colonoscopy; hysterectomy; colonoscopy (N/A, 02/03/2020); ep pulmonary vein/a-fib ablation (11/09/2022); and ep a-flutter ablation (11/09/2022).   Family History:  Her family history includes Breast Cancer (age of onset: 60) in her mother; Cancer in her father,  maternal uncle, maternal uncle, paternal uncle, and paternal uncle; Colon Polyps in her maternal uncle; Heart Disease in an other family member; MS in her sister; Other in her mother and another family member.  Social History:  She  reports that she quit smoking about 55 years ago. Her smoking use included cigarettes. She has a 2.5 pack-year smoking history. She has been exposed to tobacco smoke. She has never used smokeless tobacco. She reports that she does not currently use alcohol. She reports that she does not use drugs.    Tobacco:  She smoked tobacco in the past but quit greater than 12 months ago.  Social History    Tobacco Use      Smoking status: Former        Packs/day: 0.50        Years: 5.00        Additional pack years: 0.00        Total pack years: 2.50        Types: Cigarettes        Quit date: 1968        Years since quittin.4        Passive exposure: Past      Smokeless tobacco: Never       CAGE Alcohol Screen:   CAGE screening score of 0 on 3/7/2024, showing low risk of alcohol abuse.      Patient Care Team:  Nura Bates MD as PCP - General (Internal Medicine)  Adal Briones MD (SURGERY, ORTHOPEDIC)  Josiah Tirado PA-C (Physician Assistant)  Conchita Nicole PT as Physical Therapist (Physical Therapy)    Review of Systems   Constitutional:  Negative for activity change, chills, fatigue and fever.   HENT:  Negative for ear discharge, nosebleeds, postnasal drip, rhinorrhea, sinus pressure and sore throat.    Eyes:  Negative for pain, discharge and redness.   Respiratory:  Negative for cough, chest tightness, shortness of breath and wheezing.    Cardiovascular:  Negative for chest pain, palpitations and leg swelling.   Gastrointestinal:  Negative for abdominal pain, blood in stool, constipation, diarrhea, nausea and vomiting.   Genitourinary:  Negative for difficulty urinating, dysuria, frequency, hematuria and urgency.   Musculoskeletal:  Positive for gait  problem. Negative for back pain and joint swelling.   Skin:  Negative for rash.   Neurological:  Negative for syncope, weakness, light-headedness and headaches.   Psychiatric/Behavioral:  Negative for dysphoric mood. The patient is not nervous/anxious.          Objective:   Physical Exam  Constitutional:       Appearance: She is well-developed. She is not ill-appearing.   HENT:      Right Ear: Ear canal normal.      Left Ear: Ear canal normal.      Mouth/Throat:      Pharynx: Oropharynx is clear.   Eyes:      Extraocular Movements: Extraocular movements intact.      Conjunctiva/sclera: Conjunctivae normal.      Pupils: Pupils are equal, round, and reactive to light.   Cardiovascular:      Rate and Rhythm: Normal rate and regular rhythm.      Heart sounds: Normal heart sounds.   Pulmonary:      Effort: Pulmonary effort is normal.      Breath sounds: Normal breath sounds.   Abdominal:      General: Bowel sounds are normal.      Palpations: Abdomen is soft.   Skin:     General: Skin is warm and dry.   Neurological:      Mental Status: She is alert.   Psychiatric:         Mood and Affect: Mood normal.           /69 (BP Location: Right arm, Patient Position: Sitting, Cuff Size: adult)   Pulse 59   Ht 5' 1\" (1.549 m)   Wt 161 lb (73 kg)   BMI 30.42 kg/m²  Estimated body mass index is 30.42 kg/m² as calculated from the following:    Height as of this encounter: 5' 1\" (1.549 m).    Weight as of this encounter: 161 lb (73 kg).    Medicare Hearing Assessment:   Hearing Screening    Entry User: Jazzy Thompson CMA  Screening Method: Questionnaire  I have a problem hearing over the telephone: No I have trouble following the conversations when two or more people are talking at the same time: Sometimes   I have trouble understanding things on the TV: Yes I have to strain to understand conversations: No   I have to worry about missing the telephone ring or doorbell: No I have trouble hearing conversations in a noisy  background such as a crowded room or restaurant: Sometimes   I get confused about where sounds come from: Sometimes I misunderstand some words in a sentence and need to ask people to repeat themselves: Yes   I especially have trouble understanding the speech of women and children: No I have trouble understanding the speaker in a large room such as at a meeting or place of Catholic: No   Many people I talk to seem to mumble (or don't speak clearly): Sometimes People get annoyed because I misunderstand what they say: Yes   I misunderstand what others are saying and make inappropriate responses: No I avoid social activities because I cannot hear well and fear I will reply improperly: No   Family members and friends have told me they think I may have hearing loss: No                 Assessment & Plan:   Maeve Garner is a 78 year old female who presents for a Medicare Assessment.     (Z00.00) Medicare annual wellness visit, subsequent  (primary encounter diagnosis)  Plan: Urinalysis with Culture Reflex   Patient is independent with ADL.s    Health screening   Colonoscopy, mammography, dexa scan  And routine eye exam advised.      (G47.33) YASMIN (obstructive sleep apnea)  Plan:sleep study 2017  The data generated from this study is consistent with very mild obstructive sleep apnea persisting despite oral appliance therapy.  Severe periodic limb movements were identified of which 10 per hour were associated with arousal.  Soft snoring was recorded.     RECOMMENDATIONS:    1.       Vigilance with  oral appliance    Crhonic monitor    (I48.0) Paroxysmal atrial fibrillation (HCC)  Plan: She is s/p A-fib ablation last year with rhythm control. She is on sotalol. She is doing well.   Follow up with cardiology  controlled monitor  AF (paroxysmal atrial fibrillation), [SNOMED-CT: 035698464] 2/24/2022 - Active   Status post circumferential ablation of pulmonary vein, [SNOMED-CT: 497055789] 12/8/2022 - Active   S/P ablation  of atrial flutter, [SNOMED-CT: 876086172] 12/8/2022 - Active       (E78.2) Hyperlipidemia, mixed  Plan: CBC With Differential With Platelet, Comp         Metabolic Panel (14), Lipid Panel, TSH and Free        T4        Low cholesterol diet advised  Avoid saturated and trans fats       (Z79.01) Long term (current) use of anticoagulants  Plan: bleeding precaution  Stable monitor    (Z12.31) Visit for screening mammogram  Plan: [facility] ALICE 2D+3D SCREENING BILAT         (CPT=77067/64809)        .Breast exam.  Bilateral  No discrete palpable masses or tenderness    No nipple discharge  And no axillary adenopathy.  Self breast exam advised    s/p right knee replacement surgery,  doing well    (M17.12) Primary osteoarthritis of left knee  Plan: chronic pt is doing well monigor    (H26.8) Pseudoexfoliation (PXF) of lens capsule  Plan: chronic  monitor ongoing follow upw  opthalmology    (H25.13) Age-related nuclear cataract of both eyes  Plan: chronc ongong follow upw [facility] opthalmology    (G56.02) Carpal tunnel syndrome of left wrist  Plan: ORTHOPEDIC - INTERNAL        Follow upw [facility] hand surgery     (M19.079) Arthritis of ankle joint, unspecified laterality  Plan: PODIATRY - INTERNAL        Follow up with podiatery        The patient indicates understanding of these issues and agrees to the plan.  Reinforced healthy diet, lifestyle, and exercise.      No follow-ups on file.     Nura Bates MD, 3/11/2024     Supplementary Documentation:   General Health:  In the past six months, have you lost more than 10 pounds without trying?: 2 - No  Has your appetite been poor?: No  Type of Diet: Balanced  How does the patient maintain a good energy level?: Appropriate Exercise  How would you describe your daily physical activity?: Moderate  How would you describe your current health state?: Good  How do you maintain positive mental well-being?: Puzzles;Visiting Family  On a scale of 0 to 10, with 0 being no pain and 10 being severe  pain, what is your pain level?: 3 - (Mild)  In the past six months, have you experienced urine leakage?: 1-Yes  At any time do you feel concerned for the safety/well-being of yourself and/or your children, in your home or elsewhere?: No  Have you had any immunizations at another office such as Influenza, Hepatitis B, Tetanus, or Pneumococcal?: Yes       Maeve Garner's SCREENING SCHEDULE   Tests on this list are recommended by your physician but may not be covered, or covered at this frequency, by your insurer.   Please check with your insurance carrier before scheduling to verify coverage.   PREVENTATIVE SERVICES FREQUENCY &  COVERAGE DETAILS LAST COMPLETION DATE   Diabetes Screening    Fasting Blood Sugar /  Glucose    One screening every 12 months if never tested or if previously tested but not diagnosed with pre-diabetes   One screening every 6 months if diagnosed with pre-diabetes Lab Results   Component Value Date    GLU 91 03/12/2024        Cardiovascular Disease Screening    Lipid Panel  Cholesterol  Lipoprotein (HDL)  Triglycerides Covered every 5 years for all Medicare beneficiaries without apparent signs or symptoms of cardiovascular disease Lab Results   Component Value Date    CHOLEST 151 03/12/2024    HDL 47 03/12/2024    LDL 86 03/12/2024    TRIG 99 03/12/2024         Electrocardiogram (EKG)   Covered if needed at Welcome to Medicare, and non-screening if indicated for medical reasons 02/11/2023      Ultrasound Screening for Abdominal Aortic Aneurysm (AAA) Covered once in a lifetime for one of the following risk factors    Men who are 65-75 years old and have ever smoked    Anyone with a family history -     Colorectal Cancer Screening  Covered for ages 50-85; only need ONE of the following:    Colonoscopy   Covered every 10 years    Covered every 2 years if patient is at high risk or previous colonoscopy was abnormal 02/03/2020    No recommendations at this time    Flexible Sigmoidoscopy    Covered every 4 years -    Fecal Occult Blood Test Covered annually -   Bone Density Screening    Bone density screening    Covered every 2 years after age 65 if diagnosed with risk of osteoporosis or estrogen deficiency.    Covered yearly for long-term glucocorticoid medication use (Steroids) Last Dexa Scan:    XR DEXA BONE DENSITOMETRY (CPT=77080) 01/30/2024      No recommendations at this time   Pap and Pelvic    Pap   Covered every 2 years for women at normal risk; Annually if at high risk -  No recommendations at this time    Chlamydia Annually if high risk -  No recommendations at this time   Screening Mammogram    Mammogram     Recommend annually for all female patients aged 40 and older    One baseline mammogram covered for patients aged 35-39 02/28/2023    Health Maintenance   Topic Date Due    Mammogram  Discontinued       Immunizations    Influenza Covered once per flu season  Please get every year 09/17/2023  No recommendations at this time    Pneumococcal Each vaccine (Orypttw40 & [id number]) covered once after 65 Prevnar 13: 04/14/2017    [id number]: 11/23/2021     No recommendations at this time    Hepatitis B One screening covered for patients with certain risk factors   -  No recommendations at this time    Tetanus Toxoid Not covered by Medicare Part B unless medically necessary (cut with metal); may be covered with your pharmacy prescription benefits -    Tetanus, Diptheria and Pertusis TD and TDaP Not covered by Medicare Part B -  No recommendations at this time    Zoster Not covered by Medicare Part B; may be covered with your pharmacy  prescription benefits -  No recommendations at this time

## 2024-03-13 NOTE — PROGRESS NOTES
Maeve Garner is a 78 year old female.   Chief Complaint   Patient presents with    Follow - Up     pulsatile tinnitus of left ear and tinnitus     HPI:   Patient here to recheck her neck.  Also would like to change medications for postnasal drip.    Current Outpatient Medications   Medication Sig Dispense Refill    Gluc-Chonn-MSM-Boswellia-Vit D (GLUCOSAMINE CHONDROITIN + D3 OR) Take by mouth.      benzonatate (TESSALON PERLES) 100 MG Oral Cap Take 1 capsule (100 mg total) by mouth 3 (three) times daily as needed for cough. 30 capsule 0    omeprazole 20 MG Oral Capsule Delayed Release Take 1 capsule (20 mg total) by mouth every morning before breakfast. 90 capsule 3    docusate sodium 100 MG Oral Cap Take 100 mg by mouth 2 (two) times daily. 20 capsule 0    ferrous sulfate 325 (65 FE) MG Oral Tab EC Take 1 tablet (325 mg total) by mouth daily with breakfast. 20 tablet 0    HYDROcodone-acetaminophen 5-325 MG Oral Tab Take 1 tablet by mouth every 4 (four) hours as needed. 40 tablet 0    ipratropium 0.06 % Nasal Solution 2 sprays by Nasal route 3 (three) times daily. 15 mL 5    apixaban (ELIQUIS) 5 MG Oral Tab Take 1 tablet (5 mg total) by mouth 2 (two) times daily. Last dose to be taken on Nov.4th,2023      Probiotic Product (ALIGN OR) Take 1 tablet by mouth daily.      Simethicone (GAS RELIEF 80 OR) Take 1 tablet by mouth as needed.      sotalol 80 MG Oral Tab Take 0.5 tablets (40 mg total) by mouth 2 (two) times daily.      mupirocin 2 % External Ointment Apply with cotton swab to bilateral nares twice daily as needed for crusting 15 g 2    atorvastatin 10 MG Oral Tab Take 1 tablet (10 mg total) by mouth nightly. 30 tablet 5    Fexofenadine HCl 180 MG Oral Tab Take 1 tablet (180 mg total) by mouth daily.      Polyethylene Glycol 3350 (MIRALAX OR) Take by mouth.      Cyanocobalamin (VITAMIN B 12 OR) Take 1,000 mg by mouth daily with breakfast.      Calcium Carbonate-Vitamin D (CALCIUM + D OR) Take by mouth  daily. Take 500mg & 1000 IU Vit D daily      omega-3 fatty acids (FISH OIL) 1000 MG Oral Cap Take 500 mg by mouth daily.      Multiple Vitamins-Minerals (MULTI FOR HER OR) Take 1 tablet by mouth daily.          Past Medical History:   Diagnosis Date    Age-related nuclear cataract of both eyes 02/09/2021    Arrhythmia     A-fib-2017 dx, ablation 11/9/22    Atrial fibrillation (HCC) 01/2017    Ramírez's esophagus     Brachial neuritis 09/19/2009    Constipation     DUB (dysfunctional uterine bleeding) 1980    per ng LIBBY    Esophageal reflux     Esophagitis 2011    per ng eosinophilic egd w/ biopsy    Esophagitis 2012    pr ng egd w/ biopsy    Floater, vitreous, left 02/09/2021    Floater, vitreous, left 02/09/2021    Gastritis     Glaucoma suspect of both eyes 02/09/2021    Glaucoma suspect of both eyes 02/09/2021    Glaucoma suspect of both eyes 02/09/2021    Glaucoma suspect of both eyes 02/09/2021    Hearing impairment     left ear hearing aid    Heartburn 2011    per ng reflux, dysphagia    High blood pressure     High cholesterol     History of blood transfusion     r/t childbirth - no rx    Long term (current) use of anticoagulants 03/05/2019    Meniere disease     Osteopenia 10/20/2014    Osteopenia determined by x-ray 10/18/2016    Other and unspecified hyperlipidemia     Paroxysmal atrial fibrillation (HCC) 02/15/2017    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    Seasonal allergic rhinitis 11/26/2018    Sensorineural hearing loss, unilateral 09/24/2011    Sleep apnea     mouth guard    Snoring 2006    per ng uvulectomy    Spinal stenosis of lumbar region 09/04/2008    Visual impairment     glasses      Social History:  Social History     Socioeconomic History    Marital status:    Tobacco Use    Smoking status: Former      Packs/day: 0.50     Years: 5.00     Additional pack years: 0.00     Total pack years: 2.50     Types: Cigarettes     Quit date: 1968     Years since quittin.4     Passive exposure: Past    Smokeless tobacco: Never   Vaping Use    Vaping Use: Never used   Substance and Sexual Activity    Alcohol use: Not Currently     Alcohol/week: 0.0 standard drinks of alcohol     Comment: formerly since 2016, rare alcohol currently    Drug use: No   Other Topics Concern    Caffeine Concern Yes     Comment: soda 12 oz     Social Determinants of Health     Financial Resource Strain: Low Risk  (11/10/2023)    Financial Resource Strain     Difficulty of Paying Living Expenses: Not hard at all     Med Affordability: No   Food Insecurity: No Food Insecurity (2023)    Food Insecurity     Food Insecurity: Never true   Transportation Needs: No Transportation Needs (11/10/2023)    Transportation Needs     Lack of Transportation: No   Housing Stability: Low Risk  (2023)    Housing Stability     Housing Instability: No        REVIEW OF SYSTEMS:   GENERAL HEALTH: feels well otherwise  GENERAL : denies fever, chills, sweats, weight loss, weight gain  SKIN: denies any unusual skin lesions or rashes  RESPIRATORY: denies shortness of breath with exertion  NEURO: denies headaches    EXAM:   Ht 5' 1\" (1.549 m)   Wt 161 lb (73 kg)   BMI 30.42 kg/m²   System Details   Skin Inspection - Normal.   Constitutional Overall appearance - Normal.   Head/Face Facial features - Normal. Eyebrows - Normal. Skull - Normal.   Eyes Conjunctiva - Right: Normal, Left: Normal. Pupil - Right: Normal, Left: Normal.    Ears Inspection - Right: Normal, Left: Normal.   Canal - Right: Normal, Left: Normal.   TM - Right: Normal, Left: Normal.   Nasal External nose - Normal.   Nasal septum - Normal.  Turbinates -ashen and clear postnasal drip   Oral/Oropharynx Lips - Normal, Tonsils - Normal, Tongue - Normal    Neck Exam Inspection - Normal. Palpation -  Normal. Parotid gland - Normal. Thyroid gland - Normal.   Lymph Detail Submental. Submandibular. Anterior cervical. Posterior cervical. Supraclavicular all without enlargement.  Right level 4 neck node palpable and mobile.  Solitary node.   Psychiatric Orientation - Oriented to time, place, person & situation. Appropriate mood and affect.   Neurological Memory - Normal. Cranial nerves - Cranial nerves II through XII grossly intact.     ASSESSMENT AND PLAN:   1. Head and neck lymphadenopathy  As above right solitary lymph node in the past CT scan done August 8, 2023 measured 1.1 x 1.1 x 1.1 cm.  Will get CT to assess for stability.  - CT SOFT TISSUE OF NECK(CONTRAST ONLY) (CPT=70491); Future    2. Allergic rhinitis with postnasal drip  Changed from Allegra to Zyrtec or Xyzal which will be more drying.    3. Tinnitus, left  Reviewed CTA no intracranial abnormalities.  Patient states that tinnitus is better using her hearing aid.  Will continue the same.  Can also reduce sodium and caffeine.  Can try Lipoflavonoid.    Fill of Tessalon Perles sent to the pharmacy for intermittent cough.      The patient indicates understanding of these issues and agrees to the plan.      Ileana Allen MD  3/12/2024  7:16 PM

## 2024-03-13 NOTE — PATIENT INSTRUCTIONS
A repeat CT of the neck was ordered to check for stability of your right level 4 neck node.  For your allergic rhinitis you can change from Allegra to Zyrtec or Xyzal.  A refill of your Tessalon Perles were sent to the pharmacy for your cough.  Do sodium and caffeine for your tinnitus.  You can also try Lipoflavonoid.

## 2024-03-14 ENCOUNTER — PATIENT MESSAGE (OUTPATIENT)
Dept: INTERNAL MEDICINE CLINIC | Facility: CLINIC | Age: 79
End: 2024-03-14

## 2024-03-15 NOTE — TELEPHONE ENCOUNTER
From: Maeve Garner  To: Daphne Finch  Sent: 3/14/2024 5:30 PM CDT  Subject: UTI question    Daphne,  I received a message today from Jyoti ALVAREZ regarding a prescription for Keflex. I started the medication this afternoon.    However, there was a message from Dr. Bates that I should repeat the urinalysis. Since I have started the medication, is the second test necessary or should it be done after the medication? Please clarify so that I don't do the wrong thing!    Thanks,  Maeve Garner

## 2024-03-15 NOTE — TELEPHONE ENCOUNTER
From  Magy Figureoa RN To  Maeve Garner Sent and Delivered  3/15/2024  7:11 AM   Last Read in MyChart  3/15/2024  8:58 AM by Maeve Garner

## 2024-03-15 NOTE — TELEPHONE ENCOUNTER
[facility] message sent.      Nura Bates MD  3/14/2024  8:32 PM CDT Back to Top      Urinalysis  no blood noted elevated WBC  Take antibiotic  no need to repeat urinalysis

## 2024-03-19 ENCOUNTER — TELEPHONE (OUTPATIENT)
Dept: HEMATOLOGY/ONCOLOGY | Facility: HOSPITAL | Age: 79
End: 2024-03-19

## 2024-03-19 NOTE — TELEPHONE ENCOUNTER
Patient called.  She had her labs done for Dr John 3/12/24.  Dr John told her she does not have to have more labs done for 4/9/24 appointment.  Dr John told the patient to confirm with infusion. Please call back,..   thank you.

## 2024-04-09 ENCOUNTER — OFFICE VISIT (OUTPATIENT)
Dept: HEMATOLOGY/ONCOLOGY | Facility: HOSPITAL | Age: 79
End: 2024-04-09
Attending: INTERNAL MEDICINE
Payer: MEDICARE

## 2024-04-09 VITALS
DIASTOLIC BLOOD PRESSURE: 65 MMHG | OXYGEN SATURATION: 99 % | RESPIRATION RATE: 16 BRPM | SYSTOLIC BLOOD PRESSURE: 135 MMHG | BODY MASS INDEX: 31 KG/M2 | WEIGHT: 162.69 LBS | HEART RATE: 58 BPM | TEMPERATURE: 98 F

## 2024-04-09 DIAGNOSIS — M81.0 SENILE OSTEOPOROSIS: Primary | ICD-10-CM

## 2024-04-09 PROCEDURE — 96365 THER/PROPH/DIAG IV INF INIT: CPT

## 2024-04-09 RX ORDER — ZOLEDRONIC ACID 5 MG/100ML
5 INJECTION, SOLUTION INTRAVENOUS ONCE
Status: COMPLETED | OUTPATIENT
Start: 2024-04-09 | End: 2024-04-09

## 2024-04-09 RX ORDER — ZOLEDRONIC ACID 5 MG/100ML
INJECTION, SOLUTION INTRAVENOUS
Status: COMPLETED
Start: 2024-04-09 | End: 2024-04-09

## 2024-04-09 RX ORDER — ZOLEDRONIC ACID 5 MG/100ML
5 INJECTION, SOLUTION INTRAVENOUS ONCE
Status: CANCELLED | OUTPATIENT
Start: 2024-04-09

## 2024-04-09 RX ADMIN — ZOLEDRONIC ACID 5 MG: 5 INJECTION, SOLUTION INTRAVENOUS at 10:33:00

## 2024-04-09 NOTE — PROGRESS NOTES
Pt here for Reclast infusion. Pt denies any issues or concerns. Pt denies recent dental work.     Ordering MD: Dora  Order Exp: After this dose     Pt tolerated infusion without difficulty or complaint. Reviewed next apt date/time: N/a      Education Record  Learner:  Patient  Disease / Diagnosis: Osteoporosis  Barriers / Limitations:  None  Method:  Reinforcement  General Topics:  Medication, Side effects and symptom management, and Plan of care reviewed  Outcome:  Shows understanding

## 2024-04-19 ENCOUNTER — HOSPITAL ENCOUNTER (OUTPATIENT)
Dept: CT IMAGING | Age: 79
Discharge: HOME | End: 2024-04-19
Attending: SPECIALIST
Payer: MEDICARE

## 2024-04-19 ENCOUNTER — HOSPITAL ENCOUNTER (OUTPATIENT)
Dept: MAMMOGRAPHY | Age: 79
Discharge: HOME | End: 2024-04-19
Attending: INTERNAL MEDICINE
Payer: MEDICARE

## 2024-04-19 DIAGNOSIS — Z12.31 VISIT FOR SCREENING MAMMOGRAM: ICD-10-CM

## 2024-04-19 DIAGNOSIS — R59.1 HEAD AND NECK LYMPHADENOPATHY: ICD-10-CM

## 2024-04-19 LAB
CREAT BLD-MCNC: 0.8 MG/DL
EGFRCR SERPLBLD CKD-EPI 2021: 75 ML/MIN/1.73M2 (ref 60–?)

## 2024-04-19 PROCEDURE — 82565 ASSAY OF CREATININE: CPT

## 2024-04-19 PROCEDURE — 77067 SCR MAMMO BI INCL CAD: CPT | Performed by: INTERNAL MEDICINE

## 2024-04-19 PROCEDURE — 70491 CT SOFT TISSUE NECK W/DYE: CPT | Performed by: SPECIALIST

## 2024-04-19 PROCEDURE — 77063 BREAST TOMOSYNTHESIS BI: CPT | Performed by: INTERNAL MEDICINE

## 2024-05-07 RX ORDER — IPRATROPIUM BROMIDE 42 UG/1
2 SPRAY, METERED NASAL 3 TIMES DAILY
Qty: 45 ML | Refills: 2 | Status: SHIPPED | OUTPATIENT
Start: 2024-05-07

## 2024-05-07 NOTE — TELEPHONE ENCOUNTER
This refill request is being sent to the provider for the following reason:  []Patient has not had an appointment within the past 12 months but has made an appointment on: ___  [x]Medication is not within protocol - Ipratropium Nasal solution  []Patient did not complete follow up recommendations  []Other: ___       Last OV 3/12/24

## 2024-05-09 ENCOUNTER — APPOINTMENT (OUTPATIENT)
Dept: URBAN - METROPOLITAN AREA CLINIC 244 | Age: 79
Setting detail: DERMATOLOGY
End: 2024-05-09

## 2024-05-09 DIAGNOSIS — D22 MELANOCYTIC NEVI: ICD-10-CM

## 2024-05-09 DIAGNOSIS — Z12.83 ENCOUNTER FOR SCREENING FOR MALIGNANT NEOPLASM OF SKIN: ICD-10-CM

## 2024-05-09 DIAGNOSIS — L81.4 OTHER MELANIN HYPERPIGMENTATION: ICD-10-CM

## 2024-05-09 DIAGNOSIS — L82.1 OTHER SEBORRHEIC KERATOSIS: ICD-10-CM

## 2024-05-09 PROBLEM — D48.5 NEOPLASM OF UNCERTAIN BEHAVIOR OF SKIN: Status: ACTIVE | Noted: 2024-05-09

## 2024-05-09 PROBLEM — D22.9 MELANOCYTIC NEVI, UNSPECIFIED: Status: ACTIVE | Noted: 2024-05-09

## 2024-05-09 PROCEDURE — 11102 TANGNTL BX SKIN SINGLE LES: CPT

## 2024-05-09 PROCEDURE — 99213 OFFICE O/P EST LOW 20 MIN: CPT | Mod: 25

## 2024-05-09 PROCEDURE — OTHER BIOPSY BY SHAVE METHOD: OTHER

## 2024-05-09 PROCEDURE — OTHER COUNSELING: OTHER

## 2024-05-09 NOTE — HPI: EVALUATION OF SKIN LESION(S)
What Type Of Note Output Would You Prefer (Optional)?: Bullet Format
Have Your Spot(S) Been Treated In The Past?: has not been treated
Hpi Title: Evaluation of a Skin Lesion
Additional History: Pt picks at lesion

## 2024-05-09 NOTE — PROCEDURE: BIOPSY BY SHAVE METHOD
Hide Additional Size Dimension?: No
Anesthesia Volume In Cc: 0.5
X Size Of Lesion In Cm: 0
Was A Bandage Applied: Yes
Anesthesia Type: 0.5% lidocaine with 1:100,000 epinephrine and a 1:10 solution of 8.4% sodium bicarbonate
Billing Type: Third-Party Bill
Type Of Destruction Used: Curettage
Dressing: bandage
Silver Nitrate Text: The wound bed was treated with silver nitrate after the biopsy was performed.
Lab: -[id number]
Consent: Written consent was obtained and risks were reviewed including but not limited to scarring, infection, bleeding, scabbing, incomplete removal, nerve damage and allergy to anesthesia.
Biopsy Method: double edge Personna blade
Cryotherapy Text: The wound bed was treated with cryotherapy after the biopsy was performed.
Electrodesiccation And Curettage Text: The wound bed was treated with electrodesiccation and curettage after the biopsy was performed.
Post-Care Instructions: I reviewed with the patient in detail post-care instructions. Patient is to keep the biopsy site dry overnight, and then apply bacitracin twice daily until healed. Patient may apply hydrogen peroxide soaks to remove any crusting.
Curettage Text: The wound bed was treated with curettage after the biopsy was performed.
Notification Instructions: Patient will be notified of biopsy results. However, patient instructed to call the office if not contacted within 2 weeks.
Biopsy Type: H and E
Information: Selecting Yes will display possible errors in your note based on the variables you have selected. This validation is only offered as a suggestion for you. PLEASE NOTE THAT THE VALIDATION TEXT WILL BE REMOVED WHEN YOU FINALIZE YOUR NOTE. IF YOU WANT TO FAX A PRELIMINARY NOTE YOU WILL NEED TO TOGGLE THIS TO 'NO' IF YOU DO NOT WANT IT IN YOUR FAXED NOTE.
Wound Care: Petrolatum
Detail Level: Detailed
Depth Of Biopsy: dermis
Electrodesiccation Text: The wound bed was treated with electrodesiccation after the biopsy was performed.
Hemostasis: Drysol

## 2024-06-05 ENCOUNTER — OFFICE VISIT (OUTPATIENT)
Dept: ORTHOPEDICS CLINIC | Facility: CLINIC | Age: 79
End: 2024-06-05
Payer: MEDICARE

## 2024-06-05 ENCOUNTER — HOSPITAL ENCOUNTER (OUTPATIENT)
Dept: GENERAL RADIOLOGY | Age: 79
Discharge: HOME | End: 2024-06-05
Attending: ORTHOPAEDIC SURGERY
Payer: MEDICARE

## 2024-06-05 VITALS — BODY MASS INDEX: 30.58 KG/M2 | HEIGHT: 61 IN | WEIGHT: 162 LBS

## 2024-06-05 DIAGNOSIS — M65.4 TENOSYNOVITIS OF RADIAL STYLOID: ICD-10-CM

## 2024-06-05 DIAGNOSIS — M79.642 LEFT HAND PAIN: Primary | ICD-10-CM

## 2024-06-05 DIAGNOSIS — G56.02 CARPAL TUNNEL SYNDROME OF LEFT WRIST: ICD-10-CM

## 2024-06-05 DIAGNOSIS — M79.642 LEFT HAND PAIN: ICD-10-CM

## 2024-06-05 DIAGNOSIS — M18.10 ARTHRITIS OF CARPOMETACARPAL (CMC) JOINT OF THUMB: ICD-10-CM

## 2024-06-05 DIAGNOSIS — M19.132 DRUJ (DISTAL RADIOULNAR JOINT) POST-TRAUMATIC ARTHRITIS, LEFT: ICD-10-CM

## 2024-06-05 PROCEDURE — 20550 NJX 1 TENDON SHEATH/LIGAMENT: CPT | Performed by: ORTHOPAEDIC SURGERY

## 2024-06-05 PROCEDURE — 73130 X-RAY EXAM OF HAND: CPT | Performed by: ORTHOPAEDIC SURGERY

## 2024-06-05 PROCEDURE — 99204 OFFICE O/P NEW MOD 45 MIN: CPT | Performed by: ORTHOPAEDIC SURGERY

## 2024-06-05 RX ORDER — BETAMETHASONE SODIUM PHOSPHATE AND BETAMETHASONE ACETATE 3; 3 MG/ML; MG/ML
6 INJECTION, SUSPENSION INTRA-ARTICULAR; INTRALESIONAL; INTRAMUSCULAR; SOFT TISSUE ONCE
Status: COMPLETED | OUTPATIENT
Start: 2024-06-05 | End: 2024-06-05

## 2024-06-05 RX ADMIN — BETAMETHASONE SODIUM PHOSPHATE AND BETAMETHASONE ACETATE 6 MG: 3; 3 INJECTION, SUSPENSION INTRA-ARTICULAR; INTRALESIONAL; INTRAMUSCULAR; SOFT TISSUE at 11:21:00

## 2024-06-05 NOTE — H&P
Clinic Note       Assessment/Plan:  78 year old female    Left carpal tunnel syndrome-EMG/NCV confirmed 20 years ago-patient currently only has tingling which is intermittent.  If the steroid injection for the de Quervain's tenosynovitis resolves her tingling recommend observation.  If it does not we will consider a carpal tunnel release at that time  Left de Quervain's tenosynovitis-proceed with a steroid injection which she tolerated the complication  Left wrist DRUJ arthritis-asymptomatic with no synovitis of the DRUJ joint.  History of negative rheumatoid arthritis workup.  History of middle ring and small finger EDC rupture likely secondary to DRUJ arthritis and synovitis.  Observation is recommended.  Tendon rupture on the right side was preceded with severe pain.  Patient was instructed to inform me if similar pain develops on the left side  Left thumb CMC arthritis -asymptomatic.  Continue observation  Right carpal tunnel syndrome  Eliquis,  History of surgery of the right hand-synovitis of the right hand in 2010 resulting in tendon rupture.  The middle and ring finger were repaired together and the pinky finger only has 1 tendon    Follow Up: 6 weeks    Injection:    Written consent was obtained.  The skin was prepped with alcohol.  Ethyl chloride spray was used anesthetize the superficial skin.  A 25-gauge needle was used to inject 1.5 mL mixture of 1 mL of 6 mg of betamethasone and 1 mL of 1% lidocaine into left 1st dorsal compartment.  Hemostasis achieved.  Band-Aid was applied.  Patient tolerated procedure without complication.      Diagnostic Studies:     EMG/NCV: Positive for carpal tunnel syndrome 20 years ago  X-ray left wrist 3 views: Advanced degenerative changes of the DRUJ joint with erosion of the distal ulna into the radius.  There is subchondral cyst formation.  There is mild radiocarpal joint arthritis and advanced degenerative changes of the left thumb CMC joint with subluxation.      Physical Exam:     Ht 5' 1\" (1.549 m)   Wt 162 lb (73.5 kg)   BMI 30.61 kg/m²     Left Upper Extremity:     Inspection    Skin intact. No joint effusion. No obvious mass visualized.  CMC joint deformity with metacarpal base subluxation/the deformity Palpation    Tenderness to palpation of the first dorsal compartment.  Positive Finkelstein's test.  No pain with CMC grind or seesaw test      ROM    Full composite fist. Normal wrist/elbow motion.     Median Nerve Exam    Left   Phdurkan +   Sensation normal   PCBr Sensation normal   APB Weakness No   Thenar Atrophy No   Ulnar Nerve Exam   Sensation normal   1st TEJAS Weakness No   SF Escape No   Hypothenar Atrophy No   Radial Nerve Exam    Radial Sensory Normal        CC: Pain and tingling bilateral hands    HPI: This 78 year old RHD female presents with pain and tingling bilateral hands mostly left side.  She was previously diagnosed with carpal tunnel syndrome 20 years ago but the symptoms resolved    Onset: 3-6 months ago  Pain Character: Intermittent tingling and shooting.  Constant aching pain.  Tingling gets to the point where its painful and cannot be shaking out on the left side.  Pain goes up into the forearm.  Patient reports swelling at the wrist and just above it.  The tingling on the right side is much less.  Pain Level: Moderate intensity  Pain @ Night: Yes    Treatments Tried: None    Occupation: Retired business/    Past Medical History:    Age-related nuclear cataract of both eyes    Arrhythmia    A-fib-2017 dx, ablation 11/9/22    Atrial fibrillation (HCC)    Ramírez's esophagus    Brachial neuritis    Constipation    DUB (dysfunctional uterine bleeding)    per ng LIBBY    Esophageal reflux    Esophagitis    per ng eosinophilic egd w/ biopsy    Esophagitis    pr ng egd w/ biopsy    Floater, vitreous, left    Floater, vitreous, left    Gastritis    Glaucoma suspect of both eyes    Glaucoma suspect of both eyes    Glaucoma suspect of  both eyes    Glaucoma suspect of both eyes    Hearing impairment    left ear hearing aid    Heartburn    per ng reflux, dysphagia    High blood pressure    High cholesterol    History of blood transfusion    r/t childbirth - no rx    Long term (current) use of anticoagulants    Meniere disease    Osteopenia    Osteopenia determined by x-ray    Other and unspecified hyperlipidemia    Paroxysmal atrial fibrillation (HCC)    Pseudoexfoliation (PXF) of lens capsule    Pseudoexfoliation (PXF) of lens capsule    Pseudoexfoliation (PXF) of lens capsule    Pseudoexfoliation (PXF) of lens capsule    Seasonal allergic rhinitis    Sensorineural hearing loss, unilateral    Sleep apnea    mouth guard    Snoring    per ng uvulectomy    Spinal stenosis of lumbar region    Visual impairment    glasses     Past Surgical History:   Procedure Laterality Date    Colonoscopy  08/26/2011    Colonoscopy      Colonoscopy N/A 02/03/2020    Procedure: COLONOSCOPY;  Surgeon: Omid De La Rosa MD;  Location: [facility] ENDOSCOPY    Cortisone injection Right 2010    per ng post op right forefoot    Ep a-flutter ablation  11/09/2022         Ep pulmonary vein/a-fib ablation  11/09/2022         Excise hand/foot neuroma Right 2007    per ng neuroma 2 rt, hammertoe 3rt, naprosyn 500    Excise hand/foot neuroma Left 2009    per ng 2 left hammer toe 2 rt, hallux valqus naprosyn 500    Excision of uvula  2006    per ng  uvula removed     Hand/finger surgery unlisted Right 2011    per ng hand synovitis surgery    Hysterectomy      partial at 34    Injection; tendon origin/insertion      per ng injection tendon sheath, ligament    Other surgical history Right 1979    per ng torn ligament right ankle    Upper gi endoscopy performed  2015    Upper gi endoscopy,exam       Current Outpatient Medications   Medication Sig Dispense Refill    ipratropium 0.06 % Nasal Solution 2 sprays by Nasal route 3 (three) times daily. 45 mL 2    Gluc-Chonn-MSM-Boswellia-Vit D  (GLUCOSAMINE CHONDROITIN + D3 OR) Take by mouth.      benzonatate (TESSALON PERLES) 100 MG Oral Cap Take 1 capsule (100 mg total) by mouth 3 (three) times daily as needed for cough. 30 capsule 0    omeprazole 20 MG Oral Capsule Delayed Release Take 1 capsule (20 mg total) by mouth every morning before breakfast. 90 capsule 3    docusate sodium 100 MG Oral Cap Take 100 mg by mouth 2 (two) times daily. 20 capsule 0    apixaban (ELIQUIS) 5 MG Oral Tab Take 1 tablet (5 mg total) by mouth 2 (two) times daily. Last dose to be taken on Nov.4th,2023      Probiotic Product (ALIGN OR) Take 1 tablet by mouth daily.      Simethicone (GAS RELIEF 80 OR) Take 1 tablet by mouth as needed.      sotalol 80 MG Oral Tab Take 0.5 tablets (40 mg total) by mouth 2 (two) times daily.      mupirocin 2 % External Ointment Apply with cotton swab to bilateral nares twice daily as needed for crusting 15 g 2    atorvastatin 10 MG Oral Tab Take 1 tablet (10 mg total) by mouth nightly. 30 tablet 5    Polyethylene Glycol 3350 (MIRALAX OR) Take by mouth.      Cyanocobalamin (VITAMIN B 12 OR) Take 1,000 mg by mouth daily with breakfast.      Calcium Carbonate-Vitamin D (CALCIUM + D OR) Take by mouth daily. Take 500mg & 1000 IU Vit D daily      omega-3 fatty acids (FISH OIL) 1000 MG Oral Cap Take 500 mg by mouth daily.      Multiple Vitamins-Minerals (MULTI FOR HER OR) Take 1 tablet by mouth daily.        Fexofenadine HCl 180 MG Oral Tab Take 1 tablet (180 mg total) by mouth daily.       Allergies   Allergen Reactions    Grass Pollen(K-O-R-T-Swt Mauricio) OTHER (SEE COMMENTS)    Dust Runny nose     Headache, watery eyes    Mold Runny nose     Headache, watery eyes    Pollen Runny nose     Headache, watery eyes    Ragweed OTHER (SEE COMMENTS)     Watery eyes , Sneezing, HA's      Trees, [place] Runny nose     Headache, watery eyes     Family History   Problem Relation Age of Onset    Cancer Father         per ng lung    Breast Cancer Mother 60         approx    Other (Other) Mother     Other (MS) Sister     Heart Disease Other         per ng CAD, vein, artery, liver disease, arthritis    Other (Other) Other         per ng lupus erythematosus    Cancer Paternal Uncle         liver cancer    Cancer Paternal Uncle         stomach cancer    Cancer Maternal Uncle         stomach cancer    Cancer Maternal Uncle     Colon Polyps Maternal Uncle         bone cancer    Macular degeneration Neg     Glaucoma Neg     Diabetes Neg      Social History     Occupational History    Not on file   Tobacco Use    Smoking status: Former     Current packs/day: 0.00     Average packs/day: 0.5 packs/day for 5.0 years (2.5 ttl pk-yrs)     Types: Cigarettes     Start date: 1963     Quit date: 1968     Years since quittin.7     Passive exposure: Past    Smokeless tobacco: Never   Vaping Use    Vaping status: Never Used   Substance and Sexual Activity    Alcohol use: Not Currently     Alcohol/week: 0.0 standard drinks of alcohol     Comment: formerly since 2016, rare alcohol currently    Drug use: No    Sexual activity: Not on file      Assessment     Review of Systems (negative unless bolded):  General: fevers, chills, fatigue  CV:  chest pain, palpitations, leg swelling  Msk: bodyaches, neck pain, neck stiffness  Skin: rashes, open wounds, nonhealing ulcers  Hem: bleeds easily, bruise easily, immunocompromised  Neuro: dizziness, light headedness, headaches  Psych: anxious, depressed, anger issues    Syed Larson MD   Hand, Wrist, & Elbow Surgery  [email or web address]  t: [phone]  f: [phone]

## 2024-07-30 ENCOUNTER — TELEPHONE (OUTPATIENT)
Facility: CLINIC | Age: 79
End: 2024-07-30

## 2024-07-30 DIAGNOSIS — Z86.010 PERSONAL HISTORY OF COLONIC POLYPS: Primary | ICD-10-CM

## 2024-07-31 NOTE — TELEPHONE ENCOUNTER
Omid De La Rosa MD   Physician  Gastroenterology     Operative Report  Signed     Date of Service: 2/3/2020  8:20 AM  Case Time: Procedures: Surgeons:   2/3/2020  7:51 AM COLONOSCOPY   ESOPHAGOGASTRODUODENOSCOPY (EGD)    Omid De La Rosa MD               Signed         [facility] Report        Preoperative Diagnosis:  - colon cancer screening  - history of Ramírez's esophagus        Postoperative Diagnosis:  - colon polyps x 2  - diverticulosis  - internal hemorrhoids  - small hiatal hernia  - gastric polyp x 1  - no Ramírez's changes noted     Procedure:    Colonoscopy   Esophagogastroduodenoscopy         Surgeon:  Omid De La Rosa M.D.     Anesthesia:  MAC sedation, see anesthesia records     Technique:  After informed consent, the patient was placed in the left lateral recumbent position.  Digital rectal examination revealed no palpable intraluminal abnormalities.  An Olympus variable stiffness 190 series HD colonoscope was inserted into the rectum and advanced under direct vision by following the lumen to the cecum.  The colon was examined upon withdrawal in the left lateral position.     Following colonoscopy, an Olympus adult HD gastroscope was inserted into the hypopharynx and advanced under direct vision into the esophagus, stomach and duodenum.  The endoscope was withdrawn to the stomach where retroflexion of the annulus, body, cardia and fundus was performed.  The instrument was straightened, insufflated air and fluid were suctioned and the endoscope was withdrawn.  The procedures were well tolerated without immediate complication.        Findings:  The preparation of the colon was good.  The terminal ileum was examined for 4 cm and visually normal.  The ileocecal valve was well preserved. The visualized colonic mucosa from the cecum to the anal verge was normal with an intact vascular pattern.     Colon polyps x2 removed as follows;  -Ascending x1, sessile 4 mm in size removed  by cold snare technique.  -Transverse x1, sessile 4 to 5 mm in size removed by cold snare technique.  Both polypectomy sites inspected and found to be free of bleeding.  Specimens retrieved and sent for analysis.     Diverticular disease located in the sigmoid colon, no evidence of diverticulitis     Small internal hemorrhoids noted on retroflexed view.     The esophagus was normal.  No Ramírez's changes noted, biopsies taken from GE junction given history.   The GE junction and diaphragmatic impression were at 36 cm and 38 cm for a 2 cm hiatal hernia.  The stomach distended appropriately with insufflated air.  The mucosa of the stomach including cardia, fundus, gastric body and antrum were normal.  On small < 5mm gastric polyp s/p biopsy.  The duodenal bulb and post bulbar regions were normal.     Recommendations:  - Post polypectomy instructions given  - Repeat colonoscopy in 5 years  - High fiber diet for diverticular disease  - Symptomatic treatment of hemorrhoids  - Follow up on esophageal biopsies and gastric polyp bx              Omid De La Rosa MD  2/3/2020  8:20 AM                Omid De La Rosa MD  2/3/2020  4:26 PM CST       I wanted to get back to you with your colonoscopy and EGD results.  You had 2 colon polyps removed which were benign.  I would advise a repeat colonoscopy in 5 years to make sure no new polyps are forming.  You also have internal hemorrhoids and diverticulosis.       The upper endoscopy showed a hiatal hernia but no Barretts noted.  A benign gastric polyp was noted.               Contains abnormal data Specimen to Pathology, Tissue: [id number]  Order: [id number]   Collected 2/3/2020  7:59 AM       Status: Final result       Visible to patient: Yes (seen)       Dx: Colon cancer screening; Ramírez's eso...    2 Result Notes       1 Patient Communication        Component  Ref Range & Units      Case Report     Surgical Pathology                                Case: [id number]                                      Authorizing Provider:  Omid De La Rosa MD       Collected:           02/03/2020 07:59 AM             Ordering Location:     [facility]          Received:            02/03/2020 09:18 AM                                    [facility]                                                           Pathologist:           Dwayne Liu MD                                                               Specimens:   A) - Colon polyp, ascending                                                                            B) - Colon polyp, transverse                                                                          C) - GE junction biopsies, biopsy                                                                      D) - Gastric polyp                                                                            Final Diagnosis:        A. Ascending colon polyp:   Tubular adenoma.      B. Transverse colon polyp:   Tubular adenoma.      C. Gastroesophageal junction; biopsies:   Gastroesophageal junction mucosa with reactive changes suggestive of reflux.   No evidence of dysplasia or metaplasia identified.      D. Gastric polyp:   Fundic gland polyp.  No dysplasia or metaplasia is identified.  Diff-Quik stain (with appropriate control reactivity) is negative for H. pylori microorganisms.         Electronically signed by Dwayne Liu MD on 2/3/2020 at  3:26 PM        Clinical Information      Z12.11 Colon Cancer Screening.  K22.70 Ramírez's Esophagus Without Dysplasia.          Gross Description      Specimen A is received in formalin labeled \"Wimelaniezorfernandaicz, colon polyp ascending\" consists of a single, 0.2 cm in greatest dimension gray-tan fragment of soft tissue. Submitted entirely in cassette A1.      Specimen B is received in formalin labeled \"Wieczorkiewicz, colon polyp transverse\" consists of a single, 0.8 x 0.6 x 0.1 cm flat fragment of mucosa showing central, 0.4 x 0.3 x <0.1 cm  discoloration. The specimen is marked with blue ink and bisected. Submitted entirely in cassette B1.      Specimen C is received in formalin labeled \"Wieczorkiewicz, GE junction biopsies\" consists of a single, 0.4 x 0.3 x 0.1 cm fragment of soft tissue gray-tan tissue. Submitted entirely in cassette C1.      Specimen D is received in formalin labeled \"Wieczorkiewicz, gastric polyp\" consists of a single, tan-pink, 0.3 x 0.2 x 0.1 cm fragment of soft tissue. Submitted entirely in cassette D1. (ray)      Dwayne Liu M.D./stephane                 Interpretation     Abnormal Abnormal      Electronically signed by Dwayne Liu MD on 2/3/2020 at  3:26 PM     [facility] (Critical access hospital)

## 2024-07-31 NOTE — TELEPHONE ENCOUNTER
Dr. De La Rosa    Patient called to schedule 5 year recall colonoscopy due in February.  Please provide orders if ok to schedule directly.    Thank you    Last Procedure, Date, MD:  Colonoscopy and EGD Dr. De La Rosa 2/3/2020  Last Diagnosis:  colon polyps x2, diverticulosis, internal hemorrhoids, small hiatal hernia, gastric polyp x1, no Ramírez's changes noted  Recalled (mth/yrs): 5 years  Sedation Used Previously:  MAC  Last Prep Used (if known):  Colyte  Quality Of Prep (if known): good  Anticoagulants: eliquis   Diabetic Med's (PO/Injectables): no  Weight loss Med's: no  Iron/Herbal/Multivitamin Supplements (RX/OTC): fish oil, probiotic, multivitamin, glucosamine chondroitin, B12, calcium with vitamin D  Marijuana/Vaping/CBD: no  Height & Weight: 5'1\" 162 lbs  BMI: 30.63  Hx of Cardiac/CVA Issues/(MI/Stroke): history of A Fib with ablation over 1.5 years ago and fine ever since  Devices Pacemaker/Defibrillator/Stents: no  Respiratory Issues/Oxygen Use/YASMIN/COPD: sleep apnea uses mouth guard  Issues w/ Anesthesia: no    Symptoms (Y/N): no  Symptoms Details: n/a    Special Comments/Notes: n/a    Please advise on orders and prep.     Thank you!

## 2024-08-07 NOTE — TELEPHONE ENCOUNTER
Colonoscopy for colon cancer screening and hx colon polyps  Goltyely   MAC     Hold eliquis 2 days before procedure

## 2024-08-08 NOTE — TELEPHONE ENCOUNTER
Scheduled for:  Colonoscopy [id number]    Provider Name:   Rj    Date:  2/3/2025    Location:    [facility]    Sedation:  MAC    Time:  730 am (Patient made aware EM will call the day before with an arrival time)    Prep:  Golytely    Meds/Allergies Reconciled?:  Physician reviewed     Diagnosis with codes:    Personal history of colonic polyps [Z86.010]     Was patient informed to call insurance with codes (Y/N):  Yes, I confirmed Medicare/BCBS insurance with the patient.     Referral sent?:  N/A    EM or [facility] notified?:  I sent an electronic request to Endo Scheduling and received a confirmation today.      Medication Orders:  This patient verbally confirmed that she is not taking:     Iron,  BP meds, and is not diabetic  Not latex allergy, Not PCN allergy and does not have a pacemaker     Misc Orders:    Hold Eliquis 2 days prior to procedure (awaiting Cardiology approval - GI Nurse will call to confirm length of hold)        Further instructions given by staff:

## 2024-08-08 NOTE — TELEPHONE ENCOUNTER
I faxed request to make sure ok to hold eliquis for 2 days to [facility].  Remind me encounter set up for follow up closer to date of procedure.

## 2024-09-04 ENCOUNTER — TELEPHONE (OUTPATIENT)
Dept: INTERNAL MEDICINE CLINIC | Facility: CLINIC | Age: 79
End: 2024-09-04

## 2024-09-04 ENCOUNTER — LAB ENCOUNTER (OUTPATIENT)
Dept: LAB | Facility: HOSPITAL | Age: 79
End: 2024-09-04
Attending: INTERNAL MEDICINE
Payer: MEDICARE

## 2024-09-04 DIAGNOSIS — R82.90 ABNORMAL URINALYSIS: ICD-10-CM

## 2024-09-04 LAB
BILIRUB UR QL: NEGATIVE
COLOR UR: YELLOW
GLUCOSE UR-MCNC: NORMAL MG/DL
KETONES UR-MCNC: NEGATIVE MG/DL
LEUKOCYTE ESTERASE UR QL STRIP.AUTO: 500
NITRITE UR QL STRIP.AUTO: NEGATIVE
PH UR: 6.5 [PH] (ref 5–8)
PROT UR-MCNC: 100 MG/DL
RBC #/AREA URNS AUTO: >10 /HPF
SP GR UR STRIP: 1.02 (ref 1–1.03)
UROBILINOGEN UR STRIP-ACNC: NORMAL
WBC #/AREA URNS AUTO: >50 /HPF
WBC CLUMPS UR QL AUTO: PRESENT /HPF

## 2024-09-04 PROCEDURE — 87186 SC STD MICRODIL/AGAR DIL: CPT

## 2024-09-04 PROCEDURE — 87086 URINE CULTURE/COLONY COUNT: CPT

## 2024-09-04 PROCEDURE — 87088 URINE BACTERIA CULTURE: CPT

## 2024-09-04 PROCEDURE — 81001 URINALYSIS AUTO W/SCOPE: CPT

## 2024-09-04 NOTE — TELEPHONE ENCOUNTER
Patient reviewed urine test results on MyChart from today. She is aware that the culture is still in process. She is requesting that any medications Dr. Bates sends can be sent to [facility] in [place].       [id number] - [place], [place] - [address] AT SEC [address] [facility] [address] [facility] [address], [phone], [phone] [address] [address]   Phone: [phone] Fax: [phone]   Hours: Not open 24 hours

## 2024-09-05 NOTE — TELEPHONE ENCOUNTER
Spoke with patient, verified name/.  Informed culture showing E coli, but sensitivity not back yet.  She states had pain and burning with urination yesterday but better today.  Dr Bates, please watch for sensitivity and send antibiotic to pharmacy shown below.

## 2024-09-05 NOTE — TELEPHONE ENCOUNTER
Patient called back to check status.  Micro lab contacted, they report sensitivity was initiated around 10 am today and usually take 24 hours.  Patient notified.  She will continue to push fluids.  She does have azo on hand at home and will take that if necessary, along with tylenol.  Await final report.

## 2024-09-06 RX ORDER — CIPROFLOXACIN 500 MG/1
500 TABLET, FILM COATED ORAL 2 TIMES DAILY
Qty: 14 TABLET | Refills: 0 | Status: SHIPPED | OUTPATIENT
Start: 2024-09-06 | End: 2024-09-13

## 2024-09-06 NOTE — TELEPHONE ENCOUNTER
Patient calling back, is very uncomfortable , not taking antibiotic . Patient asking if  has viewed the urine culture and sensitivity. Please advise.

## 2024-09-20 ENCOUNTER — LAB ENCOUNTER (OUTPATIENT)
Dept: LAB | Age: 79
End: 2024-09-20
Attending: INTERNAL MEDICINE
Payer: MEDICARE

## 2024-09-20 PROCEDURE — 87086 URINE CULTURE/COLONY COUNT: CPT | Performed by: INTERNAL MEDICINE

## 2024-09-20 PROCEDURE — 81001 URINALYSIS AUTO W/SCOPE: CPT | Performed by: INTERNAL MEDICINE

## 2024-10-16 ENCOUNTER — ORDER TRANSCRIPTION (OUTPATIENT)
Dept: PHYSICAL THERAPY | Facility: HOSPITAL | Age: 79
End: 2024-10-16

## 2024-10-16 DIAGNOSIS — M43.17 SPONDYLOLISTHESIS AT L5-S1 LEVEL: Primary | ICD-10-CM

## 2024-10-19 ENCOUNTER — PATIENT MESSAGE (OUTPATIENT)
Dept: PHYSICAL THERAPY | Age: 79
End: 2024-10-19

## 2024-10-21 ENCOUNTER — OFFICE VISIT (OUTPATIENT)
Dept: OTOLARYNGOLOGY | Facility: CLINIC | Age: 79
End: 2024-10-21
Payer: MEDICARE

## 2024-10-21 ENCOUNTER — TELEPHONE (OUTPATIENT)
Dept: PHYSICAL THERAPY | Age: 79
End: 2024-10-21

## 2024-10-21 VITALS — HEIGHT: 61 IN | BODY MASS INDEX: 30.58 KG/M2 | WEIGHT: 162 LBS

## 2024-10-21 DIAGNOSIS — R09.82 ALLERGIC RHINITIS WITH POSTNASAL DRIP: ICD-10-CM

## 2024-10-21 DIAGNOSIS — H93.13 BILATERAL TINNITUS: Primary | ICD-10-CM

## 2024-10-21 DIAGNOSIS — J30.9 ALLERGIC RHINITIS WITH POSTNASAL DRIP: ICD-10-CM

## 2024-10-21 DIAGNOSIS — R59.1 HEAD AND NECK LYMPHADENOPATHY: ICD-10-CM

## 2024-10-21 PROCEDURE — 99213 OFFICE O/P EST LOW 20 MIN: CPT | Performed by: SPECIALIST

## 2024-10-21 RX ORDER — IPRATROPIUM BROMIDE 42 UG/1
2 SPRAY, METERED NASAL 3 TIMES DAILY
Qty: 45 ML | Refills: 2 | Status: SHIPPED | OUTPATIENT
Start: 2024-10-21

## 2024-10-21 NOTE — TELEPHONE ENCOUNTER
Patient messaged me to see if I have any PT appointments available prior to 11/6. I called and left patient voicemail re: several openings today, to call [phone] if interested and to schedule. Advised that appointment times cannot be held for her, to call to see if still available and to schedule.

## 2024-10-22 NOTE — PATIENT INSTRUCTIONS
A prescription for a repeat audiogram was sent for your tinnitus.  I will of course notify you of these results.  Continue Zyrtec and ipratropium bromide for your allergic rhinitis and postnasal drip.  We reviewed your CT scan together, there was either a borderline lymph node or a venal lymphatic malformation this was noted on your CTA of the carotids done 8/8/2023 and again on your CT of the neck done April 2024.  An order will be written on your next visit as they wanted a CT scan done in 1 years time to assess stability.

## 2024-10-22 NOTE — PROGRESS NOTES
Maeve Garner is a 79 year old female.   Chief Complaint   Patient presents with    Follow - Up     head and neck lymphadenopathy     HPI:   Patient here to check on her postnasal drip.  She also is wearing binaural hearing aids for decreased hearing and tinnitus.    Current Outpatient Medications   Medication Sig Dispense Refill    ipratropium 0.06 % Nasal Solution 2 sprays by Nasal route 3 (three) times daily. 45 mL 2    Gluc-Chonn-MSM-Boswellia-Vit D (GLUCOSAMINE CHONDROITIN + D3 OR) Take by mouth.      benzonatate (TESSALON PERLES) 100 MG Oral Cap Take 1 capsule (100 mg total) by mouth 3 (three) times daily as needed for cough. 30 capsule 0    omeprazole 20 MG Oral Capsule Delayed Release Take 1 capsule (20 mg total) by mouth every morning before breakfast. 90 capsule 3    docusate sodium 100 MG Oral Cap Take 100 mg by mouth 2 (two) times daily. 20 capsule 0    apixaban (ELIQUIS) 5 MG Oral Tab Take 1 tablet (5 mg total) by mouth 2 (two) times daily. Last dose to be taken on Nov.4th,2023      Probiotic Product (ALIGN OR) Take 1 tablet by mouth daily.      Simethicone (GAS RELIEF 80 OR) Take 1 tablet by mouth as needed.      sotalol 80 MG Oral Tab Take 0.5 tablets (40 mg total) by mouth 2 (two) times daily.      mupirocin 2 % External Ointment Apply with cotton swab to bilateral nares twice daily as needed for crusting 15 g 2    atorvastatin 10 MG Oral Tab Take 1 tablet (10 mg total) by mouth nightly. 30 tablet 5    Fexofenadine HCl 180 MG Oral Tab Take 1 tablet (180 mg total) by mouth daily.      Polyethylene Glycol 3350 (MIRALAX OR) Take by mouth.      Cyanocobalamin (VITAMIN B 12 OR) Take 1,000 mg by mouth daily with breakfast.      Calcium Carbonate-Vitamin D (CALCIUM + D OR) Take by mouth daily. Take 500mg & 1000 IU Vit D daily      omega-3 fatty acids (FISH OIL) 1000 MG Oral Cap Take 500 mg by mouth daily.      Multiple Vitamins-Minerals (MULTI FOR HER OR) Take 1 tablet by mouth daily.          Past  Medical History:    Age-related nuclear cataract of both eyes    Arrhythmia    - dx, ablation 22    Atrial fibrillation (HCC)    Ramírez's esophagus    Brachial neuritis    Constipation    DUB (dysfunctional uterine bleeding)    per ng LIBBY    Esophageal reflux    Esophagitis    per ng eosinophilic egd w/ biopsy    Esophagitis    pr ng egd w/ biopsy    Floater, vitreous, left    Floater, vitreous, left    Gastritis    Glaucoma suspect of both eyes    Glaucoma suspect of both eyes    Glaucoma suspect of both eyes    Glaucoma suspect of both eyes    Hearing impairment    left ear hearing aid    Heartburn    per ng reflux, dysphagia    High blood pressure    High cholesterol    History of blood transfusion    r/t childbirth - no rx    Long term (current) use of anticoagulants    Meniere disease    Osteopenia    Osteopenia determined by x-ray    Other and unspecified hyperlipidemia    Paroxysmal atrial fibrillation (HCC)    Pseudoexfoliation (PXF) of lens capsule    Pseudoexfoliation (PXF) of lens capsule    Pseudoexfoliation (PXF) of lens capsule    Pseudoexfoliation (PXF) of lens capsule    Seasonal allergic rhinitis    Sensorineural hearing loss, unilateral    Sleep apnea    mouth guard    Snoring    per ng uvulectomy    Spinal stenosis of lumbar region    Visual impairment    glasses      Social History:  Social History     Socioeconomic History    Marital status:    Tobacco Use    Smoking status: Former     Current packs/day: 0.00     Average packs/day: 0.5 packs/day for 5.0 years (2.5 ttl pk-yrs)     Types: Cigarettes     Start date: 1963     Quit date: 1968     Years since quittin.0     Passive exposure: Past    Smokeless tobacco: Never   Vaping Use    Vaping status: Never Used   Substance and Sexual Activity    Alcohol use: Not Currently     Alcohol/week: 0.0 standard drinks of alcohol     Comment: formerly since 2016, rare alcohol currently    Drug use: No   Other Topics  Concern    Caffeine Concern Yes     Comment: soda 12 oz     Social Drivers of Health     Financial Resource Strain: Low Risk  (11/10/2023)    Financial Resource Strain     Difficulty of Paying Living Expenses: Not hard at all     Med Affordability: No   Food Insecurity: No Food Insecurity (11/7/2023)    Food Insecurity     Food Insecurity: Never true   Transportation Needs: No Transportation Needs (11/10/2023)    Transportation Needs     Lack of Transportation: No   Housing Stability: Low Risk  (11/7/2023)    Housing Stability     Housing Instability: No        REVIEW OF SYSTEMS:   GENERAL HEALTH: feels well otherwise  GENERAL : denies fever, chills, sweats, weight loss, weight gain  SKIN: denies any unusual skin lesions or rashes  RESPIRATORY: denies shortness of breath with exertion  NEURO: denies headaches    EXAM:   Ht 5' 1\" (1.549 m)   Wt 162 lb (73.5 kg)   BMI 30.61 kg/m²   System Details   Skin Inspection - Normal.   Constitutional Overall appearance - Normal.   Head/Face Facial features - Normal. Eyebrows - Normal. Skull - Normal.   Eyes Conjunctiva - Right: Normal, Left: Normal. Pupil - Right: Normal, Left: Normal.    Ears Inspection - Right: Normal, Left: Normal.   Canal - nonocclusive cerumen fully cleaned  TM - Right: Normal, Left: Normal.   Nasal External nose - Normal.   Nasal septum - Normal.  Turbinates -congested turbinates and clear postnasal drainage   Oral/Oropharynx Lips - Normal, Tonsils - Normal, Tongue - Normal    Neck Exam Inspection - Normal. Palpation - Normal. Parotid gland - Normal. Thyroid gland - Normal.  No palpable neck adenopathy either side   Lymph Detail Submental. Submandibular. Anterior cervical. Posterior cervical. Supraclavicular all without enlargement   Psychiatric Orientation - Oriented to time, place, person & situation. Appropriate mood and affect.   Neurological Memory - Normal. Cranial nerves - Cranial nerves II through XII grossly intact.     ASSESSMENT AND PLAN:    1. Bilateral tinnitus  Audiogram prescription placed.  Patient to make an appointment with Dr. Talavera.  - Audiology Referral - [facility])    2. Allergic rhinitis with postnasal drip  Continue Zyrtec and ipratropium bromide.    3. Head and neck lymphadenopathy  Reviewed CT scan done April 19, 2024.  This showed a stable 1.4 x 1 cm mass on the right-hand side.  Recommendation was to repeat the CT scan in 1 years time to assess for stability.  Patient returns in 6 months time we will place the order.        The patient indicates understanding of these issues and agrees to the plan.      Ileana Allen MD  10/21/2024  8:37 PM

## 2024-10-30 ENCOUNTER — OFFICE VISIT (OUTPATIENT)
Dept: ORTHOPEDICS CLINIC | Facility: CLINIC | Age: 79
End: 2024-10-30
Payer: MEDICARE

## 2024-10-30 VITALS — BODY MASS INDEX: 30.58 KG/M2 | HEIGHT: 61 IN | WEIGHT: 162 LBS

## 2024-10-30 DIAGNOSIS — G56.02 CARPAL TUNNEL SYNDROME OF LEFT WRIST: Primary | ICD-10-CM

## 2024-10-30 DIAGNOSIS — M65.4 TENOSYNOVITIS OF RADIAL STYLOID: ICD-10-CM

## 2024-10-30 PROCEDURE — 99214 OFFICE O/P EST MOD 30 MIN: CPT | Performed by: ORTHOPAEDIC SURGERY

## 2024-10-30 PROCEDURE — 20550 NJX 1 TENDON SHEATH/LIGAMENT: CPT | Performed by: ORTHOPAEDIC SURGERY

## 2024-10-30 RX ORDER — BETAMETHASONE SODIUM PHOSPHATE AND BETAMETHASONE ACETATE 3; 3 MG/ML; MG/ML
6 INJECTION, SUSPENSION INTRA-ARTICULAR; INTRALESIONAL; INTRAMUSCULAR; SOFT TISSUE ONCE
Status: COMPLETED | OUTPATIENT
Start: 2024-10-30 | End: 2024-10-30

## 2024-10-30 RX ADMIN — BETAMETHASONE SODIUM PHOSPHATE AND BETAMETHASONE ACETATE 6 MG: 3; 3 INJECTION, SUSPENSION INTRA-ARTICULAR; INTRALESIONAL; INTRAMUSCULAR; SOFT TISSUE at 10:50:00

## 2024-10-30 NOTE — PROGRESS NOTES
Clinic Note       Assessment/Plan:  79 year old female    Left carpal tunnel syndrome-EMG/NCV confirmed in 2007 -patient currently only has tingling which is intermittent.  If the steroid injection for the de Quervain's tenosynovitis resolves her tingling recommend observation.  If it does not we will consider a carpal tunnel release at that time  Left de Quervain's tenosynovitis-proceed with a #2 corticosteroid injection if symptoms do not fully resolve we will proceed with surgery  Left wrist DRUJ arthritis-asymptomatic with no synovitis of the DRUJ joint.  History of negative rheumatoid arthritis workup.  History of middle ring and small finger EDC rupture likely secondary to DRUJ arthritis and synovitis.  Observation is recommended.  Tendon rupture on the right side was preceded with severe pain.  Patient was instructed to inform me if similar pain develops on the left side  Left thumb CMC arthritis -asymptomatic.  Continue observation  Right carpal tunnel syndrome  Eliquis, History of surgery of the right hand-synovitis of the right hand in 2010 resulting in tendon rupture.  The middle and ring finger were repaired together and the pinky finger only has 1 tendon    Follow Up: 6 weeks    Injection:    Written consent was obtained.  The skin was prepped with alcohol.  Ethyl chloride spray was used anesthetize the superficial skin.  A 25-gauge needle was used to inject 1.5 mL mixture of 1 mL of 6 mg of betamethasone and 1 mL of 1% lidocaine into left 1st dorsal compartment.  Hemostasis achieved.  Band-Aid was applied.  Patient tolerated procedure without complication.    Diagnostic Studies:     EMG/NCV: Positive for carpal tunnel syndrome 20 years ago  X-ray left wrist 3 views: Advanced degenerative changes of the DRUJ joint with erosion of the distal ulna into the radius.  There is subchondral cyst formation.  There is mild radiocarpal joint arthritis and advanced degenerative changes of the left thumb CMC  joint with subluxation.     Physical Exam:     Ht 5' 1\" (1.549 m)   Wt 162 lb (73.5 kg)   BMI 30.61 kg/m²     Left Upper Extremity:     Inspection    Skin intact. No joint effusion. No obvious mass visualized.  CMC joint deformity with metacarpal base subluxation/the deformity Palpation    Tenderness to palpation of the first dorsal compartment.  Minimally positive Finkelstein's test.  No pain with CMC grind or seesaw test      ROM    Full composite fist. Normal wrist/elbow motion.     Median Nerve Exam    Left   Phdurkan +   Sensation normal   PCBr Sensation normal   APB Weakness No   Thenar Atrophy No   Ulnar Nerve Exam   Sensation normal   1st TEJAS Weakness No   SF Escape No   Hypothenar Atrophy No   Radial Nerve Exam    Radial Sensory Normal        CC: Pain and tingling bilateral hands    HPI: This 79 year old RHD female presents with pain and tingling bilateral hands mostly left side.  She was previously diagnosed with carpal tunnel syndrome 20 years ago but the symptoms resolved    Onset: 3-6 months ago  Pain Character: Intermittent tingling and shooting.  Constant aching pain.  Tingling gets to the point where its painful and cannot be shaking out on the left side.  Pain goes up into the forearm.  Patient reports swelling at the wrist and just above it.  The tingling on the right side is much less.    Treatments Tried: None    Interval Hx (10/30/2024): Patient reports response to the steroid injection but reoccurrence.  Currently more painful than it was prior to the injection    Occupation: Retired business/    Past Medical History:    Age-related nuclear cataract of both eyes    Arrhythmia    A-fib-2017 dx, ablation 11/9/22    Atrial fibrillation (HCC)    Ramírez's esophagus    Brachial neuritis    Constipation    DUB (dysfunctional uterine bleeding)    per ng LIBBY    Esophageal reflux    Esophagitis    per ng eosinophilic egd w/ biopsy    Esophagitis    pr ng egd w/ biopsy    Floater,  vitreous, left    Floater, vitreous, left    Gastritis    Glaucoma suspect of both eyes    Glaucoma suspect of both eyes    Glaucoma suspect of both eyes    Glaucoma suspect of both eyes    Hearing impairment    left ear hearing aid    Heartburn    per ng reflux, dysphagia    High blood pressure    High cholesterol    History of blood transfusion    r/t childbirth - no rx    Long term (current) use of anticoagulants    Meniere disease    Osteopenia    Osteopenia determined by x-ray    Other and unspecified hyperlipidemia    Paroxysmal atrial fibrillation (HCC)    Pseudoexfoliation (PXF) of lens capsule    Pseudoexfoliation (PXF) of lens capsule    Pseudoexfoliation (PXF) of lens capsule    Pseudoexfoliation (PXF) of lens capsule    Seasonal allergic rhinitis    Sensorineural hearing loss, unilateral    Sleep apnea    mouth guard    Snoring    per ng uvulectomy    Spinal stenosis of lumbar region    Visual impairment    glasses     Past Surgical History:   Procedure Laterality Date    Colonoscopy  08/26/2011    Colonoscopy      Colonoscopy N/A 02/03/2020    Procedure: COLONOSCOPY;  Surgeon: Omid De La Rosa MD;  Location: [facility] ENDOSCOPY    Cortisone injection Right 2010    per ng post op right forefoot    Ep a-flutter ablation  11/09/2022         Ep pulmonary vein/a-fib ablation  11/09/2022         Excise hand/foot neuroma Right 2007    per ng neuroma 2 rt, hammertoe 3rt, naprosyn 500    Excise hand/foot neuroma Left 2009    per ng 2 left hammer toe 2 rt, hallux valqus naprosyn 500    Excision of uvula  2006    per ng  uvula removed     Hand/finger surgery unlisted Right 2011    per ng hand synovitis surgery    Hysterectomy      partial at 34    Injection; tendon origin/insertion      per ng injection tendon sheath, ligament    Other surgical history Right 1979    per ng torn ligament right ankle    Upper gi endoscopy performed  2015    Upper gi endoscopy,exam       Current Outpatient Medications   Medication Sig  Dispense Refill    ipratropium 0.06 % Nasal Solution 2 sprays by Nasal route 3 (three) times daily. 45 mL 2    Gluc-Chonn-MSM-Boswellia-Vit D (GLUCOSAMINE CHONDROITIN + D3 OR) Take by mouth.      benzonatate (TESSALON PERLES) 100 MG Oral Cap Take 1 capsule (100 mg total) by mouth 3 (three) times daily as needed for cough. 30 capsule 0    omeprazole 20 MG Oral Capsule Delayed Release Take 1 capsule (20 mg total) by mouth every morning before breakfast. 90 capsule 3    docusate sodium 100 MG Oral Cap Take 100 mg by mouth 2 (two) times daily. 20 capsule 0    apixaban (ELIQUIS) 5 MG Oral Tab Take 1 tablet (5 mg total) by mouth 2 (two) times daily. Last dose to be taken on Nov.4th,2023      Probiotic Product (ALIGN OR) Take 1 tablet by mouth daily.      Simethicone (GAS RELIEF 80 OR) Take 1 tablet by mouth as needed.      sotalol 80 MG Oral Tab Take 0.5 tablets (40 mg total) by mouth 2 (two) times daily.      mupirocin 2 % External Ointment Apply with cotton swab to bilateral nares twice daily as needed for crusting 15 g 2    atorvastatin 10 MG Oral Tab Take 1 tablet (10 mg total) by mouth nightly. 30 tablet 5    Fexofenadine HCl 180 MG Oral Tab Take 1 tablet (180 mg total) by mouth daily.      Polyethylene Glycol 3350 (MIRALAX OR) Take by mouth.      Cyanocobalamin (VITAMIN B 12 OR) Take 1,000 mg by mouth daily with breakfast.      Calcium Carbonate-Vitamin D (CALCIUM + D OR) Take by mouth daily. Take 500mg & 1000 IU Vit D daily      omega-3 fatty acids (FISH OIL) 1000 MG Oral Cap Take 500 mg by mouth daily.      Multiple Vitamins-Minerals (MULTI FOR HER OR) Take 1 tablet by mouth daily.         Allergies   Allergen Reactions    Grass Pollen(K-O-R-T-Swt Mauricio) OTHER (SEE COMMENTS)    Dust Runny nose     Headache, watery eyes    Mold Runny nose     Headache, watery eyes    Pollen Runny nose     Headache, watery eyes    Ragweed OTHER (SEE COMMENTS)     Watery eyes , Sneezing, HA's      Trees, [place] Runny nose      Headache, watery eyes     Family History   Problem Relation Age of Onset    Cancer Father         per ng lung    Breast Cancer Mother 60        approx    Other (Other) Mother     Other (MS) Sister     Heart Disease Other         per ng CAD, vein, artery, liver disease, arthritis    Other (Other) Other         per ng lupus erythematosus    Cancer Paternal Uncle         liver cancer    Cancer Paternal Uncle         stomach cancer    Cancer Maternal Uncle         stomach cancer    Cancer Maternal Uncle     Colon Polyps Maternal Uncle         bone cancer    Macular degeneration Neg     Glaucoma Neg     Diabetes Neg      Social History     Occupational History    Not on file   Tobacco Use    Smoking status: Former     Current packs/day: 0.00     Average packs/day: 0.5 packs/day for 5.0 years (2.5 ttl pk-yrs)     Types: Cigarettes     Start date: 1963     Quit date: 1968     Years since quittin.1     Passive exposure: Past    Smokeless tobacco: Never   Vaping Use    Vaping status: Never Used   Substance and Sexual Activity    Alcohol use: Not Currently     Alcohol/week: 0.0 standard drinks of alcohol     Comment: formerly since , rare alcohol currently    Drug use: No    Sexual activity: Not on file      Assessment     Review of Systems (negative unless bolded):  General: fevers, chills, fatigue  CV:  chest pain, palpitations, leg swelling  Msk: bodyaches, neck pain, neck stiffness  Skin: rashes, open wounds, nonhealing ulcers  Hem: bleeds easily, bruise easily, immunocompromised  Neuro: dizziness, light headedness, headaches  Psych: anxious, depressed, anger issues    Syed Larson MD   Hand, Wrist, [facility] Elbow Surgery  [email or web address]  t: [phone]  f: [phone]

## 2024-11-06 ENCOUNTER — OFFICE VISIT (OUTPATIENT)
Dept: PHYSICAL THERAPY | Age: 79
End: 2024-11-06
Attending: ORTHOPAEDIC SURGERY
Payer: MEDICARE

## 2024-11-06 DIAGNOSIS — M43.17 SPONDYLOLISTHESIS AT L5-S1 LEVEL: Primary | ICD-10-CM

## 2024-11-06 PROCEDURE — 97162 PT EVAL MOD COMPLEX 30 MIN: CPT | Performed by: PHYSICAL THERAPIST

## 2024-11-06 PROCEDURE — 97110 THERAPEUTIC EXERCISES: CPT | Performed by: PHYSICAL THERAPIST

## 2024-11-06 PROCEDURE — 97530 THERAPEUTIC ACTIVITIES: CPT | Performed by: PHYSICAL THERAPIST

## 2024-11-06 NOTE — PROGRESS NOTES
SPINE EVALUATION:     Diagnosis:    Spondylolisthesis at L5-S1 level (M43.17)  Referring Provider: Adal Briones  Date of Evaluation:    11/6/2024    Precautions:   HTN, hx of B TKA, osteoporosis Next MD visit:   11/2024  Date of Surgery: n/a     PATIENT SUMMARY   Maeve Garner is a 79 year old female who presents to therapy today with complaints of L LB/gluteal pain, hip pain radiating to knee/lateral calf starting 8/2024. She reports fell 4/2024 on stairs, hit head and L knee resulting in facial and L knee brusiing following but denies any serious injuries or medical care/evaluation.  Pt c/o L LE pain with ascending stairs, c/o tailbone pain with sitting, walking/standing limited.  Improvement with exercises found online for spondylolisthesis. Pt has continued with some of Hep from PT s/p TKA, shoulders. He consulted Dr. Briones re: current c/o: MRI (awaiting results), xray revelaing spondylolisthesis, PT referral, possible injections.       Pt describes pain level current 1/10, at best 2/10, at worst -/10.   Current functional limitations include: L LE pain with ascending stairs, c/o tailbone pain with sitting, walking/standing limited    Maeve describes prior level of function: pt denies L LB/LE c/o prior to 8/2024. Pt goals include: return to premorbid level of function.  Past medical history was reviewed with Maeve. Significant findings include:   Primary osteoarthritis of left knee 1/13/2023   Long term (current) use of anticoagulants 2/24/2022   Essential hypertension 2/24/2022   YASMIN (obstructive sleep apnea) 8/22/2017   Paroxysmal atrial fibrillation (HCC) 2/15/2017   Hyperlipidemia, mixed 9/17/2015   Senile osteoporosis    Pt reports HTN resolved with weight loss.   Surgeries: R hand tendon surgeries, R ankle surgery for ligament tear, B bunionectomy/hammertoe surgeries, ovary removal, sinus surgery, B TKA      Pt denies diplopia, dysarthria, dysphasia, dizziness, drop attacks,  bowel/bladder changes, saddle anesthesia, and JIM LE N/T.    ASSESSMENT  Maeve presents to physical therapy evaluation with primary c/o L LB, gluteal, LE pain radiating to lateral shin. The results of the objective tests and measures show: + L LE SLUMP/SLR, mild restrictions lumbar spine AROM, hip/lumbar stabilizer weakness, + increased tone/c/o L gluteals and SIJ.  Functional deficits include but are not limited to L LE pain with ascending stairs, c/o tailbone pain with sitting, walking/standing limited.  Signs and symptoms are consistent with diagnosis of L LE sciatica, spondylolisthesis. Pt and PT discussed evaluation findings, pathology, POC and HEP.  Pt voiced understanding and performs HEP correctly without reported pain. Skilled Physical Therapy is medically necessary to address the above impairments and reach functional goals.     OBJECTIVE:   Observation/Posture: Standing: no apparent scoliosis, L/R iliac crest ~ level  Neuro Screen: + L SLUMP and SLR, negative R. L/R LE dermatomes intact to light touch    Lumbar  AROM: (* denotes performed with pain)  Flexion: WNL  Extension: 20 deg   Sidebending: R WFL; L WFL  Rotation: R 80; L 80    Palpation: + increased tone/c/o L gluteals and SIJ    Strength: (* denotes performed with pain)  LE   Hip flexion (L2): R 4/5; L 4/5  Hip abduction: R 4/5; L 4/5*  Hip Extension: R 4-/5*; L 4/5   Hip ER: R 4/5; L 4/5  Hip IR: R -/5; L -/5  Knee Flexion: R 5-/5; L 5-/5   Knee extension (L3): R 5-/5; L 5-/5   DF (L4): R 5/5; L 5/5     Flexibility:   LE   Hamstrings: R/L WFL* symptoms L       Gait: pt ambulates on level ground with  WFL , no assistive device    Today’s Treatment and Response:   Pt education was provided on exam findings, treatment diagnosis, treatment plan, expectations, and prognosis. Pt was also provided recommendations for bodymechanics for lifting. Pt education re: diagnosis of spondylolisthesis using spine model (vs HNP, due to pt inquiry), sciatica;  bodymechanics for lifting reviewed  Patient was instructed in and issued a HEP for: pt reports completing/reviewed: bridges, crunches, SKC, DKC as able; advised may complete seated lumbar flexion due to pt reporting DKC difficult. Added: supine L/R across body stretch, tennis ball gluteal massage, seated hip abd using GTB    Charges: PT Eval Moderate Complexity, 1 TE (15 min), 1 TA (10 min)      Total Timed Treatment: 25 min     Total Treatment Time: 45 min     Based on clinical rationale and outcome measures, this evaluation involved Moderate Complexity decision making due to 1-2 personal factors/comorbidities, 3 body structures involved/activity limitations, and evolving symptoms including changing pain levels, co-morbidities  PLAN OF CARE:    Goals: (to be met in 6-10 visits)   Patient to report independence with PT HEP to facilitate self management and to maintain progress made in PT.   Patient to have L/R hip flex/abd/ext MMT grossly 5-/5 to facilitate transfers, stair negotiation.   Patient to have negative L SLR/SLUMP to facilitate walking.   Patient to report at least 50% improvement in L EL c/o to facilitate ADL.   Patient to reciprocally negotiate 1 flight of stairs with min - no L LE c/o.     Frequency / Duration: Patient will be seen for 2 x/week or a total of 6-8 visits over a 90 day period. Treatment will include: Gait training, Manual Therapy, Neuromuscular Re-education, Self-Care Home Management, Therapeutic Activities, Therapeutic Exercise, Home Exercise Program instruction, and Modalities to include: Electrical stimulation (unattended), Ultrasound, and MHP, cold pack    Education or treatment limitation: None  Rehab Potential:good    Oswestry Disability Index Score  Score: (Patient-Rptd) 30 % (11/5/2024  8:25 AM)      Patient/Family/Caregiver was advised of these findings, precautions, and treatment options and has agreed to actively participate in planning and for this course of care.    Thank you  for your referral. Please co-sign or sign and return this letter via fax as soon as possible to [phone]. If you have any questions, please contact me at Dept: [phone]    Sincerely,  Electronically signed by therapist: Conchita Nicole PT    Physician's certification required: Yes  I certify the need for these services furnished under this plan of treatment and while under my care.    X___________________________________________________ Date____________________    Certification From: 11/6/2024  To:2/4/2025

## 2024-11-11 ENCOUNTER — OFFICE VISIT (OUTPATIENT)
Dept: PHYSICAL THERAPY | Age: 79
End: 2024-11-11
Attending: ORTHOPAEDIC SURGERY
Payer: MEDICARE

## 2024-11-11 PROCEDURE — 97140 MANUAL THERAPY 1/> REGIONS: CPT | Performed by: PHYSICAL THERAPIST

## 2024-11-11 PROCEDURE — 97110 THERAPEUTIC EXERCISES: CPT | Performed by: PHYSICAL THERAPIST

## 2024-11-11 NOTE — PROGRESS NOTES
Diagnosis:   Spondylolisthesis at L5-S1 level (M43.17) Referring Provider: Adal Briones  Date of Evaluation:    11/6/2024    Precautions:  HTN, hx of B TKA, osteoporosis Next MD visit:   none scheduled  Date of Surgery: n/a   Insurance Primary/Secondary: MEDICARE / [employer] [place] INDEMNITY     # Auth Visits: POC every 10 visits            Subjective: Not noticing my LE all the time. Getting better with HEP.  Had f/u with Dr. Briones re: knees and LB, MRI reviewed, no further interventions for LB at this time.      Pain: 2/10      Objective:   Palpation: + increased tone/c/o L gluteals      Assessment: Pt with continued signs/symptoms of L sciatica.  Progressing interventions to address. HEP reviewed. Pt tolerated session well.       Goals:   to be met in 6-10 visits)   Patient to report independence with PT HEP to facilitate self management and to maintain progress made in PT.   Patient to have L/R hip flex/abd/ext MMT grossly 5-/5 to facilitate transfers, stair negotiation.   Patient to have negative L SLR/SLUMP to facilitate walking.   Patient to report at least 50% improvement in L EL c/o to facilitate ADL.   Patient to reciprocally negotiate 1 flight of stairs with min - no L LE c/o    Plan: Continue PT 2 x weekly for 6-8 visits. Progress gluteals stretches, strengthening, gluteal myofascial release.   Date: 11/11/2024  TX#: 2/ Date:                 TX#: 3/ Date:                 TX#: 4/ Date:                 TX#: 5/ Date:   Tx#: 6/   There Ex:   HEP review/practice; see below. May consider seated lumbar flexion due to pt c/o difficulty with supine DKC.   Supine L/R across body piriformis stretch 2 x 20 sec each  Supine L/R figure 4 stretch 2 x 20 sec each  Supine wide DKC with LE supported on ball 5 x 5 sec each   Bridge on red ball x 10 reps   Supine TA abdominal stabilization x 3 reps, with alternate LE march x 10 reps  Sit - stand from plinth x 10 reps        Manual:   STM/myofascial release L gluteals, ITB  using foam roller                       HEP: bridges, crunches, SKC, DKC as able; advised may complete seated lumbar flexion due to pt reporting DKC difficult. supine L/R across body stretch, tennis ball gluteal massage, seated hip abd using GTB. May consider MHP     Charges: 2 TE (30 min), 1 manual (8 min)       Total Timed Treatment: 38 min  Total Treatment Time: 45 min

## 2024-11-14 ENCOUNTER — OFFICE VISIT (OUTPATIENT)
Dept: PHYSICAL THERAPY | Age: 79
End: 2024-11-14
Attending: ORTHOPAEDIC SURGERY
Payer: MEDICARE

## 2024-11-14 PROCEDURE — 97140 MANUAL THERAPY 1/> REGIONS: CPT | Performed by: PHYSICAL THERAPIST

## 2024-11-14 PROCEDURE — 97110 THERAPEUTIC EXERCISES: CPT | Performed by: PHYSICAL THERAPIST

## 2024-11-14 NOTE — PROGRESS NOTES
Diagnosis:   Spondylolisthesis at L5-S1 level (M43.17) Referring Provider: Adal Briones  Date of Evaluation:    11/6/2024    Precautions:  HTN, hx of B TKA, osteoporosis Next MD visit:   none scheduled  Date of Surgery: n/a   Insurance Primary/Secondary: MEDICARE / [employer] INDEMNITY     # Auth Visits: POC every 10 visits            Subjective: ~ 80% better since L LE c/o started. Completing HEP. Less c/o with walking, stairs, LE symptoms intermittient. C/o tailbone pain when I sit.     Pain: 2/10      Objective:   Palpation: + increased tone/c/o proximal L gluteals      Assessment: Pt with continued signs/symptoms of L sciatica, SIJ irritation but progress noted.  Progressing interventions to address sciatica, SIJ irritation and spondylolistheses. Reviewed sitting posture in effort to reduce coccyx pain. Pt tolerated session well overall, some L SIJ c/o with lateral stepping that resolve when pt stopped.       Goals:   to be met in 6-10 visits)   Patient to report independence with PT HEP to facilitate self management and to maintain progress made in PT.   Patient to have L/R hip flex/abd/ext MMT grossly 5-/5 to facilitate transfers, stair negotiation.   Patient to have negative L SLR/SLUMP to facilitate walking.   Patient to report at least 50% improvement in L EL c/o to facilitate ADL.   Patient to reciprocally negotiate 1 flight of stairs with min - no L LE c/o    Plan: Continue PT 2 x weekly for 6-8 visits. Progress gluteals stretches, strengthening, gluteal myofascial release, steps, WB interventions.  Date: 11/11/2024  TX#: 2/6-8 Date:  11/14/2024               TX#: 3/6-8 Date:                 TX#: 4/ Date:                 TX#: 5/ Date:   Tx#: 6/   There Ex:   HEP review/practice; see below. May consider seated lumbar flexion due to pt c/o difficulty with supine DKC.   Supine L/R across body piriformis stretch 2 x 20 sec each  Supine L/R figure 4 stretch 2 x 20 sec each  S/L L/R hip abd x 10 reps  S/L  L/R hip ER x 10 reps   Supine wide DKC with LE supported on ball 5 x 5 sec each   Bridge on red ball x 10 reps   Supine TA abdominal stabilization x 3 reps, with alternate LE march x 10 reps  Sit - stand from plinth x 10 reps  There Ex:   S/L L/R hip abd x 10 reps - pt c/o L LE symptoms, stopped  S/L L/R hip ER x 10 reps   Supine L/R across body piriformis stretch  x 20 sec each  Supine L/R figure 4 stretch  x 20 sec each  Supine wide DKC with LE supported on ball 5 x 5 sec each   Bridge on red ball x 10 reps   Supine TA abdominal stabilization x 3 reps, with alternate LE march x 10 reps  Sit - stand from plinth x 10 reps   Lateral steps at mirror 3 x ~ 16 feet - pt c/o L SIJ c/o   4 inch lateral step up - deferred to next session  4 inch step up x 10 reps each L/R L      Manual:   STM/myofascial release L gluteals, ITB using foam roller Manual:   STM/myofascial release L gluteals, ITB using foam roller         TA:   Sitting posture: avoid sitting on sacrum/coccyx, sit on B ischial tuberosities  Sit stand exercise without fully sitting down in effort to strengthen LE and increase ease with toilet transfers x 10 reps             HEP: bridges, crunches or TA marching, SKC, DKC as able; advised may complete seated lumbar flexion due to pt reporting DKC difficult. supine L/R across body stretch, tennis ball gluteal massage, seated hip abd using GTB. May consider MHP     Charges: 2 TE (30 min), 1 manual (8 min), 0 TA (5 min)      Total Timed Treatment: 43 min  Total Treatment Time: 45 min

## 2024-11-18 ENCOUNTER — OFFICE VISIT (OUTPATIENT)
Dept: PHYSICAL THERAPY | Age: 79
End: 2024-11-18
Attending: ORTHOPAEDIC SURGERY
Payer: MEDICARE

## 2024-11-18 PROCEDURE — 97140 MANUAL THERAPY 1/> REGIONS: CPT | Performed by: PHYSICAL THERAPIST

## 2024-11-18 PROCEDURE — 97110 THERAPEUTIC EXERCISES: CPT | Performed by: PHYSICAL THERAPIST

## 2024-11-18 NOTE — PROGRESS NOTES
Diagnosis:   Spondylolisthesis at L5-S1 level (M43.17) Referring Provider: Adal Briones  Date of Evaluation:    11/6/2024    Precautions:  HTN, hx of B TKA, osteoporosis Next MD visit:   none scheduled  Date of Surgery: n/a   Insurance Primary/Secondary: MEDICARE / [employer] [place] INDEMNITY     # Auth Visits: POC every 10 visits            Subjective: Intermittent L LE symptoms, becoming less frequent. Completing HEP.   R lateral hip sore from increased walking/activity Saturday.  Think tailbone pain is related to chairs; working on changing this.     Pain: 2/10      Objective:   Palpation: + increased tone/c/o proximal L gluteals; + c/o R greater trochanter of femur    Gait: mildly antalgic, lateral trunk lean (pt c/o R lateral hip discomfort.)      Assessment: Pt with continued signs/symptoms of L sciatica, SIJ irritation but progress noted.  Pt with R lateral hip c/o suggestive of trochanteric bursitis/hip tendon irritation from increased walking/activity. Reviewed gluteal stretches in effort to reduce c/o, strengthening for prevention. Progressing interventions to address sciatica, SIJ irritation and spondylolistheses. Reviewed sitting posture/cushion in effort to reduce coccyx pain. Pt tolerated session well overall, pt reported felt better at close of PT session.     Goals:   to be met in 6-10 visits)   Patient to report independence with PT HEP to facilitate self management and to maintain progress made in PT.   Patient to have L/R hip flex/abd/ext MMT grossly 5-/5 to facilitate transfers, stair negotiation.   Patient to have negative L SLR/SLUMP to facilitate walking.   Patient to report at least 50% improvement in L EL c/o to facilitate ADL.   Patient to reciprocally negotiate 1 flight of stairs with min - no L LE c/o    Plan: Continue PT 2 x weekly for 6-8 visits. F/u on sitting posture, R lateral hip c/o. Progress gluteals stretches, strengthening, gluteal myofascial release, steps, WB  interventions.  Date: 11/11/2024  TX#: 2/6-8 Date:  11/14/2024               TX#: 3/6-8 Date:   11/18/2024              TX#: 4/6-8 Date:                 TX#: 5/ Date:   Tx#: 6/ There Ex:   HEP review/practice; see below. May consider seated lumbar flexion due to pt c/o difficulty with supine DKC.   Supine L/R across body piriformis stretch 2 x 20 sec each  Supine L/R figure 4 stretch 2 x 20 sec each  S/L L/R hip abd x 10 reps  S/L L/R hip ER x 10 reps   Supine wide DKC with LE supported on ball 5 x 5 sec each   Bridge on red ball x 10 reps   Supine TA abdominal stabilization x 3 reps, with alternate LE march x 10 reps  Sit - stand from plinth x 10 reps  There Ex:   Please  see Daily Note for details. There Ex:   S/L L/R hip abd x 10 reps - pt c/o L LE symptoms, resolved when pt stopped  S/L L/R hip ER x 10 reps   Supine L/R across body piriformis stretch  x 20 sec each  Supine L/R figure 4 stretch  x 20 sec each  Supine wide DKC with LE supported on ball 5 x 5 sec each, knees to chest 5 x 5 sec each  Bridge on red ball x 15 reps   Supine TA abdominal stabilization  with alternate LE march x 10 reps  Sit - stand from plinth x 10 reps   4 inch lateral step up x 5 reps L/R   4 inch step up x 5 reps each L/R   Bridge with pillow for adductor isometric x 10 reps (HEP progression)     Manual:   STM/myofascial release L gluteals, ITB using foam roller Manual:   STM/myofascial release L/R gluteals, ITB using foam roller Manual:   STM/myofascial release L/R gluteals, ITB using foam roller        TA:   Sitting posture: avoid sitting on sacrum/coccyx, sit on B ischial tuberosities  Sit stand exercise without fully sitting down in effort to strengthen LE and increase ease with toilet transfers x 10 reps TA:   Reviewed sitting posture: avoid sitting on sacrum/coccyx, sit on B ischial tuberosities, appropriate cushion/support            HEP: bridges with/without pillow, crunches or TA marching, SKC, DKC as able; advised may  complete seated lumbar flexion due to pt reporting DKC difficult. supine L/R across body piriformis stretch (may consider figure 4), tennis ball gluteal massage, seated hip abd using GTB. May consider MHP     Charges: 2 TE (30 min), 1 manual (10 min), 0 TA (5 min)      Total Timed Treatment: 45 min  Total Treatment Time: 45 min

## 2024-11-20 ENCOUNTER — OFFICE VISIT (OUTPATIENT)
Dept: PHYSICAL THERAPY | Age: 79
End: 2024-11-20
Attending: ORTHOPAEDIC SURGERY
Payer: MEDICARE

## 2024-11-20 PROCEDURE — 97140 MANUAL THERAPY 1/> REGIONS: CPT | Performed by: PHYSICAL THERAPIST

## 2024-11-20 PROCEDURE — 97110 THERAPEUTIC EXERCISES: CPT | Performed by: PHYSICAL THERAPIST

## 2024-11-20 NOTE — PROGRESS NOTES
Diagnosis:   Spondylolisthesis at L5-S1 level (M43.17) Referring Provider: Adal Briones  Date of Evaluation:    11/6/2024    Precautions:  HTN, hx of B TKA, osteoporosis Next MD visit:   none scheduled  Date of Surgery: n/a   Insurance Primary/Secondary: MEDICARE / [employer] [place] INDEMNITY     # Auth Visits: POC every 10 visits            Subjective:  C/o some increased L LE c/o following last session; it went away after a couple of hours. Continue to get better. Completing HEP.     Pain: ~ 1/10      Objective:   Palpation: + increased tone/c/o proximal L/R gluteals, greater trochanters of femurs, ITBs    Gait: mild lateral trunk lean      Assessment:   Modified PT session based on Subjective; pt tolerated well without increased c/o. Reviewed/progressed HEP and interventions to address SIJ, gluteal, sciatica involvement, spondylolisthesis; see below. Pt reporting continued progress with PT.     Goals:   to be met in 6-10 visits)   Patient to report independence with PT HEP to facilitate self management and to maintain progress made in PT.   Patient to have L/R hip flex/abd/ext MMT grossly 5-/5 to facilitate transfers, stair negotiation.   Patient to have negative L SLR/SLUMP to facilitate walking.   Patient to report at least 50% improvement in L EL c/o to facilitate ADL.   Patient to reciprocally negotiate 1 flight of stairs with min - no L LE c/o    Plan: Continue PT 2 x weekly for 6-8 visits. F/u on response to today's PT session. Progress gluteals stretches, strengthening, gluteal myofascial release, steps, WB interventions, sciatic nerve glides, SIJ interventions.  Date: 11/11/2024  TX#: 2/6-8 Date:  11/14/2024               TX#: 3/6-8 Date:   11/18/2024              TX#: 4/6-8 Date: 11/20/2024  Tx#: 5/5-8   There Ex:   HEP review/practice; see below. May consider seated lumbar flexion due to pt c/o difficulty with supine DKC.   Supine L/R across body piriformis stretch 2 x 20 sec each  Supine L/R figure 4  stretch 2 x 20 sec each  S/L L/R hip abd x 10 reps  S/L L/R hip ER x 10 reps   Supine wide DKC with LE supported on ball 5 x 5 sec each   Bridge on red ball x 10 reps   Supine TA abdominal stabilization x 3 reps, with alternate LE march x 10 reps  Sit - stand from plinth x 10 reps  There Ex:   Please  see Daily Note for details. There Ex:   S/L L/R hip abd x 10 reps - pt c/o L LE symptoms, resolved when pt stopped  S/L L/R hip ER x 10 reps   Supine L/R across body piriformis stretch  x 20 sec each  Supine L/R figure 4 stretch  x 20 sec each  Supine wide DKC with LE supported on ball 5 x 5 sec each, knees to chest 5 x 5 sec each  Bridge on red ball x 15 reps   Supine TA abdominal stabilization  with alternate LE march x 10 reps  Sit - stand from plinth x 10 reps   4 inch lateral step up x 5 reps L/R   4 inch step up x 5 reps each L/R   Bridge with pillow for adductor isometric x 10 reps (HEP progression) There Ex:   HEP review/progression; see below.   Supine L/R across body piriformis stretch  x 20 sec each  Supine L/R figure 4 stretch  x 20 sec each  Supine L/R sciatic nerve glide x 10 reps each ( may consider for HEP)  Supine wide DKC with LE supported on ball 5 x 5 sec each, knees to chest 5 x 5 sec each  S/L L/R hip ER x ~ 20 reps  S/L hip circles - c/o, stopped  Bridge with pillow for adductor isometric x 20 reps (HEP)  PT resisted B hip ER <-> IR 5 x 5 sec each  Supine TA abdominal stabilization  with alternate LE march x 10 reps, progressions reviewed but too advanced for now   Sit - stand x 10 reps, with GTB for hip abd x 10 reps     Manual:   STM/myofascial release L gluteals, ITB using foam roller Manual:   STM/myofascial release L/R gluteals, ITB using foam roller Manual:   STM/myofascial release L/R gluteals, ITB using foam roller Manual:   STM/myofascial release L/R gluteals, ITB using foam roller      TA:   Sitting posture: avoid sitting on sacrum/coccyx, sit on B ischial tuberosities  Sit stand  exercise without fully sitting down in effort to strengthen LE and increase ease with toilet transfers x 10 reps TA:   Reviewed sitting posture: avoid sitting on sacrum/coccyx, sit on B ischial tuberosities, appropriate cushion/support          HEP: bridges with/without pillow, crunches or TA marching, SKC, DKC as able; advised may complete seated lumbar flexion due to pt reporting DKC difficult. supine L/R across body piriformis stretch (may consider figure 4), tennis ball gluteal massage, seated hip abd using GTB - may progress with lateral steps at countertop, sciatic nerve glides. May consider MHP     Charges: 2 TE (30 min), 1 manual (10 min) Total Timed Treatment: 40 min  Total Treatment Time: 42 min

## 2024-12-03 ENCOUNTER — OFFICE VISIT (OUTPATIENT)
Dept: AUDIOLOGY | Facility: CLINIC | Age: 79
End: 2024-12-03
Payer: MEDICARE

## 2024-12-03 DIAGNOSIS — H90.3 ASYMMETRIC SNHL (SENSORINEURAL HEARING LOSS): Primary | ICD-10-CM

## 2024-12-03 PROCEDURE — 92557 COMPREHENSIVE HEARING TEST: CPT | Performed by: AUDIOLOGIST

## 2024-12-03 PROCEDURE — 92567 TYMPANOMETRY: CPT | Performed by: AUDIOLOGIST

## 2024-12-03 NOTE — TELEPHONE ENCOUNTER
I left a message for the patient.  There has been worsening of the hearing on the left ear.  The understanding score is also diminished.  As she does already have a hearing aid for this ear I did tell her she could take a copy of the audiogram and see if they could adjusted for her.  The only imaging I can find is an MRI from 2016 and that did not show any retrocochlear disease.

## 2024-12-11 ENCOUNTER — OFFICE VISIT (OUTPATIENT)
Dept: ORTHOPEDICS CLINIC | Facility: CLINIC | Age: 79
End: 2024-12-11
Payer: MEDICARE

## 2024-12-11 VITALS — WEIGHT: 162 LBS | HEIGHT: 61 IN | BODY MASS INDEX: 30.58 KG/M2

## 2024-12-11 DIAGNOSIS — M65.4 TENOSYNOVITIS OF RADIAL STYLOID: ICD-10-CM

## 2024-12-11 DIAGNOSIS — G56.02 CARPAL TUNNEL SYNDROME OF LEFT WRIST: Primary | ICD-10-CM

## 2024-12-11 PROCEDURE — 99212 OFFICE O/P EST SF 10 MIN: CPT | Performed by: ORTHOPAEDIC SURGERY

## 2024-12-11 NOTE — PROGRESS NOTES
Clinic Note       Assessment/Plan:  79 year old female    Left carpal tunnel syndrome-EMG/NCV confirmed in 2007 -continues to have mild intermittent tingling with certain activities.  Recommend observation.  If symptoms progress to more constant tingling, numbness and weakness would recommend surgical intervention at that time  Left de Quervain's tenosynovitis-status post 2 corticosteroid injection with resolution of symptoms.  No additional treatment is recommended at this time  Left wrist DRUJ arthritis-asymptomatic with no synovitis of the DRUJ joint.  History of negative rheumatoid arthritis workup.  History of middle ring and small finger EDC rupture likely secondary to DRUJ arthritis and synovitis.  Observation is recommended.  Tendon rupture on the right side was preceded with severe pain.  Patient was instructed to inform me if similar pain develops on the left side  Left thumb CMC arthritis -asymptomatic.  Continue observation  Right carpal tunnel syndrome  Eliquis, History of surgery of the right hand-synovitis of the right hand in 2010 resulting in tendon rupture.  The middle and ring finger were repaired together and the pinky finger only has 1 tendon    Follow Up: As needed    Diagnostic Studies:     EMG/NCV: Positive for carpal tunnel syndrome 20 years ago  X-ray left wrist 3 views: Advanced degenerative changes of the DRUJ joint with erosion of the distal ulna into the radius.  There is subchondral cyst formation.  There is mild radiocarpal joint arthritis and advanced degenerative changes of the left thumb CMC joint with subluxation.     Physical Exam:     Ht 5' 1\" (1.549 m)   Wt 162 lb (73.5 kg)   BMI 30.61 kg/m²     Left Upper Extremity:     Inspection    Skin intact. No joint effusion. No obvious mass visualized.  CMC joint deformity with metacarpal base subluxation/the deformity Palpation    No tenderness to palpation of the first dorsal compartment.  Negative Finkelstein's test no pain with  CMC grind or seesaw test      ROM    Full composite fist. Normal wrist/elbow motion.     Median Nerve Exam    Left   Phdurkan +   Sensation normal   PCBr Sensation normal   APB Weakness No   Thenar Atrophy No   Ulnar Nerve Exam   Sensation normal   1st TEJAS Weakness No   SF Escape No   Hypothenar Atrophy No   Radial Nerve Exam    Radial Sensory Normal        CC: Pain and tingling bilateral hands    HPI: This 79 year old RHD female presents with pain and tingling bilateral hands mostly left side.  She was previously diagnosed with carpal tunnel syndrome 20 years ago but the symptoms resolved    Onset: 3-6 months ago  Pain Character: Intermittent tingling and shooting.  Constant aching pain.  Tingling gets to the point where its painful and cannot be shaking out on the left side.  Pain goes up into the forearm.  Patient reports swelling at the wrist and just above it.  The tingling on the right side is much less.    Treatments Tried: None    Interval Hx (10/30/2024): Patient reports response to the steroid injection but reoccurrence.  Currently more painful than it was prior to the injection    Interval Hx (12/11/2024): Patient reports complete resolution of the de Quervain's tenosynovitis.  She occasionally gets tingling which is intermittent most commonly when holding a book for a period of time    Occupation: Retired business/    Past Medical History:    Age-related nuclear cataract of both eyes    Arrhythmia    A-fib-2017 dx, ablation 11/9/22    Atrial fibrillation (HCC)    Ramírez's esophagus    Brachial neuritis    Constipation    DUB (dysfunctional uterine bleeding)    per ng LIBBY    Esophageal reflux    Esophagitis    per ng eosinophilic egd w/ biopsy    Esophagitis    pr ng egd w/ biopsy    Floater, vitreous, left    Floater, vitreous, left    Gastritis    Glaucoma suspect of both eyes    Glaucoma suspect of both eyes    Glaucoma suspect of both eyes    Glaucoma suspect of both eyes    Hearing  impairment    left ear hearing aid    Heartburn    per ng reflux, dysphagia    High blood pressure    High cholesterol    History of blood transfusion    r/t childbirth - no rx    Long term (current) use of anticoagulants    Meniere disease    Osteopenia    Osteopenia determined by x-ray    Other and unspecified hyperlipidemia    Paroxysmal atrial fibrillation (HCC)    Pseudoexfoliation (PXF) of lens capsule    Pseudoexfoliation (PXF) of lens capsule    Pseudoexfoliation (PXF) of lens capsule    Pseudoexfoliation (PXF) of lens capsule    Seasonal allergic rhinitis    Sensorineural hearing loss, unilateral    Sleep apnea    mouth guard    Snoring    per ng uvulectomy    Spinal stenosis of lumbar region    Visual impairment    glasses     Past Surgical History:   Procedure Laterality Date    Colonoscopy  08/26/2011    Colonoscopy      Colonoscopy N/A 02/03/2020    Procedure: COLONOSCOPY;  Surgeon: Omid De La Rosa MD;  Location: [facility] ENDOSCOPY    Cortisone injection Right 2010    per ng post op right forefoot    Ep a-flutter ablation  11/09/2022         Ep pulmonary vein/a-fib ablation  11/09/2022         Excise hand/foot neuroma Right 2007    per ng neuroma 2 rt, hammertoe 3rt, naprosyn 500    Excise hand/foot neuroma Left 2009    per ng 2 left hammer toe 2 rt, hallux valqus naprosyn 500    Excision of uvula  2006    per ng  uvula removed     Hand/finger surgery unlisted Right 2011    per ng hand synovitis surgery    Hysterectomy      partial at 34    Injection; tendon origin/insertion      per ng injection tendon sheath, ligament    Other surgical history Right 1979    per ng torn ligament right ankle    Upper gi endoscopy performed  2015    Upper gi endoscopy,exam       Current Outpatient Medications   Medication Sig Dispense Refill    ipratropium 0.06 % Nasal Solution 2 sprays by Nasal route 3 (three) times daily. 45 mL 2    benzonatate (TESSALON PERLES) 100 MG Oral Cap Take 1 capsule (100 mg total) by mouth 3  (three) times daily as needed for cough. 30 capsule 0    omeprazole 20 MG Oral Capsule Delayed Release Take 1 capsule (20 mg total) by mouth every morning before breakfast. 90 capsule 3    docusate sodium 100 MG Oral Cap Take 100 mg by mouth 2 (two) times daily. 20 capsule 0    apixaban (ELIQUIS) 5 MG Oral Tab Take 1 tablet (5 mg total) by mouth 2 (two) times daily. Last dose to be taken on Nov.4th,2023      Probiotic Product (ALIGN OR) Take 1 tablet by mouth daily.      Simethicone (GAS RELIEF 80 OR) Take 1 tablet by mouth as needed.      sotalol 80 MG Oral Tab Take 0.5 tablets (40 mg total) by mouth 2 (two) times daily.      mupirocin 2 % External Ointment Apply with cotton swab to bilateral nares twice daily as needed for crusting 15 g 2    atorvastatin 10 MG Oral Tab Take 1 tablet (10 mg total) by mouth nightly. 30 tablet 5    Polyethylene Glycol 3350 (MIRALAX OR) Take by mouth.      Cyanocobalamin (VITAMIN B 12 OR) Take 1,000 mg by mouth daily with breakfast.      Calcium Carbonate-Vitamin D (CALCIUM + D OR) Take by mouth daily. Take 500mg & 1000 IU Vit D daily      omega-3 fatty acids (FISH OIL) 1000 MG Oral Cap Take 500 mg by mouth daily.      Multiple Vitamins-Minerals (MULTI FOR HER OR) Take 1 tablet by mouth daily.         Allergies   Allergen Reactions    Grass Pollen(K-O-R-T-Swt Mauricio) OTHER (SEE COMMENTS)    Dust Runny nose     Headache, watery eyes    Mold Runny nose     Headache, watery eyes    Pollen Runny nose     Headache, watery eyes    Ragweed OTHER (SEE COMMENTS)     Watery eyes , Sneezing, HA's      Trees, Runge Runny nose     Headache, watery eyes     Family History   Problem Relation Age of Onset    Cancer Father         per ng lung    Breast Cancer Mother 60        approx    Other (Other) Mother     Other (MS) Sister     Heart Disease Other         per ng CAD, vein, artery, liver disease, arthritis    Other (Other) Other         per ng lupus erythematosus    Cancer Paternal Uncle          liver cancer    Cancer Paternal Uncle         stomach cancer    Cancer Maternal Uncle         stomach cancer    Cancer Maternal Uncle     Colon Polyps Maternal Uncle         bone cancer    Macular degeneration Neg     Glaucoma Neg     Diabetes Neg      Social History     Occupational History    Not on file   Tobacco Use    Smoking status: Former     Current packs/day: 0.00     Average packs/day: 0.5 packs/day for 5.0 years (2.5 ttl pk-yrs)     Types: Cigarettes     Start date: 1963     Quit date: 1968     Years since quittin.2     Passive exposure: Past    Smokeless tobacco: Never   Vaping Use    Vaping status: Never Used   Substance and Sexual Activity    Alcohol use: Not Currently     Alcohol/week: 0.0 standard drinks of alcohol     Comment: formerly since 2016, rare alcohol currently    Drug use: No    Sexual activity: Not on file      Assessment     Review of Systems (negative unless bolded):  General: fevers, chills, fatigue  CV:  chest pain, palpitations, leg swelling  Msk: bodyaches, neck pain, neck stiffness  Skin: rashes, open wounds, nonhealing ulcers  Hem: bleeds easily, bruise easily, immunocompromised  Neuro: dizziness, light headedness, headaches  Psych: anxious, depressed, anger issues    Syed Larson MD   Hand, Wrist[facility] Elbow Surgery  [email or web address]  t: [phone]  f: [phone]

## 2024-12-12 ENCOUNTER — OFFICE VISIT (OUTPATIENT)
Dept: PHYSICAL THERAPY | Age: 79
End: 2024-12-12
Attending: ORTHOPAEDIC SURGERY
Payer: MEDICARE

## 2024-12-12 PROCEDURE — 97140 MANUAL THERAPY 1/> REGIONS: CPT | Performed by: PHYSICAL THERAPIST

## 2024-12-12 PROCEDURE — 97110 THERAPEUTIC EXERCISES: CPT | Performed by: PHYSICAL THERAPIST

## 2024-12-12 NOTE — PROGRESS NOTES
Diagnosis:   Spondylolisthesis at L5-S1 level (M43.17) Referring Provider: Adal Briones  Date of Evaluation:    11/6/2024    Precautions:  HTN, hx of B TKA, osteoporosis Next MD visit:   none scheduled  Date of Surgery: n/a   Insurance Primary/Secondary: MEDICARE / [employer] [place] INDEMNITY     # Auth Visits: POC every 10 visits         Subjective:  C/o  R and L lateral shin symptoms over past few week. Dr. Dorsey consultation 2/2024.  Had consultation with Dr. Larson re: hand issues  - tenosynovitigs and CTS. LE/back have improved, but c/o persist. Deny c/o following last PT session.     Pain: ~ 1/10      Objective:   Palpation: + increased tone/c/o proximal L/R gluteals, lumbar spine region,  greater trochanters of femurs, ITBs    Gait: mild lateral trunk lean    Knee flexion AROM: R ~ 110 deg +, L ~ 95 deg      Assessment:   Pt returns to Pt after last attending 11/20/2024. Pt reporting lateral shin/peroneal region paraesthesia at times with walking. Pt's c/o may be suggestive of lumbar stenosis, but unable to r/o peroneal nerve involvement. Progressing interventions to address stenosis, c/o L knee flexion stiffness following hx of TA, sciatica, muscular c/o. Pt tolerated session well. Consultation with Dr. Dorsey advisable as scheduled should back/LE c/o persist.     Goals:   to be met in 6-10 visits)   Patient to report independence with PT HEP to facilitate self management and to maintain progress made in PT.   Patient to have L/R hip flex/abd/ext MMT grossly 5-/5 to facilitate transfers, stair negotiation.   Patient to have negative L SLR/SLUMP to facilitate walking.   Patient to report at least 50% improvement in L EL c/o to facilitate ADL.   Patient to reciprocally negotiate 1 flight of stairs with min - no L LE c/o    Plan: Continue PT 2 x weekly for up to 8 visits. F/u on L knee flexion ROM. Progress gluteals stretches, strengthening, gluteal myofascial release, steps, WB interventions, sciatic nerve  glides, SIJ interventions, lumbar flexion.  Date: 11/11/2024  TX#: 2/6-8 Date:  11/14/2024               TX#: 3/6-8 Date:   11/18/2024              TX#: 4/6-8 Date: 11/20/2024  Tx#: 5/5-8 Date: 12/12/2024  Tx #: 6/8   There Ex:   HEP review/practice; see below. May consider seated lumbar flexion due to pt c/o difficulty with supine DKC.   Supine L/R across body piriformis stretch 2 x 20 sec each  Supine L/R figure 4 stretch 2 x 20 sec each  S/L L/R hip abd x 10 reps  S/L L/R hip ER x 10 reps   Supine wide DKC with LE supported on ball 5 x 5 sec each   Bridge on red ball x 10 reps   Supine TA abdominal stabilization x 3 reps, with alternate LE march x 10 reps  Sit - stand from plinth x 10 reps  There Ex:   Please  see Daily Note for details. There Ex:   S/L L/R hip abd x 10 reps - pt c/o L LE symptoms, resolved when pt stopped  S/L L/R hip ER x 10 reps   Supine L/R across body piriformis stretch  x 20 sec each  Supine L/R figure 4 stretch  x 20 sec each  Supine wide DKC with LE supported on ball 5 x 5 sec each, knees to chest 5 x 5 sec each  Bridge on red ball x 15 reps   Supine TA abdominal stabilization  with alternate LE march x 10 reps  Sit - stand from plinth x 10 reps   4 inch lateral step up x 5 reps L/R   4 inch step up x 5 reps each L/R   Bridge with pillow for adductor isometric x 10 reps (HEP progression) There Ex:   HEP review/progression; see below.   Supine L/R across body piriformis stretch  x 20 sec each  Supine L/R figure 4 stretch  x 20 sec each  Supine L/R sciatic nerve glide x 10 reps each ( may consider for HEP)  Supine wide DKC with LE supported on ball 5 x 5 sec each, knees to chest 5 x 5 sec each  S/L L/R hip ER x ~ 20 reps  S/L hip circles - c/o, stopped  Bridge with pillow for adductor isometric x 20 reps (HEP)  PT resisted B hip ER <-> IR 5 x 5 sec each  Supine TA abdominal stabilization  with alternate LE march x 10 reps, progressions reviewed but too advanced for now   Sit - stand x 10  reps, with GTB for hip abd x 10 reps   There Ex:   Knee flexion ROM options: attempted supine - pt c/o, copleted supine and reviewed using stairs.   HEP review; see below  Hip L/R s/l ER x 20 reps   Supine DKC with LE supported on ball 5  x 5 sec each   6 inch lateral step up with contralateral hip abd x 10 reps   Monster walk with RTB prox to knees ~ 3 x ~ 16 feet  B gastroc stretch on slant board 2 x 20 sec each   Manual:   STM/myofascial release L gluteals, ITB using foam roller Manual:   STM/myofascial release L/R gluteals, ITB using foam roller Manual:   STM/myofascial release L/R gluteals, ITB using foam roller Manual:   STM/myofascial release L/R gluteals, ITB using foam roller Manual:   STM/myofascial release L/R gluteals, ITB using foam roller             TA:   Sitting posture: avoid sitting on sacrum/coccyx, sit on B ischial tuberosities  Sit stand exercise without fully sitting down in effort to strengthen LE and increase ease with toilet transfers x 10 reps TA:   Reviewed sitting posture: avoid sitting on sacrum/coccyx, sit on B ischial tuberosities, appropriate cushion/support            HEP: bridges with/without pillow, crunches or TA marching, SKC, DKC as able; advised may complete seated lumbar flexion due to pt reporting DKC difficult. supine L/R across body piriformis stretch (may consider figure 4), tennis ball gluteal massage, seated hip abd using GTB - may progress with lateral steps at countertop, sciatic nerve glides, squats (pt reports completing at home).  May consider MHP     Charges: 2 TE (30 min), 1 manual (10 min) Total Timed Treatment: 40 min  Total Treatment Time: 42 min

## 2024-12-19 ENCOUNTER — OFFICE VISIT (OUTPATIENT)
Dept: PHYSICAL THERAPY | Age: 79
End: 2024-12-19
Attending: ORTHOPAEDIC SURGERY
Payer: MEDICARE

## 2024-12-19 PROCEDURE — 97110 THERAPEUTIC EXERCISES: CPT | Performed by: PHYSICAL THERAPIST

## 2024-12-19 PROCEDURE — 97140 MANUAL THERAPY 1/> REGIONS: CPT | Performed by: PHYSICAL THERAPIST

## 2024-12-19 NOTE — PROGRESS NOTES
Diagnosis:   Spondylolisthesis at L5-S1 level (M43.17) Referring Provider: Adal Briones  Date of Evaluation:    11/6/2024    Precautions:  HTN, hx of B TKA, osteoporosis Next MD visit:   none scheduled  Date of Surgery: n/a   Insurance Primary/Secondary: MEDICARE / [employer] [place] INDEMNITY     # Auth Visits: POC every 10 visits         Subjective: LE improving; much better than in the Fall. C/o R lateral hip c/o, deny distal LE c/o.  Dr. Dorsey consultation 2/2024.    Pain: ~ 1/10      Objective:   Palpation: + increased tone/c/o proximal L/R gluteals, lumbar spine region,  greater trochanters of femurs, ITBs    Gait: mild lateral trunk lean    Knee flexion AROM: R ~ 110 deg +, L ~ 95 deg      Assessment:   Pt progressing, reporting less LE symptoms.   HEP  reviewed. Pt tolerated session well.     Goals:   to be met in 6-10 visits)   Patient to report independence with PT HEP to facilitate self management and to maintain progress made in PT.   Patient to have L/R hip flex/abd/ext MMT grossly 5-/5 to facilitate transfers, stair negotiation.   Patient to have negative L SLR/SLUMP to facilitate walking.   Patient to report at least 50% improvement in L EL c/o to facilitate ADL.   Patient to reciprocally negotiate 1 flight of stairs with min - no L LE c/o    Plan: Continue PT 2 x weekly for up to 8 visits. Progress gluteals stretches, lumbar stabilization exercises, strengthening, gluteal myofascial release, steps, WB interventions, sciatic nerve glides, SIJ interventions, lumbar flexion.  Date:  11/14/2024               TX#: 3/6-8 Date:   11/18/2024              TX#: 4/6-8 Date: 11/20/2024  Tx#: 5/5-8 Date: 12/12/2024  Tx #: 6/8 Date: 12/19/2024  Tx #: 7/8   There Ex:   Please  see Daily Note for details. There Ex:   S/L L/R hip abd x 10 reps - pt c/o L LE symptoms, resolved when pt stopped  S/L L/R hip ER x 10 reps   Supine L/R across body piriformis stretch  x 20 sec each  Supine L/R figure 4 stretch  x 20 sec  each  Supine wide DKC with LE supported on ball 5 x 5 sec each, knees to chest 5 x 5 sec each  Bridge on red ball x 15 reps   Supine TA abdominal stabilization  with alternate LE march x 10 reps  Sit - stand from plinth x 10 reps   4 inch lateral step up x 5 reps L/R   4 inch step up x 5 reps each L/R   Bridge with pillow for adductor isometric x 10 reps (HEP progression) There Ex:   HEP review/progression; see below.   Supine L/R across body piriformis stretch  x 20 sec each  Supine L/R figure 4 stretch  x 20 sec each  Supine L/R sciatic nerve glide x 10 reps each ( may consider for HEP)  Supine wide DKC with LE supported on ball 5 x 5 sec each, knees to chest 5 x 5 sec each  S/L L/R hip ER x ~ 20 reps  S/L hip circles - c/o, stopped  Bridge with pillow for adductor isometric x 20 reps (HEP)  PT resisted B hip ER <-> IR 5 x 5 sec each  Supine TA abdominal stabilization  with alternate LE march x 10 reps, progressions reviewed but too advanced for now   Sit - stand x 10 reps, with GTB for hip abd x 10 reps   There Ex:   Knee flexion ROM options: attempted supine - pt c/o, copleted supine and reviewed using stairs.   HEP review; see below  Hip L/R s/l ER x 20 reps   Supine DKC with LE supported on ball 5  x 5 sec each   6 inch lateral step up with contralateral hip abd x 10 reps   Monster walk with RTB prox to knees ~ 3 x ~ 16 feet  B gastroc stretch on slant board 2 x 20 sec each There Ex:  HEP review, including priority exercises; see below. Consider stationary bike.   Recumbent bike: level 1 x 3 min  Hip L/R s/l ER x 20 reps   Supine TA lumbar stabilization x 5 reps, with alternate marching x ~ 20 reps   Supine DKC with LE supported on ball 5  x 5 sec each   Bridge on red ball x 10 reps  Supine L/R across body stretch L/R 2 x 20 sec each  Supine L/R sciatic nerve glides 5 x 5 sec each  6 inch lateral step up with contralateral hip abd x 10 reps   B gastroc stretch on slant board 2 x 20 sec each   Manual:    STM/myofascial release L/R gluteals, ITB using foam roller Manual:   STM/myofascial release L/R gluteals, ITB using foam roller Manual:   STM/myofascial release L/R gluteals, ITB using foam roller Manual:   STM/myofascial release L/R gluteals, ITB using foam roller Manual:   STM/myofascial release L/R gluteals, ITB using foam roller          TA:   Sitting posture: avoid sitting on sacrum/coccyx, sit on B ischial tuberosities  Sit stand exercise without fully sitting down in effort to strengthen LE and increase ease with toilet transfers x 10 reps TA:   Reviewed sitting posture: avoid sitting on sacrum/coccyx, sit on B ischial tuberosities, appropriate cushion/support             HEP: bridges with/without pillow*, crunches or TA marching, SKC*, DKC as able; advised may complete seated lumbar flexion due to pt reporting DKC difficult. supine L/R across body piriformis stretch* (may consider figure 4), tennis ball gluteal massage, seated hip abd using GTB - may progress with lateral steps at countertop*, sciatic nerve glides, squats (pt reports completing at home).  May consider MHP.  * = priority exercises    Charges: 2 TE (30 min), 1 manual (10 min) Total Timed Treatment: 40 min  Total Treatment Time: 42 min

## 2025-01-06 ENCOUNTER — OFFICE VISIT (OUTPATIENT)
Dept: PHYSICAL THERAPY | Age: 80
End: 2025-01-06
Attending: ORTHOPAEDIC SURGERY
Payer: MEDICARE

## 2025-01-06 PROCEDURE — 97110 THERAPEUTIC EXERCISES: CPT | Performed by: PHYSICAL THERAPIST

## 2025-01-06 PROCEDURE — 97140 MANUAL THERAPY 1/> REGIONS: CPT | Performed by: PHYSICAL THERAPIST

## 2025-01-06 NOTE — PROGRESS NOTES
[facility]  Pt has attended 8 visits in Physical Therapy.     Diagnosis:   Spondylolisthesis at L5-S1 level (M43.17) Referring Provider: Adal Briones  Date of Evaluation:    11/6/2024    Precautions:  HTN, hx of B TKA, osteoporosis Next MD visit:   none scheduled  Date of Surgery: n/a   Insurance Primary/Secondary: MEDICARE / [employer] [place] INDEMNITY     # Auth Visits: POC every 10 visits         Subjective: LE are good. Much better overall.  Can continue with HEP after today's session.  Have consultation with Dr. Dorsey 2/2025.  C/o mid back pain at times with sitting, but not consisetent. 80% better but tailbone still hurts at times.    Pain: ~ 1/10      Objective:   Palpation: + increased tone/c/o L gluteals, SIJ, greater trochanter of femur, ITB; denies R    Neuro Screen: + L SLUMP and SLR, negative R. L/R LE dermatomes intact to light touch    Lumbar  AROM: (* denotes performed with pain)  Flexion: WNL  Extension: 20 deg   Sidebending: R WFL; L WFL  Rotation: R 80; L 80      Strength: (* denotes performed with pain)  LE   Hip abduction: R 4/5; L 4-/5*  Hip Extension: R 4/5; L 4/5   Hip ER: R 4/5; L 4/5  Hip IR: R -/5; L -/5  Knee Flexion: R 5-/5; L 5-/5   Knee extension (L3): R 5-/5; L 5-/5   DF (L4): R 5/5; L 5/5     Flexibility:   LE   Hamstrings: R/L WFL* symptoms L       Gait: pt ambulates on level ground with WFL, no assistive device      Assessment:   Pt reports progress, less LBP/LE c/o, feels that she can continue with HEP after today's session.   Pt with residual + sciatic nerve tests, hip weakness. HEP reviewed to address. Briefly discussed considerations for thoracic spine (see below) but advised physician consultation if c/o persist.     Goals:   to be met in 6-10 visits)   Patient to report independence with PT HEP to facilitate self management and to maintain progress made in PT. - MET  Patient to have L/R hip flex/abd/ext MMT grossly 5-/5 to facilitate transfers, stair negotiation. -  PROGRESS  Patient to have negative L SLR/SLUMP to facilitate walking. - PROGRESS  Patient to report at least 50% improvement in L EL c/o to facilitate ADL. - MET  Patient to reciprocally negotiate 1 flight of stairs with min - no L LE c/o - NEARL YMET    Plan: Discharge patient from PT to HEP due to progress, pt preference, I c HEP. Pt scheduled for consultation with Dr. Dorsey 2/2025.   Date: 11/20/2024  Tx#: 5/5-8 Date: 12/12/2024  Tx #: 6/8 Date: 12/19/2024  Tx #: 7/8 Date: 1/6/2025  Tx #: 8/8   There Ex:   HEP review/progression; see below.   Supine L/R across body piriformis stretch  x 20 sec each  Supine L/R figure 4 stretch  x 20 sec each  Supine L/R sciatic nerve glide x 10 reps each ( may consider for HEP)  Supine wide DKC with LE supported on ball 5 x 5 sec each, knees to chest 5 x 5 sec each  S/L L/R hip ER x ~ 20 reps  S/L hip circles - c/o, stopped  Bridge with pillow for adductor isometric x 20 reps (HEP)  PT resisted B hip ER <-> IR 5 x 5 sec each  Supine TA abdominal stabilization  with alternate LE march x 10 reps, progressions reviewed but too advanced for now   Sit - stand x 10 reps, with GTB for hip abd x 10 reps   There Ex:   Knee flexion ROM options: attempted supine - pt c/o, copleted supine and reviewed using stairs.   HEP review; see below  Hip L/R s/l ER x 20 reps   Supine DKC with LE supported on ball 5  x 5 sec each   6 inch lateral step up with contralateral hip abd x 10 reps   Monster walk with RTB prox to knees ~ 3 x ~ 16 feet  B gastroc stretch on slant board 2 x 20 sec each There Ex:  HEP review, including priority exercises; see below. Consider stationary bike.   Recumbent bike: level 1 x 3 min  Hip L/R s/l ER x 20 reps   Supine TA lumbar stabilization x 5 reps, with alternate marching x ~ 20 reps   Supine DKC with LE supported on ball 5  x 5 sec each   Bridge on red ball x 10 reps  Supine L/R across body stretch L/R 2 x 20 sec each  Supine L/R sciatic nerve glides 5 x 5 sec each  6  inch lateral step up with contralateral hip abd x 10 reps   B gastroc stretch on slant board 2 x 20 sec each There Ex:   S/l hip abd x 10 reps each L/R   PT resisted B hip ER - IR 5 x 5 sec each  Supine DKC with LE supported on ball 5  x 5 sec each   Bridge on red ball x 10 reps  Supine TA lumbar stabilization with simultaneous marching x 10 trps  Seated L/R LAQ/sciatic nerve glides x 10 reps   Hep review; see below.    Manual:   STM/myofascial release L/R gluteals, ITB using foam roller Manual:   STM/myofascial release L/R gluteals, ITB using foam roller Manual:   STM/myofascial release L/R gluteals, ITB using foam roller Manual:   STM/myofascial release L gluteals, ITB using foam roller      TA:   Sitting posture: avoid WB on coccyx/sacrum               HEP: bridges with/without pillow*, crunches or TA marching*, SKC*, DKC as able; advised may complete seated lumbar flexion due to pt reporting DKC difficult. supine L/R across body piriformis stretch* (may consider figure 4), tennis ball gluteal massage, seated hip abd using GTB - may progress with lateral steps at countertop*, sciatic nerve glides, squats (pt reports completing at home).  May consider MHP.  * = priority exercises    Charges: 2 TE (30 min), 1 manual (10 min) Total Timed Treatment: 40 min  Total Treatment Time: 42 min  Oswestry Disability Index Score  Score: (Patient-Rptd) 30 % (11/5/2024  8:25 AM)    Post Oswestry Disability Index Score  Post Score: 8 % (1/6/2025  1:11 PM)    22 % improvement    Plan: Discharge patient from PT to HEP due to progress, pt preference, I c HEP. Pt scheduled for consultation with Dr. Dorsey 2/2025.     Patient/Family/Caregiver was advised of these findings, precautions, and treatment options and has agreed to actively participate in planning and for this course of care.    Thank you for your referral. If you have any questions, please contact me at Dept: [phone].    Sincerely,  Electronically signed by therapist:  Conchita Nicole, PT     Physician's certification required:  Yes  Please co-sign or sign and return this letter via fax as soon as possible to [phone].   I certify the need for these services furnished under this plan of treatment and while under my care.    X___________________________________________________ Date____________________    Certification From: 1/6/2025  To:4/6/2025

## 2025-01-08 NOTE — TELEPHONE ENCOUNTER
Refill passed per [facility] protocol.      Requested Prescriptions   Pending Prescriptions Disp Refills    OMEPRAZOLE 20 MG Oral Capsule Delayed Release [Pharmacy Med Name: OMEPRAZOLE DR 20 MG CAPSULE] 90 capsule 3     Sig: TAKE 1 CAPSULE BY MOUTH EVERY DAY IN THE MORNING BEFORE BREAKFAST       Gastrointestional Medication Protocol Passed - 1/8/2025 11:30 AM        Passed - In person appointment or virtual visit in the past 12 mos or appointment in next 3 mos     Recent Outpatient Visits              2 days ago     [facility]    [facility]    4 weeks ago [facility]    1 month ago Asymmetric SNHL (sensorineural hearing loss)    [facility] Halina Stein Au.D    Office Visit          Future Appointments         Provider Department Appt Notes    In 4 weeks Ellis Dorsey MD [facility] Self ref'd / arthritis in spine / medicare and bcbs    In 1 month MAEGAN LARSEN [facility] Colon w/Mac @[facility]                    Passed - Medication is active on med list

## 2025-01-16 ENCOUNTER — MED REC SCAN ONLY (OUTPATIENT)
Dept: INTERNAL MEDICINE CLINIC | Facility: CLINIC | Age: 80
End: 2025-01-16

## 2025-02-05 RX ORDER — CETIRIZINE HYDROCHLORIDE 5 MG/1
5 TABLET ORAL DAILY
COMMUNITY

## 2025-02-06 ENCOUNTER — OFFICE VISIT (OUTPATIENT)
Dept: PHYSICAL MEDICINE AND REHAB | Facility: CLINIC | Age: 80
End: 2025-02-06
Payer: MEDICARE

## 2025-02-06 ENCOUNTER — TELEPHONE (OUTPATIENT)
Facility: CLINIC | Age: 80
End: 2025-02-06

## 2025-02-06 VITALS — BODY MASS INDEX: 30.58 KG/M2 | WEIGHT: 162 LBS | HEIGHT: 61 IN

## 2025-02-06 DIAGNOSIS — M48.061 SPINAL STENOSIS OF LUMBAR REGION WITHOUT NEUROGENIC CLAUDICATION: ICD-10-CM

## 2025-02-06 DIAGNOSIS — Z87.19 HISTORY OF BARRETT'S ESOPHAGUS: Primary | ICD-10-CM

## 2025-02-06 DIAGNOSIS — M48.061 LUMBAR FORAMINAL STENOSIS: Primary | ICD-10-CM

## 2025-02-06 DIAGNOSIS — M43.16 SPONDYLOLISTHESIS OF LUMBAR REGION: ICD-10-CM

## 2025-02-06 DIAGNOSIS — M54.16 LUMBAR RADICULOPATHY: ICD-10-CM

## 2025-02-06 DIAGNOSIS — I48.0 PAROXYSMAL ATRIAL FIBRILLATION (HCC): ICD-10-CM

## 2025-02-06 DIAGNOSIS — M51.9 LUMBAR DISC DISEASE: ICD-10-CM

## 2025-02-06 PROCEDURE — 99204 OFFICE O/P NEW MOD 45 MIN: CPT | Performed by: PHYSICAL MEDICINE & REHABILITATION

## 2025-02-06 NOTE — TELEPHONE ENCOUNTER
Dr. De La Rosa    Received message that patient was expecting to have EGD at same time as colonoscopy.  There is an office note mentioning history of Barretts.  Should she have both?  If so, can you please provide orders?    Thank you

## 2025-02-06 NOTE — TELEPHONE ENCOUNTER
Received fax from PAT: patient states she was expecting to have an EGD as well as the CLN during this procedure. Please reach out to patient.

## 2025-02-06 NOTE — PROGRESS NOTES
Low Back Pain H & P    Chief Complaint:    Chief Complaint   Patient presents with    New Patient     New patient, referred by spouse, comes in with intermittent bilateral low back aching stabbing pain and intermittent L buttock aching/burning pain that radiates down lateral L leg. Denies T/N or weakness. Rates pain 1/10. Imaging done outside of . Currently in PT, reports significant improvement. Symptoms began 8/2024, denies injury. Denies injections. Takes Tylenol PRN.       HPI:  Maeve Garner is a 79 year old year old right handed female with a prior history of low back pain.  The pain started in August when she noticed left buttock pain and left lateral leg pain.  She saw Dr. Briones and was told that she had a lumbar spine issue.  She had lumbar spine x-rays and she was diagnosed with a lumbar spondylolisthesis.  She started doing on line HEP and then she went to PT with Conchita at [facility].  This has helped her about 80%.  The left leg pain resolved with the PT.  She will get the low back pain now if she does a lot of physical labor.  She has had the MRI of the lumbar spine and she was told that she has lumbar spine degeneration.    Description of the Pain  The pain is located in the bilateral low back.    The pain does not radiate..  The pain at its best is 1/10. The pain at its worst is 7/10. The pain is currently  1/10.  The pain is described as a(n) burning and sharp sensation.    The pain is worse standing, going from sitting to standing, and in the afternoon.  Walking has gotten better for her.  The pain is better sitting.  There is no numbness.  There is tingling in the bilateral toes of the feet.  There is weakness in the left leg.     Past Medical History   Past Medical History:    Age-related nuclear cataract of both eyes    Arrhythmia    A-fib-2017 dx, ablation 11/9/22    Atrial fibrillation (HCC)    Ramírez's esophagus    Brachial neuritis    Constipation    DUB (dysfunctional uterine  bleeding)    per ng LIBBY    Esophageal reflux    Esophagitis    per ng eosinophilic egd w/ biopsy    Esophagitis    pr ng egd w/ biopsy    Floater, vitreous, left    Floater, vitreous, left    Gastritis    Glaucoma suspect of both eyes    Glaucoma suspect of both eyes    Glaucoma suspect of both eyes    Glaucoma suspect of both eyes    Hearing impairment    left ear hearing aid    Heartburn    per ng reflux, dysphagia    High blood pressure    High cholesterol    History of blood transfusion    r/t childbirth - no rx    Long term (current) use of anticoagulants    Meniere disease    Osteopenia    Osteopenia determined by x-ray    Other and unspecified hyperlipidemia    Paroxysmal atrial fibrillation (HCC)    Pseudoexfoliation (PXF) of lens capsule    Pseudoexfoliation (PXF) of lens capsule    Pseudoexfoliation (PXF) of lens capsule    Pseudoexfoliation (PXF) of lens capsule    Seasonal allergic rhinitis    Sensorineural hearing loss, unilateral    Sleep apnea    mouth guard    Snoring    per ng uvulectomy    Spinal stenosis of lumbar region    Visual impairment    glasses       Past Surgical History   Past Surgical History:   Procedure Laterality Date    Colonoscopy  08/26/2011    Colonoscopy      Colonoscopy N/A 02/03/2020    Procedure: COLONOSCOPY;  Surgeon: Omid De La Rosa MD;  Location: [facility] ENDOSCOPY    Cortisone injection Right 2010    per ng post op right forefoot    Ep a-flutter ablation  11/09/2022         Ep pulmonary vein/a-fib ablation  11/09/2022         Excise hand/foot neuroma Right 2007    per ng neuroma 2 rt, hammertoe 3rt, naprosyn 500    Excise hand/foot neuroma Left 2009    per ng 2 left hammer toe 2 rt, hallux valqus naprosyn 500    Excision of uvula  2006    per ng  uvula removed     Hand/finger surgery unlisted Right 2011    per ng hand synovitis surgery    Hysterectomy      partial at 34    Injection; tendon origin/insertion      per ng injection tendon sheath, ligament    Other surgical  history Right 1979    per ng torn ligament right ankle    Upper gi endoscopy performed      Upper gi endoscopy,exam         Family History   Family History   Problem Relation Age of Onset    Cancer Father         per ng lung    Breast Cancer Mother 60        approx    Other (Other) Mother     Other (MS) Sister     Heart Disease Other         per ng CAD, vein, artery, liver disease, arthritis    Other (Other) Other         per ng lupus erythematosus    Cancer Paternal Uncle         liver cancer    Cancer Paternal Uncle         stomach cancer    Cancer Maternal Uncle         stomach cancer    Cancer Maternal Uncle     Colon Polyps Maternal Uncle         bone cancer    Macular degeneration Neg     Glaucoma Neg     Diabetes Neg        Social History   Social History     Socioeconomic History    Marital status:      Spouse name: Not on file    Number of children: Not on file    Years of education: Not on file    Highest education level: Not on file   Occupational History    Not on file   Tobacco Use    Smoking status: Former     Current packs/day: 0.00     Average packs/day: 0.5 packs/day for 5.0 years (2.5 ttl pk-yrs)     Types: Cigarettes     Start date: 1963     Quit date: 1968     Years since quittin.3     Passive exposure: Past    Smokeless tobacco: Never   Vaping Use    Vaping status: Never Used   Substance and Sexual Activity    Alcohol use: Not Currently     Alcohol/week: 0.0 standard drinks of alcohol     Comment: formerly since , rare alcohol currently    Drug use: No    Sexual activity: Not on file   Other Topics Concern     Service Not Asked    Blood Transfusions Not Asked    Caffeine Concern Yes     Comment: soda 12 oz    Occupational Exposure Not Asked    Hobby Hazards Not Asked    Sleep Concern Not Asked    Stress Concern Not Asked    Weight Concern Not Asked    Special Diet Not Asked    Back Care Not Asked    Exercise Not Asked    Bike Helmet Not Asked    Seat Belt  Not Asked    Self-Exams Not Asked   Social History Narrative    Not on file     Social Drivers of Health     Food Insecurity: No Food Insecurity (11/7/2023)    Food Insecurity     Food Insecurity: Never true   Transportation Needs: No Transportation Needs (11/10/2023)    Transportation Needs     Lack of Transportation: No   Stress: Not on file   Housing Stability: Low Risk  (11/7/2023)    Housing Stability     Housing Instability: No     Housing Instability Emergency: Not on file     Crib or Bassinette: Not on file       Review of Systems  Review of Systems   Constitutional: Negative.    HENT:  Positive for hearing loss.         Decreased sense of smell   Eyes: Negative.    Respiratory:  Positive for shortness of breath.    Cardiovascular: Negative.    Gastrointestinal: Negative.    Genitourinary: Negative.    Musculoskeletal:  Positive for back pain.   Skin: Negative.    Neurological: Negative.    Endo/Heme/Allergies: Negative.    Psychiatric/Behavioral: Negative.     All other systems reviewed and are negative.      PE:  The patient does appear in her stated age in no distress.  The patient is well groomed.    Psychiatric:  The patient is alert and oriented x 3.  The patient has a normal affect and mood.      Respiratory:  No acute respiratory distress. Patient does not have a cough.    HEENT:  Extraocular muscles are intact. There is no kern icterus. Pupils are equal, round, and reactive to light. No redness or discharge bilaterally.    Skin:  There are no rashes or lesions.    Vitals:  There were no vitals filed for this visit.    Gait:    Gait: left compensated trendelenberg gait   Sit to Stand: no difficulty   RIGHT Walking on Toes: no difficulty   LEFT Walking on Toes: no difficulty   RIGHT Walking on Heels: no difficulty   LEFT Walking on Heels: no difficulty     Lumbar Spine:    Scoliosis: Thoracic levoscoliosis that is mild-moderate, Lumbar dextroscoliosis that is moderate, and Right shift that is  mild-moderate   Lumbar Flexion: 100 degrees Painless   Lumbar Extension: 20 degrees Painless     Lumbar Spine Palpation:    Spinous Processes: Tender at L4, L5, and S1   Z-joints: Tender at  bilateral L4-5 and right L5-S1   SIJ: Tender at right > left SIJ   Piriformis Muscle: Non-tender bilateral Piriformis muscles   Greater Trochanteric Bursa: Tender at bilateral Greater Trochanteric Bursa(s)     Vascular lower extremity:   Dorsalis pedis pulse-RIGHT 2+   Dorsalis pedis pulse-LEFT 2+   Tibialis posterior pulse-RIGHT 2+   Tibialis posterior pulse-LEFT 2+      Neurological Lower Extremity:    Light Touch Sensation: Intact in bilateral Lower Extremities   LE Muscle Strength: All LE strength measurements 5/5 except:  Hamstring RIGHT:   4/5  Hamstring LEFT:   4/5   RIGHT plantar reflexes: downward response   LEFT plantar reflexes: downward response   Reflexes: 2+ in bilateral lower extremities except:  Right Achilles tendon which are absent  Left Achilles tendon which are absent     Hip: Hips are stable.    RIGHT hip ROM moderate-severely limited   LEFT hip ROM moderately limited with internal rotation   RIGHT hip flexion Negative pain   LEFT hip flexion Negative pain   RIGHT hip LUCÍA test Negative for pain   LEFT hip LUCÍA test Negative for pain   RIGHT hip internal rotation Negative for pain   LEFT hip internal rotation Negative for pain   RIGHT hip piriformis stretch test Negative for pain   LEFT hip piriformis stretch test Negative for pain     Neural Tension Tests Lumbar Spine:  Sitting straight leg raise-RIGHT Positive for right posterior leg pain   Sitting straight leg raise-LEFT Positive for left posterior leg pain which is more significant   Supine straight leg raise-RIGHT Negative for pain   Supine straight leg raise-LEFT Negative for pain   Slump test-RIGHT Negative for pain   Slump test-LEFT Negative for pain     Lymph Nodes:   Inguinal Lymph Nodes Absent     Radiology Imaging:  I reviewed with the patient  her X-ray and MRI of the lumbar spine from 10/3/2024 and 10/9/2024.      Assessment  1. L3-4 right mod, L4-5 right mod/left mod-severe, L5-S1 right mod foraminal stenosis    2. L5-S1 right mod-large/left mod foraminal, L4-5 mod diffuse, L3-4 mod diffuse, L2-3 mod diffuse bulging discs    3. L4-5 severe, L3-4 mod, L2-3 mod central stenosis    4. L5-S1 slight grade 1, L4-5 grade 1 spondylolisthesis    5. Paroxysmal atrial fibrillation (HCC)    6. bilateral S1 radiculopathy      Plan  She will continue with her home exercise program for now.    She will follow up in 6 months or sooner if needed.    She will get lumbar spine flexion and extension x-rays.    The patient understands and agrees with the stated plan.    Ellis Dorsey MD  2/6/2025

## 2025-02-06 NOTE — PATIENT INSTRUCTIONS
Plan  She will continue with her home exercise program for now.    She will follow up in 6 months or sooner if needed.    She will get lumbar spine flexion and extension x-rays.

## 2025-02-06 NOTE — TELEPHONE ENCOUNTER
Called patient verified , regarding to add an Egd with Colonoscopy on 25 at [facility].  Called Endo to [facility] and she okay'd this  As per  instructions:  Ok to add EGD for history of Barretts.      ReScheduled for:  Colonoscopy [id number] per  schedule changes , adding EGD [id number]   Provider Name:  Dr De La Rosa   Date:  From- 2/3/2025 To2025   Location:    [facility]   Sedation:  MAC   Time:  From- 730 am To- 815 am (Patient made aware [facility] will call the day before with an arrival time)   Prep:  Golytely  Meds/Allergies Reconciled?:  Physician reviewed   Diagnosis with codes:    Personal history of colonic polyps [Z86.010] ,History of Ramírez's Esophagus Z87.19  Was patient informed to call insurance with codes (Y/N):  Yes, I confirmed Medicare/BCBS insurance with the patient.    referral sent?:  N/A   [facility] or [facility] notified?:  I sent an electronic request to Endo Scheduling and received a confirmation today.    Medication Orders:  This patient verbally confirmed that she is not taking:               Iron,  BP meds, and is not diabetic  Not latex allergy, Not PCN allergy and does not have a pacemaker   Misc Orders:    Hold Eliquis 2 days prior to procedure (awaiting Cardiology approval - GI Nurse will call to confirm length of hold)         Further instructions given by staff:   I discussed the prep instructions with the patient which she verbally understood and is aware that I will send the instructions today.via Flurry      Patient/Caregiver provided printed discharge information.

## 2025-02-13 ENCOUNTER — HOSPITAL ENCOUNTER (OUTPATIENT)
Facility: HOSPITAL | Age: 80
Setting detail: HOSPITAL OUTPATIENT SURGERY
Discharge: HOME | End: 2025-02-13
Attending: INTERNAL MEDICINE | Admitting: INTERNAL MEDICINE
Payer: MEDICARE

## 2025-02-13 ENCOUNTER — ANESTHESIA EVENT (OUTPATIENT)
Dept: ENDOSCOPY | Facility: HOSPITAL | Age: 80
End: 2025-02-13
Payer: MEDICARE

## 2025-02-13 ENCOUNTER — ANESTHESIA (OUTPATIENT)
Dept: ENDOSCOPY | Facility: HOSPITAL | Age: 80
End: 2025-02-13
Payer: MEDICARE

## 2025-02-13 VITALS
HEART RATE: 52 BPM | RESPIRATION RATE: 15 BRPM | DIASTOLIC BLOOD PRESSURE: 58 MMHG | WEIGHT: 160 LBS | SYSTOLIC BLOOD PRESSURE: 119 MMHG | OXYGEN SATURATION: 100 % | HEIGHT: 61 IN | BODY MASS INDEX: 30.21 KG/M2

## 2025-02-13 DIAGNOSIS — Z86.0100 HISTORY OF COLONIC POLYPS: ICD-10-CM

## 2025-02-13 DIAGNOSIS — Z86.0100 PERSONAL HISTORY OF COLONIC POLYPS: ICD-10-CM

## 2025-02-13 DIAGNOSIS — Z87.19 HISTORY OF BARRETT'S ESOPHAGUS: ICD-10-CM

## 2025-02-13 PROCEDURE — 43239 EGD BIOPSY SINGLE/MULTIPLE: CPT | Performed by: INTERNAL MEDICINE

## 2025-02-13 PROCEDURE — G0121 COLON CA SCRN NOT HI RSK IND: HCPCS | Performed by: INTERNAL MEDICINE

## 2025-02-13 PROCEDURE — 0DB68ZX EXCISION OF STOMACH, VIA NATURAL OR ARTIFICIAL OPENING ENDOSCOPIC, DIAGNOSTIC: ICD-10-PCS | Performed by: INTERNAL MEDICINE

## 2025-02-13 PROCEDURE — 0DJD8ZZ INSPECTION OF LOWER INTESTINAL TRACT, VIA NATURAL OR ARTIFICIAL OPENING ENDOSCOPIC: ICD-10-PCS | Performed by: INTERNAL MEDICINE

## 2025-02-13 PROCEDURE — 43251 EGD REMOVE LESION SNARE: CPT | Performed by: INTERNAL MEDICINE

## 2025-02-13 PROCEDURE — 0DB48ZX EXCISION OF ESOPHAGOGASTRIC JUNCTION, VIA NATURAL OR ARTIFICIAL OPENING ENDOSCOPIC, DIAGNOSTIC: ICD-10-PCS | Performed by: INTERNAL MEDICINE

## 2025-02-13 DEVICE — REPLAY HEMOSTASIS CLIP, 11MM SPAN
Type: IMPLANTABLE DEVICE | Site: COLON | Status: FUNCTIONAL
Brand: REPLAY

## 2025-02-13 RX ORDER — NALOXONE HYDROCHLORIDE 0.4 MG/ML
0.08 INJECTION, SOLUTION INTRAMUSCULAR; INTRAVENOUS; SUBCUTANEOUS ONCE AS NEEDED
Status: DISCONTINUED | OUTPATIENT
Start: 2025-02-13 | End: 2025-02-13

## 2025-02-13 RX ORDER — ONDANSETRON 2 MG/ML
4 INJECTION INTRAMUSCULAR; INTRAVENOUS ONCE AS NEEDED
Status: DISCONTINUED | OUTPATIENT
Start: 2025-02-13 | End: 2025-02-13

## 2025-02-13 RX ORDER — SODIUM CHLORIDE, SODIUM LACTATE, POTASSIUM CHLORIDE, CALCIUM CHLORIDE 600; 310; 30; 20 MG/100ML; MG/100ML; MG/100ML; MG/100ML
INJECTION, SOLUTION INTRAVENOUS CONTINUOUS
Status: DISCONTINUED | OUTPATIENT
Start: 2025-02-13 | End: 2025-02-13

## 2025-02-13 RX ORDER — LIDOCAINE HYDROCHLORIDE 10 MG/ML
INJECTION, SOLUTION EPIDURAL; INFILTRATION; INTRACAUDAL; PERINEURAL AS NEEDED
Status: DISCONTINUED | OUTPATIENT
Start: 2025-02-13 | End: 2025-02-13 | Stop reason: SURG

## 2025-02-13 RX ORDER — GLYCOPYRROLATE 0.2 MG/ML
INJECTION, SOLUTION INTRAMUSCULAR; INTRAVENOUS AS NEEDED
Status: DISCONTINUED | OUTPATIENT
Start: 2025-02-13 | End: 2025-02-13 | Stop reason: SURG

## 2025-02-13 RX ADMIN — SODIUM CHLORIDE, SODIUM LACTATE, POTASSIUM CHLORIDE, CALCIUM CHLORIDE: 600; 310; 30; 20 INJECTION, SOLUTION INTRAVENOUS at 08:12:00

## 2025-02-13 RX ADMIN — LIDOCAINE HYDROCHLORIDE 50 MG: 10 INJECTION, SOLUTION EPIDURAL; INFILTRATION; INTRACAUDAL; PERINEURAL at 08:15:00

## 2025-02-13 RX ADMIN — GLYCOPYRROLATE 0.2 MG: 0.2 INJECTION, SOLUTION INTRAMUSCULAR; INTRAVENOUS at 08:15:00

## 2025-02-13 NOTE — ANESTHESIA PREPROCEDURE EVALUATION
Anesthesia PreOp Note    HPI:     Maeve Garner is a 79 year old female who presents for preoperative consultation requested by: Omid De La Rosa MD    Date of Surgery: 2/13/2025    Procedure(s):  COLONOSCOPY/ ESOPHAGOGASTRODUODENOSCOPY  ESOPHAGOGASTRODUODENOSCOPY (EGD)  Indication: Personal history of colonic polyps/ History of Ramírez's esophagus    Relevant Problems   No relevant active problems       NPO:  Last Liquid Consumption Date: 02/13/25  Last Liquid Consumption Time: 0800  Last Solid Consumption Date: 02/12/25  Last Solid Consumption Time: 0530  Last Liquid Consumption Date: 02/13/25          History Review:  Patient Active Problem List    Diagnosis Date Noted    L5-S1 slight grade 1, L4-5 grade 1 spondylolisthesis 02/06/2025    L3-4 right mod, L4-5 right mod/left mod-severe, L5-S1 right mod foraminal stenosis 02/06/2025    L4-5 severe, L3-4 mod, L2-3 mod central stenosis 02/06/2025    L5-S1 right mod-large/left mod foraminal, L4-5 mod diffuse, L3-4 mod diffuse, L2-3 mod diffuse bulging discs 02/06/2025    bilateral S1 radiculopathy 02/06/2025    Senile osteoporosis 03/12/2024    Primary osteoarthritis of left knee 01/13/2023    Long term (current) use of anticoagulants 02/24/2022    Age-related nuclear cataract of both eyes 02/09/2021    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    YASMIN (obstructive sleep apnea) 08/22/2017    Paroxysmal atrial fibrillation (HCC) 02/15/2017    Hyperlipidemia, mixed 09/17/2015       Past Medical History:    Age-related nuclear cataract of both eyes    Arrhythmia    A-fib-2017 dx, ablation 11/9/22    Atrial fibrillation (HCC)    Ramírez's esophagus    Brachial neuritis    Constipation    DUB (dysfunctional uterine bleeding)    per ng LIBBY    Esophageal reflux    Esophagitis    per ng eosinophilic egd w/ biopsy    Esophagitis    pr ng egd w/ biopsy    Floater, vitreous, left    Floater, vitreous, left    Gastritis    Glaucoma suspect of both eyes     Glaucoma suspect of both eyes    Glaucoma suspect of both eyes    Glaucoma suspect of both eyes    Hearing impairment    left ear hearing aid    Heartburn    per ng reflux, dysphagia    High blood pressure    High cholesterol    History of blood transfusion    r/t childbirth - no rx    Long term (current) use of anticoagulants    Meniere disease    Osteopenia    Osteopenia determined by x-ray    Other and unspecified hyperlipidemia    Paroxysmal atrial fibrillation (HCC)    Pseudoexfoliation (PXF) of lens capsule    Pseudoexfoliation (PXF) of lens capsule    Pseudoexfoliation (PXF) of lens capsule    Pseudoexfoliation (PXF) of lens capsule    Seasonal allergic rhinitis    Sensorineural hearing loss, unilateral    Sleep apnea    mouth guard    Snoring    per ng uvulectomy    Spinal stenosis of lumbar region    Visual impairment    glasses       Past Surgical History:   Procedure Laterality Date    Colonoscopy  08/26/2011    Colonoscopy      Colonoscopy N/A 02/03/2020    Procedure: COLONOSCOPY;  Surgeon: Omid De La Rosa MD;  Location: [facility] ENDOSCOPY    Cortisone injection Right 2010    per ng post op right forefoot    Ep a-flutter ablation  11/09/2022         Ep pulmonary vein/a-fib ablation  11/09/2022         Excise hand/foot neuroma Right 2007    per ng neuroma 2 rt, hammertoe 3rt, naprosyn 500    Excise hand/foot neuroma Left 2009    per ng 2 left hammer toe 2 rt, hallux valqus naprosyn 500    Excision of uvula  2006    per ng  uvula removed     Hand/finger surgery unlisted Right 2011    per ng hand synovitis surgery    Hysterectomy      partial at 34    Injection; tendon origin/insertion      per ng injection tendon sheath, ligament    Other surgical history Right 1979    per ng torn ligament right ankle    Upper gi endoscopy performed  2015    Upper gi endoscopy,exam         Prescriptions Prior to Admission[1]  Current Medications and Prescriptions Ordered in Epic[2]    Allergies[3]    Family History   Problem  Relation Age of Onset    Cancer Father         per ng lung    Breast Cancer Mother 60        approx    Other (Other) Mother     Other (MS) Sister     Heart Disease Other         per ng CAD, vein, artery, liver disease, arthritis    Other (Other) Other         per ng lupus erythematosus    Cancer Paternal Uncle         liver cancer    Cancer Paternal Uncle         stomach cancer    Cancer Maternal Uncle         stomach cancer    Cancer Maternal Uncle     Colon Polyps Maternal Uncle         bone cancer    Macular degeneration Neg     Glaucoma Neg     Diabetes Neg      Social History     Socioeconomic History    Marital status:    Tobacco Use    Smoking status: Former     Current packs/day: 0.00     Average packs/day: 0.5 packs/day for 5.0 years (2.5 ttl pk-yrs)     Types: Cigarettes     Start date: 1963     Quit date: 1968     Years since quittin.4     Passive exposure: Past    Smokeless tobacco: Never   Vaping Use    Vaping status: Never Used   Substance and Sexual Activity    Alcohol use: Not Currently     Alcohol/week: 0.0 standard drinks of alcohol     Comment: formerly since 2016, rare alcohol currently    Drug use: No   Other Topics Concern    Caffeine Concern Yes     Comment: soda 12 oz       Available pre-op labs reviewed.             Vital Signs:  Body mass index is 30.61 kg/m².   height is 1.549 m (5' 1\") and weight is 73.5 kg (162 lb).   Vitals:    25 1624   Weight: 73.5 kg (162 lb)   Height: 1.549 m (5' 1\")        Anesthesia Evaluation      No history of anesthetic complications   Airway   Mallampati: II  TM distance: >3 FB  Neck ROM: full  Dental - Dentition appears grossly intact     Pulmonary - negative ROS and normal exam    breath sounds clear to auscultation  Cardiovascular - normal exam  Exercise tolerance: good  (+) hypertension    NYHA Classification: II  Rhythm: regular  Rate: normal    Neuro/Psych - negative ROS     GI/Hepatic/Renal    (+) GERD    Endo/Other -  negative ROS   Abdominal  - normal exam    Abdomen: soft.  Bowel sounds: normal.                 Anesthesia Plan:   ASA:  2  Plan:   MAC  Post-op Pain Management: IV analgesics  Plan Comments: MAC anethetic with backup plan of GA and intubation explained to the pt and family, questions answered.   Informed Consent Plan and Risks Discussed With:  Patient  Consent Comment: MAC anesthesia with o2 mask with backup plan of GA explained. All questions answered.   Discussed plan with:  CRNA      I have informed Maeve LAI Kimberlyvarghese and/or legal guardian or family member of the nature of the anesthetic plan, benefits, risks including possible dental damage if relevant, major complications, and any alternative forms of anesthetic management.   All of the patient's questions were answered to the best of my ability. The patient desires the anesthetic management as planned.  Chitra Chawla CRNA  2/13/2025 7:28 AM  Present on Admission:  **None**           [1]   Medications Prior to Admission   Medication Sig Dispense Refill Last Dose/Taking    cetirizine 5 MG Oral Tab Take 1 tablet (5 mg total) by mouth daily.   Taking    OMEPRAZOLE 20 MG Oral Capsule Delayed Release TAKE 1 CAPSULE BY MOUTH EVERY DAY IN THE MORNING BEFORE BREAKFAST 90 capsule 3 Taking    ipratropium 0.06 % Nasal Solution 2 sprays by Nasal route 3 (three) times daily. 45 mL 2 Taking    docusate sodium 100 MG Oral Cap Take 100 mg by mouth 2 (two) times daily. 20 capsule 0 Taking    apixaban (ELIQUIS) 5 MG Oral Tab Take 1 tablet (5 mg total) by mouth 2 (two) times daily. Last dose to be taken on Nov.4th,2023   Taking    Probiotic Product (ALIGN OR) Take 1 tablet by mouth daily.   2/5/2025    Simethicone (GAS RELIEF 80 OR) Take 1 tablet by mouth as needed.   Taking As Needed    sotalol 80 MG Oral Tab Take 0.5 tablets (40 mg total) by mouth 2 (two) times daily.   Taking    atorvastatin 10 MG Oral Tab Take 1 tablet (10 mg total) by mouth nightly. 30 tablet 5  Taking    Cyanocobalamin (VITAMIN B 12 OR) Take 1,000 mg by mouth daily with breakfast.   2/5/2025    Calcium Carbonate-Vitamin D (CALCIUM + D OR) Take by mouth daily. Take 500mg & 1000 IU Vit D daily   2/5/2025    omega-3 fatty acids (FISH OIL) 1000 MG Oral Cap Take 500 mg by mouth daily.   2/5/2025    Multiple Vitamins-Minerals (MULTI FOR HER OR) Take 1 tablet by mouth daily.     2/5/2025    benzonatate (TESSALON PERLES) 100 MG Oral Cap Take 1 capsule (100 mg total) by mouth 3 (three) times daily as needed for cough. (Patient not taking: Reported on 2/5/2025) 30 capsule 0 Not Taking    mupirocin 2 % External Ointment Apply with cotton swab to bilateral nares twice daily as needed for crusting (Patient not taking: Reported on 2/5/2025) 15 g 2 Not Taking    Polyethylene Glycol 3350 (MIRALAX OR) Take by mouth. (Patient not taking: Reported on 2/5/2025)   Not Taking   [2]   Current Facility-Administered Medications Ordered in Epic   Medication Dose Route Frequency Provider Last Rate Last Admin    lactated ringers infusion   Intravenous Continuous Omid De La Rosa MD         No current [facility]-ordered outpatient medications on file.   [3]   Allergies  Allergen Reactions    Grass Pollen([id number] Mauricio) OTHER (SEE COMMENTS)    Dust Runny nose     Headache, watery eyes    Mold Runny nose     Headache, watery eyes    Pollen Runny nose     Headache, watery eyes    Ragweed OTHER (SEE COMMENTS)     Watery eyes , Sneezing, HA's      Trees, [place] Runny nose     Headache, watery eyes

## 2025-02-13 NOTE — H&P
History & Physical Examination    Patient Name: Maeve Garner  MRN: [id number]  CSN: [id number]  YOB: 1945    Diagnosis: Personal history of colonic polyps/ History of Ramírez's esophagus       Prescriptions Prior to Admission[1]  Current Facility-Administered Medications   Medication Dose Route Frequency    lactated ringers infusion   Intravenous Continuous       Allergies: Allergies[2]    Past Medical History:    Age-related nuclear cataract of both eyes    Arrhythmia    A-fib-2017 dx, ablation 11/9/22    Atrial fibrillation (HCC)    Ramírez's esophagus    Brachial neuritis    Constipation    DUB (dysfunctional uterine bleeding)    per ng LIBBY    Esophageal reflux    Esophagitis    per ng eosinophilic egd w/ biopsy    Esophagitis    pr ng egd w/ biopsy    Floater, vitreous, left    Floater, vitreous, left    Gastritis    Glaucoma suspect of both eyes    Glaucoma suspect of both eyes    Glaucoma suspect of both eyes    Glaucoma suspect of both eyes    Hearing impairment    left ear hearing aid    Heartburn    per ng reflux, dysphagia    High blood pressure    High cholesterol    History of blood transfusion    r/t childbirth - no rx    Long term (current) use of anticoagulants    Meniere disease    Osteopenia    Osteopenia determined by x-ray    Other and unspecified hyperlipidemia    Paroxysmal atrial fibrillation (HCC)    Pseudoexfoliation (PXF) of lens capsule    Pseudoexfoliation (PXF) of lens capsule    Pseudoexfoliation (PXF) of lens capsule    Pseudoexfoliation (PXF) of lens capsule    Seasonal allergic rhinitis    Sensorineural hearing loss, unilateral    Sleep apnea    mouth guard    Snoring    per ng uvulectomy    Spinal stenosis of lumbar region    Visual impairment    glasses     Past Surgical History:   Procedure Laterality Date    Colonoscopy  08/26/2011    Colonoscopy      Colonoscopy N/A 02/03/2020    Procedure: COLONOSCOPY;  Surgeon: Omid De La Rosa MD;  Location: [facility]  ENDOSCOPY    Cortisone injection Right     per ng post op right forefoot    Ep a-flutter ablation  2022         Ep pulmonary vein/a-fib ablation  2022         Excise hand/foot neuroma Right     per ng neuroma 2 rt, hammertoe 3rt, naprosyn 500    Excise hand/foot neuroma Left     per ng 2 left hammer toe 2 rt, hallux valqus naprosyn 500    Excision of uvula      per ng  uvula removed     Hand/finger surgery unlisted Right     per ng hand synovitis surgery    Hysterectomy      partial at 34    Injection; tendon origin/insertion      per ng injection tendon sheath, ligament    Other surgical history Right     per ng torn ligament right ankle    Upper gi endoscopy performed      Upper gi endoscopy,exam       Family History   Problem Relation Age of Onset    Cancer Father         per ng lung    Breast Cancer Mother 60        approx    Other (Other) Mother     Other (MS) Sister     Heart Disease Other         per ng CAD, vein, artery, liver disease, arthritis    Other (Other) Other         per ng lupus erythematosus    Cancer Paternal Uncle         liver cancer    Cancer Paternal Uncle         stomach cancer    Cancer Maternal Uncle         stomach cancer    Cancer Maternal Uncle     Colon Polyps Maternal Uncle         bone cancer    Macular degeneration Neg     Glaucoma Neg     Diabetes Neg      Social History     Tobacco Use    Smoking status: Former     Current packs/day: 0.00     Average packs/day: 0.5 packs/day for 5.0 years (2.5 ttl pk-yrs)     Types: Cigarettes     Start date: 1963     Quit date: 1968     Years since quittin.4     Passive exposure: Past    Smokeless tobacco: Never   Substance Use Topics    Alcohol use: Not Currently     Alcohol/week: 0.0 standard drinks of alcohol     Comment: formerly since 2016, rare alcohol currently       SYSTEM Check if Review is Normal Check if Physical Exam is Normal If not normal, please explain:   HEENT [x ] [ x]     NECK & BACK [x ] [x ]    HEART [x ] [ x]    LUNGS [x ] [ x]    ABDOMEN [x ] [x ]    UROGENITAL [ ] [ ]    EXTREMITIES [x ] [x ]    OTHER        [ x ] I have discussed the risks and benefits and alternatives with the patient/family.  They understand and agree to proceed with plan of care.  [ x ] I have reviewed the History and Physical done within the last 30 days.  Any changes noted above.    Omid De La Rosa MD  2/13/2025  8:05 AM         [1]   Medications Prior to Admission   Medication Sig Dispense Refill Last Dose/Taking    cetirizine 5 MG Oral Tab Take 1 tablet (5 mg total) by mouth daily.   2/12/2025    OMEPRAZOLE 20 MG Oral Capsule Delayed Release TAKE 1 CAPSULE BY MOUTH EVERY DAY IN THE MORNING BEFORE BREAKFAST 90 capsule 3 2/11/2025    ipratropium 0.06 % Nasal Solution 2 sprays by Nasal route 3 (three) times daily. 45 mL 2 Taking    docusate sodium 100 MG Oral Cap Take 100 mg by mouth 2 (two) times daily. 20 capsule 0 2/6/2025    apixaban (ELIQUIS) 5 MG Oral Tab Take 1 tablet (5 mg total) by mouth 2 (two) times daily. Last dose to be taken on Nov.4th,2023   2/10/2025    Probiotic Product (ALIGN OR) Take 1 tablet by mouth daily.   2/6/2025    Simethicone (GAS RELIEF 80 OR) Take 1 tablet by mouth as needed.   2/12/2025    sotalol 80 MG Oral Tab Take 0.5 tablets (40 mg total) by mouth 2 (two) times daily.   2/13/2025 Morning    atorvastatin 10 MG Oral Tab Take 1 tablet (10 mg total) by mouth nightly. 30 tablet 5 2/12/2025    Cyanocobalamin (VITAMIN B 12 OR) Take 1,000 mg by mouth daily with breakfast.   2/6/2025    Calcium Carbonate-Vitamin D (CALCIUM + D OR) Take by mouth daily. Take 500mg & 1000 IU Vit D daily   2/6/2025    omega-3 fatty acids (FISH OIL) 1000 MG Oral Cap Take 500 mg by mouth daily.   2/6/2025    Multiple Vitamins-Minerals (MULTI FOR HER OR) Take 1 tablet by mouth daily.     2/6/2025    benzonatate (TESSALON PERLES) 100 MG Oral Cap Take 1 capsule (100 mg total) by mouth 3 (three) times  daily as needed for cough. (Patient not taking: Reported on 2/5/2025) 30 capsule 0 Not Taking    mupirocin 2 % External Ointment Apply with cotton swab to bilateral nares twice daily as needed for crusting (Patient not taking: Reported on 2/5/2025) 15 g 2 Not Taking    Polyethylene Glycol 3350 (MIRALAX OR) Take by mouth. (Patient not taking: Reported on 2/5/2025)   Not Taking   [2]   Allergies  Allergen Reactions    Grass Pollen(K-O-R-T-Swt Mauricio) OTHER (SEE COMMENTS)    Dust Runny nose     Headache, watery eyes    Mold Runny nose     Headache, watery eyes    Pollen Runny nose     Headache, watery eyes    Ragweed OTHER (SEE COMMENTS)     Watery eyes , Sneezing, HA's      Trees, Cuming Runny nose     Headache, watery eyes

## 2025-02-13 NOTE — ANESTHESIA POSTPROCEDURE EVALUATION
Patient: Maeve Garner    Procedure Summary       Date: 02/13/25 Room / Location: [facility] ENDOSCOPY 04 / [facility] ENDOSCOPY    Anesthesia Start: 0812 Anesthesia Stop:     Procedures:       COLONOSCOPY      ESOPHAGOGASTRODUODENOSCOPY (EGD) Diagnosis:       Personal history of colonic polyps      History of Ramírez's esophagus      (Personal history of colonic polyps/ History of Ramírez's esophagus)    Surgeons: Omid De La Rosa MD Anesthesiologist:     Anesthesia Type: MAC ASA Status: 2            Anesthesia Type: MAC    Vitals Value Taken Time   /68 02/13/25 0819   Temp 97.6 02/13/25 0819   Pulse 59 02/13/25 0819   Resp 18 02/13/25 0819   SpO2 97% RA 02/13/25 0819       [facility] AN Post Evaluation:   Patient Participation: complete - patient participated  Level of Consciousness: awake  Pain Score: 0  Pain Management: adequate  Airway Patency:patent  Yes    Nausea/Vomiting: none  Cardiovascular Status: acceptable  Respiratory Status: acceptable  Postoperative Hydration acceptable      Chitra Chawla CRNA  2/13/2025 8:19 AM

## 2025-02-13 NOTE — DISCHARGE INSTRUCTIONS
Home Care Instructions for Colonoscopy and/or Gastroscopy with Sedation    Diet:  - Resume your regular diet as tolerated unless otherwise instructed.  - Start with light meals to minimize bloating.  - Do not drink alcohol today.    Medication:  - If you have questions about resuming your normal medications, please contact your Primary Care Physician.    Activities:  - Take it easy today. Do not return to work today.  - Do not drive today.  - Do not operate any machinery today (including kitchen equipment).    Colonoscopy:  - You may notice some rectal \"spotting\" (a little blood on the toilet tissue) for a day or two after the exam. This is normal.  - If you experience any rectal bleeding (not spotting), persistent tenderness or sharp severe abdominal pains, oral temperature over 100 degrees Fahrenheit, light-headedness or dizziness, or any other problems, contact your doctor.    Gastroscopy:  - You may have a sore throat for 2-3 days following the exam. This is normal. Gargling with warm salt water (1/2 tsp salt to 1 glass warm water) or using throat lozenges will help.  - If you experience any sharp pain in your neck, abdomen or chest, vomiting of blood, oral temperature over 100 degrees Fahrenheit, light-headedness or dizziness, or any other problems, contact your doctor.    **If unable to reach your doctor, please go to the [facility] Emergency Room**    - Your referring physician will receive a full report of your examination.  - If you do not hear from your doctor's office within two weeks of your biopsy, please call them for your results.    You may be able to see your laboratory results in [facility] between 4 and 7 business days.  In some cases, your physician may not have viewed the results before they are released to [facility].  If you have questions regarding your results contact the physician who ordered the test/exam by phone or via [facility] by choosing \"Ask a Medical Question.\"

## 2025-02-13 NOTE — OPERATIVE REPORT
[facility] Endoscopy Report  Date of procedure-February 13, 2025    Preoperative Diagnosis:  -Colorectal cancer screening  -History colon polyps  -History of short segment Ramírez's      Postoperative Diagnosis:  -Diverticulosis  -Internal hemorrhoids  -Gastric polyp  -No obvious Ramírez's mucosa, biopsies taken at the GE junction      Procedure:    Colonoscopy   Esophagogastroduodenoscopy       Surgeon:  Omid De La Rosa M.D.    Anesthesia:  MAC     Technique:  After informed consent, the patient was placed in the left lateral recumbent position.  Digital rectal examination revealed no palpable intraluminal abnormalities.  An Olympus variable stiffness 190 series HD colonoscope was inserted into the rectum and advanced under direct vision by following the lumen to the cecum.  The colon was examined upon withdrawal in the left lateral position.    Following colonoscopy, an Olympus adult HD gastroscope was inserted into the hypopharynx and advanced under direct vision into the esophagus, stomach and duodenum.  The endoscope was withdrawn to the stomach where retroflexion of the annulus, body, cardia and fundus was performed.  The instrument was straightened, insufflated air and fluid were suctioned and the endoscope was withdrawn.  The procedures were well tolerated without immediate complication.      Findings:  The preparation of the colon was good.  The terminal ileum was examined for 4 cm and visually normal.  The ileocecal valve was well preserved. The visualized colonic mucosa from the cecum to the anal verge was normal with an intact vascular pattern.    Diverticular disease noted in the left colon but no diverticulitis.    Internal hemorrhoids noted.    The esophagus was normal.  No obvious Ramírez's mucosa, biopsies taken at the GE junction.  The GE junction and diaphragmatic impression were at 39 cm.  The stomach distended appropriately with insufflated air.  The mucosa of the stomach showed  fundic gland like polyps in the body and fundic regions of the stomach, 1 large polyp which seem to be semipedunculated was approximately 2 cm in size.  This was removed by hot snare and a single Endo Clip placed across the mucosa be defect.  No bleeding noted.  And specimen retrieved and sent for analysis.  The duodenal bulb and post bulbar regions were normal.    Estimated blood loss-insignificant  Specimens-see above    Impression:  -Diverticulosis  -Internal hemorrhoids  -Gastric polyp  -No obvious Ramírez's mucosa, biopsies taken at the GE junction    Recommendations:  - Post procedure instructions given  - No recall colonoscopy needed  - High fiber diet for diverticular disease  - Symptomatic treatment of hemorrhoids  - Follow up on upper GI polyp results          Omid De La Rosa MD  2/13/2025  8:56 AM

## 2025-02-14 ENCOUNTER — TELEPHONE (OUTPATIENT)
Facility: CLINIC | Age: 80
End: 2025-02-14

## 2025-02-14 NOTE — TELEPHONE ENCOUNTER
Called and spoke to patient. Dr. De La Rosa has reviewed your colonoscopy and EGD.   Pr doctor: You had no colon polyps.  No recall colonoscopy needed. You do have internal hemorrhoids and diverticulosis.      The upper endoscopy showed a stomach polyp ( fundic gland type of polyp/benign) and biopsies taken from the GE junction were negative for Ramírez's esophagus.  No need for repeat EGD.     Patient understanding. Call complete.

## 2025-02-19 ENCOUNTER — HOSPITAL ENCOUNTER (OUTPATIENT)
Dept: GENERAL RADIOLOGY | Facility: HOSPITAL | Age: 80
Discharge: HOME | End: 2025-02-19
Attending: PHYSICAL MEDICINE & REHABILITATION
Payer: MEDICARE

## 2025-02-19 DIAGNOSIS — M43.16 SPONDYLOLISTHESIS OF LUMBAR REGION: ICD-10-CM

## 2025-02-19 PROCEDURE — 72110 X-RAY EXAM L-2 SPINE 4/>VWS: CPT | Performed by: PHYSICAL MEDICINE & REHABILITATION

## 2025-04-03 ENCOUNTER — OFFICE VISIT (OUTPATIENT)
Dept: INTERNAL MEDICINE CLINIC | Facility: CLINIC | Age: 80
End: 2025-04-03
Payer: MEDICARE

## 2025-04-03 VITALS
SYSTOLIC BLOOD PRESSURE: 131 MMHG | HEART RATE: 59 BPM | WEIGHT: 162.69 LBS | TEMPERATURE: 98 F | HEIGHT: 61 IN | DIASTOLIC BLOOD PRESSURE: 76 MMHG | BODY MASS INDEX: 30.71 KG/M2

## 2025-04-03 DIAGNOSIS — E78.2 HYPERLIPIDEMIA, MIXED: ICD-10-CM

## 2025-04-03 DIAGNOSIS — M54.16 LUMBAR RADICULOPATHY: ICD-10-CM

## 2025-04-03 DIAGNOSIS — I48.0 PAROXYSMAL ATRIAL FIBRILLATION (HCC): ICD-10-CM

## 2025-04-03 DIAGNOSIS — Z00.00 MEDICARE ANNUAL WELLNESS VISIT, SUBSEQUENT: Primary | ICD-10-CM

## 2025-04-03 DIAGNOSIS — M81.0 SENILE OSTEOPOROSIS: ICD-10-CM

## 2025-04-03 DIAGNOSIS — H26.8 PSEUDOEXFOLIATION (PXF) OF LENS CAPSULE: ICD-10-CM

## 2025-04-03 DIAGNOSIS — Z12.31 VISIT FOR SCREENING MAMMOGRAM: ICD-10-CM

## 2025-04-03 DIAGNOSIS — Z79.01 LONG TERM (CURRENT) USE OF ANTICOAGULANTS: ICD-10-CM

## 2025-04-03 DIAGNOSIS — G47.33 OSA (OBSTRUCTIVE SLEEP APNEA): ICD-10-CM

## 2025-04-03 DIAGNOSIS — H25.13 AGE-RELATED NUCLEAR CATARACT OF BOTH EYES: ICD-10-CM

## 2025-04-03 DIAGNOSIS — M17.12 PRIMARY OSTEOARTHRITIS OF LEFT KNEE: ICD-10-CM

## 2025-04-03 PROBLEM — M51.9 LUMBAR DISC DISEASE: Status: RESOLVED | Noted: 2025-02-06 | Resolved: 2025-04-03

## 2025-04-03 PROBLEM — M48.061 SPINAL STENOSIS OF LUMBAR REGION WITHOUT NEUROGENIC CLAUDICATION: Status: RESOLVED | Noted: 2025-02-06 | Resolved: 2025-04-03

## 2025-04-03 PROBLEM — M48.061 LUMBAR FORAMINAL STENOSIS: Status: RESOLVED | Noted: 2025-02-06 | Resolved: 2025-04-03

## 2025-04-03 PROBLEM — M43.16 SPONDYLOLISTHESIS OF LUMBAR REGION: Status: RESOLVED | Noted: 2025-02-06 | Resolved: 2025-04-03

## 2025-04-03 NOTE — PROGRESS NOTES
Subjective:   Maeve Garner is a 79 year old female who presents for a Medicare Subsequent Annual Wellness visit (Pt already had Initial Annual Wellness) and scheduled follow up of multiple significant but stable problems.   Last seen 3/12/24  Chronic medical problems   Hyperlipidemia atrail fibrillation  Arthritis of both knees  remain independent     obeity  Headache no  dizziness        no                             Blurred vision no  palpitaionsSyncope no  Chest pain  no  PND  Orthopnea no  Weakness  No  Low salt diet    yes    Compliance with exercise stays active  Compliance with medication yes                                          /76 (BP Location: Right arm, Patient Position: Sitting, Cuff Size: adult)   Pulse 59   Temp 98.1 °F (36.7 °C) (Temporal)   Ht 5' 1\" (1.549 m)   Wt 162 lb 11.2 oz (73.8 kg)   BMI 30.74 kg/m²   Wt Readings from Last 6 Encounters:   04/03/25 162 lb 11.2 oz (73.8 kg)   02/13/25 160 lb (72.6 kg)   02/06/25 162 lb (73.5 kg)   12/11/24 162 lb (73.5 kg)   10/30/24 162 lb (73.5 kg)   10/21/24 162 lb (73.5 kg)     Body mass index is 30.74 kg/m².  HGBA1C:    Lab Results   Component Value Date    A1C 5.6 11/23/2021    A1C 5.2 11/30/2020    A1C 5.6 03/10/2015     11/23/2021         History/Other:   Fall Risk Assessment:   She has been screened for Falls and is High Risk. Fall Prevention information provided to patient in After Visit Summary.    Do you feel unsteady when standing or walking?: (Patient-Rptd) No  Do you worry about falling?: (Patient-Rptd) Yes  Have you fallen in the past year?: (Patient-Rptd) Yes  How many times have you fallen?: (Patient-Rptd) (P) 1  Were you injured?: (Patient-Rptd) (P) Yes     Cognitive Assessment:   She had a completely normal cognitive assessment - see flowsheet entries       Functional Ability/Status:   Maeve Garner has some abnormal functions as listed below:  She has Hearing problems based on screening of functional  status.      Depression Screening (PHQ):  PHQ-2 SCORE: 0  , done 3/29/2025            Advanced Directives:   She has a Living Will on file in Epic; reviewed and discussed documents with patient (and family/surrogate if present).  She does have a POA but we do NOT have it on file in [facility].    Patient has Advance Care Planning documents but we do not have a copy in EMR. Discussed Advanced Care Planning with patient and instructed patient to get our office a copy to be scanned into EMR.      Patient Active Problem List   Diagnosis    Hyperlipidemia, mixed    Paroxysmal atrial fibrillation (HCC)    YASMIN (obstructive sleep apnea)    Age-related nuclear cataract of both eyes    Pseudoexfoliation (PXF) of lens capsule    Long term (current) use of anticoagulants    Senile osteoporosis    bilateral S1 radiculopathy     Allergies:  She is allergic to grass pollen(k-o-r-t-swt maycol); dust; mold; pollen; ragweed; and trees, box elder.    Current Medications:  Outpatient Medications Marked as Taking for the 4/3/25 encounter (Office Visit) with Nura Bates MD   Medication Sig    Cholecalciferol (VITAMIN D3) 25 MCG (1000 UT) Oral Cap Take 1 tablet by mouth daily.    cetirizine 5 MG Oral Tab Take 1 tablet (5 mg total) by mouth daily.    OMEPRAZOLE 20 MG Oral Capsule Delayed Release TAKE 1 CAPSULE BY MOUTH EVERY DAY IN THE MORNING BEFORE BREAKFAST    ipratropium 0.06 % Nasal Solution 2 sprays by Nasal route 3 (three) times daily.    docusate sodium 100 MG Oral Cap Take 100 mg by mouth 2 (two) times daily.    apixaban (ELIQUIS) 5 MG Oral Tab Take 1 tablet (5 mg total) by mouth 2 (two) times daily. Last dose to be taken on Nov.4th,2023    Probiotic Product (ALIGN OR) Take 1 tablet by mouth daily.    Simethicone (GAS RELIEF 80 OR) Take 1 tablet by mouth as needed.    sotalol 80 MG Oral Tab Take 0.5 tablets (40 mg total) by mouth 2 (two) times daily.    mupirocin 2 % External Ointment Apply with cotton swab to bilateral nares twice  daily as needed for crusting    atorvastatin 10 MG Oral Tab Take 1 tablet (10 mg total) by mouth nightly.    Calcium Carbonate-Vitamin D (CALCIUM + D OR) Take by mouth daily. Take 500mg & 1000 IU Vit D daily    omega-3 fatty acids (FISH OIL) 1000 MG Oral Cap Take 500 mg by mouth daily.    Multiple Vitamins-Minerals (MULTI FOR HER OR) Take 1 tablet by mouth daily.         Medical History:  She  has a past medical history of Age-related nuclear cataract of both eyes (02/09/2021), Arrhythmia, Atrial fibrillation (HCC) (01/2017), Ramírez's esophagus, Brachial neuritis (09/19/2009), Constipation, DUB (dysfunctional uterine bleeding) (1980), Esophageal reflux, Esophagitis (2011), Esophagitis (2012), Floater, vitreous, left (02/09/2021), Floater, vitreous, left (02/09/2021), Gastritis, Glaucoma suspect of both eyes (02/09/2021), Glaucoma suspect of both eyes (02/09/2021), Glaucoma suspect of both eyes (02/09/2021), Glaucoma suspect of both eyes (02/09/2021), Hearing impairment, Heartburn (2011), High blood pressure, High cholesterol, History of blood transfusion, Long term (current) use of anticoagulants (03/05/2019), Meniere disease, Osteopenia (10/20/2014), Osteopenia determined by x-ray (10/18/2016), Other and unspecified hyperlipidemia, Paroxysmal atrial fibrillation (HCC) (02/15/2017), Pseudoexfoliation (PXF) of lens capsule (02/09/2021), Pseudoexfoliation (PXF) of lens capsule (02/09/2021), Pseudoexfoliation (PXF) of lens capsule (02/09/2021), Pseudoexfoliation (PXF) of lens capsule (02/09/2021), Seasonal allergic rhinitis (11/26/2018), Sensorineural hearing loss, unilateral (09/24/2011), Sleep apnea, Snoring (2006), Spinal stenosis of lumbar region (09/04/2008), and Visual impairment.  Surgical History:  She  has a past surgical history that includes colonoscopy (08/26/2011); other surgical history (Right, 1979); hand/finger surgery unlisted (Right, 2011); injection; tendon origin/insertion; cortisone injection  (Right, ); excise hand/foot neuroma (Right, ); excise hand/foot neuroma (Left, ); excision of uvula (); upper gi endoscopy performed (); upper gi endoscopy,exam; colonoscopy; hysterectomy; colonoscopy (N/A, 2020); ep pulmonary vein/a-fib ablation (2022); ep a-flutter ablation (2022); and colonoscopy (N/A, 2025).   Family History:  Her family history includes Breast Cancer (age of onset: 60) in her mother; Cancer in her father, maternal uncle, maternal uncle, paternal uncle, and paternal uncle; Colon Polyps in her maternal uncle; Heart Disease in an other family member; MS in her sister; Other in her mother and another family member.  Social History:  She  reports that she quit smoking about 56 years ago. Her smoking use included cigarettes. She started smoking about 61 years ago. She has a 2.5 pack-year smoking history. She has been exposed to tobacco smoke. She has never used smokeless tobacco. She reports that she does not currently use alcohol. She reports that she does not use drugs.    Tobacco:  She smoked tobacco in the past but quit greater than 12 months ago.  Social History     Tobacco Use   Smoking Status Former    Current packs/day: 0.00    Average packs/day: 0.5 packs/day for 5.0 years (2.5 ttl pk-yrs)    Types: Cigarettes    Start date: 1963    Quit date: 1968    Years since quittin.5    Passive exposure: Past   Smokeless Tobacco Never        CAGE Alcohol Screen:   CAGE screening score of 0 on 3/29/2025, showing low risk of alcohol abuse.      Patient Care Team:  Nura Bates MD as PCP - General (Internal Medicine)  Adal Briones MD (SURGERY, ORTHOPEDIC)  Josiah Tirado PA-C (Physician Assistant)  Conchita Nicole PT as Physical Therapist (Physical Therapy)  Nick Mahmood MD (Interventional, Cardiology)    Review of Systems      Objective:   Physical Exam      /76 (BP Location: Right arm, Patient Position:  Sitting, Cuff Size: adult)   Pulse 59   Temp 98.1 °F (36.7 °C) (Temporal)   Ht 5' 1\" (1.549 m)   Wt 162 lb 11.2 oz (73.8 kg)   BMI 30.74 kg/m²  Estimated body mass index is 30.74 kg/m² as calculated from the following:    Height as of this encounter: 5' 1\" (1.549 m).    Weight as of this encounter: 162 lb 11.2 oz (73.8 kg).    Medicare Hearing Assessment:   Hearing Screening    Entry User: Jazzy Thompson CMA  Screening Method: Questionnaire  I have a problem hearing over the telephone: No I have trouble following the conversations when two or more people are talking at the same time: No   I have trouble understanding things on the TV: Yes I have to strain to understand conversations: No   I have to worry about missing the telephone ring or doorbell: No I have trouble hearing conversations in a noisy background such as a crowded room or restaurant: Yes   I get confused about where sounds come from: Yes I misunderstand some words in a sentence and need to ask people to repeat themselves: Sometimes   I especially have trouble understanding the speech of women and children: No I have trouble understanding the speaker in a large room such as at a meeting or place of [place]: No   Many people I talk to seem to mumble (or don't speak clearly): No People get annoyed because I misunderstand what they say: Sometimes   I misunderstand what others are saying and make inappropriate responses: No I avoid social activities because I cannot hear well and fear I will reply improperly: No   Family members and friends have told me they think I may have hearing loss: Sometimes                 Assessment & Plan:   Maeve Garner is a 79 year old female who presents for a Medicare Assessment.   (Z00.00) Medicare annual wellness visit, subsequent  (primary encounter diagnosis)  Plan: Urinalysis, Routine        Patient is independent with ADL.s    Mammogram is  due  last 4/2024  Health screening   Colonoscopy, mammography, dexa  scan  And routine eye exam advised.  2/13/2025  8:18 AM COLONOSCOPY   ESOPHAGOGASTRODUODENOSCOPY (EGD)      mpression:  -Diverticulosis  -Internal hemorrhoids  -Gastric polyp  -No obvious Ramírez's mucosa, biopsies taken at the GE junction     Recommendations:  - Post procedure instructions given  - No recall colonoscopy needed  - High fiber diet for diverticular disease  - Symptomatic treatment of hemorrhoids  - Follow up on upper GI polyp results          (I48.0) Paroxysmal atrial fibrillation (HCC)  Plan: controlled monitor    (G47.33) YASMIN (obstructive sleep apnea)  Plan: using oral appliance complaint monitor    (E78.2) Hyperlipidemia, mixed  Plan: CBC With Differential With Platelet, Comp         Metabolic Panel (14), Lipid Panel, TSH and Free        T4        Low cholesterol diet advised  Avoid saturated and trans fats       (Z79.01) Long term (current) use of anticoagulants  Plan: bleeding preucaiotn    (M17.12) Primary osteoarthritis of left knee  Plan: chronic  pain controlled    (M81.0) Senile osteoporosis  Plan: Followed by DR John  completed prolia  now on reclast  follow with endo  DR John this year    (M54.16) bilateral S1 radiculopathy  Plan: pain mgt    (H26.8) Pseudoexfoliation (PXF) of lens capsule  Plan: chronic follow up with opthalmolgy ongoing    (H25.13) Age-related nuclear cataract of both eyes  Plan: chronic follow up with opthalmolotgy    (Z12.31) Visit for screening mammogram  Plan: [facility] ALICE 2D+3D SCREENING BILAT         (CPT=77067/97258)        .Breast exam.  Bilateral  No discrete palpable masses or tenderness    No nipple discharge  And no axillary adenopathy.  Self breast exam adv    Medications and most recent test results reviewed  Refill medicaitons  as needed  Potential side effect discussed  Modification of risk for CAD advised    Dietary an lifestyle change  Pt voiced understanding and agrees with plan  Pt given time to ask questions  After Visit Summary handout    Discussed   And given to patient.      The patient indicates understanding of these issues and agrees to the plan.  Reinforced healthy diet, lifestyle, and exercise.      No follow-ups on file.     Nura Bates MD, 4/3/2025     Supplementary Documentation:   General Health:  In the past six months, have you lost more than 10 pounds without trying?: (Patient-Rptd) 2 - No  Has your appetite been poor?: (Patient-Rptd) No  Type of Diet: (Patient-Rptd) Balanced  How does the patient maintain a good energy level?: (Patient-Rptd) Appropriate Exercise  How would you describe your daily physical activity?: (Patient-Rptd) Moderate  How would you describe your current health state?: (Patient-Rptd) Good  How do you maintain positive mental well-being?: (Patient-Rptd) Puzzles  On a scale of 0 to 10, with 0 being no pain and 10 being severe pain, what is your pain level?: (Patient-Rptd) 2 - (Mild)  In the past six months, have you experienced urine leakage?: (Patient-Rptd) 0-No  At any time do you feel concerned for the safety/well-being of yourself and/or your children, in your home or elsewhere?: No  Have you had any immunizations at another office such as Influenza, Hepatitis B, Tetanus, or Pneumococcal?: (Patient-Rptd) No    Health Maintenance   Topic Date Due    Annual Depression Screening  01/01/2025    Annual Physical  03/12/2025    COVID-19 Vaccine (8 - 2024-25 season) 04/13/2025    Influenza Vaccine  Completed    DEXA Scan  Completed    Fall Risk Screening (Annual)  Completed    Pneumococcal Vaccine: 50+ Years  Completed    Zoster Vaccines  Completed    Meningococcal B Vaccine  Aged Out    Mammogram  Discontinued    Colonoscopy  Discontinued

## 2025-04-14 ENCOUNTER — OFFICE VISIT (OUTPATIENT)
Dept: ENDOCRINOLOGY CLINIC | Facility: CLINIC | Age: 80
End: 2025-04-14
Payer: MEDICARE

## 2025-04-14 VITALS
HEART RATE: 63 BPM | BODY MASS INDEX: 31.34 KG/M2 | WEIGHT: 166 LBS | SYSTOLIC BLOOD PRESSURE: 150 MMHG | DIASTOLIC BLOOD PRESSURE: 82 MMHG | HEIGHT: 61 IN

## 2025-04-14 DIAGNOSIS — E55.9 VITAMIN D DEFICIENCY: ICD-10-CM

## 2025-04-14 DIAGNOSIS — M81.0 AGE-RELATED OSTEOPOROSIS WITHOUT CURRENT PATHOLOGICAL FRACTURE: Primary | ICD-10-CM

## 2025-04-14 PROCEDURE — 99213 OFFICE O/P EST LOW 20 MIN: CPT | Performed by: INTERNAL MEDICINE

## 2025-04-14 NOTE — PROGRESS NOTES
Name: Maeve Garner  Date: 4/14/2025    Referring Physician: No ref. provider found    Chief Complaint   Patient presents with    Osteoporosis       HISTORY OF PRESENT ILLNESS   Maeve Garner is a 79 year old female who presents for   Chief Complaint   Patient presents with    Osteoporosis     80 y/o F presents for follow up evaluation of osteopenia.  She was diagnosed with bone loss several years ago per PCP.  No h/o falls or fractures.  No h/o nephrolithiasis.     She is maintained on Calcium 500mg PO daily and Vitamin D 1000 units daily.  She has been eating less yogurt but occ taking extra calcium supplement.  No fractures since last visit.     She received 6 injections of prolia and received Reclast on 4/9/24 to transition off medication.  She did tolerate infusion well.     She did have a fall since last visit, no fracture.      Maternal h/o hip fracture and sister osteoporosis     REVIEW OF SYSTEMS  Eyes: no change in vision  Neurologic: no headache, generalized or focal weakness or numbness.  Head: normal  ENT: normal  Lungs: no shortness of breath, wheezing or TODD  Cardiovascular:  no chest pain or palpitations  Gastrointestinal:  no abdominal pain, bowel movement problems  Musculoskeletal: no muscle pain or arthralgia  /Gyne: no frequency or discomfort while urinating  Psychiatric:  no acute distress, anxiety  or depression  Skin: normal moisturized skin    Medications:     Current Outpatient Medications:     Cholecalciferol (VITAMIN D3) 25 MCG (1000 UT) Oral Cap, Take 1 tablet by mouth daily., Disp: , Rfl:     cetirizine 5 MG Oral Tab, Take 1 tablet (5 mg total) by mouth daily., Disp: , Rfl:     OMEPRAZOLE 20 MG Oral Capsule Delayed Release, TAKE 1 CAPSULE BY MOUTH EVERY DAY IN THE MORNING BEFORE BREAKFAST, Disp: 90 capsule, Rfl: 3    ipratropium 0.06 % Nasal Solution, 2 sprays by Nasal route 3 (three) times daily., Disp: 45 mL, Rfl: 2    docusate sodium 100 MG Oral Cap, Take 100 mg by  mouth 2 (two) times daily., Disp: 20 capsule, Rfl: 0    apixaban (ELIQUIS) 5 MG Oral Tab, Take 1 tablet (5 mg total) by mouth 2 (two) times daily. Last dose to be taken on Nov.4th,2023, Disp: , Rfl:     Probiotic Product (ALIGN OR), Take 1 tablet by mouth daily., Disp: , Rfl:     Simethicone (GAS RELIEF 80 OR), Take 1 tablet by mouth as needed., Disp: , Rfl:     sotalol 80 MG Oral Tab, Take 0.5 tablets (40 mg total) by mouth 2 (two) times daily., Disp: , Rfl:     mupirocin 2 % External Ointment, Apply with cotton swab to bilateral nares twice daily as needed for crusting, Disp: 15 g, Rfl: 2    atorvastatin 10 MG Oral Tab, Take 1 tablet (10 mg total) by mouth nightly., Disp: 30 tablet, Rfl: 5    Calcium Carbonate-Vitamin D (CALCIUM + D OR), Take by mouth daily. Take 500mg & 1000 IU Vit D daily, Disp: , Rfl:     omega-3 fatty acids (FISH OIL) 1000 MG Oral Cap, Take 500 mg by mouth daily., Disp: , Rfl:     Multiple Vitamins-Minerals (MULTI FOR HER OR), Take 1 tablet by mouth daily.  , Disp: , Rfl:     Polyethylene Glycol 3350 (MIRALAX OR), Take by mouth. (Patient not taking: Reported on 4/14/2025), Disp: , Rfl:     Cyanocobalamin (VITAMIN B 12 OR), Take 1,000 mg by mouth daily with breakfast. (Patient not taking: Reported on 4/14/2025), Disp: , Rfl:      Allergies:   Allergies   Allergen Reactions    Grass Pollen(K-O-R-T-Swt Mauricio) OTHER (SEE COMMENTS)    Dust Runny nose     Headache, watery eyes    Mold Runny nose     Headache, watery eyes    Pollen Runny nose     Headache, watery eyes    Ragweed OTHER (SEE COMMENTS)     Watery eyes , Sneezing, HA's      Trees, Cohagen Runny nose     Headache, watery eyes       Social History:   Social History     Socioeconomic History    Marital status:    Tobacco Use    Smoking status: Former     Current packs/day: 0.00     Average packs/day: 0.5 packs/day for 5.0 years (2.5 ttl pk-yrs)     Types: Cigarettes     Start date: 9/29/1963     Quit date: 9/29/1968     Years since  quittin.5     Passive exposure: Past    Smokeless tobacco: Never   Vaping Use    Vaping status: Never Used   Substance and Sexual Activity    Alcohol use: Not Currently     Alcohol/week: 0.0 standard drinks of alcohol     Comment: formerly since 2016, rare alcohol currently    Drug use: No   Other Topics Concern    Caffeine Concern Yes     Comment: soda 12 oz       Medical History:   Past Medical History:    Age-related nuclear cataract of both eyes    Arrhythmia    A-fib- dx, ablation 22    Atrial fibrillation (HCC)    Ramírez's esophagus    Brachial neuritis    Constipation    DUB (dysfunctional uterine bleeding)    per ng LIBBY    Esophageal reflux    Esophagitis    per ng eosinophilic egd w/ biopsy    Esophagitis    pr ng egd w/ biopsy    Floater, vitreous, left    Floater, vitreous, left    Gastritis    Glaucoma suspect of both eyes    Glaucoma suspect of both eyes    Glaucoma suspect of both eyes    Glaucoma suspect of both eyes    Hearing impairment    left ear hearing aid    Heartburn    per ng reflux, dysphagia    High blood pressure    High cholesterol    History of blood transfusion    r/t childbirth - no rx    Long term (current) use of anticoagulants    Meniere disease    Osteopenia    Osteopenia determined by x-ray    Other and unspecified hyperlipidemia    Paroxysmal atrial fibrillation (HCC)    Pseudoexfoliation (PXF) of lens capsule    Pseudoexfoliation (PXF) of lens capsule    Pseudoexfoliation (PXF) of lens capsule    Pseudoexfoliation (PXF) of lens capsule    Seasonal allergic rhinitis    Sensorineural hearing loss, unilateral    Sleep apnea    mouth guard    Snoring    per ng uvulectomy    Spinal stenosis of lumbar region    Visual impairment    glasses       Surgical history:   Past Surgical History:   Procedure Laterality Date    Colonoscopy  2011    Colonoscopy      Colonoscopy N/A 2020    Procedure: COLONOSCOPY;  Surgeon: Omid De La Rosa MD;  [facility] ENDOSCOPY     Colonoscopy N/A 2/13/2025    Procedure: COLONOSCOPY;  Surgeon: Omid De La Rosa MD;  Location: [facility] ENDOSCOPY    Cortisone injection Right 2010    per ng post op right forefoot    Ep a-flutter ablation  11/09/2022         Ep pulmonary vein/a-fib ablation  11/09/2022         Excise hand/foot neuroma Right 2007    per ng neuroma 2 rt, hammertoe 3rt, naprosyn 500    Excise hand/foot neuroma Left 2009    per ng 2 left hammer toe 2 rt, hallux valqus naprosyn 500    Excision of uvula  2006    per ng  uvula removed     Hand/finger surgery unlisted Right 2011    per ng hand synovitis surgery    Hysterectomy      partial at 34    Injection; tendon origin/insertion      per ng injection tendon sheath, ligament    Other surgical history Right 1979    per ng torn ligament right ankle    Upper gi endoscopy performed  2015    Upper gi endoscopy,exam         PHYSICAL EXAMINATION:  BP (!) 167/83   Pulse 63   Ht 5' 1\" (1.549 m)   Wt 166 lb (75.3 kg)   BMI 31.37 kg/m²     General Appearance:  Alert, in no acute distress, well developed  Eyes: normal conjunctivae, sclera.  Ears/Nose/Mouth/Throat/Neck:  normal hearing, normal speech  Neurologic: sensory grossly intact and motor grossly intact  Musculoskeletal:  normal muscle strength and tone  PV: normal pulses of carotids, pedals  Skin:  normal moisture and skin texture  Hair & Nails:  normal scalp hair     Neuro:  sensory grossly intact and motor grossly intact  Psychiatric:  oriented to time, self, and place  Nutritional:  no abnormal weight gain or loss    ASSESSMENT/PLAN:    1. Osteopenia  - Discussed diagnosis with patient  - Discussed importance of treatment to prevent fractures  - She is at high risk for fracture and now started on medication   - She received 6 injections of prolia  - Transitioned off with Reclast 4/2024   - Continue calcium and Vitamin D supplementation  - DEXA improved 1/2024   - Labs at goal    - Recheck Vitamin D, Bone specific alk phos, CTX     RTC  1 year     4/14/2025  Lolita John MD

## 2025-04-18 ENCOUNTER — LAB ENCOUNTER (OUTPATIENT)
Dept: LAB | Facility: HOSPITAL | Age: 80
End: 2025-04-18
Attending: INTERNAL MEDICINE
Payer: MEDICARE

## 2025-04-18 ENCOUNTER — TELEPHONE (OUTPATIENT)
Dept: ENDOCRINOLOGY CLINIC | Facility: CLINIC | Age: 80
End: 2025-04-18

## 2025-04-18 ENCOUNTER — PATIENT MESSAGE (OUTPATIENT)
Dept: ENDOCRINOLOGY CLINIC | Facility: CLINIC | Age: 80
End: 2025-04-18

## 2025-04-18 DIAGNOSIS — M81.0 AGE-RELATED OSTEOPOROSIS WITHOUT CURRENT PATHOLOGICAL FRACTURE: ICD-10-CM

## 2025-04-18 DIAGNOSIS — E55.9 VITAMIN D DEFICIENCY: ICD-10-CM

## 2025-04-18 DIAGNOSIS — Z00.00 MEDICARE ANNUAL WELLNESS VISIT, SUBSEQUENT: ICD-10-CM

## 2025-04-18 DIAGNOSIS — E78.2 HYPERLIPIDEMIA, MIXED: ICD-10-CM

## 2025-04-18 LAB
ALBUMIN SERPL-MCNC: 4.4 G/DL (ref 3.2–4.8)
ALBUMIN/GLOB SERPL: 1.4 {RATIO} (ref 1–2)
ALP LIVER SERPL-CCNC: 85 U/L (ref 55–142)
ALT SERPL-CCNC: 12 U/L (ref 10–49)
ANION GAP SERPL CALC-SCNC: 8 MMOL/L (ref 0–18)
AST SERPL-CCNC: 18 U/L (ref ?–34)
BASOPHILS # BLD AUTO: 0.04 X10(3) UL (ref 0–0.2)
BASOPHILS NFR BLD AUTO: 0.6 %
BILIRUB SERPL-MCNC: 0.4 MG/DL (ref 0.2–1.1)
BILIRUB UR QL: NEGATIVE
BUN BLD-MCNC: 21 MG/DL (ref 9–23)
BUN/CREAT SERPL: 22.6 (ref 10–20)
CALCIUM BLD-MCNC: 9.4 MG/DL (ref 8.7–10.4)
CHLORIDE SERPL-SCNC: 103 MMOL/L (ref 98–112)
CHOLEST SERPL-MCNC: 139 MG/DL (ref ?–200)
CLARITY UR: CLEAR
CO2 SERPL-SCNC: 30 MMOL/L (ref 21–32)
CREAT BLD-MCNC: 0.93 MG/DL (ref 0.55–1.02)
DEPRECATED RDW RBC AUTO: 43.2 FL (ref 35.1–46.3)
EGFRCR SERPLBLD CKD-EPI 2021: 63 ML/MIN/1.73M2 (ref 60–?)
EOSINOPHIL # BLD AUTO: 0.1 X10(3) UL (ref 0–0.7)
EOSINOPHIL NFR BLD AUTO: 1.6 %
ERYTHROCYTE [DISTWIDTH] IN BLOOD BY AUTOMATED COUNT: 12.1 % (ref 11–15)
FASTING PATIENT LIPID ANSWER: YES
FASTING STATUS PATIENT QL REPORTED: YES
GLOBULIN PLAS-MCNC: 3.2 G/DL (ref 2–3.5)
GLUCOSE BLD-MCNC: 81 MG/DL (ref 70–99)
GLUCOSE UR-MCNC: NORMAL MG/DL
HCT VFR BLD AUTO: 36.1 % (ref 35–48)
HDLC SERPL-MCNC: 42 MG/DL (ref 40–59)
HGB BLD-MCNC: 12.5 G/DL (ref 12–16)
HGB UR QL STRIP.AUTO: NEGATIVE
IMM GRANULOCYTES # BLD AUTO: 0.02 X10(3) UL (ref 0–1)
IMM GRANULOCYTES NFR BLD: 0.3 %
KETONES UR-MCNC: NEGATIVE MG/DL
LDLC SERPL CALC-MCNC: 76 MG/DL (ref ?–100)
LEUKOCYTE ESTERASE UR QL STRIP.AUTO: NEGATIVE
LYMPHOCYTES # BLD AUTO: 1.35 X10(3) UL (ref 1–4)
LYMPHOCYTES NFR BLD AUTO: 21.8 %
MCH RBC QN AUTO: 33.6 PG (ref 26–34)
MCHC RBC AUTO-ENTMCNC: 34.6 G/DL (ref 31–37)
MCV RBC AUTO: 97 FL (ref 80–100)
MONOCYTES # BLD AUTO: 0.5 X10(3) UL (ref 0.1–1)
MONOCYTES NFR BLD AUTO: 8.1 %
NEUTROPHILS # BLD AUTO: 4.19 X10 (3) UL (ref 1.5–7.7)
NEUTROPHILS # BLD AUTO: 4.19 X10(3) UL (ref 1.5–7.7)
NEUTROPHILS NFR BLD AUTO: 67.6 %
NITRITE UR QL STRIP.AUTO: NEGATIVE
NONHDLC SERPL-MCNC: 97 MG/DL (ref ?–130)
OSMOLALITY SERPL CALC.SUM OF ELEC: 294 MOSM/KG (ref 275–295)
PH UR: 7 [PH] (ref 5–8)
PLATELET # BLD AUTO: 264 10(3)UL (ref 150–450)
POTASSIUM SERPL-SCNC: 4.5 MMOL/L (ref 3.5–5.1)
PROT SERPL-MCNC: 7.6 G/DL (ref 5.7–8.2)
PROT UR-MCNC: NEGATIVE MG/DL
RBC # BLD AUTO: 3.72 X10(6)UL (ref 3.8–5.3)
SODIUM SERPL-SCNC: 141 MMOL/L (ref 136–145)
SP GR UR STRIP: 1.01 (ref 1–1.03)
T4 FREE SERPL-MCNC: 0.9 NG/DL (ref 0.8–1.7)
TRIGL SERPL-MCNC: 119 MG/DL (ref 30–149)
TSI SER-ACNC: 1.16 UIU/ML (ref 0.55–4.78)
UROBILINOGEN UR STRIP-ACNC: NORMAL
VIT D+METAB SERPL-MCNC: 58.3 NG/ML (ref 30–100)
VLDLC SERPL CALC-MCNC: 18 MG/DL (ref 0–30)
WBC # BLD AUTO: 6.2 X10(3) UL (ref 4–11)

## 2025-04-18 PROCEDURE — 84443 ASSAY THYROID STIM HORMONE: CPT

## 2025-04-18 PROCEDURE — 85025 COMPLETE CBC W/AUTO DIFF WBC: CPT

## 2025-04-18 PROCEDURE — 36415 COLL VENOUS BLD VENIPUNCTURE: CPT

## 2025-04-18 PROCEDURE — 81003 URINALYSIS AUTO W/O SCOPE: CPT

## 2025-04-18 PROCEDURE — 82523 COLLAGEN CROSSLINKS: CPT

## 2025-04-18 PROCEDURE — 80061 LIPID PANEL: CPT

## 2025-04-18 PROCEDURE — 80053 COMPREHEN METABOLIC PANEL: CPT

## 2025-04-18 PROCEDURE — 84080 ASSAY ALKALINE PHOSPHATASES: CPT

## 2025-04-18 PROCEDURE — 82306 VITAMIN D 25 HYDROXY: CPT

## 2025-04-18 PROCEDURE — 84439 ASSAY OF FREE THYROXINE: CPT

## 2025-04-18 NOTE — TELEPHONE ENCOUNTER
Patient dropped off at the [facility]  a white envelope - results for dr John to review.

## 2025-04-22 ENCOUNTER — MED REC SCAN ONLY (OUTPATIENT)
Dept: INTERNAL MEDICINE CLINIC | Facility: CLINIC | Age: 80
End: 2025-04-22

## 2025-04-23 LAB
ALKALINE PHOSPHATASE BONE SPECIFIC: 15.7 UG/L
C-TELOPEPTIDE: 432 PG/ML

## 2025-04-24 ENCOUNTER — TELEPHONE (OUTPATIENT)
Dept: ENDOCRINOLOGY CLINIC | Facility: CLINIC | Age: 80
End: 2025-04-24

## 2025-04-24 RX ORDER — ZOLEDRONIC ACID 0.05 MG/ML
5 INJECTION, SOLUTION INTRAVENOUS ONCE
OUTPATIENT
Start: 2025-04-24

## 2025-04-24 NOTE — TELEPHONE ENCOUNTER
Good afternoon,     Verified coverage, no authorization is needed for Reclast infusion.     Okay to schedule patient.       Thank You,  Michelle   POD #1 s/p left ureteral stent 2/2 multiple obstructing ureteral calculi; MAG, improving    - Will D/C cook this AM; once voids, will D/C home on ceftin x 7 days; f/u Dr. Rivers in 1 week to schedule 2nd procedure for definitive stone treatment

## 2025-04-28 DIAGNOSIS — M81.0 SENILE OSTEOPOROSIS: Primary | ICD-10-CM

## 2025-04-28 RX ORDER — ZOLEDRONIC ACID 0.05 MG/ML
5 INJECTION, SOLUTION INTRAVENOUS ONCE
OUTPATIENT
Start: 2025-04-28

## 2025-05-07 ENCOUNTER — HOSPITAL ENCOUNTER (OUTPATIENT)
Dept: MAMMOGRAPHY | Facility: HOSPITAL | Age: 80
Discharge: HOME | End: 2025-05-07
Attending: INTERNAL MEDICINE
Payer: MEDICARE

## 2025-05-07 DIAGNOSIS — Z12.31 VISIT FOR SCREENING MAMMOGRAM: ICD-10-CM

## 2025-05-07 PROCEDURE — 77067 SCR MAMMO BI INCL CAD: CPT | Performed by: INTERNAL MEDICINE

## 2025-05-07 PROCEDURE — 77063 BREAST TOMOSYNTHESIS BI: CPT | Performed by: INTERNAL MEDICINE

## 2025-05-08 ENCOUNTER — TELEPHONE (OUTPATIENT)
Age: 80
End: 2025-05-08

## 2025-05-09 ENCOUNTER — LAB ENCOUNTER (OUTPATIENT)
Dept: LAB | Age: 80
End: 2025-05-09
Attending: INTERNAL MEDICINE
Payer: MEDICARE

## 2025-05-09 DIAGNOSIS — M81.0 SENILE OSTEOPOROSIS: ICD-10-CM

## 2025-05-09 LAB
ALBUMIN SERPL-MCNC: 4.1 G/DL (ref 3.2–4.8)
CALCIUM BLD-MCNC: 9.2 MG/DL (ref 8.7–10.4)
CREAT BLD-MCNC: 0.89 MG/DL (ref 0.55–1.02)
EGFRCR SERPLBLD CKD-EPI 2021: 66 ML/MIN/1.73M2 (ref 60–?)

## 2025-05-09 PROCEDURE — 82565 ASSAY OF CREATININE: CPT

## 2025-05-09 PROCEDURE — 36415 COLL VENOUS BLD VENIPUNCTURE: CPT

## 2025-05-09 PROCEDURE — 82310 ASSAY OF CALCIUM: CPT

## 2025-05-09 PROCEDURE — 82040 ASSAY OF SERUM ALBUMIN: CPT

## 2025-05-12 ENCOUNTER — APPOINTMENT (OUTPATIENT)
Dept: URBAN - METROPOLITAN AREA CLINIC 244 | Age: 80
Setting detail: DERMATOLOGY
End: 2025-05-12

## 2025-05-12 DIAGNOSIS — L81.4 OTHER MELANIN HYPERPIGMENTATION: ICD-10-CM

## 2025-05-12 DIAGNOSIS — L82.1 OTHER SEBORRHEIC KERATOSIS: ICD-10-CM

## 2025-05-12 DIAGNOSIS — Z12.83 ENCOUNTER FOR SCREENING FOR MALIGNANT NEOPLASM OF SKIN: ICD-10-CM

## 2025-05-12 DIAGNOSIS — D22 MELANOCYTIC NEVI: ICD-10-CM

## 2025-05-12 PROBLEM — D22.9 MELANOCYTIC NEVI, UNSPECIFIED: Status: ACTIVE | Noted: 2025-05-12

## 2025-05-12 PROCEDURE — 99213 OFFICE O/P EST LOW 20 MIN: CPT

## 2025-05-12 PROCEDURE — OTHER MIPS QUALITY: OTHER

## 2025-05-12 PROCEDURE — OTHER COUNSELING: OTHER

## 2025-05-19 ENCOUNTER — OFFICE VISIT (OUTPATIENT)
Age: 80
End: 2025-05-19
Attending: INTERNAL MEDICINE
Payer: MEDICARE

## 2025-05-19 VITALS
HEART RATE: 57 BPM | SYSTOLIC BLOOD PRESSURE: 143 MMHG | OXYGEN SATURATION: 96 % | RESPIRATION RATE: 16 BRPM | TEMPERATURE: 98 F | DIASTOLIC BLOOD PRESSURE: 65 MMHG

## 2025-05-19 DIAGNOSIS — M81.0 SENILE OSTEOPOROSIS: Primary | ICD-10-CM

## 2025-05-19 RX ORDER — ZOLEDRONIC ACID 0.05 MG/ML
5 INJECTION, SOLUTION INTRAVENOUS ONCE
Status: CANCELLED | OUTPATIENT
Start: 2025-05-19

## 2025-05-19 RX ORDER — ZOLEDRONIC ACID 0.05 MG/ML
5 INJECTION, SOLUTION INTRAVENOUS ONCE
Status: COMPLETED | OUTPATIENT
Start: 2025-05-19 | End: 2025-05-19

## 2025-05-19 RX ORDER — ZOLEDRONIC ACID 0.05 MG/ML
INJECTION, SOLUTION INTRAVENOUS
Status: COMPLETED
Start: 2025-05-19 | End: 2025-05-19

## 2025-05-19 RX ADMIN — ZOLEDRONIC ACID 5 MG: 0.05 INJECTION, SOLUTION INTRAVENOUS at 15:44:00

## 2025-05-19 NOTE — PROGRESS NOTES
Maeve to infusion today for Reclast. Arrives alert and independent, accompanied by her . Patient denies any issues or concerns. No jaw pain or discomfort. No recent or upcoming invasive dental procedures.     Ordering Provider: Dr. LEIGHA John  Order Exp: after today's dose (x1 dose)     PIV established to left AC with good blood return. Reclast given over 20 minutes and patient tolerated infusion without difficulty or complaint. PIV flushed and removed, applied 2x2 gauze and coban to site.       Education Record  Learner:  Patient and Spouse  Disease / Diagnosis: senile osteoporosis  Barriers / Limitations:  None  Method:  Brief focused and Discussion  General Topics:  Medication, Side effects and symptom management, and Plan of care reviewed  Outcome:  Shows understanding

## 2025-05-22 ENCOUNTER — OFFICE VISIT (OUTPATIENT)
Dept: OTOLARYNGOLOGY | Facility: CLINIC | Age: 80
End: 2025-05-22
Payer: MEDICARE

## 2025-05-22 VITALS — WEIGHT: 166 LBS | HEIGHT: 61 IN | BODY MASS INDEX: 31.34 KG/M2

## 2025-05-22 DIAGNOSIS — R09.82 POSTNASAL DRIP: ICD-10-CM

## 2025-05-22 DIAGNOSIS — R59.1 HEAD AND NECK LYMPHADENOPATHY: Primary | ICD-10-CM

## 2025-05-22 PROCEDURE — 99213 OFFICE O/P EST LOW 20 MIN: CPT | Performed by: SPECIALIST

## 2025-05-23 NOTE — PATIENT INSTRUCTIONS
A repeat CT scan was ordered to reassess a right neck mass.  Continue Nasalcrom for your postnasal drip.  I will of course notify you of all results.

## 2025-05-23 NOTE — PROGRESS NOTES
Maeve Garner is a 79 year old female.   Chief Complaint   Patient presents with    Follow - Up     Patient here for ear, nose and throat annual check-up, pt reports no symptoms.     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  A repeat CAT scan was recommended for documentation of stability of the mass.  Patient also reports postnasal drip.  She has had some improvement using Nasalcrom.    Current Medications[1]   Past Medical History[2]   Social History:  Short Social Hx on File[3]     REVIEW OF SYSTEMS:   GENERAL HEALTH: feels well otherwise  GENERAL : denies fever, chills, sweats, weight loss, weight gain  SKIN: denies any unusual skin lesions or rashes  RESPIRATORY: denies shortness of breath with exertion  NEURO: denies headaches    EXAM:   Ht 5' 1\" (1.549 m)   Wt 166 lb (75.3 kg)   BMI 31.37 kg/m²   System Details   Skin Inspection - Normal.   Constitutional Overall appearance - Normal.   Head/Face Facial features - Normal. Eyebrows - Normal. Skull - Normal.   Eyes Conjunctiva - Right: Normal, Left: Normal. Pupil - Right: Normal, Left: Normal.    Ears Inspection - Right: Normal, Left: Normal.   Canal - Right: Normal, Left: Normal.   TM - Right: Normal, Left: Normal.   Nasal External nose - Normal.   Nasal septum - Normal.  Turbinates -congestion no purulence or polyps   Oral/Oropharynx Lips - Normal, Tonsils - Normal, Tongue - Normal    Neck Exam Inspection - Normal. Palpation - Normal. Parotid gland - Normal. Thyroid gland - Normal.   Lymph Detail Submental. Submandibular.  Posterior cervical. Supraclavicular all without enlargement.  Small palpable right level 3 neck mass, solitary.   Psychiatric Orientation - Oriented to time, place, person & situation. Appropriate mood and affect.   Neurological Memory - Normal. Cranial nerves - Cranial nerves II  through XII grossly intact.     ASSESSMENT AND PLAN:   1. Head and neck lymphadenopathy  Will get CT scan to check for stability of the lymph node versus venal lymphatic malformation.  I will of course call patient with the results.  - CT SOFT TISSUE OF NECK(CONTRAST ONLY) (CPT=70491); Future    2. Postnasal drip  Continue Nasalcrom.        The patient indicates understanding of these issues and agrees to the plan.      Ileana Allen MD  5/22/2025  7:50 PM       [1]   Current Outpatient Medications   Medication Sig Dispense Refill    Cholecalciferol (VITAMIN D3) 25 MCG (1000 UT) Oral Cap Take 1 tablet by mouth daily.      cetirizine 5 MG Oral Tab Take 1 tablet (5 mg total) by mouth daily.      OMEPRAZOLE 20 MG Oral Capsule Delayed Release TAKE 1 CAPSULE BY MOUTH EVERY DAY IN THE MORNING BEFORE BREAKFAST 90 capsule 3    ipratropium 0.06 % Nasal Solution 2 sprays by Nasal route 3 (three) times daily. 45 mL 2    docusate sodium 100 MG Oral Cap Take 100 mg by mouth 2 (two) times daily. 20 capsule 0    apixaban (ELIQUIS) 5 MG Oral Tab Take 1 tablet (5 mg total) by mouth 2 (two) times daily. Last dose to be taken on Nov.4th,2023      Probiotic Product (ALIGN OR) Take 1 tablet by mouth daily.      Simethicone (GAS RELIEF 80 OR) Take 1 tablet by mouth as needed.      sotalol 80 MG Oral Tab Take 0.5 tablets (40 mg total) by mouth 2 (two) times daily.      mupirocin 2 % External Ointment Apply with cotton swab to bilateral nares twice daily as needed for crusting 15 g 2    atorvastatin 10 MG Oral Tab Take 1 tablet (10 mg total) by mouth nightly. 30 tablet 5    Calcium Carbonate-Vitamin D (CALCIUM + D OR) Take by mouth daily. Take 500mg & 1000 IU Vit D daily      omega-3 fatty acids (FISH OIL) 1000 MG Oral Cap Take 500 mg by mouth daily.      Multiple Vitamins-Minerals (MULTI FOR HER OR) Take 1 tablet by mouth daily.        Polyethylene Glycol 3350 (MIRALAX OR) Take by mouth. (Patient not taking: Reported on 5/22/2025)       Cyanocobalamin (VITAMIN B 12 OR) Take 1,000 mg by mouth daily with breakfast. (Patient not taking: Reported on 2025)     [2]   Past Medical History:   Age-related nuclear cataract of both eyes    Arrhythmia    A-fib- dx, ablation 22    Atrial fibrillation (HCC)    Ramírez's esophagus    Brachial neuritis    Constipation    DUB (dysfunctional uterine bleeding)    per ng LIBBY    Esophageal reflux    Esophagitis    per ng eosinophilic egd w/ biopsy    Esophagitis    pr ng egd w/ biopsy    Floater, vitreous, left    Floater, vitreous, left    Gastritis    Glaucoma suspect of both eyes    Glaucoma suspect of both eyes    Glaucoma suspect of both eyes    Glaucoma suspect of both eyes    Hearing impairment    left ear hearing aid    Heartburn    per ng reflux, dysphagia    High blood pressure    High cholesterol    History of blood transfusion    r/t childbirth - no rx    Long term (current) use of anticoagulants    Meniere disease    Osteopenia    Osteopenia determined by x-ray    Other and unspecified hyperlipidemia    Paroxysmal atrial fibrillation (HCC)    Pseudoexfoliation (PXF) of lens capsule    Pseudoexfoliation (PXF) of lens capsule    Pseudoexfoliation (PXF) of lens capsule    Pseudoexfoliation (PXF) of lens capsule    Seasonal allergic rhinitis    Sensorineural hearing loss, unilateral    Sleep apnea    mouth guard    Snoring    per ng uvulectomy    Spinal stenosis of lumbar region    Visual impairment    glasses   [3]   Social History  Socioeconomic History    Marital status:    Tobacco Use    Smoking status: Former     Current packs/day: 0.00     Average packs/day: 0.5 packs/day for 5.0 years (2.5 ttl pk-yrs)     Types: Cigarettes     Start date: 1963     Quit date: 1968     Years since quittin.6     Passive exposure: Past    Smokeless tobacco: Never   Vaping Use    Vaping status: Never Used   Substance and Sexual Activity    Alcohol use: Not Currently     Alcohol/week: 0.0  standard drinks of alcohol     Comment: formerly since 2016, rare alcohol currently    Drug use: No   Other Topics Concern    Caffeine Concern Yes     Comment: soda 12 oz     Social Drivers of Health     Food Insecurity: No Food Insecurity (4/3/2025)    NCSS - Food Insecurity     Worried About Running Out of Food in the Last Year: No     Ran Out of Food in the Last Year: No   Transportation Needs: No Transportation Needs (4/3/2025)    NCSS - Transportation     Lack of Transportation: No   Housing Stability: Not At Risk (4/3/2025)    NCSS - Housing/Utilities     Has Housing: Yes     Worried About Losing Housing: No     Unable to Get Utilities: No

## 2025-05-27 ENCOUNTER — HOSPITAL ENCOUNTER (OUTPATIENT)
Dept: CT IMAGING | Facility: HOSPITAL | Age: 80
Discharge: HOME | End: 2025-05-27
Attending: SPECIALIST
Payer: MEDICARE

## 2025-05-27 DIAGNOSIS — R59.1 HEAD AND NECK LYMPHADENOPATHY: ICD-10-CM

## 2025-05-27 PROCEDURE — 70491 CT SOFT TISSUE NECK W/DYE: CPT | Performed by: SPECIALIST

## 2025-07-01 ENCOUNTER — OFFICE VISIT (OUTPATIENT)
Dept: INTERNAL MEDICINE CLINIC | Facility: CLINIC | Age: 80
End: 2025-07-01
Payer: MEDICARE

## 2025-07-01 ENCOUNTER — HOSPITAL ENCOUNTER (OUTPATIENT)
Dept: GENERAL RADIOLOGY | Facility: HOSPITAL | Age: 80
Discharge: HOME | End: 2025-07-01
Attending: INTERNAL MEDICINE
Payer: MEDICARE

## 2025-07-01 VITALS
HEIGHT: 61 IN | BODY MASS INDEX: 30.78 KG/M2 | DIASTOLIC BLOOD PRESSURE: 77 MMHG | HEART RATE: 55 BPM | WEIGHT: 163 LBS | SYSTOLIC BLOOD PRESSURE: 145 MMHG | TEMPERATURE: 97 F

## 2025-07-01 DIAGNOSIS — M89.319 CLAVICLE ENLARGEMENT: ICD-10-CM

## 2025-07-01 DIAGNOSIS — M89.8X9 ABNORMAL BONE FORMATION: Primary | ICD-10-CM

## 2025-07-01 PROCEDURE — 73000 X-RAY EXAM OF COLLAR BONE: CPT | Performed by: INTERNAL MEDICINE

## 2025-07-01 PROCEDURE — 99214 OFFICE O/P EST MOD 30 MIN: CPT | Performed by: INTERNAL MEDICINE

## 2025-07-01 PROCEDURE — 71130 X-RAY STRENOCLAVIC JT 3/>VWS: CPT | Performed by: INTERNAL MEDICINE

## 2025-07-01 PROCEDURE — 71046 X-RAY EXAM CHEST 2 VIEWS: CPT | Performed by: RADIOLOGY

## 2025-07-08 ENCOUNTER — PATIENT MESSAGE (OUTPATIENT)
Dept: INTERNAL MEDICINE CLINIC | Facility: CLINIC | Age: 80
End: 2025-07-08

## 2025-07-08 NOTE — PROGRESS NOTES
HPI:    Patient ID: Maeve Garner is a 79 year old female.    HPI    Enlargement of left clavicle   no redness open wound or tenderness  Incidentally  noted  recently   No trauma    /77 (BP Location: Left arm, Patient Position: Sitting, Cuff Size: adult)   Pulse 55   Temp 97.1 °F (36.2 °C) (Temporal)   Ht 5' 1\" (1.549 m)   Wt 163 lb (73.9 kg)   BMI 30.80 kg/m²   Wt Readings from Last 6 Encounters:   07/01/25 163 lb (73.9 kg)   05/22/25 166 lb (75.3 kg)   04/14/25 166 lb (75.3 kg)   04/03/25 162 lb 11.2 oz (73.8 kg)   02/13/25 160 lb (72.6 kg)   02/06/25 162 lb (73.5 kg)     Body mass index is 30.8 kg/m².  HGBA1C:    Lab Results   Component Value Date    A1C 5.6 11/23/2021    A1C 5.2 11/30/2020    A1C 5.6 03/10/2015     11/23/2021         Review of Systems   Constitutional:  Negative for activity change, chills, fatigue and fever.   HENT:  Negative for ear discharge.    Eyes:  Negative for pain, discharge and redness.   Respiratory:  Negative for cough, chest tightness, shortness of breath and wheezing.    Cardiovascular:  Negative for chest pain, palpitations and leg swelling.   Gastrointestinal:  Negative for constipation, diarrhea, nausea and vomiting.   Genitourinary:  Negative for difficulty urinating.   Musculoskeletal:  Positive for arthralgias.   Skin:  Negative for rash.   Neurological:  Negative for syncope, weakness, light-headedness and headaches.   Psychiatric/Behavioral:  Negative for dysphoric mood. The patient is not nervous/anxious.          Current Medications[1]  Allergies:Allergies[2]    HISTORY:  Past Medical History[3]   Past Surgical History[4]   Family History[5]   Social History: Short Social Hx on File[6]     PHYSICAL EXAM:    Physical Exam  Constitutional:       Appearance: She is well-developed. She is not ill-appearing.   HENT:      Right Ear: Ear canal normal.      Left Ear: Ear canal normal.   Eyes:      Conjunctiva/sclera: Conjunctivae normal.    Cardiovascular:      Rate and Rhythm: Normal rate and regular rhythm.      Heart sounds: Normal heart sounds.   Pulmonary:      Effort: Pulmonary effort is normal.      Breath sounds: Normal breath sounds.   Abdominal:      General: Bowel sounds are normal.      Palpations: Abdomen is soft.   Skin:     General: Skin is warm and dry.   Neurological:      Mental Status: She is alert.   Psychiatric:         Mood and Affect: Mood normal.       Left clavicle enlarged bone       ASSESSMENT/PLAN:   (M89.8X9) Abnormal bone formation  (primary encounter diagnosis)  Plan: xray ordered    (M89.319) Clavicle enlargement  Plan: XR CLAVICLE, COMPLETE, LEFT (CPT=73000), XR         CLAVICLE, COMPLETE, RIGHT (CPT=73000), XR         STERNO CLAVICULAR JTS (MIN 3 VIEWS)         (CPT=71130), XR CHEST PA + LAT CHEST         (CPT=71046)        Xray ordered  Denies any associated sympotms       Monitor      No orders of the defined types were placed in this encounter.      Meds This Visit:  Requested Prescriptions      No prescriptions requested or ordered in this encounter       Imaging & Referrals:  None        ID#[id number]           [1]   Current Outpatient Medications   Medication Sig Dispense Refill    Cholecalciferol (VITAMIN D3) 25 MCG (1000 UT) Oral Cap Take 1 tablet by mouth daily.      cetirizine 5 MG Oral Tab Take 1 tablet (5 mg total) by mouth daily.      OMEPRAZOLE 20 MG Oral Capsule Delayed Release TAKE 1 CAPSULE BY MOUTH EVERY DAY IN THE MORNING BEFORE BREAKFAST 90 capsule 3    ipratropium 0.06 % Nasal Solution 2 sprays by Nasal route 3 (three) times daily. 45 mL 2    docusate sodium 100 MG Oral Cap Take 100 mg by mouth 2 (two) times daily. 20 capsule 0    apixaban (ELIQUIS) 5 MG Oral Tab Take 1 tablet (5 mg total) by mouth 2 (two) times daily. Last dose to be taken on Nov.4th,2023      Probiotic Product (ALIGN OR) Take 1 tablet by mouth daily.      Simethicone (GAS RELIEF 80 OR) Take 1 tablet by mouth as needed.      sotalol  80 MG Oral Tab Take 0.5 tablets (40 mg total) by mouth 2 (two) times daily.      mupirocin 2 % External Ointment Apply with cotton swab to bilateral nares twice daily as needed for crusting 15 g 2    atorvastatin 10 MG Oral Tab Take 1 tablet (10 mg total) by mouth nightly. 30 tablet 5    Calcium Carbonate-Vitamin D (CALCIUM + D OR) Take by mouth daily. Take 500mg & 1000 IU Vit D daily      omega-3 fatty acids (FISH OIL) 1000 MG Oral Cap Take 500 mg by mouth daily.      Multiple Vitamins-Minerals (MULTI FOR HER OR) Take 1 tablet by mouth daily.        Polyethylene Glycol 3350 (MIRALAX OR) Take by mouth. (Patient not taking: Reported on 7/1/2025)      Cyanocobalamin (VITAMIN B 12 OR) Take 1,000 mg by mouth daily with breakfast. (Patient not taking: Reported on 7/1/2025)     [2]   Allergies  Allergen Reactions    Grass Pollen(K-O-R-T-Swt Mauricio) OTHER (SEE COMMENTS)    Dust Runny nose     Headache, watery eyes    Mold Runny nose     Headache, watery eyes    Pollen Runny nose     Headache, watery eyes    Ragweed OTHER (SEE COMMENTS)     Watery eyes , Sneezing, HA's      Trees, [place] Runny nose     Headache, watery eyes   [3]   Past Medical History:   Age-related nuclear cataract of both eyes    Arrhythmia    A-fib-2017 dx, ablation 11/9/22    Atrial fibrillation (HCC)    Ramírez's esophagus    Brachial neuritis    Constipation    DUB (dysfunctional uterine bleeding)    per ng LIBBY    Esophageal reflux    Esophagitis    per ng eosinophilic egd w/ biopsy    Esophagitis    pr ng egd w/ biopsy    Floater, vitreous, left    Floater, vitreous, left    Gastritis    Glaucoma suspect of both eyes    Glaucoma suspect of both eyes    Glaucoma suspect of both eyes    Glaucoma suspect of both eyes    Hearing impairment    left ear hearing aid    Heartburn    per ng reflux, dysphagia    High blood pressure    High cholesterol    History of blood transfusion    r/t childbirth - no rx    Long term (current) use of anticoagulants     Meniere disease    Osteopenia    Osteopenia determined by x-ray    Other and unspecified hyperlipidemia    Paroxysmal atrial fibrillation (HCC)    Pseudoexfoliation (PXF) of lens capsule    Pseudoexfoliation (PXF) of lens capsule    Pseudoexfoliation (PXF) of lens capsule    Pseudoexfoliation (PXF) of lens capsule    Seasonal allergic rhinitis    Sensorineural hearing loss, unilateral    Sleep apnea    mouth guard    Snoring    per ng uvulectomy    Spinal stenosis of lumbar region    Visual impairment    glasses   [4]   Past Surgical History:  Procedure Laterality Date    Colonoscopy  08/26/2011    Colonoscopy      Colonoscopy N/A 02/03/2020    Procedure: COLONOSCOPY;  Surgeon: Omid De La Rosa MD;  Location: [facility] ENDOSCOPY    Colonoscopy N/A 2/13/2025    Procedure: COLONOSCOPY;  Surgeon: Omid De La Rosa MD;  Location: [facility] ENDOSCOPY    Cortisone injection Right 2010    per ng post op right forefoot    Ep a-flutter ablation  11/09/2022         Ep pulmonary vein/a-fib ablation  11/09/2022         Excise hand/foot neuroma Right 2007    per ng neuroma 2 rt, hammertoe 3rt, naprosyn 500    Excise hand/foot neuroma Left 2009    per ng 2 left hammer toe 2 rt, hallux valqus naprosyn 500    Excision of uvula  2006    per ng  uvula removed     Hand/finger surgery unlisted Right 2011    per ng hand synovitis surgery    Hysterectomy      partial at 34    Injection; tendon origin/insertion      per ng injection tendon sheath, ligament    Other surgical history Right 1979    per ng torn ligament right ankle    Upper gi endoscopy performed  2015    Upper gi endoscopy,exam     [5]   Family History  Problem Relation Age of Onset    Cancer Father         per ng lung    Breast Cancer Mother 60        approx    Other (Other) Mother     Other (MS) Sister     Heart Disease Other         per ng CAD, vein, artery, liver disease, arthritis    Other (Other) Other         per ng lupus erythematosus    Cancer Paternal Uncle         liver  cancer    Cancer Paternal Uncle         stomach cancer    Cancer Maternal Uncle         stomach cancer    Cancer Maternal Uncle     Colon Polyps Maternal Uncle         bone cancer    Macular degeneration Neg     Glaucoma Neg     Diabetes Neg    [6]   Social History  Socioeconomic History    Marital status:    Tobacco Use    Smoking status: Former     Current packs/day: 0.00     Average packs/day: 0.5 packs/day for 5.0 years (2.5 ttl pk-yrs)     Types: Cigarettes     Start date: 1963     Quit date: 1968     Years since quittin.8     Passive exposure: Past    Smokeless tobacco: Never   Vaping Use    Vaping status: Never Used   Substance and Sexual Activity    Alcohol use: Not Currently     Alcohol/week: 0.0 standard drinks of alcohol     Comment: formerly since 2016, rare alcohol currently    Drug use: No   Other Topics Concern    Caffeine Concern Yes     Comment: soda 12 oz     Social Drivers of Health     Food Insecurity: No Food Insecurity (4/3/2025)    NCSS - Food Insecurity     Worried About Running Out of Food in the Last Year: No     Ran Out of Food in the Last Year: No   Transportation Needs: No Transportation Needs (4/3/2025)    NCSS - Transportation     Lack of Transportation: No   Housing Stability: Not At Risk (4/3/2025)    NCSS - Housing/Utilities     Has Housing: Yes     Worried About Losing Housing: No     Unable to Get Utilities: No

## 2025-08-07 ENCOUNTER — OFFICE VISIT (OUTPATIENT)
Dept: PHYSICAL MEDICINE AND REHAB | Facility: CLINIC | Age: 80
End: 2025-08-07

## 2025-08-07 VITALS — HEIGHT: 61 IN | BODY MASS INDEX: 30.78 KG/M2 | WEIGHT: 163 LBS

## 2025-08-07 DIAGNOSIS — M41.25 OTHER IDIOPATHIC SCOLIOSIS, THORACOLUMBAR REGION: ICD-10-CM

## 2025-08-07 DIAGNOSIS — M54.16 LUMBAR RADICULOPATHY: Primary | ICD-10-CM

## 2025-08-07 DIAGNOSIS — M48.061 SPINAL STENOSIS OF LUMBAR REGION WITHOUT NEUROGENIC CLAUDICATION: ICD-10-CM

## 2025-08-07 DIAGNOSIS — M48.061 LUMBAR FORAMINAL STENOSIS: ICD-10-CM

## 2025-08-07 DIAGNOSIS — G89.29 CHRONIC BILATERAL THORACIC BACK PAIN: ICD-10-CM

## 2025-08-07 DIAGNOSIS — M54.12 CERVICAL RADICULOPATHY: ICD-10-CM

## 2025-08-07 DIAGNOSIS — I48.0 PAROXYSMAL ATRIAL FIBRILLATION (HCC): ICD-10-CM

## 2025-08-07 DIAGNOSIS — M54.6 CHRONIC BILATERAL THORACIC BACK PAIN: ICD-10-CM

## 2025-08-07 DIAGNOSIS — M51.9 LUMBAR DISC DISEASE: ICD-10-CM

## 2025-08-07 PROCEDURE — 99214 OFFICE O/P EST MOD 30 MIN: CPT | Performed by: PHYSICAL MEDICINE & REHABILITATION

## 2025-08-09 ENCOUNTER — HOSPITAL ENCOUNTER (OUTPATIENT)
Dept: GENERAL RADIOLOGY | Facility: HOSPITAL | Age: 80
Discharge: HOME | End: 2025-08-09
Attending: PHYSICAL MEDICINE & REHABILITATION

## 2025-08-09 DIAGNOSIS — M54.12 CERVICAL RADICULOPATHY: ICD-10-CM

## 2025-08-09 DIAGNOSIS — M41.25 OTHER IDIOPATHIC SCOLIOSIS, THORACOLUMBAR REGION: ICD-10-CM

## 2025-08-09 PROCEDURE — 72082 X-RAY EXAM ENTIRE SPI 2/3 VW: CPT | Performed by: PHYSICAL MEDICINE & REHABILITATION

## 2025-08-09 PROCEDURE — 72050 X-RAY EXAM NECK SPINE 4/5VWS: CPT | Performed by: PHYSICAL MEDICINE & REHABILITATION

## 2025-08-27 ENCOUNTER — OFFICE VISIT (OUTPATIENT)
Dept: PHYSICAL THERAPY | Age: 80
End: 2025-08-27
Attending: PHYSICAL MEDICINE & REHABILITATION

## 2025-08-27 DIAGNOSIS — M48.061 SPINAL STENOSIS OF LUMBAR REGION WITHOUT NEUROGENIC CLAUDICATION: ICD-10-CM

## 2025-08-27 DIAGNOSIS — I48.0 PAROXYSMAL ATRIAL FIBRILLATION (HCC): ICD-10-CM

## 2025-08-27 DIAGNOSIS — M54.12 CERVICAL RADICULOPATHY: ICD-10-CM

## 2025-08-27 DIAGNOSIS — G89.29 CHRONIC BILATERAL THORACIC BACK PAIN: ICD-10-CM

## 2025-08-27 DIAGNOSIS — M54.16 LUMBAR RADICULOPATHY: Primary | ICD-10-CM

## 2025-08-27 DIAGNOSIS — M51.9 LUMBAR DISC DISEASE: ICD-10-CM

## 2025-08-27 DIAGNOSIS — M41.25 OTHER IDIOPATHIC SCOLIOSIS, THORACOLUMBAR REGION: ICD-10-CM

## 2025-08-27 DIAGNOSIS — M54.6 CHRONIC BILATERAL THORACIC BACK PAIN: ICD-10-CM

## 2025-08-27 DIAGNOSIS — M48.061 LUMBAR FORAMINAL STENOSIS: ICD-10-CM

## 2025-08-27 PROCEDURE — 97140 MANUAL THERAPY 1/> REGIONS: CPT

## 2025-08-27 PROCEDURE — 97161 PT EVAL LOW COMPLEX 20 MIN: CPT

## 2025-08-27 PROCEDURE — 97110 THERAPEUTIC EXERCISES: CPT

## 2025-08-29 ENCOUNTER — HOSPITAL ENCOUNTER (OUTPATIENT)
Age: 80
Discharge: HOME | End: 2025-08-29

## 2025-08-29 VITALS
TEMPERATURE: 98 F | SYSTOLIC BLOOD PRESSURE: 147 MMHG | DIASTOLIC BLOOD PRESSURE: 62 MMHG | HEART RATE: 70 BPM | OXYGEN SATURATION: 98 % | RESPIRATION RATE: 18 BRPM

## 2025-08-29 DIAGNOSIS — M54.2 NECK PAIN: Primary | ICD-10-CM

## 2025-08-29 PROCEDURE — 99213 OFFICE O/P EST LOW 20 MIN: CPT

## 2025-08-29 RX ORDER — METHYLPREDNISOLONE 4 MG/1
TABLET ORAL
Qty: 1 EACH | Refills: 0 | Status: SHIPPED | OUTPATIENT
Start: 2025-08-29

## (undated) DIAGNOSIS — Z79.01 LONG TERM (CURRENT) USE OF ANTICOAGULANTS: ICD-10-CM

## (undated) DIAGNOSIS — I48.91 ATRIAL FIBRILLATION, UNSPECIFIED TYPE (HCC): Primary | ICD-10-CM

## (undated) DIAGNOSIS — M77.8 LEFT SHOULDER TENDONITIS: Primary | ICD-10-CM

## (undated) DIAGNOSIS — Z51.81 ENCOUNTER FOR THERAPEUTIC DRUG MONITORING: ICD-10-CM

## (undated) DEVICE — VIOLET BRAIDED (POLYGLACTIN 910), SYNTHETIC ABSORBABLE SUTURE: Brand: COATED VICRYL

## (undated) DEVICE — CLIP LGT 11MM OPEN 2.8MM 235CM

## (undated) DEVICE — BANDAGE COMPR W6INXL11YD WVN COT/E CLP CLSR

## (undated) DEVICE — Device: Brand: DEFENDO AIR/WATER/SUCTION AND BIOPSY VALVE

## (undated) DEVICE — MYKNEE PPS STD FEMDISCUTBLOCK-MRI-GMK-LM-#3+: Brand: MYKNEE PPS

## (undated) DEVICE — NET SPEC 3X6CM TIP DIA2.5MM CATH L230CM

## (undated) DEVICE — YANKAUER,BULB TIP,W/O VENT,RIGID,STERILE: Brand: MEDLINE

## (undated) DEVICE — 2T11 #2 PDO 36 X 36: Brand: 2T11 #2 PDO 36 X 36

## (undated) DEVICE — DISPOSABLE TOURNIQUET CUFF SINGLE BLADDER, DUAL PORT AND QUICK CONNECT CONNECTOR: Brand: COLOR CUFF

## (undated) DEVICE — 60 ML SYRINGE LUER-LOCK TIP: Brand: MONOJECT

## (undated) DEVICE — Device: Brand: CUSTOM PROCEDURE KIT

## (undated) DEVICE — CEMENT MIXING SYSTEM WITH FEMORAL BREAKWAY NOZZLE: Brand: REVOLUTION

## (undated) DEVICE — V2 SPECIMEN COLLECTION MANIFOLD KIT: Brand: NEPTUNE

## (undated) DEVICE — SUTURE VCRL SZ 2-0 L27IN ABSRB UD L24MM FS-1

## (undated) DEVICE — KIT CLEAN ENDOKIT 1.1OZ GOWNX2

## (undated) DEVICE — 2DE14 2-0 PDO 24 X 24: Brand: 2DE14 2-0 PDO 24 X 24

## (undated) DEVICE — Device

## (undated) DEVICE — GAMMEX® PI HYBRID SIZE 9, STERILE POWDER-FREE SURGICAL GLOVE, POLYISOPRENE AND NEOPRENE BLEND: Brand: GAMMEX

## (undated) DEVICE — DRESSING 10X4IN ANMC SAFETAC

## (undated) DEVICE — SHORT THREADED PINS PACK: Brand: KNEE INSTRUMENTS

## (undated) DEVICE — CONMED SCOPE SAVER BITE BLOCK, 20X27 MM: Brand: SCOPE SAVER

## (undated) DEVICE — SOLUTION  .9 3000ML

## (undated) DEVICE — MYKNEE PPS MIS TIBCUTBLOCK-MRI-GMK-LM-#2: Brand: MYKNEE PPS

## (undated) DEVICE — 60 ML SYRINGE REGULAR TIP: Brand: MONOJECT

## (undated) DEVICE — DRAPE SHEET LG

## (undated) DEVICE — MYKNEE PPS STD FEMDISCUTBLOCK-MRI-GMK-RM-#4: Brand: MYKNEE PPS

## (undated) DEVICE — CLOSURE EXOFIN 1.0ML

## (undated) DEVICE — MYKNEE PPS MIS TIBCUTBLOCK-MRI-GMK-RM-#4: Brand: MYKNEE PPS

## (undated) DEVICE — DRAPE SHEET LARGE 76X55

## (undated) DEVICE — BANDAGE,GAUZE,BULKEE II,4.5"X4.1YD,STRL: Brand: MEDLINE

## (undated) DEVICE — COTTON ROLL: Brand: DEROYAL

## (undated) DEVICE — GAMMEX® PI HYBRID SIZE 7, STERILE POWDER-FREE SURGICAL GLOVE, POLYISOPRENE AND NEOPRENE BLEND: Brand: GAMMEX

## (undated) DEVICE — Device: Brand: STABLECUT®

## (undated) DEVICE — PATIENT RETURN ELECTRODE, SINGLE-USE, CONTACT QUALITY MONITORING, ADULT, WITH 9FT CORD, FOR PATIENTS WEIGING OVER 33LBS. (15KG): Brand: MEGADYNE

## (undated) DEVICE — MYKNEE PATIENT SPECIFIC GUIDES

## (undated) DEVICE — MEDI-VAC NON-CONDUCTIVE SUCTION TUBING 6MM X 1.8M (6FT.) L: Brand: CARDINAL HEALTH

## (undated) DEVICE — 35 ML SYRINGE REGULAR TIP: Brand: MONOJECT

## (undated) DEVICE — SCREWS PACK: Brand: KNEE INSTRUMENTS

## (undated) DEVICE — GAMMEX® NON-LATEX PI ORTHO SIZE 9, STERILE POLYISOPRENE POWDER-FREE SURGICAL GLOVE: Brand: GAMMEX

## (undated) DEVICE — HOOD: Brand: FLYTE

## (undated) DEVICE — SMOOTH PINS PACK: Brand: KNEE INSTRUMENTS

## (undated) DEVICE — ADHESIVE SKIN TOP FOR WND CLSR DERMBND ADV

## (undated) DEVICE — 3M™ STERI-DRAPE™ U-DRAPE 1015: Brand: STERI-DRAPE™

## (undated) DEVICE — MYKNEE MIS TIBIAL BONE MODEL LEFT MEDIAL: Brand: MY KNEE BONE MODELS

## (undated) DEVICE — MYKNEE MIS TIBIAL BONE MODEL RIGHT MEDIAL: Brand: MY KNEE BONE MODELS

## (undated) DEVICE — SUT VICRYL 2-0 FS-1 J443H

## (undated) DEVICE — SOLUTION IRRIG 3000ML 0.9% NACL FLX CONT

## (undated) DEVICE — SNARE OPTMZ PLPCTM TRP

## (undated) DEVICE — MYKNEE FEMUR BONE MODEL LEFT: Brand: MY KNEE BONE MODELS

## (undated) DEVICE — SPONGE GZ 4XL4IN 100% COT 12 PLY TYP VII WVN

## (undated) DEVICE — NEEDLE SPINAL BD 20GX3.5

## (undated) DEVICE — NEEDLE SPNL 20GA L3.5IN YEL HUB SS RW FIT

## (undated) DEVICE — 450 ML BOTTLE OF 0.05% CHLORHEXIDINE GLUCONATE IN 99.95% STERILE WATER FOR IRRIGATION, USP AND APPLICATOR.: Brand: IRRISEPT ANTIMICROBIAL WOUND LAVAGE

## (undated) DEVICE — TRAY CATH 16FR F INCLUDE BARDX IC COMPLT CARE

## (undated) DEVICE — MEDI-VAC NON-CONDUCTIVE SUCTION TUBING: Brand: CARDINAL HEALTH

## (undated) DEVICE — KIT ENDO ORCAPOD 160/180/190

## (undated) DEVICE — ENDOSCOPY PACK UPPER: Brand: MEDLINE INDUSTRIES, INC.

## (undated) DEVICE — GAMMEX® NON-LATEX PI ORTHO SIZE 8.5, STERILE POLYISOPRENE POWDER-FREE SURGICAL GLOVE: Brand: GAMMEX

## (undated) DEVICE — STERILE POLYISOPRENE POWDER-FREE SURGICAL GLOVES: Brand: PROTEXIS

## (undated) DEVICE — GMK SPHERE KNEE: Type: IMPLANTABLE DEVICE

## (undated) DEVICE — ADHESIVE LIQ 2/3ML VI MASTISOL

## (undated) DEVICE — T5 HOOD WITH PEEL AWAY FACE SHIELD

## (undated) DEVICE — GAUZE TRAY STERILE 4X4 12PLY

## (undated) DEVICE — APPLICATOR CHLORAPREP 26ML

## (undated) DEVICE — LINE MNTR ADLT SET O2 INTMD

## (undated) DEVICE — TOTAL KNEE: Brand: MEDLINE INDUSTRIES, INC.

## (undated) DEVICE — FORCEP RADIAL JAW 4

## (undated) DEVICE — SNARE ENDOSCOPIC 10MM ROUND

## (undated) DEVICE — WRAP COOLING KNEE W/ICE PILLOW

## (undated) DEVICE — MYKNEE FEMUR BONE MODEL RIGHT: Brand: MY KNEE BONE MODELS

## (undated) DEVICE — BANDAGE FLXMSTR 11YDX6IN STRL

## (undated) DEVICE — APPLICATOR SKIN PREP 26ML HI LT ORNG 2% CHG

## (undated) DEVICE — GAMMEX® PI HYBRID SIZE 6.5, STERILE POWDER-FREE SURGICAL GLOVE, POLYISOPRENE AND NEOPRENE BLEND: Brand: GAMMEX

## (undated) NOTE — MR AVS SNAPSHOT
[facility] SPECIALTY [facility] [place] [address]  [address]  [phone]               Thank you for choosing us for your health care visit with Primo Vela MD.  We are glad to serve you and happy to provide you with this summary of y Take  by mouth. GLUCOSAMINE-CHONDROITIN OR   Take 1 tablet by mouth 2 (two) times daily. Take Glucosamine 1500mg and Chondroitin 1100mg two times a day. LORazepam 0.5 MG Tabs   Take 0.5 mg by mouth as needed.    Commonly known as:  Dasha Fossa

## (undated) NOTE — Clinical Note
01/30/2017     Dr. Jeane Blount  [address] [place] [address] [facility] Dr. Andreina Aleman,     I saw Sreedhar  in the afib clinic today. She is stable in Sinus dorota on atenolol 25 mg daily and asa 325 mg daily.  Having mild heart bu

## (undated) NOTE — LETTER
Maeve Garner  9/28/1945    A patient of your clinic was recently seen by the hospitalists at [facility], and will be following up with you on 6/15/22. The patient's last INR values were, as follows:    Lab Results   Component Value Date    INR 2.64 (H) 06/12/2022    INR 3.1 (A) 06/01/2022    INR 2.4 (A) 05/04/2022       The patient's discharge Coumadin dosing is as follows:    warfarin 5 MG Tabs  Commonly known as: Coumadin  Next dose due: tonight  Take as directed. If you are  unsure how to take this  medication, talk to your  nurse or doctor. Original instructions: Taken as  directed by coumadin clinic    Please contact us if you have any questions.     Lester Dominguez RN  06/14/22

## (undated) NOTE — LETTER
12/1/2017    [address]        [place] [address]            Dear Kendall Mccabe,      Our records indicate that you are due for an appointment for a EGD or Upper Endoscopy after 1/15/2018 with Dev Wu,

## (undated) NOTE — MR AVS SNAPSHOT
[facility]  [address] [address]  [phone]               Thank you for choosing us for your health care visit with Son Daniels MD.  We are glad to serve you and happy to provide you with this summary of yo Take by mouth daily. Take 500mg & 1000 IU Vit D daily           CLARITIN 10 MG Tabs   Generic drug:  loratadine   Take  by mouth. GLUCOSAMINE-CHONDROITIN OR   Take 1 tablet by mouth 2 (two) times daily.  Take Glucosamine 1500mg and Chondroitin 110 Don’t eat while distracted and slow down. Avoid over sized portions. Don’t eat while when you’re bored.      EAT THESE FOODS MORE OFTEN: EAT THESE FOODS LESS OFTEN:   Make half your plate fruits and vegetables Highly refined, white starches including wh

## (undated) NOTE — LETTER
[facility]    Consent for Coronary CT Angiography    Your doctor has recommended that you, MARTIN Gould have a Coronary CT Angiography procedure.  Coronary CT Angiography is a diagnostic procedure using computed patient when taking medication. Allergic reactions to medication can range from very minor to very serious leading to a life threatening situation or even death. Please be sure to communicate any allergy you may have to your caregiver immediately.     The i

## (undated) NOTE — LETTER
[facility]  Administration of Anesthesia  IMaevecandis agree to be cared for by a physician anesthesiologist alone and/or with a nurse anesthetist, who is specially trained to monitor me and give me medicine to put me to sleep or keep me comfortable during my procedure    I understand that my anesthesiologist and/or anesthetist is not an employee or agent of [facility] or [employer]. He or she works for [facility]    As the patient asking for anesthesia services, I agree to:  Allow the anesthesiologist (anesthesia doctor) to give me medicine and do additional procedures as necessary. Some examples are: Starting or using an “IV” to give me medicine, fluids or blood during my procedure, and having a breathing tube placed to help me breathe when I’m asleep (intubation). In the event that my heart stops working properly, I understand that my anesthesiologist will make every effort to sustain my life, unless otherwise directed by [facility] Do Not Resuscitate documents.  Tell my anesthesia doctor before my procedure:  If I am pregnant.  The last time that I ate or drank.  iii. All of the medicines I take (including prescriptions, herbal supplements, and pills I can buy without a prescription (including street drugs/illegal medications). Failure to inform my anesthesiologist about these medicines may increase my risk of anesthetic complications.  iv.If I am allergic to anything or have had a reaction to anesthesia before.  I understand how the anesthesia medicine will help me (benefits).  I understand that with any type of anesthesia medicine there are risks:  The most common risks are: nausea, vomiting, sore throat, muscle soreness, damage to my eyes, mouth, or teeth (from breathing tube placement).  Rare risks include: remembering what happened during my procedure, allergic reactions to medications, injury to my airway, heart, lungs, vision, nerves,  or muscles and in extremely rare instances death.  My doctor has explained to me other choices available to me for my care (alternatives).  Pregnant Patients (“epidural”):  I understand that the risks of having an epidural (medicine given into my back to help control pain during labor), include itching, low blood pressure, difficulty urinating, headache or slowing of the baby’s heart. Very rare risks include infection, bleeding, seizure, irregular heart rhythms and nerve injury.  Regional Anesthesia (“spinal”, “epidural”, & “nerve blocks”):  I understand that rare but potential complications include headache, bleeding, infection, seizure, irregular heart rhythms, and nerve injury.    _____________________________________________________________________________  Patient (or Representative) Signature/Relationship to Patient  Date   Time    _____________________________________________________________________________   Name (if used)    Language/Organization   Time    _____________________________________________________________________________  Nurse Anesthetist Signature     Date   Time  _____________________________________________________________________________  Anesthesiologist Signature     Date   Time  I have discussed the procedure and information above with the patient (or patient’s representative) and answered their questions. The patient or their representative has agreed to have anesthesia services.    _____________________________________________________________________________  Witness        Date   Time  I have verified that the signature is that of the patient or patient’s representative, and that it was signed before the procedure  Patient Name: Maeve LAI Pascual     : 1945                 Printed: 2/10/2025 at 7:59 AM    Medical Record #: [id number]                                            Page 1 of 1  ----------ANESTHESIA CONSENT----------

## (undated) NOTE — Clinical Note
CLAUDIOI, TCM call made, see notes.  NCM confirmed with patient that she has a TCM HFU on 6/22/22 at 1:20 pm.

## (undated) NOTE — LETTER
2/9/2021    Patient: Sera Mccabe   MR Number: [id number]   YOB: 1945   Date of Visit: 2/9/2021   Physician: Pop De Oliveira MD     Dear Medicare Patient:  Madison Gonzales TO BENEFICIARY:  Please know that while a refraction

## (undated) NOTE — LETTER
AUTHORIZATION FOR SURGICAL OPERATION OR OTHER PROCEDURE    1.  I hereby authorize CON[facility] Lutz, [facility] to perform the following operation and/or procedure at the [facility]:    ______________________[place] Patient Name:  ______________________________________________________  (please print)      Patient signature:  ___________________________________________________             Relationship to Patient:           []  Parent    Responsible person

## (undated) NOTE — LETTER
[facility], [place]  [address], [place], [place]    Authorization for Surgical Operation and Procedure                               I hereby authorize Omid De La Rosa MD, my physician and his/her assistants (if applicable), which may include medical students, residents, and/or fellows, to perform the following surgical operation/ procedure and administer such anesthesia as may be determined necessary by my physician: Operation/Procedure name (s) COLONOSCOPY/ ESOPHAGOGASTRODUODENOSCOPY on Maeve Garner   2.   I recognize that during the surgical operation/procedure, unforeseen conditions may necessitate additional or different procedures than those listed above.  I, therefore, further authorize and request that the above-named surgeon, assistants, or designees perform such procedures as are, in their judgment, necessary and desirable.    3.   My surgeon/physician has discussed prior to my surgery the potential benefits, risks and side effects of this procedure; the likelihood of achieving goals; and potential problems that might occur during recuperation.  They also discussed reasonable alternatives to the procedure, including risks, benefits, and side effects related to the alternatives and risks related to not receiving this procedure.  I have had all my questions answered and I acknowledge that no guarantee has been made as to the result that may be obtained.    4.   Should the need arise during my operation/procedure, which includes change of level of care prior to discharge, I also consent to the administration of blood and/or blood products.  Further, I understand that despite careful testing and screening of blood or blood products by collecting agencies, I may still be subject to ill effects as a result of receiving a blood transfusion and/or blood products.  The following are some, but not all, of the potential risks that can occur: fever and allergic reactions, hemolytic reactions,  transmission of diseases such as Hepatitis, AIDS and Cytomegalovirus (CMV) and fluid overload.  In the event that I wish to have an autologous transfusion of my own blood, or a directed donor transfusion, I will discuss this with my physician.  Check only if Refusing Blood or Blood Products  I understand refusal of blood or blood products as deemed necessary by my physician may have serious consequences to my condition to include possible death. I hereby assume responsibility for my refusal and release the hospital, its personnel, and my physicians from any responsibility for the consequences of my refusal.    o  Refuse   5.   I authorize the use of any specimen, organs, tissues, body parts or foreign objects that may be removed from my body during the operation/procedure for diagnosis, research or teaching purposes and their subsequent disposal by hospital authorities.  I also authorize the release of specimen test results and/or written reports to my treating physician on the hospital medical staff or other referring or consulting physicians involved in my care, at the discretion of the Pathologist or my treating physician.    6.   I consent to the photographing or videotaping of the operations or procedures to be performed, including appropriate portions of my body for medical, scientific, or educational purposes, provided my identity is not revealed by the pictures or by descriptive texts accompanying them.  If the procedure has been photographed/videotaped, the surgeon will obtain the original picture, image, videotape or CD.  The hospital will not be responsible for storage, release or maintenance of the picture, image, tape or CD.    7.   I consent to the presence of a  or observers in the operating room as deemed necessary by my physician or their designees.    8.   I recognize that in the event my procedure results in extended X-Ray/fluoroscopy time, I may develop a skin reaction.    9. If  I have a Do Not Attempt Resuscitation (DNAR) order in place, that status will be suspended while in the operating room, procedural suite, and during the recovery period unless otherwise explicitly stated by me (or a person authorized to consent on my behalf). The surgeon or my attending physician will determine when the applicable recovery period ends for purposes of reinstating the DNAR order.  10. Patients having a sterilization procedure: I understand that if the procedure is successful the results will be permanent and it will therefore be impossible for me to inseminate, conceive, or bear children.  I also understand that the procedure is intended to result in sterility, although the result has not been guaranteed.   11. I acknowledge that my physician has explained sedation/analgesia administration to me including the risk and benefits I consent to the administration of sedation/analgesia as may be necessary or desirable in the judgment of my physician.    I CERTIFY THAT I HAVE READ AND FULLY UNDERSTAND THE ABOVE CONSENT TO OPERATION and/or OTHER PROCEDURE.     ____________________________________  _________________________________        ______________________________  Signature of Patient    Signature of Responsible Person                Printed Name of Responsible Person                                      ____________________________________  _____________________________                ________________________________  Signature of Witness        Date  Time         Relationship to Patient    STATEMENT OF PHYSICIAN My signature below affirms that prior to the time of the procedure; I have explained to the patient and/or his/her legal representative, the risks and benefits involved in the proposed treatment and any reasonable alternative to the proposed treatment. I have also explained the risks and benefits involved in refusal of the proposed treatment and alternatives to the proposed treatment and have  answered the patient's questions. If I have a significant financial interest in a co-management agreement or a significant financial interest in any product or implant, or other significant relationship used in this procedure/surgery, I have disclosed this and had a discussion with my patient.     _____________________________________________________              _____________________________  (Signature of Physician)                                                                                         (Date)                                   (Time)  Patient Name: Maeve Garner      : 1945      Printed: 2/10/2025     Medical Record #: [id number]                                      Page 1 of 1

## (undated) NOTE — MR AVS SNAPSHOT
[facility] SPECIALTY [facility] - [place] [address] [address]  [id number] [phone]               Thank you for choosing us for your health care visit with Seven Meeks MD.  We are glad to serve you and happy to provide you with this summary of Take 1 tablet by mouth 2 (two) times daily. Take Glucosamine 1500mg and Chondroitin 1100mg two times a day. LORazepam 0.5 MG Tabs   Take 0.5 mg by mouth as needed.    Commonly known as:  ATIVAN           Lovastatin 10 MG Tabs   Take 1 tablet (10 m

## (undated) NOTE — LETTER
August 24, 2017         Marcie Cobian, [address] [place] [address]      Patient: Nancy Menendez   YOB: 1945   Date of Visit: 8/22/2017       Dear  Nikki Monroy  :           As you know, Jhonatan Jaramillo is a 61-year-old female w Bladder function normal. No depression. No thyroid disease. No rash. Muscles and joints unremarkable. No weight loss no weight gain.     PHYSICAL EXAMINATION: Vital signs normal. HEENT examination is unremarkable with pupils equal round and reactive to ligh I am delighted to assist with Maeve's care.             With warmest regards,           Gelacio Be MD   [facility] OFFICE BUILDING, [address], [address]    Document

## (undated) NOTE — LETTER
Jose Daniel Whitehead, [address]  [place], [place]       08/22/17        Patient: Romeo Bridegjhonathan   YOB: 1945   Date of Visit: 8/22/2017       Dear  Sarita Swanson  :           As you know, Mono Allen is a 42-year-old female who I a Bladder function normal. No depression. No thyroid disease. No rash. Muscles and joints unremarkable. No weight loss no weight gain.     PHYSICAL EXAMINATION: Vital signs normal. HEENT examination is unremarkable with pupils equal round and reactive to ligh I am delighted to assist with Maeve's care.             With warmest regards,           Sari Patel MD   [facility] OFFICE BUILDING, [address] [place] [address]    Document

## (undated) NOTE — LETTER
2/6/2025      Ellis Dorsey MD  Physical Medicine and Rehabilitation  [address] [place] [address], [address]  [facility] [address]  Dept: [phone]  Dept Fax: [phone]        RE: Consultation for Maeve Garner        Dear Nura Bates MD,    Thank you very much for the opportunity to see your patient.  Attached please find a summary from your patient's recent visit.     I appreciate the chance to take care of your patient with you.  Please feel free to call me with any questions or concerns.    Sincerely,        Ellis Dorsey MD  Electronically Signed on 2/6/2025

## (undated) NOTE — Clinical Note
Spoke with pt today for TCM--sent office staff TE for appt clarification and f/u, as needed, per TCM protocol--thank you.

## (undated) NOTE — MR AVS SNAPSHOT
[address]  [address] [place] [address]  [address] [place] [phone]  [phone]               Thank you for choosing us for your health care visit with Jarad Malone NP.   We are glad to serve you and happy to provide yo exercise like walking for about 30 minutes 5 days per week. Start by walking at a slow to moderate pace for 5-10 minutes 2-3 times a day. Pace your activity to prevent shortness of breath or fatigue.  Stop exercise if you develop chest pain, lightheadedness Take 0.5 mg by mouth as needed. Commonly known as:  ATIVAN           Lovastatin 10 MG Tabs   Take 1 tablet (10 mg total) by mouth daily. Meclizine HCl 12.5 MG Tabs   Take 12.5 mg by mouth as needed.    Commonly known as:  ANTIVERT           LCT

## (undated) NOTE — IP AVS SNAPSHOT
[address]  [address] [facility] [phone]                Discharge Summary   1/21/2017    Judd Benavides [address] [place] [address]           Admission Information        Provider Department    1/21/2017 Pinky No MD E CLARITIN 10 MG Tabs   Generic drug:  loratadine   Next dose due: Tomorrow am        Take  by mouth. GLUCOSAMINE-CHONDROITIN OR   Next dose due: Tonight        Take 1 tablet by mouth 2 (two) times daily.  Take Glucosamine 1500mg Contact information:    [address] [facility]  [phone]          Follow up with Lonnie Perez MD In 3 weeks.     Specialty:  CARDIOLOGY    Contact information:    [facility] [address]  [phone]        Immunization H 0.72 (01/22/17)  9.1 (10/25/16)  87 (10/25/16)  21      Metabolic Lab Results  (Last result in the past 90 days)    ALT Bilirubin,Total Total Protein Albumin Sodium Potassium Chloride    (10/25/16)  20 (10/25/16)  0.8 (10/25/16)  7.6 (10/25/16)  3.6 (01/22 For medical emergencies, dial 911.             _____________________________________________________________________________    Medication Side Effects - Medications to be taken at home  As your caregivers, we want you to be aware of the medications you a omeprazole 20 MG Oral Capsule Delayed Release    Meclizine HCl (ANTIVERT) 12.5 MG Oral Tab    Ondansetron HCl (ZOFRAN) 4 mg tablet    Probiotic Product (PROBIOTIC DAILY OR)       Use:  Nausea/vomiting, acid reflux, low bowel motility, stomach pain   Most

## (undated) NOTE — LETTER
Patient Name: Maeve Garner  Surgery Date: 2/13/2025  Medical Record: [id number] CSN: [id number]      Surgeon(s):  Omid De La Rosa MD  Consent Procedure: COLONOSCOPY  Anesthesia Type: MAC    Pt states she was expecting to have an EGD as well as the colonoscopy during this procedure. Please reach out to the patient.       Thank you.

## (undated) NOTE — MR AVS SNAPSHOT
[address] [facility] Esmer Christie [phone]               Thank you for choosing us for your health care visit with [facility].   We are glad to serve you and happy to provide you with [facility] Commonly known as:  ANTIVERT           MULTI FOR HER OR   Take 1 tablet by mouth daily. omega-3 fatty acids 1000 MG Caps   Take 500 mg by mouth 2 (two) times daily.    Commonly known as:  FISH OIL           Omeprazole 40 MG Cpdr   Take 1 capsule (

## (undated) NOTE — LETTER
3/14/2018              Maeve Garner        [address] [place] [address]         To whom it may concern,    )  Maeve Garner (:1945)  Is under my medical.  Her ideal weight is acceptable   At 150 lbs.

## (undated) NOTE — MR AVS SNAPSHOT
[facility]  [address] [place] [address], [address] [place] [address]               Thank you for choosing us for your health care visit with Nick Duval MD.  We are glad to serve you and happy to provide you with this summary wake you when you are asleep. Carpal tunnel syndrome may occur during pregnancy and with use of birth control pills. It is more common in workers who must often bend their wrists.  It is also common in people who work with power tools that cause strong vib When to seek medical advice  Call your healthcare provider right away if any of these occur:  · Pain not improving with the above treatment  · Fingers or hand become cold, blue, numb, or tingly  · Your whole arm becomes swollen or weak  Date Last Reviewed: your hand and arm muscles stronger. This can help you keep a better position. Date Last Reviewed: 9/11/2015  © 9647-2483 The [address] [place] [address], [address] [place] [address]. All rights reserved.  This information is not intended as a subst Warfarin Sodium 5 MG Tabs   Take 1 tablet (5 mg total) by mouth nightly. Or as directed by anticoagulation clinic. Commonly known as:  COUMADIN           Wrist Brace Misc   1 Application by Does not apply route nightly.            ZANTAC 150 MG Tabs   Ge

## (undated) NOTE — MR AVS SNAPSHOT
[facility] SPECIALTY [facility] - [place] [address] [place]  [id number] [phone]               Thank you for choosing us for your health care visit with Nikunj Arriaga MD.  We are glad to serve you and happy to provide you with this summary of **REFERRAL REQUEST**    Your physician has referred you to a specialist.  Please call the number below to schedule an appointment.     [address]  [address], [address]  [phone]    If you are confident that your manolo Take by mouth daily. Take 500mg & 1000 IU Vit D daily           CLARITIN 10 MG Tabs   Generic drug:  loratadine   Take  by mouth. GLUCOSAMINE-CHONDROITIN OR   Take 1 tablet by mouth 2 (two) times daily.  Take Glucosamine 1500mg and Chondroitin 110 Summaries. If you've been to the Emergency Department or your doctor's office, you can view your past visit information in [employer] by going to Visits < Visit Summaries. [employer] questions? Call [phone] for help.   [employer] is NOT to be used for urge

## (undated) NOTE — MR AVS SNAPSHOT
[facility] SPECIALTY [facility] - [place] [address] [place]  [address]  [phone]               Thank you for choosing us for your health care visit with THERESE Braun.   We are glad to serve you and happy to provide you with this summary Take 1 tablet (10 mg total) by mouth daily. Meclizine HCl 12.5 MG Tabs   Take 12.5 mg by mouth as needed. Commonly known as:  ANTIVERT           MULTI FOR HER OR   Take 1 tablet by mouth daily.            omega-3 fatty acids 1000 MG Caps   Take

## (undated) NOTE — LETTER
[address] [facility]    Consent for Coronary CT Angiography    Your doctor has recommended that you  Mike Louie have a Coronary CT Angiography procedure.  Coronary CT Angiography is a diagnostic procedure using computed t patient when taking medication. Allergic reactions to medication can range from very minor to very serious leading to a life threatening situation or even death. Please be sure to communicate any allergy you may have to your caregiver immediately.     The i